# Patient Record
Sex: MALE | Race: WHITE | NOT HISPANIC OR LATINO | Employment: FULL TIME | URBAN - METROPOLITAN AREA
[De-identification: names, ages, dates, MRNs, and addresses within clinical notes are randomized per-mention and may not be internally consistent; named-entity substitution may affect disease eponyms.]

---

## 2019-09-10 ENCOUNTER — HOSPITAL ENCOUNTER (OUTPATIENT)
Dept: GENERAL RADIOLOGY | Facility: HOSPITAL | Age: 59
Discharge: HOME | End: 2019-09-10
Attending: NURSE PRACTITIONER

## 2023-01-14 ENCOUNTER — APPOINTMENT (OUTPATIENT)
Dept: MRI IMAGING | Facility: HOSPITAL | Age: 63
DRG: 439 | End: 2023-01-14
Payer: COMMERCIAL

## 2023-01-14 ENCOUNTER — APPOINTMENT (OUTPATIENT)
Dept: CT IMAGING | Facility: HOSPITAL | Age: 63
DRG: 439 | End: 2023-01-14
Payer: COMMERCIAL

## 2023-01-14 ENCOUNTER — HOSPITAL ENCOUNTER (INPATIENT)
Facility: HOSPITAL | Age: 63
LOS: 3 days | Discharge: HOME | DRG: 439 | End: 2023-01-17
Attending: EMERGENCY MEDICINE | Admitting: INTERNAL MEDICINE
Payer: COMMERCIAL

## 2023-01-14 DIAGNOSIS — R10.9 ABDOMINAL PAIN, UNSPECIFIED ABDOMINAL LOCATION: ICD-10-CM

## 2023-01-14 DIAGNOSIS — R11.2 NAUSEA AND VOMITING, UNSPECIFIED VOMITING TYPE: ICD-10-CM

## 2023-01-14 DIAGNOSIS — K85.90 ACUTE PANCREATITIS, UNSPECIFIED COMPLICATION STATUS, UNSPECIFIED PANCREATITIS TYPE: Primary | ICD-10-CM

## 2023-01-14 DIAGNOSIS — R79.89 ELEVATED LFTS: ICD-10-CM

## 2023-01-14 LAB
ACETONE BLD QL: NEGATIVE
ALBUMIN SERPL-MCNC: 4.7 G/DL (ref 3.5–5.2)
ALBUMIN/GLOB SERPL: 1.8 G/DL
ALP SERPL-CCNC: 151 U/L (ref 39–117)
ALT SERPL W P-5'-P-CCNC: 334 U/L (ref 1–41)
ANION GAP SERPL CALCULATED.3IONS-SCNC: 9.6 MMOL/L (ref 5–15)
APTT PPP: 26.8 SECONDS (ref 24.2–34.2)
AST SERPL-CCNC: 516 U/L (ref 1–40)
BASOPHILS # BLD AUTO: 0.01 10*3/MM3 (ref 0–0.2)
BASOPHILS NFR BLD AUTO: 0.1 % (ref 0–1.5)
BILIRUB SERPL-MCNC: 1.2 MG/DL (ref 0–1.2)
BILIRUB UR QL STRIP: NEGATIVE
BUN SERPL-MCNC: 23 MG/DL (ref 8–23)
BUN/CREAT SERPL: 22.5 (ref 7–25)
CALCIUM SPEC-SCNC: 9.5 MG/DL (ref 8.6–10.5)
CANCER AG19-9 SERPL-ACNC: 81.4 U/ML
CHLORIDE SERPL-SCNC: 99 MMOL/L (ref 98–107)
CLARITY UR: CLEAR
CO2 SERPL-SCNC: 26.4 MMOL/L (ref 22–29)
COLOR UR: ABNORMAL
CREAT SERPL-MCNC: 1.02 MG/DL (ref 0.76–1.27)
D-LACTATE SERPL-SCNC: 1.8 MMOL/L (ref 0.5–2)
DEPRECATED RDW RBC AUTO: 40 FL (ref 37–54)
EGFRCR SERPLBLD CKD-EPI 2021: 82.6 ML/MIN/1.73
EOSINOPHIL # BLD AUTO: 0.02 10*3/MM3 (ref 0–0.4)
EOSINOPHIL NFR BLD AUTO: 0.2 % (ref 0.3–6.2)
ERYTHROCYTE [DISTWIDTH] IN BLOOD BY AUTOMATED COUNT: 13.2 % (ref 12.3–15.4)
GGT SERPL-CCNC: 493 U/L (ref 8–61)
GLOBULIN UR ELPH-MCNC: 2.6 GM/DL
GLUCOSE BLDC GLUCOMTR-MCNC: 190 MG/DL (ref 70–99)
GLUCOSE BLDC GLUCOMTR-MCNC: 87 MG/DL (ref 70–99)
GLUCOSE SERPL-MCNC: 213 MG/DL (ref 65–99)
GLUCOSE UR STRIP-MCNC: ABNORMAL MG/DL
HCT VFR BLD AUTO: 48.5 % (ref 37.5–51)
HGB BLD-MCNC: 17 G/DL (ref 13–17.7)
HGB UR QL STRIP.AUTO: NEGATIVE
HOLD SPECIMEN: NORMAL
HOLD SPECIMEN: NORMAL
IMM GRANULOCYTES # BLD AUTO: 0.04 10*3/MM3 (ref 0–0.05)
IMM GRANULOCYTES NFR BLD AUTO: 0.4 % (ref 0–0.5)
INR PPP: 1.02 (ref 0.86–1.15)
KETONES UR QL STRIP: NEGATIVE
LEUKOCYTE ESTERASE UR QL STRIP.AUTO: NEGATIVE
LIPASE SERPL-CCNC: 196 U/L (ref 13–60)
LYMPHOCYTES # BLD AUTO: 0.77 10*3/MM3 (ref 0.7–3.1)
LYMPHOCYTES NFR BLD AUTO: 7.9 % (ref 19.6–45.3)
MCH RBC QN AUTO: 29.5 PG (ref 26.6–33)
MCHC RBC AUTO-ENTMCNC: 35.1 G/DL (ref 31.5–35.7)
MCV RBC AUTO: 84.1 FL (ref 79–97)
MONOCYTES # BLD AUTO: 0.46 10*3/MM3 (ref 0.1–0.9)
MONOCYTES NFR BLD AUTO: 4.7 % (ref 5–12)
NEUTROPHILS NFR BLD AUTO: 8.49 10*3/MM3 (ref 1.7–7)
NEUTROPHILS NFR BLD AUTO: 86.7 % (ref 42.7–76)
NITRITE UR QL STRIP: NEGATIVE
NRBC BLD AUTO-RTO: 0 /100 WBC (ref 0–0.2)
PH UR STRIP.AUTO: 6 [PH] (ref 5–8)
PLATELET # BLD AUTO: 161 10*3/MM3 (ref 140–450)
PMV BLD AUTO: 9.4 FL (ref 6–12)
POTASSIUM SERPL-SCNC: 4.4 MMOL/L (ref 3.5–5.2)
PROT SERPL-MCNC: 7.3 G/DL (ref 6–8.5)
PROT UR QL STRIP: ABNORMAL
PROTHROMBIN TIME: 13.5 SECONDS (ref 11.8–14.9)
RBC # BLD AUTO: 5.77 10*6/MM3 (ref 4.14–5.8)
SODIUM SERPL-SCNC: 135 MMOL/L (ref 136–145)
SP GR UR STRIP: 1.02 (ref 1–1.03)
UROBILINOGEN UR QL STRIP: ABNORMAL
WBC NRBC COR # BLD: 9.79 10*3/MM3 (ref 3.4–10.8)
WHOLE BLOOD HOLD COAG: NORMAL
WHOLE BLOOD HOLD SPECIMEN: NORMAL

## 2023-01-14 PROCEDURE — 82977 ASSAY OF GGT: CPT | Performed by: INTERNAL MEDICINE

## 2023-01-14 PROCEDURE — 74183 MRI ABD W/O CNTR FLWD CNTR: CPT

## 2023-01-14 PROCEDURE — 86301 IMMUNOASSAY TUMOR CA 19-9: CPT | Performed by: INTERNAL MEDICINE

## 2023-01-14 PROCEDURE — 0 IOPAMIDOL PER 1 ML: Performed by: EMERGENCY MEDICINE

## 2023-01-14 PROCEDURE — 82962 GLUCOSE BLOOD TEST: CPT

## 2023-01-14 PROCEDURE — 99284 EMERGENCY DEPT VISIT MOD MDM: CPT

## 2023-01-14 PROCEDURE — 25010000002 ENOXAPARIN PER 10 MG: Performed by: INTERNAL MEDICINE

## 2023-01-14 PROCEDURE — 85025 COMPLETE CBC W/AUTO DIFF WBC: CPT | Performed by: EMERGENCY MEDICINE

## 2023-01-14 PROCEDURE — 94799 UNLISTED PULMONARY SVC/PX: CPT

## 2023-01-14 PROCEDURE — 0 GADOBENATE DIMEGLUMINE 529 MG/ML SOLUTION: Performed by: EMERGENCY MEDICINE

## 2023-01-14 PROCEDURE — 83605 ASSAY OF LACTIC ACID: CPT | Performed by: EMERGENCY MEDICINE

## 2023-01-14 PROCEDURE — 83690 ASSAY OF LIPASE: CPT | Performed by: EMERGENCY MEDICINE

## 2023-01-14 PROCEDURE — 63710000001 INSULIN REGULAR HUMAN PER 5 UNITS: Performed by: INTERNAL MEDICINE

## 2023-01-14 PROCEDURE — 80053 COMPREHEN METABOLIC PANEL: CPT | Performed by: EMERGENCY MEDICINE

## 2023-01-14 PROCEDURE — 82009 KETONE BODYS QUAL: CPT

## 2023-01-14 PROCEDURE — 85610 PROTHROMBIN TIME: CPT | Performed by: INTERNAL MEDICINE

## 2023-01-14 PROCEDURE — 36415 COLL VENOUS BLD VENIPUNCTURE: CPT

## 2023-01-14 PROCEDURE — 74181 MRI ABDOMEN W/O CONTRAST: CPT

## 2023-01-14 PROCEDURE — 81003 URINALYSIS AUTO W/O SCOPE: CPT | Performed by: EMERGENCY MEDICINE

## 2023-01-14 PROCEDURE — A9577 INJ MULTIHANCE: HCPCS | Performed by: EMERGENCY MEDICINE

## 2023-01-14 PROCEDURE — 99223 1ST HOSP IP/OBS HIGH 75: CPT | Performed by: INTERNAL MEDICINE

## 2023-01-14 PROCEDURE — 87086 URINE CULTURE/COLONY COUNT: CPT | Performed by: INTERNAL MEDICINE

## 2023-01-14 PROCEDURE — 63710000001 INSULIN REGULAR HUMAN PER 5 UNITS

## 2023-01-14 PROCEDURE — 74177 CT ABD & PELVIS W/CONTRAST: CPT

## 2023-01-14 PROCEDURE — 85730 THROMBOPLASTIN TIME PARTIAL: CPT | Performed by: INTERNAL MEDICINE

## 2023-01-14 RX ORDER — NICOTINE POLACRILEX 4 MG
15 LOZENGE BUCCAL
Status: DISCONTINUED | OUTPATIENT
Start: 2023-01-14 | End: 2023-01-17 | Stop reason: HOSPADM

## 2023-01-14 RX ORDER — SODIUM CHLORIDE 0.9 % (FLUSH) 0.9 %
10 SYRINGE (ML) INJECTION AS NEEDED
Status: DISCONTINUED | OUTPATIENT
Start: 2023-01-14 | End: 2023-01-17 | Stop reason: HOSPADM

## 2023-01-14 RX ORDER — SODIUM CHLORIDE, SODIUM LACTATE, POTASSIUM CHLORIDE, CALCIUM CHLORIDE 600; 310; 30; 20 MG/100ML; MG/100ML; MG/100ML; MG/100ML
75 INJECTION, SOLUTION INTRAVENOUS CONTINUOUS
Status: DISCONTINUED | OUTPATIENT
Start: 2023-01-14 | End: 2023-01-17

## 2023-01-14 RX ORDER — CALCIUM CARBONATE 200(500)MG
1 TABLET,CHEWABLE ORAL 2 TIMES DAILY PRN
Status: DISCONTINUED | OUTPATIENT
Start: 2023-01-14 | End: 2023-01-17 | Stop reason: HOSPADM

## 2023-01-14 RX ORDER — ROSUVASTATIN CALCIUM 10 MG/1
10 TABLET, COATED ORAL DAILY
COMMUNITY
Start: 2022-10-19 | End: 2023-01-17

## 2023-01-14 RX ORDER — ENOXAPARIN SODIUM 100 MG/ML
40 INJECTION SUBCUTANEOUS EVERY 24 HOURS
Status: DISCONTINUED | OUTPATIENT
Start: 2023-01-14 | End: 2023-01-17 | Stop reason: HOSPADM

## 2023-01-14 RX ORDER — DULAGLUTIDE 0.75 MG/.5ML
0.75 INJECTION, SOLUTION SUBCUTANEOUS WEEKLY
COMMUNITY
End: 2023-01-17 | Stop reason: HOSPADM

## 2023-01-14 RX ORDER — SODIUM CHLORIDE 0.9 % (FLUSH) 0.9 %
10 SYRINGE (ML) INJECTION EVERY 12 HOURS SCHEDULED
Status: DISCONTINUED | OUTPATIENT
Start: 2023-01-14 | End: 2023-01-17 | Stop reason: HOSPADM

## 2023-01-14 RX ORDER — ONDANSETRON 4 MG/1
4 TABLET, FILM COATED ORAL EVERY 6 HOURS PRN
Status: DISCONTINUED | OUTPATIENT
Start: 2023-01-14 | End: 2023-01-17 | Stop reason: HOSPADM

## 2023-01-14 RX ORDER — ACETAMINOPHEN 325 MG/1
650 TABLET ORAL EVERY 4 HOURS PRN
Status: DISCONTINUED | OUTPATIENT
Start: 2023-01-14 | End: 2023-01-17 | Stop reason: HOSPADM

## 2023-01-14 RX ORDER — SODIUM CHLORIDE 9 MG/ML
40 INJECTION, SOLUTION INTRAVENOUS AS NEEDED
Status: DISCONTINUED | OUTPATIENT
Start: 2023-01-14 | End: 2023-01-17 | Stop reason: HOSPADM

## 2023-01-14 RX ORDER — DEXTROSE MONOHYDRATE 25 G/50ML
25 INJECTION, SOLUTION INTRAVENOUS
Status: DISCONTINUED | OUTPATIENT
Start: 2023-01-14 | End: 2023-01-17 | Stop reason: HOSPADM

## 2023-01-14 RX ORDER — LISINOPRIL 2.5 MG/1
2.5 TABLET ORAL DAILY
Status: ON HOLD | COMMUNITY
Start: 2023-01-11 | End: 2023-03-03

## 2023-01-14 RX ADMIN — INSULIN HUMAN 7 UNITS: 100 INJECTION, SOLUTION PARENTERAL at 09:34

## 2023-01-14 RX ADMIN — ENOXAPARIN SODIUM 40 MG: 100 INJECTION SUBCUTANEOUS at 14:31

## 2023-01-14 RX ADMIN — IOPAMIDOL 100 ML: 755 INJECTION, SOLUTION INTRAVENOUS at 08:26

## 2023-01-14 RX ADMIN — GADOBENATE DIMEGLUMINE 14 ML: 529 INJECTION, SOLUTION INTRAVENOUS at 13:07

## 2023-01-14 RX ADMIN — SODIUM CHLORIDE, POTASSIUM CHLORIDE, SODIUM LACTATE AND CALCIUM CHLORIDE 75 ML/HR: 600; 310; 30; 20 INJECTION, SOLUTION INTRAVENOUS at 13:57

## 2023-01-14 RX ADMIN — SODIUM CHLORIDE 1000 ML: 9 INJECTION, SOLUTION INTRAVENOUS at 09:34

## 2023-01-14 RX ADMIN — INSULIN HUMAN 2 UNITS: 100 INJECTION, SOLUTION PARENTERAL at 17:31

## 2023-01-14 NOTE — PLAN OF CARE
Goal Outcome Evaluation:  Plan of Care Reviewed With: patient, spouse        Progress: no change  Outcome Evaluation: new admit from ED, vss, no n/v, NPO

## 2023-01-14 NOTE — PAYOR COMM NOTE
"Jonas Hudson (62 y.o. Male)     Date of Birth   1960    Social Security Number       Address   [address] [place] [address]    Home Phone   [phone]    MRN   [id number]       Voodoo   None    Marital Status                               Admission Date   1/14/23    Admission Type   Elective    Admitting Provider   Fabian Mccloud MD    Attending Provider   Sean Goldberg DO    Department, Room/Bed   [facility] EMERGENCY ROOM, NBA/NBA       Discharge Date       Discharge Disposition       Discharge Destination                               Attending Provider: Sean Goldberg DO    Allergies: Tyloxapol    Isolation: None   Infection: None   Code Status: CPR    Ht: 160 cm (63\")   Wt: 72.3 kg (159 lb 6.3 oz)    Admission Cmt: None   Principal Problem: Acute pancreatitis, unspecified complication status, unspecified pancreatitis type [K85.90]                 Active Insurance as of 1/14/2023     Primary Coverage     Payor Plan Insurance Group Employer/Plan Group    [employer]UE [employer] BLUE [employer] PPO [id number]     Payor Plan Address Payor Plan Phone Number Payor Plan Fax Number Effective Dates    [address] [phone]  1/1/2021 - None Entered    [facility] [id number]       Subscriber Name Subscriber Birth Date Member ID       JONAS HUDSON 1960 [id number]                 Emergency Contacts      (Rel.) Home Phone Work Phone Mobile Phone    CYNTHIA HUDSON (Spouse) [phone] -- [phone]            History & Physical    No notes of this type exist for this encounter.            Emergency Department Notes      Senait Hudson RN at 01/14/23 0715        Patient states that he has been having abdominal pain for over a week, patent states that last night he ate some chicken wings and his abdominal pain got increasingly worse then It has been all week, patient states he went to the doctors Wednesday  for this abdominal pain " and his PCP told him if the pain increased to go to the ER for evaluation     Electronically signed by Senait Steinberg RN at 01/14/23 0717     Gracie Vázquez APRN at 01/14/23 0717          Time: 7:17 AM EST  Date of encounter:  1/14/2023  Independent Historian/Clinical History and Information was obtained by:   Patient  Chief Complaint: abdominal pain     History is limited by: N/A    History of Present Illness:  Patient is a 62 y.o. year old male who presents to the emergency department for evaluation of left-sided upper and lower abdominal pain.  Patient states Friday he developed pain to the point where he cannot go to work which is not like him.  He is also had nausea and vomiting.  Prior to arriving his pain was a 10 however he states he became ill and vomited and that relieved his pain and is now 0.  He denies any constipation or diarrhea.  He has had no change in his urinary habits such as frequency and urgency and he has had no fever or chills.    HPI    Patient Care Team  Primary Care Provider: Provider, No Known    Past Medical History:     Allergies   Allergen Reactions   • Tyloxapol Swelling     Past Medical History:   Diagnosis Date   • Acid reflux    • Diabetes mellitus (HCC)    • Hyperlipidemia      Past Surgical History:   Procedure Laterality Date   • HERNIA REPAIR     • HERNIA REPAIR     • SHOULDER SURGERY Bilateral      History reviewed. No pertinent family history.    Home Medications:  Prior to Admission medications    Medication Sig Start Date End Date Taking? Authorizing Provider   fluticasone (FLONASE) 50 MCG/ACT nasal spray 1 spray by Each Nare route Daily As Needed for Rhinitis or Allergies for up to 30 days. 3/14/22 4/13/22  Trinh Gilliland MD        Social History:   Social History     Tobacco Use   • Smoking status: Never   • Smokeless tobacco: Never   Vaping Use   • Vaping Use: Never used   Substance Use Topics   • Alcohol use: Never   • Drug use: Never         Review of  "Systems:  Review of Systems   Constitutional: Negative for chills and fever.   HENT: Negative for congestion, ear pain and sore throat.    Eyes: Negative for pain.   Respiratory: Negative for cough, chest tightness and shortness of breath.    Cardiovascular: Negative for chest pain.   Gastrointestinal: Positive for abdominal pain, nausea and vomiting. Negative for abdominal distention and diarrhea.   Genitourinary: Negative for difficulty urinating, flank pain, frequency, hematuria and urgency.   Musculoskeletal: Negative for joint swelling.   Skin: Negative for pallor.   Neurological: Negative for seizures and headaches.   All other systems reviewed and are negative.       Physical Exam:  /81   Pulse 102   Temp 98 °F (36.7 °C) (Oral)   Resp 16   Ht 160 cm (63\")   Wt 72.3 kg (159 lb 6.3 oz)   SpO2 98%   BMI 28.24 kg/m²     Physical Exam  Vitals and nursing note reviewed.   Constitutional:       General: He is not in acute distress.     Appearance: Normal appearance. He is not toxic-appearing.   HENT:      Head: Normocephalic and atraumatic.      Mouth/Throat:      Mouth: Mucous membranes are moist.   Eyes:      General: No scleral icterus.  Cardiovascular:      Rate and Rhythm: Normal rate and regular rhythm.      Pulses: Normal pulses.      Heart sounds: Normal heart sounds.   Pulmonary:      Effort: Pulmonary effort is normal. No respiratory distress.      Breath sounds: Normal breath sounds. No stridor. No wheezing, rhonchi or rales.   Chest:      Chest wall: No tenderness.   Abdominal:      General: Abdomen is flat. There is no distension.      Palpations: Abdomen is soft.      Tenderness: There is abdominal tenderness in the left upper quadrant and left lower quadrant. There is guarding.   Musculoskeletal:         General: Normal range of motion.      Cervical back: Normal range of motion and neck supple.   Skin:     General: Skin is warm and dry.      Coloration: Skin is not cyanotic, jaundiced, " mottled or pale.      Findings: No erythema or rash.   Neurological:      Mental Status: He is alert and oriented to person, place, and time. Mental status is at baseline.                 Procedures:  Procedures      Medical Decision Making:      Comorbidities that affect care:    Diabetes    External Notes reviewed:    Previous Clinic Note      The following orders were placed and all results were independently analyzed by me:  Orders Placed This Encounter   Procedures   • CT Abdomen Pelvis With Contrast   • MRI abdomen wo contrast mrcp   • MRI Abdomen With & Without Contrast   • [facility] Draw   • Comprehensive Metabolic Panel   • Lipase   • Urinalysis With Microscopic If Indicated (No Culture) - Urine, Clean Catch   • Lactic Acid, Plasma   • CBC Auto Differential   • Acetone   • Cancer Antigen 19-9   • NPO Diet NPO Type: Strict NPO   • Undress & Gown   • Code Status and Medical Interventions:   • IP General Consult (Use specialty-specific consult if known)   • IP General Consult (Use specialty-specific consult if known)   • POC Glucose Once   • Insert Peripheral IV   • Inpatient Admission   • CBC & Differential   • Green Top (Gel)   • Lavender Top   • Gold Top - SST   • Light Blue Top       Medications Given in the Emergency Department:  Medications   sodium chloride 0.9 % flush 10 mL (has no administration in time range)   gadobenate dimeglumine (MULTIHANCE) injection 15 mL (has no administration in time range)   iopamidol (ISOVUE-370) 76 % injection 100 mL (100 mL Intravenous Given 1/14/23 0826)   sodium chloride 0.9 % bolus 1,000 mL (0 mL Intravenous Stopped 1/14/23 1206)   insulin regular (humuLIN R,novoLIN R) injection 7 Units (7 Units Intravenous Given 1/14/23 0934)        ED Course:    ED Course as of 01/14/23 1220   Sat Jan 14, 2023   0951 Hospitalist paged for consult for possible admission for acute pancreatitis.   [MS]   1006 Order placed to consult with GI at this time per hospitalist request [MS]    1015 Spoke with Dr. Pelletier who has agreed to consult on pt and requested that pt have an MRI with and without, as well as a MRCP.  This was relayed to .  Dr. Mccloud advises that he will reach out to Dr. Pelletier and be down to see the pt.     I have also discussed this pt with ER attending Dr. Goldberg. [MS]      ED Course User Index  [MS] Gracie Vázquez, APRN       Labs:    Lab Results (last 24 hours)     Procedure Component Value Units Date/Time    CBC & Differential [[id number]]  (Abnormal) Collected: 01/14/23 0700    Specimen: Blood Updated: 01/14/23 0718    Narrative:      The following orders were created for panel order CBC & Differential.  Procedure                               Abnormality         Status                     ---------                               -----------         ------                     CBC Auto Differential[[id number]]        Abnormal            Final result                 Please view results for these tests on the individual orders.    Comprehensive Metabolic Panel [[id number]]  (Abnormal) Collected: 01/14/23 0700    Specimen: Blood Updated: 01/14/23 0733     Glucose 213 mg/dL      BUN 23 mg/dL      Creatinine 1.02 mg/dL      Sodium 135 mmol/L      Potassium 4.4 mmol/L      Chloride 99 mmol/L      CO2 26.4 mmol/L      Calcium 9.5 mg/dL      Total Protein 7.3 g/dL      Albumin 4.7 g/dL      ALT (SGPT) 334 U/L      AST (SGOT) 516 U/L      Alkaline Phosphatase 151 U/L      Total Bilirubin 1.2 mg/dL      Globulin 2.6 gm/dL      A/G Ratio 1.8 g/dL      BUN/Creatinine Ratio 22.5     Anion Gap 9.6 mmol/L      eGFR 82.6 mL/min/1.73      Comment: National Kidney Foundation and American Society of Nephrology (ASN) Task Force recommended calculation based on the Chronic Kidney Disease Epidemiology Collaboration (CKD-EPI) equation refit without adjustment for race.       Narrative:      GFR Normal >60  Chronic Kidney Disease <60  Kidney Failure <15      Lipase [[id number]]   (Abnormal) Collected: 01/14/23 0700    Specimen: Blood Updated: 01/14/23 0733     Lipase 196 U/L     Lactic Acid, Plasma [[id number]]  (Normal) Collected: 01/14/23 0700    Specimen: Blood Updated: 01/14/23 0729     Lactate 1.8 mmol/L     CBC Auto Differential [[id number]]  (Abnormal) Collected: 01/14/23 0700    Specimen: Blood Updated: 01/14/23 0718     WBC 9.79 10*3/mm3      RBC 5.77 10*6/mm3      Hemoglobin 17.0 g/dL      Hematocrit 48.5 %      MCV 84.1 fL      MCH 29.5 pg      MCHC 35.1 g/dL      RDW 13.2 %      RDW-SD 40.0 fl      MPV 9.4 fL      Platelets 161 10*3/mm3      Neutrophil % 86.7 %      Lymphocyte % 7.9 %      Monocyte % 4.7 %      Eosinophil % 0.2 %      Basophil % 0.1 %      Immature Grans % 0.4 %      Neutrophils, Absolute 8.49 10*3/mm3      Lymphocytes, Absolute 0.77 10*3/mm3      Monocytes, Absolute 0.46 10*3/mm3      Eosinophils, Absolute 0.02 10*3/mm3      Basophils, Absolute 0.01 10*3/mm3      Immature Grans, Absolute 0.04 10*3/mm3      nRBC 0.0 /100 WBC     Acetone [[id number]]  (Normal) Collected: 01/14/23 0700    Specimen: Blood Updated: 01/14/23 0937     Acetone Negative    Urinalysis With Microscopic If Indicated (No Culture) - Urine, Clean Catch [[id number]]  (Abnormal) Collected: 01/14/23 0824    Specimen: Urine, Clean Catch Updated: 01/14/23 0842     Color, UA Dark Yellow     Appearance, UA Clear     pH, UA 6.0     Specific Gravity, UA 1.024     Glucose,  mg/dL (2+)     Ketones, UA Negative     Bilirubin, UA Negative     Blood, UA Negative     Protein, UA Trace     Leuk Esterase, UA Negative     Nitrite, UA Negative     Urobilinogen, UA 2.0 E.U./dL    Narrative:      Urine microscopic not indicated.    POC Glucose Once [[id number]]  (Normal) Collected: 01/14/23 1015    Specimen: Blood Updated: 01/14/23 1016     Glucose 87 mg/dL      Comment: Serial Number: [id number]:  [id number]       Cancer Antigen 19-9 [[id number]] Collected: 01/14/23 1128    Specimen: Blood Updated: 01/14/23  1131           Imaging:    CT Abdomen Pelvis With Contrast    Result Date: 1/14/2023  PROCEDURE: CT ABDOMEN PELVIS W CONTRAST  COMPARISON: [facility], CT, ABDOMEN/PELVIS WITH CONTRAST, 9/10/2019, 13:52.  INDICATIONS: left upper and lower abdominal pain with vomiting  TECHNIQUE: After obtaining the patient's consent, CT images were created with non-ionic intravenous contrast material.   PROTOCOL:   Standard imaging protocol performed    RADIATION:   DLP: 625.8 mGy*cm   Automated exposure control was utilized to minimize radiation dose. CONTRAST: 100 cc Isovue 370 I.V. LABS:   eGFR: >60 ml/min/1.73m2  FINDINGS:    Lung bases:  Lung bases are grossly clear.  There is no free air noted below the diaphragm.  Organs:  The liver, gallbladder, spleen and adrenal glands are unremarkable in appearance question a subtle hypervascular lesion within the tail of the pancreas measuring approximately 1.9 by 1.4 cm.  Question of a similar region on the previous study without great change from comparison.  No acute intra-abdominal or intrapelvic abnormality noted low-attenuation lesion compatible with a cyst noted within the inferior pole right kidney.  Cortical scarring noted left kidney  GI tract:  The stomach and small bowel are unremarkable in appearance.  Ileocecal valve is grossly unremarkable in appearance.  The appendix is visualized and appears within normal limits.  No suspicious mesenteric adenopathy or fluid collections are noted.  The colon is incompletely distended and grossly unremarkable in appearance  Pelvis:  Urinary bladder, prostate and pelvic structures demonstrate no acute abnormality.  Postsurgical changes from right inguinal hernia repair noted  Retroperitoneum:  The aorta is normal in caliber.  There is no suspicious retroperitoneal adenopathy  Bones and soft tissues:  No destructive bone lesion noted        1. Incomplete distension of the colon without evidence of acute inflammatory  change. 2. No acute intra-abdominal or intrapelvic abnormality noted 3. Question of a possible hypervascular solid lesion within the pancreas.  Nonemergent MRI abdomen with and without contrast recommended to further evaluate on an outpatient basis.     RUPERTO NOGUEIRA MD       Electronically Signed and Approved By: RUPERTO NOGUEIRA MD on 1/14/2023 at 9:10                 Differential Diagnosis and Discussion:    Abdominal Pain: Based on the patient's signs and symptoms, I considered abdominal aortic aneurysm, small bowel obstruction, pancreatitis, acute cholecystitis, acute appendecitis, peptic ulcer disease, gastritis, colitis, endocrine disorders, irritable bowel syndrome and other differential diagnosis an etiology of the patient's abdominal pain.    All labs were reviewed and analyzed by me.  CT scan radiology interpretation was reviewed by me.    MDM  Number of Diagnoses or Management Options  Abdominal pain, unspecified abdominal location: new and does not require workup  Acute pancreatitis, unspecified complication status, unspecified pancreatitis type: new and requires workup  Elevated LFTs: new and requires workup  Nausea and vomiting, unspecified vomiting type: new and does not require workup  Diagnosis management comments: Patient is a 62-year-old male that presented to the emergency department today with left sided upper and lower abdominal pain.  Patient has had pain since Friday.  He is also had nausea and vomiting but denies any diarrhea or constipation.  He has been afebrile.  CT was completed and did show patient to have a highly vascular lesion noted to his pancreas.  Additionally he was noted to have elevated LFTs, lipase, and alk phos -all which are consistent for acute onset pancreatitis which she has no history of.  He is also a recently, less than a year, new diabetic.  I consulted with ER attending Dr. Goldberg and then consulted with hospitalist Dr. Mccloud who agreed to admit the patient  once on-call GI specialist Dr. Pelletier was consulted which I did.       Amount and/or Complexity of Data Reviewed  Clinical lab tests: reviewed and ordered  Tests in the radiology section of CPT®: reviewed and ordered  Review and summarize past medical records: yes (I have personally reviewed patient's previous medical encounters.  )  Discuss the patient with other providers: yes (I discussed this patient with ER attending Dr. Goldberg.  I then discussed the patient with hospitalist Dr. Mccloud who agreed to admit the patient.  GI specialist Dr. Pelletier was also consulted who will round and consult with patient once admitted to the hospital)    Risk of Complications, Morbidity, and/or Mortality  Presenting problems: moderate  Diagnostic procedures: low  Management options: moderate    Patient Progress  Patient progress: stable           Patient Care Considerations:    MRI: I considered ordering an MRI however Patient is to be admitted and MRI as well as ERCP to be completed once admitted       Consultants/Shared Management Plan:    Hospitalist: I have discussed the case with Dr. Mccloud who agrees to accept the patient for admission.  Consultant: I have discussed the case with Dr. Pelletier who agrees to consult on the patient.I also discussed this pt with ER attending Dr. Goldberg.    Social Determinants of Health:    Patient is independent, reliable, and has access to care.       Disposition and Care Coordination:    Admit:   Through independent evaluation of the patient's history, physical, and imperical data, the patient meets criteria for observation/admission to the hospital.        Final diagnoses:   Acute pancreatitis, unspecified complication status, unspecified pancreatitis type   Elevated LFTs   Abdominal pain, unspecified abdominal location   Nausea and vomiting, unspecified vomiting type        ED Disposition     ED Disposition   Decision to Admit    Condition   --    Comment   Level of Care: Med/Surg [1]    Diagnosis: Acute pancreatitis, unspecified complication status, unspecified pancreatitis type [[id number]]   Admitting Physician: AL STOUT [[id number]]   Attending Physician: AL STOUT [[id number]]   Isolate for COVID?: No [0]   Certification: I Certify That Inpatient Hospital Services Are Medically Necessary For Greater Than 2 Midnights               This medical record created using voice recognition software.           Gracie Vázquez, APRLAWRENCE  01/14/23 1220      Electronically signed by Gracie Vázquez, APRN at 01/14/23 1220       Vital Signs (last day)     Date/Time Temp Temp src Pulse Resp BP Patient Position SpO2    01/14/23 0932 -- -- 102 -- -- -- 98    01/14/23 0922 -- -- 106 -- -- -- 99    01/14/23 0912 -- -- 97 -- -- -- 100    01/14/23 0812 -- -- 93 -- -- -- 99    01/14/23 0802 -- -- 95 -- -- -- 100    01/14/23 0752 -- -- 93 -- -- -- 98    01/14/23 0742 -- -- 89 -- -- -- 98    01/14/23 0732 -- -- 92 -- 140/81 -- 99    01/14/23 0648 98 (36.7) Oral 97 16 144/78 Lying 99          Oxygen Therapy (last day)     Date/Time SpO2 Device (Oxygen Therapy) Flow (L/min) Oxygen Concentration (%) ETCO2 (mmHg)    01/14/23 0932 98 -- -- -- --    01/14/23 0922 99 -- -- -- --    01/14/23 0912 100 -- -- -- --    01/14/23 0812 99 -- -- -- --    01/14/23 0802 100 -- -- -- --    01/14/23 0752 98 -- -- -- --    01/14/23 0742 98 -- -- -- --    01/14/23 0732 99 -- -- -- --    01/14/23 0648 99 room air -- -- --            Facility-Administered Medications as of 1/14/2023   Medication Dose Route Frequency Provider Last Rate Last Admin   • gadobenate dimeglumine (MULTIHANCE) injection 15 mL  15 mL Intravenous Once in imaging Sean Goldberg, DO       • [COMPLETED] insulin regular (humuLIN R,novoLIN R) injection 7 Units  7 Units Intravenous Once Gracie Vázquez APRN   7 Units at 01/14/23 0934   • [COMPLETED] iopamidol (ISOVUE-370) 76 % injection 100 mL  100 mL Intravenous Once in imaging Kenneth Zuniga MD   100 mL at  01/14/23 0826   • [COMPLETED] sodium chloride 0.9 % bolus 1,000 mL  1,000 mL Intravenous Once Gracie Vázquez APRN   Stopped at 01/14/23 1206   • sodium chloride 0.9 % flush 10 mL  10 mL Intravenous PRN Kenneth Zuniga MD           Lab Results (last 24 hours)     Procedure Component Value Units Date/Time    Cancer Antigen 19-9 [[id number]] Collected: 01/14/23 1128    Specimen: Blood Updated: 01/14/23 1131    POC Glucose Once [[id number]]  (Normal) Collected: 01/14/23 1015    Specimen: Blood Updated: 01/14/23 1016     Glucose 87 mg/dL      Comment: Serial Number: [id number]:  [id number]       Acetone [[id number]]  (Normal) Collected: 01/14/23 0700    Specimen: Blood Updated: 01/14/23 0937     Acetone Negative    Urinalysis With Microscopic If Indicated (No Culture) - Urine, Clean Catch [[id number]]  (Abnormal) Collected: 01/14/23 0824    Specimen: Urine, Clean Catch Updated: 01/14/23 0842     Color, UA Dark Yellow     Appearance, UA Clear     pH, UA 6.0     Specific Gravity, UA 1.024     Glucose,  mg/dL (2+)     Ketones, UA Negative     Bilirubin, UA Negative     Blood, UA Negative     Protein, UA Trace     Leuk Esterase, UA Negative     Nitrite, UA Negative     Urobilinogen, UA 2.0 E.U./dL    Narrative:      Urine microscopic not indicated.    Comprehensive Metabolic Panel [[id number]]  (Abnormal) Collected: 01/14/23 0700    Specimen: Blood Updated: 01/14/23 0733     Glucose 213 mg/dL      BUN 23 mg/dL      Creatinine 1.02 mg/dL      Sodium 135 mmol/L      Potassium 4.4 mmol/L      Chloride 99 mmol/L      CO2 26.4 mmol/L      Calcium 9.5 mg/dL      Total Protein 7.3 g/dL      Albumin 4.7 g/dL      ALT (SGPT) 334 U/L      AST (SGOT) 516 U/L      Alkaline Phosphatase 151 U/L      Total Bilirubin 1.2 mg/dL      Globulin 2.6 gm/dL      A/G Ratio 1.8 g/dL      BUN/Creatinine Ratio 22.5     Anion Gap 9.6 mmol/L      eGFR 82.6 mL/min/1.73      Comment: National Kidney Foundation and American Society of  Nephrology (ASN) Task Force recommended calculation based on the Chronic Kidney Disease Epidemiology Collaboration (CKD-EPI) equation refit without adjustment for race.       Narrative:      GFR Normal >60  Chronic Kidney Disease <60  Kidney Failure <15      Lipase [[id number]]  (Abnormal) Collected: 01/14/23 0700    Specimen: Blood Updated: 01/14/23 0733     Lipase 196 U/L     Lactic Acid, Plasma [[id number]]  (Normal) Collected: 01/14/23 0700    Specimen: Blood Updated: 01/14/23 0729     Lactate 1.8 mmol/L     CBC & Differential [[id number]]  (Abnormal) Collected: 01/14/23 0700    Specimen: Blood Updated: 01/14/23 0718    Narrative:      The following orders were created for panel order CBC & Differential.  Procedure                               Abnormality         Status                     ---------                               -----------         ------                     CBC Auto Differential[[id number]]        Abnormal            Final result                 Please view results for these tests on the individual orders.    CBC Auto Differential [[id number]]  (Abnormal) Collected: 01/14/23 0700    Specimen: Blood Updated: 01/14/23 0718     WBC 9.79 10*3/mm3      RBC 5.77 10*6/mm3      Hemoglobin 17.0 g/dL      Hematocrit 48.5 %      MCV 84.1 fL      MCH 29.5 pg      MCHC 35.1 g/dL      RDW 13.2 %      RDW-SD 40.0 fl      MPV 9.4 fL      Platelets 161 10*3/mm3      Neutrophil % 86.7 %      Lymphocyte % 7.9 %      Monocyte % 4.7 %      Eosinophil % 0.2 %      Basophil % 0.1 %      Immature Grans % 0.4 %      Neutrophils, Absolute 8.49 10*3/mm3      Lymphocytes, Absolute 0.77 10*3/mm3      Monocytes, Absolute 0.46 10*3/mm3      Eosinophils, Absolute 0.02 10*3/mm3      Basophils, Absolute 0.01 10*3/mm3      Immature Grans, Absolute 0.04 10*3/mm3      nRBC 0.0 /100 WBC     [place] Draw [[id number]] Collected: 01/14/23 0700    Specimen: Blood Updated: 01/14/23 0716    Narrative:      The following orders were created  for panel order [place] Draw.  Procedure                               Abnormality         Status                     ---------                               -----------         ------                     Green Top (Gel)[[id number]]                                  Final result               Lavender Top[[id number]]                                     Final result               Gold Top - SST[[id number]]                                   Final result               Light Blue Top[[id number]]                                   Final result                 Please view results for these tests on the individual orders.    Light Blue Top [[id number]] Collected: 01/14/23 0700    Specimen: Blood Updated: 01/14/23 0716     Extra Tube Hold for add-ons.     Comment: Auto resulted       Lavender Top [[id number]] Collected: 01/14/23 0700    Specimen: Blood Updated: 01/14/23 0716     Extra Tube hold for add-on     Comment: Auto resulted       Green Top (Gel) [[id number]] Collected: 01/14/23 0700    Specimen: Blood Updated: 01/14/23 0715     Extra Tube Hold for add-ons.     Comment: Auto resulted.       Gold Top - SST [[id number]] Collected: 01/14/23 0700    Specimen: Blood Updated: 01/14/23 0715     Extra Tube Hold for add-ons.     Comment: Auto resulted.           Imaging Results (Last 24 Hours)     Procedure Component Value Units Date/Time    MRI Abdomen With & Without Contrast [[id number]] Resulted: 01/14/23 1158     Updated: 01/14/23 1158    MRI abdomen wo contrast mrcp [[id number]] Resulted: 01/14/23 1157     Updated: 01/14/23 1157    CT Abdomen Pelvis With Contrast [[id number]] Collected: 01/14/23 0911     Updated: 01/14/23 0914    Narrative:      PROCEDURE: CT ABDOMEN PELVIS W CONTRAST     COMPARISON: [facility], CT, ABDOMEN/PELVIS WITH CONTRAST, 9/10/2019, 13:52.     INDICATIONS: left upper and lower abdominal pain with vomiting     TECHNIQUE: After obtaining the patient's consent, CT images were created with  non-ionic intravenous   contrast material.       PROTOCOL:   Standard imaging protocol performed      RADIATION:   DLP: 625.8 mGy*cm    Automated exposure control was utilized to minimize radiation dose.   CONTRAST: 100 cc Isovue 370 I.V.  LABS:   eGFR: >60 ml/min/1.73m2     FINDINGS:         Lung bases:  Lung bases are grossly clear.  There is no free air noted below the diaphragm.     Organs:  The liver, gallbladder, spleen and adrenal glands are unremarkable in appearance question   a subtle hypervascular lesion within the tail of the pancreas measuring approximately 1.9 by 1.4   cm.  Question of a similar region on the previous study without great change from comparison.     No acute intra-abdominal or intrapelvic abnormality noted low-attenuation lesion compatible with a   cyst noted within the inferior pole right kidney.  Cortical scarring noted left kidney     GI tract:  The stomach and small bowel are unremarkable in appearance.  Ileocecal valve is grossly   unremarkable in appearance.     The appendix is visualized and appears within normal limits.  No suspicious mesenteric adenopathy   or fluid collections are noted.  The colon is incompletely distended and grossly unremarkable in   appearance     Pelvis:  Urinary bladder, prostate and pelvic structures demonstrate no acute abnormality.    Postsurgical changes from right inguinal hernia repair noted     Retroperitoneum:  The aorta is normal in caliber.  There is no suspicious retroperitoneal   adenopathy     Bones and soft tissues:  No destructive bone lesion noted       Impression:         1. Incomplete distension of the colon without evidence of acute inflammatory change.  2. No acute intra-abdominal or intrapelvic abnormality noted  3. Question of a possible hypervascular solid lesion within the pancreas.  Nonemergent MRI abdomen   with and without contrast recommended to further evaluate on an outpatient basis.           RUPERTO NOGUEIRA MD          Electronically Signed and Approved By: RUPERTO NOGUEIRA MD on 1/14/2023 at 9:10                         ECG/EMG Results (last 24 hours)     ** No results found for the last 24 hours. **        Orders (active)      Start     Ordered    01/14/23 1200  gadobenate dimeglumine (MULTIHANCE) injection 15 mL  Once in Imaging         01/14/23 1158    01/14/23 1154  MRI Abdomen With & Without Contrast  1 Time Imaging         01/14/23 1050    01/14/23 1119  Cancer Antigen 19-9  Once         01/14/23 1118    01/14/23 1058  Inpatient Admission  Once         01/14/23 1059    01/14/23 1058  Code Status and Medical Interventions:  Continuous         01/14/23 1059    01/14/23 1017  MRI abdomen wo contrast mrcp  1 Time Imaging         01/14/23 1017    01/14/23 1004  IP General Consult (Use specialty-specific consult if known)  Once        Provider:  Davon Pelletier MD    01/14/23 1004    01/14/23 0952  IP General Consult (Use specialty-specific consult if known)  Once        Provider:  Fabian Mccloud MD    01/14/23 0952    01/14/23 0648  NPO Diet NPO Type: Strict NPO  Diet Effective Now         01/14/23 0647    01/14/23 0648  Insert Peripheral IV  Once         01/14/23 0647    01/14/23 0647  sodium chloride 0.9 % flush 10 mL  As Needed         01/14/23 0647    01/11/23 0000  lisinopril (PRINIVIL,ZESTRIL) 2.5 MG tablet  Daily         01/14/23 1052    01/10/23 0000  esomeprazole (nexIUM) 20 MG capsule  Every Morning Before Breakfast         01/14/23 1052    10/19/22 0000  rosuvastatin (CRESTOR) 10 MG tablet  Daily         01/14/23 1052    --  Dulaglutide (Trulicity) 0.75 MG/0.5ML solution pen-injector  Weekly         01/14/23 1054    Signed and Held  Inpatient Gastroenterology Consult  Once        Specialty:  Gastroenterology  Provider:  Davon Pelletier MD    Signed and Held    Signed and Held  Vital Signs  Every 4 Hours       Signed and Held    Signed and Held  Notify Provider (With Default Parameters)  Until  Discontinued         Signed and Held    Signed and Held  Intake & Output  Every Shift       Signed and Held    Signed and Held  Weigh Patient  Once         Signed and Held    Signed and Held  Oral Care  2 Times Daily       Signed and Held    Signed and Held  Oxygen Therapy- Nasal Cannula; Titrate for SPO2: 90% - 95%  Continuous         Signed and Held    Signed and Held  CBC Auto Differential  Morning Draw         Signed and Held    Signed and Held  Magnesium  Morning Draw         Signed and Held    Signed and Held  Urinalysis With Culture If Indicated -  Once         Signed and Held    Signed and Held  Insert Peripheral IV  Once         Signed and Held    Signed and Held  Saline Lock & Maintain IV Access  Continuous         Signed and Held    Signed and Held  sodium chloride 0.9 % flush 10 mL  As Needed         Signed and Held    Signed and Held  sodium chloride 0.9 % flush 10 mL  Every 12 Hours Scheduled         Signed and Held    Signed and Held  sodium chloride 0.9 % infusion 40 mL  As Needed         Signed and Held    Signed and Held  acetaminophen (TYLENOL) tablet 650 mg  Every 4 Hours PRN         Signed and Held    Signed and Held  calcium carbonate (TUMS) chewable tablet 500 mg (200 mg elemental)  2 Times Daily PRN         Signed and Held    Signed and Held  ondansetron (ZOFRAN) tablet 4 mg  Every 6 Hours PRN         Signed and Held    Signed and Held  Tobacco Cessation Education  Prior to Discharge         Signed and Held    Signed and Held  Pharmacy to Dose enoxaparin (LOVENOX)  Continuous PRN         Signed and Held    Signed and Held  Comprehensive Metabolic Panel  Morning Draw         Signed and Held    Signed and Held  Gamma GT  Once         Signed and Held    Signed and Held  lactated ringers infusion  Continuous         Signed and Held    Signed and Held  Protime-INR  Once         Signed and Held    Signed and Held  aPTT  Once         Signed and Held

## 2023-01-14 NOTE — LETTER
January 17, 2023     Patient: Devyn Steinberg   YOB: 1960   Date of Visit: 1/14/2023       To Whom It May Concern:    Was under our care from 1/14/23 to 1/17/2023. It is my medical opinion that Devyn Steinberg may return to work on January 23,2023.            Sincerely,        Daksha Jackson RN

## 2023-01-14 NOTE — H&P
[facility]   HOSPITALIST HISTORY AND PHYSICAL  Date: 2023   Patient Name: Devyn Steinberg  : 1960  MRN: [id number]  Primary Care Physician:  Larry, No Known  Date of admission: 2023    Subjective   Subjective     Chief Complaint:   Abdominal pain    HPI:    Devyn Steinberg is a 62 y.o. male with past medical history for diabetes, hyperlipidemia, GERD    Patient presented emergency department with chief complaint of abdominal pain.  Pain has been present for quite some time.  Before the holidays patient contributed this pain to his new Trulicity therefore he discontinued it.  Patient states his abdominal pain slowly improved.  He followed up with his primary care physician where as he informed to restart Trulicity, stated the pain shortly later started.  Patient described the pain as episodic coming and going.  It is not associated with eating.  Patient denies having nausea.  Patient denies having vomiting.  Patient denies any weight loss.  Patient denies any change in his bowel habits.  Patient states the pain is periumbilical with no radiation.  In the emergency department a CT scan was obtained that shows question of possible hypervascular solid lesion within the pancreas, recommendations for MRI.  In the ED patient with no leukocytosis.  A lipase of 196, AST ALT elevated as 516 and 334 respectively.  Patient with a normal total bilirubin, elevated alk phos at 151.  Given lab abnormalities and findings on CT scan hospitalist service was called for admission.  Requested GI learn about patient before admitting.  Recommendations for MRI with contrast along with an MRCP to be performed.        Personal History     Past Medical History:  Diabetes  Hyperlipidemia  GERD    Past Surgical History:  Bilateral inguinal hernia repairs    Family History:   Patient's mother passed from lung cancer, heavy smoker    Social History:   Non-smoker  Rare alcohol use  No illicit drug use    Home  Medications:  Dulaglutide, esomeprazole, lisinopril, and rosuvastatin    Allergies:  Allergies   Allergen Reactions   • Tyloxapol Swelling       Review of Systems   All systems were reviewed and negative except for: No current abdominal pain    Objective   Objective     Vitals:   Temp:  [98 °F (36.7 °C)-98.2 °F (36.8 °C)] 98.2 °F (36.8 °C)  Heart Rate:  [] 127  Resp:  [16-18] 18  BP: (140-144)/(76-81) 141/76    Physical Exam    Constitutional: Awake, alert, no acute distress   Eyes: Pupils equal, sclerae anicteric, no conjunctival injection   HENT: NCAT, mucous membranes moist   Neck: Supple, no thyromegaly, no lymphadenopathy, trachea midline   Respiratory: Clear to auscultation bilaterally, nonlabored respirations    Cardiovascular: RRR, no murmurs, rubs, or gallops, palpable pedal pulses bilaterally   Gastrointestinal: Positive bowel sounds, soft, nontender, nondistended   Musculoskeletal: No bilateral ankle edema, no clubbing or cyanosis to extremities   Psychiatric: Appropriate affect, cooperative   Neurologic: Oriented x 3, strength symmetric in all extremities, Cranial Nerves grossly intact to confrontation, speech clear   Skin: No rashes     Result Review    Result Review:  I have personally reviewed the results from the time of this admission to 1/14/2023 15:13 EST and agree with these findings:  [x]  Laboratory  []  Microbiology  [x]  Radiology  [x]  EKG/Telemetry   []  Cardiology/Vascular   []  Pathology  [x]  Old records  []  Other:      Assessment & Plan   Assessment / Plan     Assessment/Plan:   Pancreatic mass on CT scan  Pancreatitis  Transaminitis  History of hyperlipidemia  Diabetes  GERD    Plan:  Patient to be admitted to the hospital for further care and management  Consulting gastroenterologist, appreciate assistance  Ordering MRCP, ordering MRI of the abdomen with contrast  We will hold patient n.p.o. until seen by gastroenterologist  Providing additional IV fluids for now  Holding  patient's home medications  Sliding scale insulin with hypoglycemia protocol  Patient's symptoms onset associated with the start of Trulicity along with its cessation after stopping medication certainly lead to thoughts of causation.  Trulicity does have a link with pancreatitis as well as gallbladder disease.  We will continue to hold Trulicity while in the hospital. Will likely recommend its discontinuation on discharge, will also discuss with gastroenterologist.    DVT prophylaxis:  Medical DVT prophylaxis orders are present.    CODE STATUS:    Code Status (Patient has no pulse and is not breathing): CPR (Attempt to Resuscitate)  Medical Interventions (Patient has pulse or is breathing): Full Support      Admission Status:  I believe this patient meets inpatient status.    Electronically signed by Fabian Mccloud MD, 01/14/23, 11:12 AM EST.

## 2023-01-14 NOTE — ED PROVIDER NOTES
Time: 7:17 AM EST  Date of encounter:  1/14/2023  Independent Historian/Clinical History and Information was obtained by:   Patient  Chief Complaint: abdominal pain     History is limited by: N/A    History of Present Illness:  Patient is a 62 y.o. year old male who presents to the emergency department for evaluation of left-sided upper and lower abdominal pain.  Patient states Friday he developed pain to the point where he cannot go to work which is not like him.  He is also had nausea and vomiting.  Prior to arriving his pain was a 10 however he states he became ill and vomited and that relieved his pain and is now 0.  He denies any constipation or diarrhea.  He has had no change in his urinary habits such as frequency and urgency and he has had no fever or chills.    HPI    Patient Care Team  Primary Care Provider: Provider, No Known    Past Medical History:     Allergies   Allergen Reactions   • Tyloxapol Swelling     Past Medical History:   Diagnosis Date   • Acid reflux    • Diabetes mellitus (HCC)    • Hyperlipidemia      Past Surgical History:   Procedure Laterality Date   • HERNIA REPAIR     • HERNIA REPAIR     • SHOULDER SURGERY Bilateral      History reviewed. No pertinent family history.    Home Medications:  Prior to Admission medications    Medication Sig Start Date End Date Taking? Authorizing Provider   fluticasone (FLONASE) 50 MCG/ACT nasal spray 1 spray by Each Nare route Daily As Needed for Rhinitis or Allergies for up to 30 days. 3/14/22 4/13/22  Trinh Gilliland MD        Social History:   Social History     Tobacco Use   • Smoking status: Never   • Smokeless tobacco: Never   Vaping Use   • Vaping Use: Never used   Substance Use Topics   • Alcohol use: Never   • Drug use: Never         Review of Systems:  Review of Systems   Constitutional: Negative for chills and fever.   HENT: Negative for congestion, ear pain and sore throat.    Eyes: Negative for pain.   Respiratory: Negative for cough,  "chest tightness and shortness of breath.    Cardiovascular: Negative for chest pain.   Gastrointestinal: Positive for abdominal pain, nausea and vomiting. Negative for abdominal distention and diarrhea.   Genitourinary: Negative for difficulty urinating, flank pain, frequency, hematuria and urgency.   Musculoskeletal: Negative for joint swelling.   Skin: Negative for pallor.   Neurological: Negative for seizures and headaches.   All other systems reviewed and are negative.       Physical Exam:  /81   Pulse 102   Temp 98 °F (36.7 °C) (Oral)   Resp 16   Ht 160 cm (63\")   Wt 72.3 kg (159 lb 6.3 oz)   SpO2 98%   BMI 28.24 kg/m²     Physical Exam  Vitals and nursing note reviewed.   Constitutional:       General: He is not in acute distress.     Appearance: Normal appearance. He is not toxic-appearing.   HENT:      Head: Normocephalic and atraumatic.      Mouth/Throat:      Mouth: Mucous membranes are moist.   Eyes:      General: No scleral icterus.  Cardiovascular:      Rate and Rhythm: Normal rate and regular rhythm.      Pulses: Normal pulses.      Heart sounds: Normal heart sounds.   Pulmonary:      Effort: Pulmonary effort is normal. No respiratory distress.      Breath sounds: Normal breath sounds. No stridor. No wheezing, rhonchi or rales.   Chest:      Chest wall: No tenderness.   Abdominal:      General: Abdomen is flat. There is no distension.      Palpations: Abdomen is soft.      Tenderness: There is abdominal tenderness in the left upper quadrant and left lower quadrant. There is guarding.   Musculoskeletal:         General: Normal range of motion.      Cervical back: Normal range of motion and neck supple.   Skin:     General: Skin is warm and dry.      Coloration: Skin is not cyanotic, jaundiced, mottled or pale.      Findings: No erythema or rash.   Neurological:      Mental Status: He is alert and oriented to person, place, and time. Mental status is at baseline.            "       Procedures:  Procedures      Medical Decision Making:      Comorbidities that affect care:    Diabetes    External Notes reviewed:    Previous Clinic Note      The following orders were placed and all results were independently analyzed by me:  Orders Placed This Encounter   Procedures   • CT Abdomen Pelvis With Contrast   • MRI abdomen wo contrast mrcp   • MRI Abdomen With & Without Contrast   • [place] Draw   • Comprehensive Metabolic Panel   • Lipase   • Urinalysis With Microscopic If Indicated (No Culture) - Urine, Clean Catch   • Lactic Acid, Plasma   • CBC Auto Differential   • Acetone   • Cancer Antigen 19-9   • NPO Diet NPO Type: Strict NPO   • Undress & Gown   • Code Status and Medical Interventions:   • IP General Consult (Use specialty-specific consult if known)   • IP General Consult (Use specialty-specific consult if known)   • POC Glucose Once   • Insert Peripheral IV   • Inpatient Admission   • CBC & Differential   • Green Top (Gel)   • Lavender Top   • Gold Top - SST   • Light Blue Top       Medications Given in the Emergency Department:  Medications   sodium chloride 0.9 % flush 10 mL (has no administration in time range)   gadobenate dimeglumine (MULTIHANCE) injection 15 mL (has no administration in time range)   iopamidol (ISOVUE-370) 76 % injection 100 mL (100 mL Intravenous Given 1/14/23 0826)   sodium chloride 0.9 % bolus 1,000 mL (0 mL Intravenous Stopped 1/14/23 1206)   insulin regular (humuLIN R,novoLIN R) injection 7 Units (7 Units Intravenous Given 1/14/23 0934)        ED Course:    ED Course as of 01/14/23 1220   Sat Jan 14, 2023   0951 Hospitalist paged for consult for possible admission for acute pancreatitis.   [MS]   1006 Order placed to consult with GI at this time per hospitalist request [MS]   1015 Spoke with Dr. Pelletier who has agreed to consult on pt and requested that pt have an MRI with and without, as well as a MRCP.  This was relayed to .  Dr. Mccloud advises  that he will reach out to Dr. Pelletier and be down to see the pt.     I have also discussed this pt with ER attending Dr. Goldberg. [MS]      ED Course User Index  [MS] Gracie Vázquezison, KINJAL       Labs:    Lab Results (last 24 hours)     Procedure Component Value Units Date/Time    CBC & Differential [[id number]]  (Abnormal) Collected: 01/14/23 0700    Specimen: Blood Updated: 01/14/23 0718    Narrative:      The following orders were created for panel order CBC & Differential.  Procedure                               Abnormality         Status                     ---------                               -----------         ------                     CBC Auto Differential[[id number]]        Abnormal            Final result                 Please view results for these tests on the individual orders.    Comprehensive Metabolic Panel [[id number]]  (Abnormal) Collected: 01/14/23 0700    Specimen: Blood Updated: 01/14/23 0733     Glucose 213 mg/dL      BUN 23 mg/dL      Creatinine 1.02 mg/dL      Sodium 135 mmol/L      Potassium 4.4 mmol/L      Chloride 99 mmol/L      CO2 26.4 mmol/L      Calcium 9.5 mg/dL      Total Protein 7.3 g/dL      Albumin 4.7 g/dL      ALT (SGPT) 334 U/L      AST (SGOT) 516 U/L      Alkaline Phosphatase 151 U/L      Total Bilirubin 1.2 mg/dL      Globulin 2.6 gm/dL      A/G Ratio 1.8 g/dL      BUN/Creatinine Ratio 22.5     Anion Gap 9.6 mmol/L      eGFR 82.6 mL/min/1.73      Comment: National Kidney Foundation and American Society of Nephrology (ASN) Task Force recommended calculation based on the Chronic Kidney Disease Epidemiology Collaboration (CKD-EPI) equation refit without adjustment for race.       Narrative:      GFR Normal >60  Chronic Kidney Disease <60  Kidney Failure <15      Lipase [[id number]]  (Abnormal) Collected: 01/14/23 0700    Specimen: Blood Updated: 01/14/23 0733     Lipase 196 U/L     Lactic Acid, Plasma [[id number]]  (Normal) Collected: 01/14/23 0700    Specimen: Blood  Updated: 01/14/23 0729     Lactate 1.8 mmol/L     CBC Auto Differential [[id number]]  (Abnormal) Collected: 01/14/23 0700    Specimen: Blood Updated: 01/14/23 0718     WBC 9.79 10*3/mm3      RBC 5.77 10*6/mm3      Hemoglobin 17.0 g/dL      Hematocrit 48.5 %      MCV 84.1 fL      MCH 29.5 pg      MCHC 35.1 g/dL      RDW 13.2 %      RDW-SD 40.0 fl      MPV 9.4 fL      Platelets 161 10*3/mm3      Neutrophil % 86.7 %      Lymphocyte % 7.9 %      Monocyte % 4.7 %      Eosinophil % 0.2 %      Basophil % 0.1 %      Immature Grans % 0.4 %      Neutrophils, Absolute 8.49 10*3/mm3      Lymphocytes, Absolute 0.77 10*3/mm3      Monocytes, Absolute 0.46 10*3/mm3      Eosinophils, Absolute 0.02 10*3/mm3      Basophils, Absolute 0.01 10*3/mm3      Immature Grans, Absolute 0.04 10*3/mm3      nRBC 0.0 /100 WBC     Acetone [[id number]]  (Normal) Collected: 01/14/23 0700    Specimen: Blood Updated: 01/14/23 0937     Acetone Negative    Urinalysis With Microscopic If Indicated (No Culture) - Urine, Clean Catch [[id number]]  (Abnormal) Collected: 01/14/23 0824    Specimen: Urine, Clean Catch Updated: 01/14/23 0842     Color, UA Dark Yellow     Appearance, UA Clear     pH, UA 6.0     Specific Gravity, UA 1.024     Glucose,  mg/dL (2+)     Ketones, UA Negative     Bilirubin, UA Negative     Blood, UA Negative     Protein, UA Trace     Leuk Esterase, UA Negative     Nitrite, UA Negative     Urobilinogen, UA 2.0 E.U./dL    Narrative:      Urine microscopic not indicated.    POC Glucose Once [[id number]]  (Normal) Collected: 01/14/23 1015    Specimen: Blood Updated: 01/14/23 1016     Glucose 87 mg/dL      Comment: Serial Number: [id number]:  [id number]       Cancer Antigen 19-9 [[id number]] Collected: 01/14/23 1128    Specimen: Blood Updated: 01/14/23 1131           Imaging:    CT Abdomen Pelvis With Contrast    Result Date: 1/14/2023  PROCEDURE: CT ABDOMEN PELVIS W CONTRAST  COMPARISON: [facility], CT,  ABDOMEN/PELVIS WITH CONTRAST, 9/10/2019, 13:52.  INDICATIONS: left upper and lower abdominal pain with vomiting  TECHNIQUE: After obtaining the patient's consent, CT images were created with non-ionic intravenous contrast material.   PROTOCOL:   Standard imaging protocol performed    RADIATION:   DLP: 625.8 mGy*cm   Automated exposure control was utilized to minimize radiation dose. CONTRAST: 100 cc Isovue 370 I.V. LABS:   eGFR: >60 ml/min/1.73m2  FINDINGS:    Lung bases:  Lung bases are grossly clear.  There is no free air noted below the diaphragm.  Organs:  The liver, gallbladder, spleen and adrenal glands are unremarkable in appearance question a subtle hypervascular lesion within the tail of the pancreas measuring approximately 1.9 by 1.4 cm.  Question of a similar region on the previous study without great change from comparison.  No acute intra-abdominal or intrapelvic abnormality noted low-attenuation lesion compatible with a cyst noted within the inferior pole right kidney.  Cortical scarring noted left kidney  GI tract:  The stomach and small bowel are unremarkable in appearance.  Ileocecal valve is grossly unremarkable in appearance.  The appendix is visualized and appears within normal limits.  No suspicious mesenteric adenopathy or fluid collections are noted.  The colon is incompletely distended and grossly unremarkable in appearance  Pelvis:  Urinary bladder, prostate and pelvic structures demonstrate no acute abnormality.  Postsurgical changes from right inguinal hernia repair noted  Retroperitoneum:  The aorta is normal in caliber.  There is no suspicious retroperitoneal adenopathy  Bones and soft tissues:  No destructive bone lesion noted        1. Incomplete distension of the colon without evidence of acute inflammatory change. 2. No acute intra-abdominal or intrapelvic abnormality noted 3. Question of a possible hypervascular solid lesion within the pancreas.  Nonemergent MRI abdomen with and  without contrast recommended to further evaluate on an outpatient basis.     RUPERTO NOGUEIRA MD       Electronically Signed and Approved By: RUPERTO NOGUEIRA MD on 1/14/2023 at 9:10                 Differential Diagnosis and Discussion:    Abdominal Pain: Based on the patient's signs and symptoms, I considered abdominal aortic aneurysm, small bowel obstruction, pancreatitis, acute cholecystitis, acute appendecitis, peptic ulcer disease, gastritis, colitis, endocrine disorders, irritable bowel syndrome and other differential diagnosis an etiology of the patient's abdominal pain.    All labs were reviewed and analyzed by me.  CT scan radiology interpretation was reviewed by me.    MDM  Number of Diagnoses or Management Options  Abdominal pain, unspecified abdominal location: new and does not require workup  Acute pancreatitis, unspecified complication status, unspecified pancreatitis type: new and requires workup  Elevated LFTs: new and requires workup  Nausea and vomiting, unspecified vomiting type: new and does not require workup  Diagnosis management comments: Patient is a 62-year-old male that presented to the emergency department today with left sided upper and lower abdominal pain.  Patient has had pain since Friday.  He is also had nausea and vomiting but denies any diarrhea or constipation.  He has been afebrile.  CT was completed and did show patient to have a highly vascular lesion noted to his pancreas.  Additionally he was noted to have elevated LFTs, lipase, and alk phos -all which are consistent for acute onset pancreatitis which she has no history of.  He is also a recently, less than a year, new diabetic.  I consulted with ER attending Dr. Goldberg and then consulted with hospitalist Dr. Mccloud who agreed to admit the patient once on-call GI specialist Dr. Pelletier was consulted which I did.       Amount and/or Complexity of Data Reviewed  Clinical lab tests: reviewed and ordered  Tests in the  radiology section of CPT®: reviewed and ordered  Review and summarize past medical records: yes (I have personally reviewed patient's previous medical encounters.  )  Discuss the patient with other providers: yes (I discussed this patient with ER attending Dr. Goldberg.  I then discussed the patient with hospitalist Dr. Stout who agreed to admit the patient.  GI specialist Dr. Pelletier was also consulted who will round and consult with patient once admitted to the hospital)    Risk of Complications, Morbidity, and/or Mortality  Presenting problems: moderate  Diagnostic procedures: low  Management options: moderate    Patient Progress  Patient progress: stable           Patient Care Considerations:    MRI: I considered ordering an MRI however Patient is to be admitted and MRI as well as ERCP to be completed once admitted       Consultants/Shared Management Plan:    Hospitalist: I have discussed the case with Dr. Stout who agrees to accept the patient for admission.  Consultant: I have discussed the case with Dr. Pelletier who agrees to consult on the patient.I also discussed this pt with ER attending Dr. Goldberg.    Social Determinants of Health:    Patient is independent, reliable, and has access to care.       Disposition and Care Coordination:    Admit:   Through independent evaluation of the patient's history, physical, and imperical data, the patient meets criteria for observation/admission to the hospital.        Final diagnoses:   Acute pancreatitis, unspecified complication status, unspecified pancreatitis type   Elevated LFTs   Abdominal pain, unspecified abdominal location   Nausea and vomiting, unspecified vomiting type        ED Disposition     ED Disposition   Decision to Admit    Condition   --    Comment   Level of Care: Med/Surg [1]   Diagnosis: Acute pancreatitis, unspecified complication status, unspecified pancreatitis type [[id number]]   Admitting Physician: AL STOUT [[id number]]   Attending  Physician: AL STOUT [[id number]]   Isolate for COVID?: No [0]   Certification: I Certify That Inpatient Hospital Services Are Medically Necessary For Greater Than 2 Midnights               This medical record created using voice recognition software.           Gracie Vázquez, APRN  01/14/23 4086

## 2023-01-14 NOTE — ED NOTES
Patient states that he has been having abdominal pain for over a week, patent states that last night he ate some chicken wings and his abdominal pain got increasingly worse then It has been all week, patient states he went to the doctors Wednesday  for this abdominal pain and his PCP told him if the pain increased to go to the ER for evaluation

## 2023-01-15 PROBLEM — R74.8 ABNORMAL LIVER ENZYMES: Status: ACTIVE | Noted: 2023-01-15

## 2023-01-15 LAB
ALBUMIN SERPL-MCNC: 3.5 G/DL (ref 3.5–5.2)
ALBUMIN/GLOB SERPL: 1.3 G/DL
ALP SERPL-CCNC: 149 U/L (ref 39–117)
ALT SERPL W P-5'-P-CCNC: 690 U/L (ref 1–41)
ANION GAP SERPL CALCULATED.3IONS-SCNC: 10.5 MMOL/L (ref 5–15)
AST SERPL-CCNC: 346 U/L (ref 1–40)
BASOPHILS # BLD AUTO: 0.01 10*3/MM3 (ref 0–0.2)
BASOPHILS NFR BLD AUTO: 0.1 % (ref 0–1.5)
BILIRUB SERPL-MCNC: 6 MG/DL (ref 0–1.2)
BUN SERPL-MCNC: 12 MG/DL (ref 8–23)
BUN/CREAT SERPL: 15.6 (ref 7–25)
CALCIUM SPEC-SCNC: 9.1 MG/DL (ref 8.6–10.5)
CHLORIDE SERPL-SCNC: 104 MMOL/L (ref 98–107)
CO2 SERPL-SCNC: 24.5 MMOL/L (ref 22–29)
CREAT SERPL-MCNC: 0.77 MG/DL (ref 0.76–1.27)
DEPRECATED RDW RBC AUTO: 42.5 FL (ref 37–54)
EGFRCR SERPLBLD CKD-EPI 2021: 100.6 ML/MIN/1.73
EOSINOPHIL # BLD AUTO: 0.06 10*3/MM3 (ref 0–0.4)
EOSINOPHIL NFR BLD AUTO: 0.8 % (ref 0.3–6.2)
ERYTHROCYTE [DISTWIDTH] IN BLOOD BY AUTOMATED COUNT: 13.9 % (ref 12.3–15.4)
GLOBULIN UR ELPH-MCNC: 2.6 GM/DL
GLUCOSE BLDC GLUCOMTR-MCNC: 131 MG/DL (ref 70–99)
GLUCOSE BLDC GLUCOMTR-MCNC: 134 MG/DL (ref 70–99)
GLUCOSE BLDC GLUCOMTR-MCNC: 146 MG/DL (ref 70–99)
GLUCOSE BLDC GLUCOMTR-MCNC: 86 MG/DL (ref 70–99)
GLUCOSE SERPL-MCNC: 129 MG/DL (ref 65–99)
HCT VFR BLD AUTO: 45 % (ref 37.5–51)
HGB BLD-MCNC: 15.4 G/DL (ref 13–17.7)
IMM GRANULOCYTES # BLD AUTO: 0.02 10*3/MM3 (ref 0–0.05)
IMM GRANULOCYTES NFR BLD AUTO: 0.3 % (ref 0–0.5)
LYMPHOCYTES # BLD AUTO: 0.64 10*3/MM3 (ref 0.7–3.1)
LYMPHOCYTES NFR BLD AUTO: 8.1 % (ref 19.6–45.3)
MAGNESIUM SERPL-MCNC: 1.7 MG/DL (ref 1.6–2.4)
MCH RBC QN AUTO: 29 PG (ref 26.6–33)
MCHC RBC AUTO-ENTMCNC: 34.2 G/DL (ref 31.5–35.7)
MCV RBC AUTO: 84.7 FL (ref 79–97)
MONOCYTES # BLD AUTO: 0.64 10*3/MM3 (ref 0.1–0.9)
MONOCYTES NFR BLD AUTO: 8.1 % (ref 5–12)
NEUTROPHILS NFR BLD AUTO: 6.53 10*3/MM3 (ref 1.7–7)
NEUTROPHILS NFR BLD AUTO: 82.6 % (ref 42.7–76)
NRBC BLD AUTO-RTO: 0 /100 WBC (ref 0–0.2)
PLATELET # BLD AUTO: 143 10*3/MM3 (ref 140–450)
PMV BLD AUTO: 9.7 FL (ref 6–12)
POTASSIUM SERPL-SCNC: 3.9 MMOL/L (ref 3.5–5.2)
PROT SERPL-MCNC: 6.1 G/DL (ref 6–8.5)
RBC # BLD AUTO: 5.31 10*6/MM3 (ref 4.14–5.8)
SODIUM SERPL-SCNC: 139 MMOL/L (ref 136–145)
WBC NRBC COR # BLD: 7.9 10*3/MM3 (ref 3.4–10.8)

## 2023-01-15 PROCEDURE — 99233 SBSQ HOSP IP/OBS HIGH 50: CPT | Performed by: INTERNAL MEDICINE

## 2023-01-15 PROCEDURE — 83735 ASSAY OF MAGNESIUM: CPT | Performed by: INTERNAL MEDICINE

## 2023-01-15 PROCEDURE — 82962 GLUCOSE BLOOD TEST: CPT

## 2023-01-15 PROCEDURE — 99222 1ST HOSP IP/OBS MODERATE 55: CPT | Performed by: INTERNAL MEDICINE

## 2023-01-15 PROCEDURE — 80053 COMPREHEN METABOLIC PANEL: CPT | Performed by: INTERNAL MEDICINE

## 2023-01-15 PROCEDURE — 25010000002 ENOXAPARIN PER 10 MG: Performed by: INTERNAL MEDICINE

## 2023-01-15 PROCEDURE — 94799 UNLISTED PULMONARY SVC/PX: CPT

## 2023-01-15 PROCEDURE — 85025 COMPLETE CBC W/AUTO DIFF WBC: CPT | Performed by: INTERNAL MEDICINE

## 2023-01-15 RX ADMIN — Medication 10 ML: at 00:28

## 2023-01-15 RX ADMIN — Medication 10 ML: at 08:44

## 2023-01-15 RX ADMIN — SODIUM CHLORIDE, POTASSIUM CHLORIDE, SODIUM LACTATE AND CALCIUM CHLORIDE 75 ML/HR: 600; 310; 30; 20 INJECTION, SOLUTION INTRAVENOUS at 16:31

## 2023-01-15 RX ADMIN — Medication 10 ML: at 20:46

## 2023-01-15 RX ADMIN — ENOXAPARIN SODIUM 40 MG: 100 INJECTION SUBCUTANEOUS at 14:09

## 2023-01-15 RX ADMIN — SODIUM CHLORIDE, POTASSIUM CHLORIDE, SODIUM LACTATE AND CALCIUM CHLORIDE 75 ML/HR: 600; 310; 30; 20 INJECTION, SOLUTION INTRAVENOUS at 00:23

## 2023-01-15 RX ADMIN — ACETAMINOPHEN 650 MG: 325 TABLET ORAL at 14:13

## 2023-01-15 NOTE — CONSULTS
Patient states that he was diagnosed with diabetes around July of 2022. Patient states that his HbA1c was greater than 9% at that time. 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 Patient explains that he works for [employer] and was able to consume Pepsi all throughout his workday. Patient states he only consumes Diet Pepsi and sugar free Pepsi now.    Patient states that he was previously on Metformin instant release and it caused him GI distress. Discussed the possibility of trying Metformin ER as it tends to cause less GI distress. Provided education on the importance of taking Metformin with food to help alleviate GI distress.     Provided patient with written material, Diabetes Survival Skills, and covered the pertinent information at this time.     Recommend on discharge:    Trial of Metformin ER 500mg BID   normal

## 2023-01-15 NOTE — PLAN OF CARE
Goal Outcome Evaluation:  Plan of Care Reviewed With: patient        Progress: improving     Pt alert and orient x4 blood sugar range .  No insulin adm.      Pt c/o headache, tylenol prn adm as ordered

## 2023-01-15 NOTE — PROGRESS NOTES
[facility]   Hospitalist Progress Note  Date: 1/15/2023  Patient Name: Devyn Steinberg  : 1960  MRN: [id number]  Date of admission: 2023      Subjective   Subjective     Chief Complaint:   Abdominal pain    Summary:   Devyn Steinberg is a 62 y.o. male with past medical history for diabetes, hyperlipidemia, GERD     Patient presented emergency department with chief complaint of abdominal pain.  Pain has been present for quite some time.  Before the holidays patient contributed this pain to his new Trulicity therefore he discontinued it.  Patient states his abdominal pain slowly improved.  He followed up with his primary care physician where as he informed to restart Trulicity, stated the pain shortly later started.  Patient described the pain as episodic coming and going.  It is not associated with eating.  Patient denies having nausea.  Patient denies having vomiting.  Patient denies any weight loss.  Patient denies any change in his bowel habits.  Patient states the pain is periumbilical with no radiation.  In the emergency department a CT scan was obtained that shows question of possible hypervascular solid lesion within the pancreas, recommendations for MRI.  In the ED patient with no leukocytosis.  A lipase of 196, AST ALT elevated as 516 and 334 respectively.  Patient with a normal total bilirubin, elevated alk phos at 151.  Given lab abnormalities and findings on CT scan hospitalist service was called for admission.      Interval Followup:   Patient with no further pain this morning, will start on clear liquid diet.  Patient's LFTs godfrey today as well as his T bili.  However given no pain will start on a clear liquid diet.  Discussed with gastroenterologist    Review of Systems   All systems were reviewed and negative except for: No further abdominal pain    Objective   Objective     Vitals:   Temp:  [97.7 °F (36.5 °C)-98.6 °F (37 °C)] 98 °F (36.7 °C)  Heart Rate:  [] 93  Resp:  [18]  18  BP: (115-141)/(63-86) 137/77  Physical Exam    Constitutional: Awake, alert, no acute distress   Eyes: Pupils equal, sclerae anicteric, no conjunctival injection   HENT: NCAT, mucous membranes moist   Neck: Supple, no thyromegaly, no lymphadenopathy, trachea midline   Respiratory: Clear to auscultation bilaterally, nonlabored respirations    Cardiovascular: RRR, no murmurs, rubs, or gallops, palpable pedal pulses bilaterally   Gastrointestinal: Positive bowel sounds, soft, nontender, nondistended   Musculoskeletal: No bilateral ankle edema, no clubbing or cyanosis to extremities   Psychiatric: Appropriate affect, cooperative   Neurologic: Oriented x 3, strength symmetric in all extremities, Cranial Nerves grossly intact to confrontation, speech clear   Skin: No rashes     Result Review    Result Review:  I have personally reviewed the results from 1/15/2023 and agree with these findings:  [x]  Laboratory  []  Microbiology  [x]  Radiology  [x]  EKG/Telemetry   [x]  Cardiology/Vascular   []  Pathology  [x]  Old records  []  Other:    Assessment & Plan   Assessment / Plan     Assessment/Plan:  Pancreatic mass on CT scan  Pancreatitis  Transaminitis  History of hyperlipidemia  Diabetes  GERD     Plan:  Patient to be admitted to the hospital for further care and management  Consulting gastroenterologist, appreciate assistance  Following discussion with gastroenterologist we will start patient on clear liquid diet today  Following discussion with gastroenterologist, we both agree patient will stay off Trulicity in the future  Started on sliding scale with hypoglycemia protocol  Patient will need an outpatient US with biopsy given lesion on pancreas  Continue to monitor labs, transaminitis and T bili elevated today    Discussed plan with RN, gastroenterologist    DVT prophylaxis:  Medical DVT prophylaxis orders are present.    CODE STATUS:   Code Status (Patient has no pulse and is not breathing): CPR (Attempt to  Resuscitate)  Medical Interventions (Patient has pulse or is breathing): Full Support

## 2023-01-15 NOTE — CONSULTS
[facility]  Initial Inpatient Consult Note    Referring Provider: Hospitalist    Reason for Consultation: Pancreatitis, elevated LFTs, epigastric pain      Subjective     History of present illness:    62 y.o. male with a history of diabetes newly started on insulin in the past several months who was admitted after having 5 days of progressive epigastric discomfort and food intolerance.  On arrival here patient was noted to have elevation of his lipase to around 300 suggestive of pancreatitis.  He had CT scan of the abdomen pelvis yesterday that showed incomplete distention of the colon but no inflammatory changes, and a possible hypervascular solid lesion within the pancreas measuring 1.9 x 1.4 cm in the tail.  Subsequent MRCP/MRI showed gallstones but a normal common bile duct, mild pancreatitis in the head of the pancreas.  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    Patient describes recurrent epigastric discomfort after each injection of Trulicity over the past 15 weeks.  He stopped the medication and his symptoms improved but then restarted the medication a few weeks ago and his symptoms recurred.  His last injection was 1 week ago today and the next day his symptoms started.  He describes epigastric pain radiation through towards his back, nausea, food aversion.  The symptoms worsen to the point of coming to the emergency department yesterday.  He did have 1 episode of nonbloody emesis.  Today feels much improved although with still some he discomfort.  He is not requiring any pain medication.  He would like to eat.    Past Medical History:  Past Medical History:   Diagnosis Date   • Acid reflux    • Diabetes mellitus (HCC)    • Hyperlipidemia      Past Surgical History:  Past Surgical History:   Procedure Laterality Date   • HERNIA REPAIR     • HERNIA REPAIR     • SHOULDER SURGERY Bilateral       Social History:   Social History     Tobacco  Use   • Smoking status: Never   • Smokeless tobacco: Never   Substance Use Topics   • Alcohol use: Never      Family History:  History reviewed. No pertinent family history.    Home Meds:  Medications Prior to Admission   Medication Sig Dispense Refill Last Dose   • esomeprazole (nexIUM) 20 MG capsule Take 20 mg by mouth Every Morning Before Breakfast.      • lisinopril (PRINIVIL,ZESTRIL) 2.5 MG tablet Take 2.5 mg by mouth Daily.      • rosuvastatin (CRESTOR) 10 MG tablet Take 10 mg by mouth Daily.   1/12/2023   • Dulaglutide (Trulicity) 0.75 MG/0.5ML solution pen-injector Inject 0.75 mg under the skin into the appropriate area as directed 1 (One) Time Per Week.   1/8/2023     Current Meds:   enoxaparin, 40 mg, Subcutaneous, Q24H  insulin regular, 2-7 Units, Subcutaneous, Q6H  sodium chloride, 10 mL, Intravenous, Q12H      Allergies:  Allergies   Allergen Reactions   • Tyloxapol Swelling     Review of Systems  Pertinent items are noted in HPI, all other systems reviewed and negative         Vital Signs  Temp:  [97.7 °F (36.5 °C)-98.6 °F (37 °C)] 97.7 °F (36.5 °C)  Heart Rate:  [] 86  Resp:  [18] 18  BP: (115-141)/(63-86) 138/86  Physical Exam:  General Appearance:    Alert, cooperative, in no acute distress   Head:    Normocephalic, without obvious abnormality, atraumatic   Eyes:          conjunctivae and sclerae normal, no   icterus   Throat:   no thrush, oral mucosa moist   Neck:   Supple, no adenopathy   Lungs:     Clear to auscultation bilaterally    Heart:    Regular rhythm and normal rate    Chest Wall:    No abnormalities observed   Abdomen:     Soft, nondistended, nontender; normal bowel sounds   Extremities:   no edema, no redness   Skin:   No bruising or rash   Psychiatric:  normal mood and insight     Results Review:  [x]  Laboratory   [x]  Radiology  []  Pathology      I reviewed the patient's new clinical results.    Results from last 7 days   Lab Units 01/15/23  0600 01/14/23  0700   WBC 10*3/mm3  7.90 9.79   HEMOGLOBIN g/dL 15.4 17.0   HEMATOCRIT % 45.0 48.5   PLATELETS 10*3/mm3 143 161     Results from last 7 days   Lab Units 01/15/23  0600 01/14/23  0700   SODIUM mmol/L 139 135*   POTASSIUM mmol/L 3.9 4.4   CHLORIDE mmol/L 104 99   CO2 mmol/L 24.5 26.4   BUN mg/dL 12 23   CREATININE mg/dL 0.77 1.02   CALCIUM mg/dL 9.1 9.5   BILIRUBIN mg/dL 6.0* 1.2   ALK PHOS U/L 149* 151*   ALT (SGPT) U/L 690* 334*   AST (SGOT) U/L 346* 516*   GLUCOSE mg/dL 129* 213*     Results from last 7 days   Lab Units 01/14/23  0700   INR  1.02     Lab Results   Lab Value Date/Time    LIPASE 196 (H) 01/14/2023 0700       Radiology:  MRI Abdomen With & Without Contrast   Final Result           1. 2 cm pancreatic tail lesion that has imaging characteristics most compatible with    intrapancreatic accessory spleen.  Recommend follow-up CT in 6 months to assure stability       2. Findings suggesting mild pancreatitis       3. Cholelithiasis with no findings of cholecystitis or biliary obstruction                  DEEP KO MD          Electronically Signed and Approved By: DEEP KO MD on 1/14/2023 at 13:46                           MRI abdomen wo contrast mrcp   Final Result       1. Cholelithiasis.  Trace amount of pericholecystic fluid cannot be excluded.  Although this is    felt to be artifactual.   2. Common bile duct is normal in caliber.   3. Mild increased interstitial changes within the head of the pancreas may be artifactual versus    very mild pancreatitis given reported history.   4. Lesion seen on CT is not definitively identified on noncontrast imaging.  Please refer to    additional imaging with contrast .                RUPERTO NOGUEIRA MD          Electronically Signed and Approved By: RUPERTO NOGUEIRA MD on 1/14/2023 at 13:10                           CT Abdomen Pelvis With Contrast   Final Result       1. Incomplete distension of the colon without evidence of acute inflammatory change.   2. No acute  intra-abdominal or intrapelvic abnormality noted   3. Question of a possible hypervascular solid lesion within the pancreas.  Nonemergent MRI abdomen    with and without contrast recommended to further evaluate on an outpatient basis.              RUPERTO NOGUEIRA MD          Electronically Signed and Approved By: RUPERTO NOGUEIRA MD on 1/14/2023 at 9:10                                Assessment & Plan     Patient Active Problem List   Diagnosis   • Acute pancreatitis, unspecified complication status, unspecified pancreatitis type   • Abnormal liver enzymes    pancreatic lesion    Plan:  Patient with acute pancreatitis but also with elevated liver enzymes and gallstones seen by MRCP.  His common bile duct is normal but his LFTs are worsened today.  He does not have leukocytosis, fever, and clinically appears improved this morning.  I suspect that his pancreatitis is related to Trulicity and this has been discontinued.  We will follow his LFTs again tomorrow morning and check PT/INR.  He can have clears today.  He currently is not needing any pain medication.    Regarding the pancreatic lesion seen by MRI I would recommend outpatient endoscopic ultrasound to ensure correct etiology but this can be arranged as an outpatient.      I discussed the patients findings and my recommendations with patient and family.    Davon Pelletier MD

## 2023-01-16 ENCOUNTER — TELEPHONE (OUTPATIENT)
Dept: GASTROENTEROLOGY | Facility: CLINIC | Age: 63
End: 2023-01-16
Payer: COMMERCIAL

## 2023-01-16 DIAGNOSIS — K86.9 PANCREATIC LESION: Primary | ICD-10-CM

## 2023-01-16 LAB
ALBUMIN SERPL-MCNC: 3.6 G/DL (ref 3.5–5.2)
ALBUMIN/GLOB SERPL: 1.3 G/DL
ALP SERPL-CCNC: 161 U/L (ref 39–117)
ALT SERPL W P-5'-P-CCNC: 433 U/L (ref 1–41)
ANION GAP SERPL CALCULATED.3IONS-SCNC: 8.3 MMOL/L (ref 5–15)
AST SERPL-CCNC: 112 U/L (ref 1–40)
BACTERIA SPEC AEROBE CULT: NO GROWTH
BILIRUB SERPL-MCNC: 2.6 MG/DL (ref 0–1.2)
BUN SERPL-MCNC: 9 MG/DL (ref 8–23)
BUN/CREAT SERPL: 10.8 (ref 7–25)
CALCIUM SPEC-SCNC: 9.4 MG/DL (ref 8.6–10.5)
CHLORIDE SERPL-SCNC: 104 MMOL/L (ref 98–107)
CO2 SERPL-SCNC: 25.7 MMOL/L (ref 22–29)
CREAT SERPL-MCNC: 0.83 MG/DL (ref 0.76–1.27)
DEPRECATED RDW RBC AUTO: 42.3 FL (ref 37–54)
EGFRCR SERPLBLD CKD-EPI 2021: 98.3 ML/MIN/1.73
ERYTHROCYTE [DISTWIDTH] IN BLOOD BY AUTOMATED COUNT: 13.7 % (ref 12.3–15.4)
GLOBULIN UR ELPH-MCNC: 2.8 GM/DL
GLUCOSE BLDC GLUCOMTR-MCNC: 110 MG/DL (ref 70–99)
GLUCOSE BLDC GLUCOMTR-MCNC: 119 MG/DL (ref 70–99)
GLUCOSE BLDC GLUCOMTR-MCNC: 130 MG/DL (ref 70–99)
GLUCOSE BLDC GLUCOMTR-MCNC: 162 MG/DL (ref 70–99)
GLUCOSE BLDC GLUCOMTR-MCNC: 87 MG/DL (ref 70–99)
GLUCOSE SERPL-MCNC: 133 MG/DL (ref 65–99)
HCT VFR BLD AUTO: 44.6 % (ref 37.5–51)
HGB BLD-MCNC: 15.3 G/DL (ref 13–17.7)
INR PPP: 1.11 (ref 0.86–1.15)
MCH RBC QN AUTO: 28.9 PG (ref 26.6–33)
MCHC RBC AUTO-ENTMCNC: 34.3 G/DL (ref 31.5–35.7)
MCV RBC AUTO: 84.2 FL (ref 79–97)
PLATELET # BLD AUTO: 137 10*3/MM3 (ref 140–450)
PMV BLD AUTO: 9.4 FL (ref 6–12)
POTASSIUM SERPL-SCNC: 3.9 MMOL/L (ref 3.5–5.2)
PROT SERPL-MCNC: 6.4 G/DL (ref 6–8.5)
PROTHROMBIN TIME: 14.5 SECONDS (ref 11.8–14.9)
RBC # BLD AUTO: 5.3 10*6/MM3 (ref 4.14–5.8)
SODIUM SERPL-SCNC: 138 MMOL/L (ref 136–145)
WBC NRBC COR # BLD: 4.76 10*3/MM3 (ref 3.4–10.8)

## 2023-01-16 PROCEDURE — 82962 GLUCOSE BLOOD TEST: CPT

## 2023-01-16 PROCEDURE — 85027 COMPLETE CBC AUTOMATED: CPT | Performed by: INTERNAL MEDICINE

## 2023-01-16 PROCEDURE — 25010000002 ENOXAPARIN PER 10 MG: Performed by: INTERNAL MEDICINE

## 2023-01-16 PROCEDURE — 63710000001 INSULIN REGULAR HUMAN PER 5 UNITS: Performed by: INTERNAL MEDICINE

## 2023-01-16 PROCEDURE — 80053 COMPREHEN METABOLIC PANEL: CPT | Performed by: INTERNAL MEDICINE

## 2023-01-16 PROCEDURE — 94799 UNLISTED PULMONARY SVC/PX: CPT

## 2023-01-16 PROCEDURE — 99233 SBSQ HOSP IP/OBS HIGH 50: CPT | Performed by: INTERNAL MEDICINE

## 2023-01-16 PROCEDURE — 85610 PROTHROMBIN TIME: CPT | Performed by: INTERNAL MEDICINE

## 2023-01-16 RX ADMIN — SODIUM CHLORIDE, POTASSIUM CHLORIDE, SODIUM LACTATE AND CALCIUM CHLORIDE 75 ML/HR: 600; 310; 30; 20 INJECTION, SOLUTION INTRAVENOUS at 19:50

## 2023-01-16 RX ADMIN — CALCIUM CARBONATE 1 TABLET: 500 TABLET, CHEWABLE ORAL at 19:55

## 2023-01-16 RX ADMIN — SODIUM CHLORIDE, POTASSIUM CHLORIDE, SODIUM LACTATE AND CALCIUM CHLORIDE 75 ML/HR: 600; 310; 30; 20 INJECTION, SOLUTION INTRAVENOUS at 06:17

## 2023-01-16 RX ADMIN — CALCIUM CARBONATE 1 TABLET: 500 TABLET, CHEWABLE ORAL at 04:07

## 2023-01-16 RX ADMIN — INSULIN HUMAN 2 UNITS: 100 INJECTION, SOLUTION PARENTERAL at 12:14

## 2023-01-16 RX ADMIN — ENOXAPARIN SODIUM 40 MG: 100 INJECTION SUBCUTANEOUS at 14:27

## 2023-01-16 NOTE — PLAN OF CARE
Problem: Adult Inpatient Plan of Care  Goal: Plan of Care Review  Outcome: Ongoing, Progressing  Flowsheets (Taken 1/16/2023 1551)  Progress: no change  Plan of Care Reviewed With: patient  Outcome Evaluation: Patient alert and oriented. Patient on room air. Patient up ad mariah. Patient has had no complaints this shift.  Goal: Patient-Specific Goal (Individualized)  Outcome: Ongoing, Progressing  Goal: Absence of Hospital-Acquired Illness or Injury  Outcome: Ongoing, Progressing  Intervention: Identify and Manage Fall Risk  Recent Flowsheet Documentation  Taken 1/16/2023 1215 by Ashanti Rodriguez RN  Safety Promotion/Fall Prevention: safety round/check completed  Taken 1/16/2023 1130 by Ashanti Rodriguez RN  Safety Promotion/Fall Prevention: safety round/check completed  Taken 1/16/2023 0930 by Ashanti Rodriguez RN  Safety Promotion/Fall Prevention: safety round/check completed  Taken 1/16/2023 0715 by Ashanti Rodriguez RN  Safety Promotion/Fall Prevention:   safety round/check completed   assistive device/personal items within reach   clutter free environment maintained   nonskid shoes/slippers when out of bed   room organization consistent   lighting adjusted  Intervention: Prevent and Manage VTE (Venous Thromboembolism) Risk  Recent Flowsheet Documentation  Taken 1/16/2023 0930 by Ashanti Rodriguez RN  Activity Management: up ad mariah  Intervention: Prevent Infection  Recent Flowsheet Documentation  Taken 1/16/2023 0715 by Ashanti Rodriguez RN  Infection Prevention:   cohorting utilized   environmental surveillance performed   hand hygiene promoted   single patient room provided  Goal: Optimal Comfort and Wellbeing  Outcome: Ongoing, Progressing  Intervention: Provide Person-Centered Care  Recent Flowsheet Documentation  Taken 1/16/2023 0930 by Ashanti Rodriguez RN  Trust Relationship/Rapport:   care explained   choices provided   emotional support provided   empathic listening provided   questions answered   questions  encouraged   reassurance provided   thoughts/feelings acknowledged  Goal: Readiness for Transition of Care  Outcome: Ongoing, Progressing     Problem: Pain Acute  Goal: Acceptable Pain Control and Functional Ability  Outcome: Ongoing, Progressing  Intervention: Prevent or Manage Pain  Recent Flowsheet Documentation  Taken 1/16/2023 0715 by Ashanti Rodriguez, RN  Medication Review/Management:   medications reviewed   high-risk medications identified   Goal Outcome Evaluation:  Plan of Care Reviewed With: patient        Progress: no change  Outcome Evaluation: Patient alert and oriented. Patient on room air. Patient up ad mariah. Patient has had no complaints this shift.

## 2023-01-16 NOTE — PROGRESS NOTES
[facility]   Hospitalist Progress Note  Date: 2023  Patient Name: Devyn Steinberg  : 1960  MRN: [id number]  Date of admission: 2023      Subjective   Subjective     Chief Complaint:   Abdominal pain    Summary:   Devyn Steinberg is a 62 y.o. male with past medical history for diabetes, hyperlipidemia, GERD     Patient presented emergency department with chief complaint of abdominal pain.  Pain has been present for quite some time.  Before the holidays patient contributed this pain to his new Trulicity therefore he discontinued it.  Patient states his abdominal pain slowly improved.  He followed up with his primary care physician where as he informed to restart Trulicity, stated the pain shortly later started.  Patient described the pain as episodic coming and going.  It is not associated with eating.  Patient denies having nausea.  Patient denies having vomiting.  Patient denies any weight loss.  Patient denies any change in his bowel habits.  Patient states the pain is periumbilical with no radiation.  In the emergency department a CT scan was obtained that shows question of possible hypervascular solid lesion within the pancreas, recommendations for MRI.  In the ED patient with no leukocytosis.  A lipase of 196, AST ALT elevated as 516 and 334 respectively.  Patient with a normal total bilirubin, elevated alk phos at 151.  Given lab abnormalities and findings on CT scan hospitalist service was called for admission.  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     Interval Followup:   Patient tolerated clear liquid diet yesterday, therefore starting full liquid diet today.  Patient's lab abnormalities are improving.  Case discussed with gastroenterologist    Review of Systems   All systems were reviewed and negative except for: No further abdominal pain, tolerating clear  liquid diet    Objective   Objective     Vitals:   Temp:  [97.5 °F (36.4 °C)-98 °F (36.7 °C)] 97.5 °F (36.4 °C)  Heart Rate:  [81-96] 85  Resp:  [16-18] 18  BP: (115-145)/(62-97) 145/97  Physical Exam    Constitutional: Awake, alert, no acute distress   Eyes: Pupils equal, sclerae anicteric, no conjunctival injection   HENT: NCAT, mucous membranes moist   Neck: Supple, no thyromegaly, no lymphadenopathy, trachea midline   Respiratory: Clear to auscultation bilaterally, nonlabored respirations    Cardiovascular: RRR, no murmurs, rubs, or gallops, palpable pedal pulses bilaterally   Gastrointestinal: Positive bowel sounds, soft, nontender, nondistended   Musculoskeletal: No bilateral ankle edema, no clubbing or cyanosis to extremities   Psychiatric: Appropriate affect, cooperative   Neurologic: Oriented x 3, strength symmetric in all extremities, Cranial Nerves grossly intact to confrontation, speech clear   Skin: No rashes     Result Review    Result Review:  I have personally reviewed the results from 1/16/2023 and agree with these findings:  [x]  Laboratory  []  Microbiology  [x]  Radiology  [x]  EKG/Telemetry   [x]  Cardiology/Vascular   []  Pathology  [x]  Old records  []  Other:    Assessment & Plan   Assessment / Plan     Assessment/Plan:  Pancreatic mass on CT scan  Pancreatitis  Transaminitis  History of hyperlipidemia  Diabetes  GERD     Plan:  Patient remains admitted to the hospital for further care and management  Consulting gastroenterologist, appreciate assistance  Following discussion with gastroenterologist advancing diet to full liquid today  Following discussion with gastroenterologist, agree patient will stay off Trulicity in the future  Trulicity added to allergy list  Started on sliding scale with hypoglycemia protocol  Patient will need an outpatient US with biopsy given lesion on pancreas  Continue to monitor labs, transaminitis and T bili elevated, now downtrending    Discussed plan with RN,  gastroenterologist    DVT prophylaxis:  Medical DVT prophylaxis orders are present.    CODE STATUS:   Code Status (Patient has no pulse and is not breathing): CPR (Attempt to Resuscitate)  Medical Interventions (Patient has pulse or is breathing): Full Support

## 2023-01-17 ENCOUNTER — READMISSION MANAGEMENT (OUTPATIENT)
Dept: CALL CENTER | Facility: HOSPITAL | Age: 63
End: 2023-01-17
Payer: COMMERCIAL

## 2023-01-17 VITALS
BODY MASS INDEX: 28.24 KG/M2 | DIASTOLIC BLOOD PRESSURE: 85 MMHG | TEMPERATURE: 97.6 F | HEIGHT: 63 IN | WEIGHT: 159.39 LBS | HEART RATE: 78 BPM | RESPIRATION RATE: 16 BRPM | OXYGEN SATURATION: 95 % | SYSTOLIC BLOOD PRESSURE: 136 MMHG

## 2023-01-17 PROBLEM — K85.90 ACUTE PANCREATITIS, UNSPECIFIED COMPLICATION STATUS, UNSPECIFIED PANCREATITIS TYPE: Status: RESOLVED | Noted: 2023-01-14 | Resolved: 2023-01-17

## 2023-01-17 PROBLEM — K86.9 PANCREATIC LESION: Status: ACTIVE | Noted: 2023-01-17

## 2023-01-17 LAB
ALBUMIN SERPL-MCNC: 3.7 G/DL (ref 3.5–5.2)
ALBUMIN/GLOB SERPL: 1.3 G/DL
ALP SERPL-CCNC: 153 U/L (ref 39–117)
ALT SERPL W P-5'-P-CCNC: 285 U/L (ref 1–41)
ANION GAP SERPL CALCULATED.3IONS-SCNC: 6.7 MMOL/L (ref 5–15)
AST SERPL-CCNC: 43 U/L (ref 1–40)
BILIRUB SERPL-MCNC: 1.5 MG/DL (ref 0–1.2)
BUN SERPL-MCNC: 10 MG/DL (ref 8–23)
BUN/CREAT SERPL: 12.3 (ref 7–25)
CALCIUM SPEC-SCNC: 9.6 MG/DL (ref 8.6–10.5)
CHLORIDE SERPL-SCNC: 102 MMOL/L (ref 98–107)
CO2 SERPL-SCNC: 27.3 MMOL/L (ref 22–29)
CREAT SERPL-MCNC: 0.81 MG/DL (ref 0.76–1.27)
DEPRECATED RDW RBC AUTO: 41.9 FL (ref 37–54)
EGFRCR SERPLBLD CKD-EPI 2021: 99.1 ML/MIN/1.73
ERYTHROCYTE [DISTWIDTH] IN BLOOD BY AUTOMATED COUNT: 13.6 % (ref 12.3–15.4)
GLOBULIN UR ELPH-MCNC: 2.8 GM/DL
GLUCOSE BLDC GLUCOMTR-MCNC: 115 MG/DL (ref 70–99)
GLUCOSE BLDC GLUCOMTR-MCNC: 115 MG/DL (ref 70–99)
GLUCOSE BLDC GLUCOMTR-MCNC: 116 MG/DL (ref 70–99)
GLUCOSE BLDC GLUCOMTR-MCNC: 121 MG/DL (ref 70–99)
GLUCOSE SERPL-MCNC: 126 MG/DL (ref 65–99)
HCT VFR BLD AUTO: 45.1 % (ref 37.5–51)
HGB BLD-MCNC: 15.5 G/DL (ref 13–17.7)
MAGNESIUM SERPL-MCNC: 1.7 MG/DL (ref 1.6–2.4)
MCH RBC QN AUTO: 29.1 PG (ref 26.6–33)
MCHC RBC AUTO-ENTMCNC: 34.4 G/DL (ref 31.5–35.7)
MCV RBC AUTO: 84.8 FL (ref 79–97)
PLATELET # BLD AUTO: 145 10*3/MM3 (ref 140–450)
PMV BLD AUTO: 9 FL (ref 6–12)
POTASSIUM SERPL-SCNC: 4 MMOL/L (ref 3.5–5.2)
PROT SERPL-MCNC: 6.5 G/DL (ref 6–8.5)
RBC # BLD AUTO: 5.32 10*6/MM3 (ref 4.14–5.8)
SODIUM SERPL-SCNC: 136 MMOL/L (ref 136–145)
TRIGL SERPL-MCNC: 201 MG/DL (ref 0–150)
WBC NRBC COR # BLD: 4.08 10*3/MM3 (ref 3.4–10.8)

## 2023-01-17 PROCEDURE — 25010000002 ENOXAPARIN PER 10 MG: Performed by: INTERNAL MEDICINE

## 2023-01-17 PROCEDURE — 94799 UNLISTED PULMONARY SVC/PX: CPT

## 2023-01-17 PROCEDURE — 85027 COMPLETE CBC AUTOMATED: CPT | Performed by: INTERNAL MEDICINE

## 2023-01-17 PROCEDURE — 84478 ASSAY OF TRIGLYCERIDES: CPT | Performed by: INTERNAL MEDICINE

## 2023-01-17 PROCEDURE — 80053 COMPREHEN METABOLIC PANEL: CPT | Performed by: INTERNAL MEDICINE

## 2023-01-17 PROCEDURE — 82962 GLUCOSE BLOOD TEST: CPT

## 2023-01-17 PROCEDURE — 83735 ASSAY OF MAGNESIUM: CPT | Performed by: INTERNAL MEDICINE

## 2023-01-17 PROCEDURE — 99239 HOSP IP/OBS DSCHRG MGMT >30: CPT | Performed by: INTERNAL MEDICINE

## 2023-01-17 RX ORDER — GLIMEPIRIDE 1 MG/1
1 TABLET ORAL
Qty: 30 TABLET | Refills: 0 | Status: ON HOLD | OUTPATIENT
Start: 2023-01-17 | End: 2023-03-03

## 2023-01-17 RX ORDER — ROSUVASTATIN CALCIUM 5 MG/1
10 TABLET, COATED ORAL DAILY
Status: DISCONTINUED | OUTPATIENT
Start: 2023-01-17 | End: 2023-01-17 | Stop reason: HOSPADM

## 2023-01-17 RX ORDER — PANTOPRAZOLE SODIUM 40 MG/1
40 TABLET, DELAYED RELEASE ORAL
Status: DISCONTINUED | OUTPATIENT
Start: 2023-01-17 | End: 2023-01-17 | Stop reason: HOSPADM

## 2023-01-17 RX ORDER — LISINOPRIL 2.5 MG/1
2.5 TABLET ORAL DAILY
Status: DISCONTINUED | OUTPATIENT
Start: 2023-01-17 | End: 2023-01-17 | Stop reason: HOSPADM

## 2023-01-17 RX ADMIN — ROSUVASTATIN CALCIUM 10 MG: 5 TABLET, FILM COATED ORAL at 12:03

## 2023-01-17 RX ADMIN — LISINOPRIL 2.5 MG: 2.5 TABLET ORAL at 12:03

## 2023-01-17 RX ADMIN — PANTOPRAZOLE SODIUM 40 MG: 40 TABLET, DELAYED RELEASE ORAL at 12:03

## 2023-01-17 RX ADMIN — ENOXAPARIN SODIUM 40 MG: 100 INJECTION SUBCUTANEOUS at 14:23

## 2023-01-17 RX ADMIN — SODIUM CHLORIDE, POTASSIUM CHLORIDE, SODIUM LACTATE AND CALCIUM CHLORIDE 75 ML/HR: 600; 310; 30; 20 INJECTION, SOLUTION INTRAVENOUS at 08:48

## 2023-01-17 NOTE — PLAN OF CARE
Goal Outcome Evaluation:  Plan of Care Reviewed With: patient        Progress: no change  Outcome Evaluation: VSS. No complaints voiced. Tolerating full liquids. Rested well overnight

## 2023-01-17 NOTE — DISCHARGE SUMMARY
[facility]        HOSPITALIST  DISCHARGE SUMMARY    Patient Name: Devyn Steinberg  : 1960  MRN: [id number]    Date of Admission: 2023  Date of Discharge:  2023  Primary Care Physician: Vivi Bailon APRN    Consults     Date and Time Order Name Status Description    2023  1:29 PM Inpatient Gastroenterology Consult Completed           Active and Resolved Hospital Problems:  Active Hospital Problems    Diagnosis POA   • Pancreatic lesion [K86.9] Yes   • Abnormal liver enzymes [R74.8] Yes      Resolved Hospital Problems    Diagnosis POA   • **Acute pancreatitis, unspecified complication status, unspecified pancreatitis type [K85.90] Yes   Pancreatic mass on CT scan.  With elevated CA 19 9.  Pancreatitis improving  Transaminitis.  Improving  Asymptomatic gallstones  History of hyperlipidemia  Diabetes  GERD       Hospital Course     Hospital Course:  Devyn Steinberg is a 62 y.o. male with past medical history for diabetes, hyperlipidemia, GERD     Patient presented emergency department with chief complaint of abdominal pain.  Pain has been present for quite some time.  Before the holidays patient contributed this pain to his new Trulicity therefore he discontinued it.  Patient states his abdominal pain slowly improved.  He followed up with his primary care physician where as he informed to restart Trulicity, stated the pain shortly later started.  Patient described the pain as episodic coming and going.  It is not associated with eating.  Patient denies having nausea.  Patient denies having vomiting.  Patient denies any weight loss.  Patient denies any change in his bowel habits.  Patient states the pain is periumbilical with no radiation.  In the emergency department a CT scan was obtained that shows question of possible hypervascular solid lesion within the pancreas, recommendations for MRI.  In the ED patient with no leukocytosis.  A lipase of 196, AST ALT elevated as 516 and  334 respectively.  Patient with a normal total bilirubin, elevated alk phos at 151.  Given lab abnormalities and findings on CT scan hospitalist service was called for admission.  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     Interval Followup:   Patient tolerated full liquid diet and started on solids, minimal abdominal discomfort.  No nausea vomiting Patient's lab abnormalities are improving.  Case discussed with gastroenterologist.  He has been set up for EUS as an outpatient.  Trulicity has been DC'd as per GI recommendation.  Started on Amaryl.  Previously metformin caused diarrhea.           DISCHARGE Follow Up Recommendations for labs and diagnostics: PCP and gastroenterology.  Make sure to keep appointment for EUS that is endoscopic  ultrasound in [place].  Have PCP check LFTs in 3 days.      Day of Discharge     Vital Signs:  Temp:  [97.4 °F (36.3 °C)-98 °F (36.7 °C)] 97.7 °F (36.5 °C)  Heart Rate:  [77-86] 77  Resp:  [16-18] 16  BP: (120-147)/(70-88) 130/80    Physical Exam:   Constitutional: Awake, alert, no acute distress              Eyes: Pupils equal, sclerae anicteric, no conjunctival injection              HENT: NCAT, mucous membranes moist              Neck: Supple, no thyromegaly, no lymphadenopathy, trachea midline              Respiratory: Clear to auscultation bilaterally, nonlabored respirations               Cardiovascular: RRR, no murmurs, rubs, or gallops, palpable pedal pulses bilaterally              Gastrointestinal: Positive bowel sounds, soft, nontender, nondistended              Musculoskeletal: No bilateral ankle edema, no clubbing or cyanosis to extremities              Psychiatric: Appropriate affect, cooperative              Neurologic: Oriented x 3, strength symmetric in all extremities, Cranial Nerves grossly intact to confrontation, speech clear               Skin: No rashes        Discharge Details        Discharge Medications      New Medications      Instructions Start Date   glimepiride 1 MG tablet  Commonly known as: Amaryl   1 mg, Oral, Every Morning Before Breakfast         Continue These Medications      Instructions Start Date   esomeprazole 20 MG capsule  Commonly known as: nexIUM   20 mg, Oral, Every Morning Before Breakfast      lisinopril 2.5 MG tablet  Commonly known as: PRINIVIL,ZESTRIL   2.5 mg, Oral, Daily      rosuvastatin 10 MG tablet  Commonly known as: CRESTOR   10 mg, Oral, Daily         Stop These Medications    Trulicity 0.75 MG/0.5ML solution pen-injector  Generic drug: Dulaglutide            Allergies   Allergen Reactions   • Trulicity [Dulaglutide] Other (See Comments)     Pancreatitis   • Tyloxapol Swelling       Discharge Disposition:  Home or Self Care    Diet:  Diet Instructions     Advance Diet As Tolerated      Diet: Consistent Carbohydrate, Specialty Diet; Thin Liquids, No Restrictions; Low Fat      Discharge Diet:  Consistent Carbohydrate  Specialty Diet       Fluid Consistency: Thin Liquids, No Restrictions    Specialty Diets: Low Fat          Discharge Activity:   Activity Instructions     Other Activity Instructions      Activity Instructions: Off work from January 14 until January 22.  Return to work without restriction on January 23.          CODE STATUS:  Code Status and Medical Interventions:   Ordered at: 01/14/23 1100     Code Status (Patient has no pulse and is not breathing):    CPR (Attempt to Resuscitate)     Medical Interventions (Patient has pulse or is breathing):    Full Support         No future appointments.    Additional Instructions for the Follow-ups that You Need to Schedule     Discharge Follow-up with PCP   As directed       Currently Documented PCP:    Vivi Bailon APRN    PCP Phone Number:    [phone]     Follow Up Details: 2 weeks         Discharge Follow-up with Specialty: [place]  [facility]   As directed      Specialty: [place] [facility]    Follow Up Details: For EUS.  Endoscopic ultrasound for biopsy of pancreatic lesion         Discharge Follow-up with Specified Provider: Dr. Pelletier; 1 Week   As directed      To: Dr. Pelletier    Follow Up: 1 Week    Follow Up Details: Pancreatitis               Pertinent  and/or Most Recent Results     PROCEDURES:   * Cannot find OR case *     LAB RESULTS:      Lab 01/17/23 0524 01/16/23  0528 01/15/23  0600 01/14/23  1415 01/14/23  0700   WBC 4.08 4.76 7.90  --  9.79   HEMOGLOBIN 15.5 15.3 15.4  --  17.0   HEMATOCRIT 45.1 44.6 45.0  --  48.5   PLATELETS 145 137* 143  --  161   NEUTROS ABS  --   --  6.53  --  8.49*   IMMATURE GRANS (ABS)  --   --  0.02  --  0.04   LYMPHS ABS  --   --  0.64*  --  0.77   MONOS ABS  --   --  0.64  --  0.46   EOS ABS  --   --  0.06  --  0.02   MCV 84.8 84.2 84.7  --  84.1   LACTATE  --   --   --   --  1.8   PROTIME  --  14.5  --   --  13.5   APTT  --   --   --  26.8  --          Lab 01/17/23  0524 01/16/23  0528 01/15/23  0600 01/14/23  0700   SODIUM 136 138 139 135*   POTASSIUM 4.0 3.9 3.9 4.4   CHLORIDE 102 104 104 99   CO2 27.3 25.7 24.5 26.4   ANION GAP 6.7 8.3 10.5 9.6   BUN 10 9 12 23   CREATININE 0.81 0.83 0.77 1.02   EGFR 99.1 98.3 100.6 82.6   GLUCOSE 126* 133* 129* 213*   CALCIUM 9.6 9.4 9.1 9.5   MAGNESIUM 1.7  --  1.7  --          Lab 01/17/23 0524 01/16/23  0528 01/15/23  0600 01/14/23  0700   TOTAL PROTEIN 6.5 6.4 6.1 7.3   ALBUMIN 3.7 3.6 3.5 4.7   GLOBULIN 2.8 2.8 2.6 2.6   ALT (SGPT) 285* 433* 690* 334*   AST (SGOT) 43* 112* 346* 516*   BILIRUBIN 1.5* 2.6* 6.0* 1.2   ALK PHOS 153* 161* 149* 151*   LIPASE  --   --   --  196*         Lab 01/16/23  0528 01/14/23  0700   PROTIME 14.5 13.5   INR 1.11 1.02         Lab 01/17/23  0524   TRIGLYCERIDES 201*             Brief Urine Lab Results  (Last result in the past 365 days)      Color   Clarity   Blood   Leuk Est   Nitrite   Protein   CREAT   Urine  HCG        01/14/23 0824 Dark Yellow   Clear   Negative   Negative   Negative   Trace               Microbiology Results (last 10 days)     Procedure Component Value - Date/Time    Urine Culture - Urine, Urine, Clean Catch [[id number]]  (Normal) Collected: 01/14/23 0824    Lab Status: Final result Specimen: Urine, Clean Catch Updated: 01/16/23 0809     Urine Culture No growth          MRI Abdomen With & Without Contrast    Result Date: 1/14/2023  Impression:    1. 2 cm pancreatic tail lesion that has imaging characteristics most compatible with intrapancreatic accessory spleen.  Recommend follow-up CT in 6 months to assure stability  2. Findings suggesting mild pancreatitis  3. Cholelithiasis with no findings of cholecystitis or biliary obstruction      DEEP KO MD       Electronically Signed and Approved By: DEEP KO MD on 1/14/2023 at 13:46             CT Abdomen Pelvis With Contrast    Result Date: 1/14/2023  Impression:   1. Incomplete distension of the colon without evidence of acute inflammatory change. 2. No acute intra-abdominal or intrapelvic abnormality noted 3. Question of a possible hypervascular solid lesion within the pancreas.  Nonemergent MRI abdomen with and without contrast recommended to further evaluate on an outpatient basis.     RUPERTO NOGUEIRA MD       Electronically Signed and Approved By: RUPERTO NOGUEIRA MD on 1/14/2023 at 9:10             MRI abdomen wo contrast mrcp    Result Date: 1/14/2023  Impression:   1. Cholelithiasis.  Trace amount of pericholecystic fluid cannot be excluded.  Although this is felt to be artifactual. 2. Common bile duct is normal in caliber. 3. Mild increased interstitial changes within the head of the pancreas may be artifactual versus very mild pancreatitis given reported history. 4. Lesion seen on CT is not definitively identified on noncontrast imaging.  Please refer to additional imaging with contrast .     RUPERTO NOGUEIRA MD       Electronically  Signed and Approved By: RUPERTO NOGUEIRA MD on 1/14/2023 at 13:10                           Imaging Results (All)     Procedure Component Value Units Date/Time    MRI Abdomen With & Without Contrast [[id number]] Collected: 01/14/23 1346     Updated: 01/14/23 1349    Narrative:      PROCEDURE: MRI ABDOMEN W WO CONTRAST     COMPARISON: [facility], CT, CT ABDOMEN PELVIS W CONTRAST, 1/14/2023, 8:24.     INDICATIONS: Abdominal mass, intra-abdominal neoplasm suspected     CONTRAST: 14ml  Multihance I.V.     TECHNIQUE: A comprehensive MRI examination of the abdomen was performed utilizing a variety of   imaging planes and imaging parameters to optimize visualization of suspected pathology.  Images   were obtained both before and after intravenous gadolinium injection.       FINDINGS:   There is a 2 cm lesion in the distal pancreatic tail.  The lesion demonstrates signal   characteristics identical 2 splenic tissue on all pulse sequences, and dynamic enhancement pattern   similar to the spleen.  There is mild interstitial edema in the neck of the pancreas.  Pancreatic   duct is normal in caliber.  Stones are noted in the gallbladder lumen.  Bile ducts normal in   caliber.  No focal liver lesion is demonstrated.  There is some perinephric stranding bilaterally.    There is a cyst of the lower pole of the right kidney       Impression:            1. 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  Recommend follow-up CT in 6 months to assure stability     2. Findings suggesting mild pancreatitis     3. Cholelithiasis with no findings of cholecystitis or biliary obstruction              DEEP KO MD         Electronically Signed and Approved By: DEEP KO MD on 1/14/2023 at 13:46                     MRI abdomen wo contrast mrcp [[id number]] Collected: 01/14/23 1257     Updated: 01/14/23 1313    Narrative:      PROCEDURE: MRI ABDOMEN WO  CONTRAST MRCP     COMPARISON: [facility], CT, CT ABDOMEN PELVIS W CONTRAST, 1/14/2023, 8:24.     INDICATIONS: Pancreatitis, acute, severe             TECHNIQUE: A comprehensive examination was performed utilizing a variety of imaging planes and   imaging parameters to optimize visualization of suspected pathology.  Magnetic resonance   cholangiopancreatography was also performed.     FINDINGS:   Study is limited by motion.     Gallbladder demonstrates some tiny small depended filling defects compatible with small stones.  No   wall thickening .  Questionable trace amount of pericholecystic fluid on the coronal imaging only   is noted..  Liver is normal in signal and morphology without evidence of intrahepatic biliary   ductal dilation.  The common duct appears normal in caliber without evidence of obstructing   calculus     The pancreas demonstrates no dilation of the pancreatic duct.  Questionable very mild increased   signal in interstitial regions of the pancreatic head which could represent mild pancreatitis given   reported patient history.  No significant peripancreatic stranding is noted.     Small cyst noted within the kidneys.  Visualized GI tract is grossly unremarkable in appearance.    Spleen and adrenal glands appear grossly unremarkable in appearance.       Impression:         1. Cholelithiasis.  Trace amount of pericholecystic fluid cannot be excluded.  Although this is   felt to be artifactual.  2. Common bile duct is normal in caliber.  3. Mild increased interstitial changes within the head of the pancreas may be artifactual versus   very mild pancreatitis given reported history.  4. Lesion seen on CT is not definitively identified on noncontrast imaging.  Please refer to   additional imaging with contrast .            RUPERTO NOGUEIRA MD         Electronically Signed and Approved By: RUPERTO NOGUEIRA MD on 1/14/2023 at 13:10                     CT Abdomen Pelvis With Contrast  [[id number]] Collected: 01/14/23 0911     Updated: 01/14/23 0914    Narrative:      PROCEDURE: CT ABDOMEN PELVIS W CONTRAST     COMPARISON: [facility], CT, ABDOMEN/PELVIS WITH CONTRAST, 9/10/2019, 13:52.     INDICATIONS: left upper and lower abdominal pain with vomiting     TECHNIQUE: After obtaining the patient's consent, CT images were created with non-ionic intravenous   contrast material.       PROTOCOL:   Standard imaging protocol performed      RADIATION:   DLP: 625.8 mGy*cm    Automated exposure control was utilized to minimize radiation dose.   CONTRAST: 100 cc Isovue 370 I.V.  LABS:   eGFR: >60 ml/min/1.73m2     FINDINGS:         Lung bases:  Lung bases are grossly clear.  There is no free air noted below the diaphragm.     Organs:  The liver, gallbladder, spleen and adrenal glands are unremarkable in appearance question   a subtle hypervascular lesion within the tail of the pancreas measuring approximately 1.9 by 1.4   cm.  Question of a similar region on the previous study without great change from comparison.     No acute intra-abdominal or intrapelvic abnormality noted low-attenuation lesion compatible with a   cyst noted within the inferior pole right kidney.  Cortical scarring noted left kidney     GI tract:  The stomach and small bowel are unremarkable in appearance.  Ileocecal valve is grossly   unremarkable in appearance.     The appendix is visualized and appears within normal limits.  No suspicious mesenteric adenopathy   or fluid collections are noted.  The colon is incompletely distended and grossly unremarkable in   appearance     Pelvis:  Urinary bladder, prostate and pelvic structures demonstrate no acute abnormality.    Postsurgical changes from right inguinal hernia repair noted     Retroperitoneum:  The aorta is normal in caliber.  There is no suspicious retroperitoneal   adenopathy     Bones and soft tissues:  No destructive bone lesion noted       Impression:          1. Incomplete distension of the colon without evidence of acute inflammatory change.  2. No acute intra-abdominal or intrapelvic abnormality noted  3. Question of a possible hypervascular solid lesion within the pancreas.  Nonemergent MRI abdomen   with and without contrast recommended to further evaluate on an outpatient basis.           RUPERTO NOGUEIRA MD         Electronically Signed and Approved By: RUPERTO NOGUEIRA MD on 1/14/2023 at 9:10                            Labs Pending at Discharge:          Time spent on Discharge including face to face service: More than 30 minutes  Part of this note may be an electronic transcription/translation of spoken language to printed text using the Dragon Dictation System.     TElectronically signed by Jamie Segovia MD, 01/17/23, 3:21 PM EST.

## 2023-01-17 NOTE — PLAN OF CARE
Problem: Adult Inpatient Plan of Care  Goal: Plan of Care Review  Outcome: Met  Flowsheets (Taken 1/17/2023 1545)  Progress: improving  Plan of Care Reviewed With: patient  Outcome Evaluation: Vital signs stable. Discharge home. See discharge summary for further details.  Goal: Patient-Specific Goal (Individualized)  Outcome: Met  Goal: Absence of Hospital-Acquired Illness or Injury  Outcome: Met  Intervention: Identify and Manage Fall Risk  Recent Flowsheet Documentation  Taken 1/17/2023 1424 by Daksha Jackson RN  Safety Promotion/Fall Prevention: safety round/check completed  Taken 1/17/2023 1204 by Daksha Jackson RN  Safety Promotion/Fall Prevention: safety round/check completed  Taken 1/17/2023 0848 by Daksha Jackson RN  Safety Promotion/Fall Prevention: safety round/check completed  Taken 1/17/2023 0755 by Daksha Jackson RN  Safety Promotion/Fall Prevention: safety round/check completed  Taken 1/17/2023 0733 by Daksha Jackson RN  Safety Promotion/Fall Prevention: safety round/check completed  Goal: Optimal Comfort and Wellbeing  Outcome: Met  Goal: Readiness for Transition of Care  Outcome: Met     Problem: Pain Acute  Goal: Acceptable Pain Control and Functional Ability  Outcome: Met   Goal Outcome Evaluation:  Plan of Care Reviewed With: patient        Progress: improving  Outcome Evaluation: Vital signs stable. Discharge home. See discharge summary for further details.

## 2023-01-18 NOTE — OUTREACH NOTE
Prep Survey    Flowsheet Row Responses   [facility] facility patient discharged from? [facility]   Is LACE score < 7 ? Yes   Eligibility Not Eligible   What are the reasons patient is not eligible? Other   Does the patient have one of the following disease processes/diagnoses(primary or secondary)? Other   Prep survey completed? Yes          JACI GOODWIN - Registered Nurse

## 2023-01-19 NOTE — TELEPHONE ENCOUNTER
Spoke to pt; Pt is scheduled with Dr. Armando on 1/20/2023. Educated pt on EUS procedure. Pt has been scheduled Inpt f/u with Saba LAYTON on 2/7/2023. Pt does not report any concerns or issues at this time.

## 2023-02-01 NOTE — PROGRESS NOTES
Chief Complaint   inpt follow up    History of Present Illness       Devyn Steinberg is a 62 y.o. male who presents to [facility] for follow-up after inpatient stay 1/14/2023 through 1/17/2023 with a history of pancreatic lesion, acute pancreatitis, and abnormal liver enzymes.  Patient was noted to have acute pancreatitis and a pancreatic mass on CT scan with elevated CA 19.  Patient was placed on Trulicity and developed episodic abdominal pain.  In the emergency department patient had a lipase of 196 and elevated AST at 516 and ALT of 334.  He had an MRI with [facility]CP that displayed pancreatitis and gallstones.  Patient underwent EUS with Dr. Armando which displayed accessory spleen tissue and recommended repeat CT scan in 2 months.  Patient reports today resolution of his abdominal pain since stopping Trulicity.  Returned to normal labs.  Patient voices no concerns today.  Patient denies fever, nausea, vomiting, weight loss, night sweats, melena, hematochezia, hematemesis.    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  Recommend follow-up CT in 6 months to assure stability Findings suggesting mild pancreatitis. Cholelithiasis with no findings of cholecystitis or biliary obstruction      CT abdomen and pelvis w/contrast on 01.14.2023 Incomplete distension of the colon without evidence of acute inflammatory change. No acute intra-abdominal or intrapelvic abnormality noted  Question of a possible hypervascular solid lesion within the pancreas.  Nonemergent MRI abdomen   with and without contrast recommended to further evaluate on an outpatient basis.         Most recent labs on 01.17.2023 see below  Results       Result Review :       CMP    CMP 1/15/23 1/16/23 1/17/23   Glucose 129 (A) 133 (A) 126 (A)   BUN 12 9 10   Creatinine 0.77  0.83 0.81   eGFR 100.6 98.3 99.1   Sodium 139 138 136   Potassium 3.9 3.9 4.0   Chloride 104 104 102   Calcium 9.1 9.4 9.6   Total Protein 6.1 6.4 6.5   Albumin 3.5 3.6 3.7   Globulin 2.6 2.8 2.8   Total Bilirubin 6.0 (A) 2.6 (A) 1.5 (A)   Alkaline Phosphatase 149 (A) 161 (A) 153 (A)   AST (SGOT) 346 (A) 112 (A) 43 (A)   ALT (SGPT) 690 (A) 433 (A) 285 (A)   Albumin/Globulin Ratio 1.3 1.3 1.3   BUN/Creatinine Ratio 15.6 10.8 12.3   Anion Gap 10.5 8.3 6.7   (A) Abnormal value       Comments are available for some flowsheets but are not being displayed.           CBC    CBC 1/15/23 1/16/23 1/17/23   WBC 7.90 4.76 4.08   RBC 5.31 5.30 5.32   Hemoglobin 15.4 15.3 15.5   Hematocrit 45.0 44.6 45.1   MCV 84.7 84.2 84.8   MCH 29.0 28.9 29.1   MCHC 34.2 34.3 34.4   RDW 13.9 13.7 13.6   Platelets 143 137 (A) 145   (A) Abnormal value                          Past Medical History       Past Medical History:   Diagnosis Date   • Acid reflux    • Diabetes mellitus (HCC)    • Hyperlipidemia        Past Surgical History:   Procedure Laterality Date   • HERNIA REPAIR     • HERNIA REPAIR     • SHOULDER SURGERY     • UPPER GASTROINTESTINAL ENDOSCOPY           Current Outpatient Medications:   •  Continuous Blood Gluc Sensor (FreeStyle Chiquis 3 Sensor) misc, USE 1 EVERY 14 DAYS, Disp: , Rfl:   •  esomeprazole (nexIUM) 20 MG capsule, Take 20 mg by mouth Every Morning Before Breakfast., Disp: , Rfl:   •  glimepiride (Amaryl) 1 MG tablet, Take 1 tablet by mouth Every Morning Before Breakfast for 30 days., Disp: 30 tablet, Rfl: 0  •  Insulin Lispro, 1 Unit Dial, (HUMALOG) 100 UNIT/ML solution pen-injector, Take as needed per sliding scale for hyperglycemia.., Disp: , Rfl:   •  lisinopril (PRINIVIL,ZESTRIL) 2.5 MG tablet, Take 2.5 mg by mouth Daily., Disp: , Rfl:   •  lisinopril (PRINIVIL,ZESTRIL) 5 MG tablet, Take 1 tablet by mouth Daily., Disp: , Rfl:   •  metFORMIN ER (GLUCOPHAGE-XR) 500 MG 24 hr tablet, TAKE 1 TABLET BY MOUTH TWICE DAILY  "WITH MEALS. STOP TRULICITY, Disp: , Rfl:   •  OneTouch Verio test strip, , Disp: , Rfl:   •  rosuvastatin (CRESTOR) 10 MG tablet, Take 10 mg by mouth Daily., Disp: , Rfl:      Allergies   Allergen Reactions   • Trulicity [Dulaglutide] Other (See Comments)     Pancreatitis   • Tyloxapol Swelling       Family History   Problem Relation Age of Onset   • Colon cancer Neg Hx         Social History     Social History Narrative   • Not on file       Objective     Vital Signs:   /67 (BP Location: Right arm, Patient Position: Sitting, Cuff Size: Adult)   Pulse 76   Ht 160 cm (63\")   Wt 73 kg (161 lb)   SpO2 99%   BMI 28.52 kg/m²       Physical Exam  Constitutional:       General: He is not in acute distress.     Appearance: Normal appearance. He is well-developed and normal weight.   Eyes:      Conjunctiva/sclera: Conjunctivae normal.      Pupils: Pupils are equal, round, and reactive to light.      Visual Fields: Right eye visual fields normal and left eye visual fields normal.   Cardiovascular:      Rate and Rhythm: Normal rate and regular rhythm.      Heart sounds: Normal heart sounds.   Pulmonary:      Effort: Pulmonary effort is normal. No retractions.      Breath sounds: Normal breath sounds and air entry.      Comments: Inspection of chest: normal appearance  Abdominal:      General: Bowel sounds are normal.      Palpations: Abdomen is soft.      Tenderness: There is no abdominal tenderness.      Comments: No appreciable hepatosplenomegaly   Musculoskeletal:      Cervical back: Neck supple.      Right lower leg: No edema.      Left lower leg: No edema.   Lymphadenopathy:      Cervical: No cervical adenopathy.   Skin:     Findings: No lesion.      Comments: Turgor normal   Neurological:      Mental Status: He is alert and oriented to person, place, and time.   Psychiatric:         Mood and Affect: Mood and affect normal.           Assessment & Plan          Assessment and Plan    Diagnoses and all orders for " this visit:    1. Accessory spleen (Primary)  -     Lipase; Future  -     Comprehensive Metabolic Panel; Future  -     CBC & Differential; Future  -     Cancel: CT Abdomen Pelvis With Contrast; Future  -     CT Abdomen Pelvis With Contrast; Future    2. Elevated lipase  -     Lipase; Future  -     Comprehensive Metabolic Panel; Future  -     CBC & Differential; Future  -     Cancel: CT Abdomen Pelvis With Contrast; Future  -     CT Abdomen Pelvis With Contrast; Future    3. Acute pancreatitis, unspecified complication status, unspecified pancreatitis type      62-year-old male presenting the office today for follow-up after inpatient stay 1/14/2023 through 1/17/2023 with a history of pancreatic lesion, acute pancreatitis, and abnormal liver enzymes.  We will proceed with labs to include a CBC, CMP and lipase.  We will repeat CT scan in 2 months with follow-up after imaging.  Patient will continue to be off of Trulicity as this could have been the cause of his pancreatitis.  Labs have returned to normal.  Patient will call the office with any questions or concerns.          Follow Up       Follow Up   Return in about 2 months (around 4/7/2023).  Patient was given instructions and counseling regarding his condition or for health maintenance advice. Please see specific information pulled into the AVS if appropriate.

## 2023-02-07 ENCOUNTER — OFFICE VISIT (OUTPATIENT)
Dept: GASTROENTEROLOGY | Facility: CLINIC | Age: 63
End: 2023-02-07
Payer: COMMERCIAL

## 2023-02-07 VITALS
OXYGEN SATURATION: 99 % | DIASTOLIC BLOOD PRESSURE: 67 MMHG | WEIGHT: 161 LBS | HEART RATE: 76 BPM | SYSTOLIC BLOOD PRESSURE: 127 MMHG | BODY MASS INDEX: 28.53 KG/M2 | HEIGHT: 63 IN

## 2023-02-07 DIAGNOSIS — R74.8 ELEVATED LIPASE: ICD-10-CM

## 2023-02-07 DIAGNOSIS — Q89.09 ACCESSORY SPLEEN: Primary | ICD-10-CM

## 2023-02-07 DIAGNOSIS — K85.90 ACUTE PANCREATITIS, UNSPECIFIED COMPLICATION STATUS, UNSPECIFIED PANCREATITIS TYPE: ICD-10-CM

## 2023-02-07 PROCEDURE — 99214 OFFICE O/P EST MOD 30 MIN: CPT | Performed by: NURSE PRACTITIONER

## 2023-02-07 RX ORDER — BLOOD-GLUCOSE SENSOR
EACH MISCELLANEOUS
Status: ON HOLD | COMMUNITY
Start: 2023-01-31 | End: 2023-03-03

## 2023-02-07 RX ORDER — LISINOPRIL 2.5 MG/1
5 TABLET ORAL DAILY
COMMUNITY
Start: 2023-01-31

## 2023-02-07 RX ORDER — INSULIN LISPRO 100 [IU]/ML
INJECTION, SOLUTION INTRAVENOUS; SUBCUTANEOUS
Status: ON HOLD | COMMUNITY
Start: 2023-01-31 | End: 2023-03-03

## 2023-02-07 RX ORDER — METFORMIN HYDROCHLORIDE 500 MG/1
500 TABLET, EXTENDED RELEASE ORAL 2 TIMES DAILY WITH MEALS
COMMUNITY
Start: 2023-01-11

## 2023-02-07 RX ORDER — BLOOD SUGAR DIAGNOSTIC
STRIP MISCELLANEOUS
Status: ON HOLD | COMMUNITY
Start: 2023-01-12 | End: 2023-03-03

## 2023-02-08 ENCOUNTER — TELEPHONE (OUTPATIENT)
Dept: GASTROENTEROLOGY | Facility: CLINIC | Age: 63
End: 2023-02-08
Payer: COMMERCIAL

## 2023-02-08 NOTE — TELEPHONE ENCOUNTER
Per Saba Ibrahim to call pt and set up for a 2 month CT scan as well as 2 month office visit. Pt agreed to this plan is scheduled for CT on 04.07.2023 and office visit on 04.10.2023.

## 2023-03-02 ENCOUNTER — APPOINTMENT (OUTPATIENT)
Dept: ULTRASOUND IMAGING | Facility: HOSPITAL | Age: 63
DRG: 419 | End: 2023-03-02
Payer: COMMERCIAL

## 2023-03-02 ENCOUNTER — APPOINTMENT (OUTPATIENT)
Dept: MRI IMAGING | Facility: HOSPITAL | Age: 63
DRG: 419 | End: 2023-03-02
Payer: COMMERCIAL

## 2023-03-02 ENCOUNTER — APPOINTMENT (OUTPATIENT)
Dept: CT IMAGING | Facility: HOSPITAL | Age: 63
DRG: 419 | End: 2023-03-02
Payer: COMMERCIAL

## 2023-03-02 ENCOUNTER — HOSPITAL ENCOUNTER (INPATIENT)
Facility: HOSPITAL | Age: 63
LOS: 5 days | Discharge: HOME | DRG: 419 | End: 2023-03-07
Attending: STUDENT IN AN ORGANIZED HEALTH CARE EDUCATION/TRAINING PROGRAM | Admitting: INTERNAL MEDICINE
Payer: COMMERCIAL

## 2023-03-02 DIAGNOSIS — K83.09 ASCENDING CHOLANGITIS: ICD-10-CM

## 2023-03-02 DIAGNOSIS — R11.2 NAUSEA AND VOMITING, UNSPECIFIED VOMITING TYPE: ICD-10-CM

## 2023-03-02 DIAGNOSIS — R79.89 ELEVATED LFTS: ICD-10-CM

## 2023-03-02 DIAGNOSIS — K80.21 CALCULUS OF GALLBLADDER WITH BILIARY OBSTRUCTION BUT WITHOUT CHOLECYSTITIS: Primary | ICD-10-CM

## 2023-03-02 DIAGNOSIS — R10.12 LEFT UPPER QUADRANT ABDOMINAL PAIN: ICD-10-CM

## 2023-03-02 LAB
ALBUMIN SERPL-MCNC: 4.7 G/DL (ref 3.5–5.2)
ALBUMIN/GLOB SERPL: 1.5 G/DL
ALP SERPL-CCNC: 135 U/L (ref 39–117)
ALT SERPL W P-5'-P-CCNC: 165 U/L (ref 1–41)
ANION GAP SERPL CALCULATED.3IONS-SCNC: 11.4 MMOL/L (ref 5–15)
AST SERPL-CCNC: 231 U/L (ref 1–40)
BACTERIA UR QL AUTO: ABNORMAL /HPF
BASOPHILS # BLD AUTO: 0 10*3/MM3 (ref 0–0.2)
BASOPHILS NFR BLD AUTO: 0 % (ref 0–1.5)
BILIRUB SERPL-MCNC: 2.3 MG/DL (ref 0–1.2)
BILIRUB UR QL STRIP: ABNORMAL
BUN SERPL-MCNC: 24 MG/DL (ref 8–23)
BUN/CREAT SERPL: 25.5 (ref 7–25)
CALCIUM SPEC-SCNC: 9.7 MG/DL (ref 8.6–10.5)
CHLORIDE SERPL-SCNC: 102 MMOL/L (ref 98–107)
CLARITY UR: CLEAR
CO2 SERPL-SCNC: 25.6 MMOL/L (ref 22–29)
COLOR UR: ABNORMAL
CREAT SERPL-MCNC: 0.94 MG/DL (ref 0.76–1.27)
D-LACTATE SERPL-SCNC: 2.2 MMOL/L (ref 0.5–2)
D-LACTATE SERPL-SCNC: 2.6 MMOL/L (ref 0.5–2)
DEPRECATED RDW RBC AUTO: 42.6 FL (ref 37–54)
EGFRCR SERPLBLD CKD-EPI 2021: 91.1 ML/MIN/1.73
EOSINOPHIL # BLD AUTO: 0.01 10*3/MM3 (ref 0–0.4)
EOSINOPHIL NFR BLD AUTO: 0.1 % (ref 0.3–6.2)
ERYTHROCYTE [DISTWIDTH] IN BLOOD BY AUTOMATED COUNT: 13.8 % (ref 12.3–15.4)
GLOBULIN UR ELPH-MCNC: 3.1 GM/DL
GLUCOSE BLDC GLUCOMTR-MCNC: 131 MG/DL (ref 70–99)
GLUCOSE SERPL-MCNC: 175 MG/DL (ref 65–99)
GLUCOSE UR STRIP-MCNC: ABNORMAL MG/DL
HCT VFR BLD AUTO: 49.9 % (ref 37.5–51)
HGB BLD-MCNC: 17.3 G/DL (ref 13–17.7)
HGB UR QL STRIP.AUTO: NEGATIVE
HOLD SPECIMEN: NORMAL
HOLD SPECIMEN: NORMAL
HYALINE CASTS UR QL AUTO: ABNORMAL /LPF
IMM GRANULOCYTES # BLD AUTO: 0.02 10*3/MM3 (ref 0–0.05)
IMM GRANULOCYTES NFR BLD AUTO: 0.2 % (ref 0–0.5)
KETONES UR QL STRIP: ABNORMAL
LEUKOCYTE ESTERASE UR QL STRIP.AUTO: NEGATIVE
LIPASE SERPL-CCNC: 31 U/L (ref 13–60)
LYMPHOCYTES # BLD AUTO: 0.56 10*3/MM3 (ref 0.7–3.1)
LYMPHOCYTES NFR BLD AUTO: 6.1 % (ref 19.6–45.3)
MCH RBC QN AUTO: 29.5 PG (ref 26.6–33)
MCHC RBC AUTO-ENTMCNC: 34.7 G/DL (ref 31.5–35.7)
MCV RBC AUTO: 85 FL (ref 79–97)
MONOCYTES # BLD AUTO: 0.16 10*3/MM3 (ref 0.1–0.9)
MONOCYTES NFR BLD AUTO: 1.7 % (ref 5–12)
NEUTROPHILS NFR BLD AUTO: 8.48 10*3/MM3 (ref 1.7–7)
NEUTROPHILS NFR BLD AUTO: 91.9 % (ref 42.7–76)
NITRITE UR QL STRIP: NEGATIVE
NRBC BLD AUTO-RTO: 0 /100 WBC (ref 0–0.2)
PH UR STRIP.AUTO: 5.5 [PH] (ref 5–8)
PLATELET # BLD AUTO: 160 10*3/MM3 (ref 140–450)
PMV BLD AUTO: 9.2 FL (ref 6–12)
POTASSIUM SERPL-SCNC: 4.3 MMOL/L (ref 3.5–5.2)
PROT SERPL-MCNC: 7.8 G/DL (ref 6–8.5)
PROT UR QL STRIP: ABNORMAL
RBC # BLD AUTO: 5.87 10*6/MM3 (ref 4.14–5.8)
RBC # UR STRIP: ABNORMAL /HPF
REF LAB TEST METHOD: ABNORMAL
SODIUM SERPL-SCNC: 139 MMOL/L (ref 136–145)
SP GR UR STRIP: >=1.03 (ref 1–1.03)
SQUAMOUS #/AREA URNS HPF: ABNORMAL /HPF
UROBILINOGEN UR QL STRIP: ABNORMAL
WBC # UR STRIP: ABNORMAL /HPF
WBC NRBC COR # BLD: 9.23 10*3/MM3 (ref 3.4–10.8)
WHOLE BLOOD HOLD COAG: NORMAL
WHOLE BLOOD HOLD SPECIMEN: NORMAL

## 2023-03-02 PROCEDURE — 81001 URINALYSIS AUTO W/SCOPE: CPT | Performed by: STUDENT IN AN ORGANIZED HEALTH CARE EDUCATION/TRAINING PROGRAM

## 2023-03-02 PROCEDURE — 25010000002 ONDANSETRON PER 1 MG: Performed by: SURGERY

## 2023-03-02 PROCEDURE — 36415 COLL VENOUS BLD VENIPUNCTURE: CPT | Performed by: STUDENT IN AN ORGANIZED HEALTH CARE EDUCATION/TRAINING PROGRAM

## 2023-03-02 PROCEDURE — 80053 COMPREHEN METABOLIC PANEL: CPT | Performed by: STUDENT IN AN ORGANIZED HEALTH CARE EDUCATION/TRAINING PROGRAM

## 2023-03-02 PROCEDURE — 25010000002 KETOROLAC TROMETHAMINE PER 15 MG: Performed by: BEHAVIOR TECHNICIAN

## 2023-03-02 PROCEDURE — G0378 HOSPITAL OBSERVATION PER HR: HCPCS

## 2023-03-02 PROCEDURE — 25010000002 ONDANSETRON PER 1 MG: Performed by: REGISTERED NURSE

## 2023-03-02 PROCEDURE — 80053 COMPREHEN METABOLIC PANEL: CPT | Performed by: INTERNAL MEDICINE

## 2023-03-02 PROCEDURE — 85025 COMPLETE CBC W/AUTO DIFF WBC: CPT | Performed by: INTERNAL MEDICINE

## 2023-03-02 PROCEDURE — 25010000002 MORPHINE PER 10 MG: Performed by: INTERNAL MEDICINE

## 2023-03-02 PROCEDURE — 74181 MRI ABDOMEN W/O CONTRAST: CPT

## 2023-03-02 PROCEDURE — 99284 EMERGENCY DEPT VISIT MOD MDM: CPT

## 2023-03-02 PROCEDURE — 76705 ECHO EXAM OF ABDOMEN: CPT

## 2023-03-02 PROCEDURE — 25010000002 MORPHINE PER 10 MG: Performed by: SURGERY

## 2023-03-02 PROCEDURE — 25010000002 ONDANSETRON PER 1 MG: Performed by: INTERNAL MEDICINE

## 2023-03-02 PROCEDURE — 82962 GLUCOSE BLOOD TEST: CPT

## 2023-03-02 PROCEDURE — 85025 COMPLETE CBC W/AUTO DIFF WBC: CPT | Performed by: STUDENT IN AN ORGANIZED HEALTH CARE EDUCATION/TRAINING PROGRAM

## 2023-03-02 PROCEDURE — 83605 ASSAY OF LACTIC ACID: CPT | Performed by: STUDENT IN AN ORGANIZED HEALTH CARE EDUCATION/TRAINING PROGRAM

## 2023-03-02 PROCEDURE — 83690 ASSAY OF LIPASE: CPT | Performed by: STUDENT IN AN ORGANIZED HEALTH CARE EDUCATION/TRAINING PROGRAM

## 2023-03-02 PROCEDURE — 99223 1ST HOSP IP/OBS HIGH 75: CPT | Performed by: INTERNAL MEDICINE

## 2023-03-02 RX ORDER — SODIUM CHLORIDE 0.9 % (FLUSH) 0.9 %
10 SYRINGE (ML) INJECTION AS NEEDED
Status: DISCONTINUED | OUTPATIENT
Start: 2023-03-02 | End: 2023-03-07 | Stop reason: HOSPADM

## 2023-03-02 RX ORDER — MORPHINE SULFATE 2 MG/ML
2 INJECTION, SOLUTION INTRAMUSCULAR; INTRAVENOUS EVERY 4 HOURS PRN
Status: DISCONTINUED | OUTPATIENT
Start: 2023-03-02 | End: 2023-03-04

## 2023-03-02 RX ORDER — PANTOPRAZOLE SODIUM 40 MG/10ML
40 INJECTION, POWDER, LYOPHILIZED, FOR SOLUTION INTRAVENOUS
Status: DISCONTINUED | OUTPATIENT
Start: 2023-03-03 | End: 2023-03-05

## 2023-03-02 RX ORDER — ONDANSETRON 2 MG/ML
4 INJECTION INTRAMUSCULAR; INTRAVENOUS ONCE
Status: COMPLETED | OUTPATIENT
Start: 2023-03-02 | End: 2023-03-02

## 2023-03-02 RX ORDER — DEXTROSE MONOHYDRATE 25 G/50ML
25 INJECTION, SOLUTION INTRAVENOUS
Status: DISCONTINUED | OUTPATIENT
Start: 2023-03-02 | End: 2023-03-07 | Stop reason: HOSPADM

## 2023-03-02 RX ORDER — ONDANSETRON 2 MG/ML
4 INJECTION INTRAMUSCULAR; INTRAVENOUS EVERY 6 HOURS PRN
Status: DISCONTINUED | OUTPATIENT
Start: 2023-03-02 | End: 2023-03-04

## 2023-03-02 RX ORDER — KETOROLAC TROMETHAMINE 15 MG/ML
15 INJECTION, SOLUTION INTRAMUSCULAR; INTRAVENOUS ONCE
Status: DISCONTINUED | OUTPATIENT
Start: 2023-03-02 | End: 2023-03-02

## 2023-03-02 RX ORDER — SODIUM CHLORIDE, SODIUM LACTATE, POTASSIUM CHLORIDE, CALCIUM CHLORIDE 600; 310; 30; 20 MG/100ML; MG/100ML; MG/100ML; MG/100ML
100 INJECTION, SOLUTION INTRAVENOUS CONTINUOUS
Status: DISCONTINUED | OUTPATIENT
Start: 2023-03-03 | End: 2023-03-04

## 2023-03-02 RX ORDER — KETOROLAC TROMETHAMINE 30 MG/ML
30 INJECTION, SOLUTION INTRAMUSCULAR; INTRAVENOUS ONCE
Status: COMPLETED | OUTPATIENT
Start: 2023-03-02 | End: 2023-03-02

## 2023-03-02 RX ORDER — SODIUM CHLORIDE 9 MG/ML
40 INJECTION, SOLUTION INTRAVENOUS AS NEEDED
Status: DISCONTINUED | OUTPATIENT
Start: 2023-03-02 | End: 2023-03-07 | Stop reason: HOSPADM

## 2023-03-02 RX ORDER — KETOROLAC TROMETHAMINE 30 MG/ML
30 INJECTION, SOLUTION INTRAMUSCULAR; INTRAVENOUS ONCE
Status: DISCONTINUED | OUTPATIENT
Start: 2023-03-02 | End: 2023-03-02

## 2023-03-02 RX ORDER — NALOXONE HCL 0.4 MG/ML
0.4 VIAL (ML) INJECTION
Status: DISCONTINUED | OUTPATIENT
Start: 2023-03-02 | End: 2023-03-04

## 2023-03-02 RX ORDER — NICOTINE POLACRILEX 4 MG
15 LOZENGE BUCCAL
Status: DISCONTINUED | OUTPATIENT
Start: 2023-03-02 | End: 2023-03-07 | Stop reason: HOSPADM

## 2023-03-02 RX ORDER — SODIUM CHLORIDE 0.9 % (FLUSH) 0.9 %
10 SYRINGE (ML) INJECTION EVERY 12 HOURS SCHEDULED
Status: DISCONTINUED | OUTPATIENT
Start: 2023-03-03 | End: 2023-03-07 | Stop reason: HOSPADM

## 2023-03-02 RX ADMIN — SODIUM CHLORIDE 1000 ML: 9 INJECTION, SOLUTION INTRAVENOUS at 21:37

## 2023-03-02 RX ADMIN — Medication 10 ML: at 23:49

## 2023-03-02 RX ADMIN — KETOROLAC TROMETHAMINE 30 MG: 30 INJECTION, SOLUTION INTRAMUSCULAR; INTRAVENOUS at 18:15

## 2023-03-02 RX ADMIN — SODIUM CHLORIDE 1000 ML: 9 INJECTION, SOLUTION INTRAVENOUS at 18:15

## 2023-03-02 RX ADMIN — ONDANSETRON 4 MG: 2 INJECTION INTRAMUSCULAR; INTRAVENOUS at 23:47

## 2023-03-02 RX ADMIN — MORPHINE SULFATE 2 MG: 2 INJECTION, SOLUTION INTRAMUSCULAR; INTRAVENOUS at 23:48

## 2023-03-02 RX ADMIN — ONDANSETRON 4 MG: 2 INJECTION INTRAMUSCULAR; INTRAVENOUS at 18:15

## 2023-03-02 NOTE — ED PROVIDER NOTES
Time: 4:18 PM EST  Date of encounter:  3/2/2023  Independent Historian/Clinical History and Information was obtained by:   Patient  Chief Complaint   Patient presents with   • Abdominal Pain   • Vomiting       History is limited by: N/A    History of Present Illness:  Patient is a 62 y.o. year old male who presents to the emergency department for evaluation of abdominal pain and vomiting with sudden onset this afternoon.  Patient states he vomited 3 times. He denies diarrhea.  He reports a normal bowel movement just prior to arrival.  He has had a sick contact, his wife having recently been vomiting. He has tried taking PeptoBismol at home, but reports he vomited immediately afterward. 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  He was told that everything was fine.     Patient reports he has a history of pancreatitis, and his symptoms in the past were similar. He denies ETOH use, NSAID use, or HLD. He states his pancreatitis was caused by Trulicity.     Patient Care Team  Primary Care Provider: Vivi Bailon APRN    Past Medical History:     Allergies   Allergen Reactions   • Trulicity [Dulaglutide] Other (See Comments)     Pancreatitis   • Tyloxapol Swelling     Past Medical History:   Diagnosis Date   • Acid reflux    • Diabetes mellitus (HCC)    • Hyperlipidemia      Past Surgical History:   Procedure Laterality Date   • HERNIA REPAIR     • HERNIA REPAIR     • SHOULDER SURGERY     • UPPER GASTROINTESTINAL ENDOSCOPY       Family History   Problem Relation Age of Onset   • Colon cancer Neg Hx        Home Medications:  Prior to Admission medications    Medication Sig Start Date End Date Taking? Authorizing Provider   Continuous Blood Gluc Sensor (FreeStyle Chiquis 3 Sensor) misc USE 1 EVERY 14 DAYS 1/31/23   Provider, MD Kya   esomeprazole (nexIUM) 20 MG capsule Take 20 mg by mouth Every Morning Before Breakfast. 1/10/23    "Kya Juarez MD   glimepiride (Amaryl) 1 MG tablet Take 1 tablet by mouth Every Morning Before Breakfast for 30 days. 1/17/23 2/16/23  Jamie Segovia MD   Insulin Lispro, 1 Unit Dial, (HUMALOG) 100 UNIT/ML solution pen-injector Take as needed per sliding scale for hyperglycemia.. 1/31/23   Kya Juarez MD   lisinopril (PRINIVIL,ZESTRIL) 2.5 MG tablet Take 2.5 mg by mouth Daily. 1/11/23 1/11/24  Kya Juarez MD   lisinopril (PRINIVIL,ZESTRIL) 5 MG tablet Take 1 tablet by mouth Daily. 1/31/23   Kya Juarez MD   metFORMIN ER (GLUCOPHAGE-XR) 500 MG 24 hr tablet TAKE 1 TABLET BY MOUTH TWICE DAILY WITH MEALS. STOP TRULICITY 1/11/23   Kya Juarez MD   OneTouch Verio test strip  1/12/23   Kya Juarez MD   rosuvastatin (CRESTOR) 10 MG tablet Take 10 mg by mouth Daily. 10/19/22 1/17/23  Kya Juarez MD        Social History:   Social History     Tobacco Use   • Smoking status: Never     Passive exposure: Past   • Smokeless tobacco: Never   Vaping Use   • Vaping Use: Never used   Substance Use Topics   • Alcohol use: Never   • Drug use: Never         Review of Systems:  Review of Systems   Constitutional: Positive for chills (+sweating). Negative for fever.   HENT: Negative.    Eyes: Negative.    Respiratory: Negative.    Cardiovascular: Negative.    Gastrointestinal: Positive for abdominal pain, nausea and vomiting.   Endocrine: Negative.    Genitourinary: Negative.    Musculoskeletal: Negative.    Skin: Negative.    Allergic/Immunologic: Negative.    Neurological: Negative.    Hematological: Negative.    Psychiatric/Behavioral: Negative.         Physical Exam:  /75   Pulse 67   Temp 97.6 °F (36.4 °C)   Resp 20   Ht 160 cm (63\")   Wt 71.9 kg (158 lb 8.2 oz)   SpO2 97%   BMI 28.08 kg/m²     Physical Exam  Vitals and nursing note reviewed.   Constitutional:       Appearance: Normal appearance.   HENT:      Head: Normocephalic and atraumatic.      " Nose: Nose normal.      Mouth/Throat:      Mouth: Mucous membranes are moist.   Eyes:      Extraocular Movements: Extraocular movements intact.      Conjunctiva/sclera: Conjunctivae normal.      Pupils: Pupils are equal, round, and reactive to light.   Cardiovascular:      Rate and Rhythm: Normal rate and regular rhythm.   Pulmonary:      Effort: Pulmonary effort is normal.      Breath sounds: Normal breath sounds.   Abdominal:      General: Abdomen is flat. Bowel sounds are normal. There is no distension.      Palpations: Abdomen is soft.      Tenderness: There is abdominal tenderness in the left upper quadrant. There is no right CVA tenderness or left CVA tenderness.   Musculoskeletal:         General: Normal range of motion.      Cervical back: Normal range of motion and neck supple.   Skin:     General: Skin is warm and dry.      Coloration: Skin is not cyanotic.   Neurological:      General: No focal deficit present.      Mental Status: He is alert and oriented to person, place, and time.   Psychiatric:         Attention and Perception: Attention and perception normal.         Mood and Affect: Mood normal.         Behavior: Behavior normal.                  Procedures:  Procedures      Medical Decision Making:      Comorbidities that affect care:    Diabetes    External Notes reviewed:    Previous ED Note, Previous Radiological Studies: previous ct. mri abdomen  and Previous Labs: previous labs      The following orders were placed and all results were independently analyzed by me:  Orders Placed This Encounter   Procedures   • US Abdomen Limited   • MRI abdomen wo contrast mrcp   • [place] Draw   • Comprehensive Metabolic Panel   • Lipase   • Urinalysis With Microscopic If Indicated (No Culture) - Urine, Clean Catch   • Lactic Acid, Plasma   • CBC Auto Differential   • Urinalysis, Microscopic Only - Urine, Clean Catch   • STAT Lactic Acid, Reflex   • STAT Lactic Acid, Reflex   • NPO Diet NPO Type: Strict NPO    • Undress & Gown   • Code Status and Medical Interventions:   • IP General Consult (Use specialty-specific consult if known)   • Inpatient Hospitalist Consult   • Insert Peripheral IV   • Initiate Observation Status   • CBC & Differential   • Green Top (Gel)   • Lavender Top   • Gold Top - SST   • Light Blue Top       Medications Given in the Emergency Department:  Medications   sodium chloride 0.9 % flush 10 mL (has no administration in time range)   sodium chloride 0.9 % bolus 1,000 mL (0 mL Intravenous Stopped 3/2/23 1930)   ondansetron (ZOFRAN) injection 4 mg (4 mg Intravenous Given 3/2/23 1815)   ketorolac (TORADOL) injection 30 mg (30 mg Intravenous Given 3/2/23 1815)   sodium chloride 0.9 % bolus 1,000 mL (1,000 mL Intravenous New Bag 3/2/23 2137)        ED Course:    The patient was initially evaluated in the triage area where orders were placed. The patient was later dispositioned by KINJAL Randhawa.      The patient was advised to stay for completion of workup which includes but is not limited to communication of labs and radiological results, reassessment and plan. The patient was advised that leaving prior to disposition by a provider could result in critical findings that are not communicated to the patient.     ED Course as of 03/02/23 2224   Thu Mar 02, 2023   1954 Eosinophil Rel %(!): 0.1 [AJ]   2030 Narrative & Impression  PROCEDURE:  MRI ABDOMEN W WO CONTRAST     COMPARISON: [facility], CT, CT ABDOMEN PELVIS W CONTRAST, 1/14/2023, 8:24.     INDICATIONS:  Abdominal mass, intra-abdominal neoplasm suspected     CONTRAST:      14ml  Multihance I.V.     TECHNIQUE:    A comprehensive MRI examination of the abdomen was performed utilizing a variety of   imaging planes and imaging parameters to optimize visualization of suspected pathology.  Images   were obtained both before and after intravenous gadolinium injection.       FINDINGS:          There is a 2 cm lesion in the distal  pancreatic tail.  The lesion demonstrates signal   characteristics identical 2 splenic tissue on all pulse sequences, and dynamic enhancement pattern   similar to the spleen.  There is mild interstitial edema in the neck of the pancreas.  Pancreatic   duct is normal in caliber.  Stones are noted in the gallbladder lumen.  Bile ducts normal in   caliber.  No focal liver lesion is demonstrated.  There is some perinephric stranding bilaterally.    There is a cyst of the lower pole of the right kidney     IMPRESSION:                    1. 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  Recommend follow-up CT in 6 months to assure stability     2. Findings suggesting mild pancreatitis     3. Cholelithiasis with no findings of cholecystitis or biliary obstruction              DEEP KO MD         Electronically Signed and Approved By: DEEP KO MD on 1/     [TC]   2152 I spoke with Dr. Pelletier and reviewed the patient's test results with him.  We did discussed and reviewed the previous MRI and MRCP results.  He would like the patient to be admitted tonight with the hospitalist service and wants him kept n.p.o. once an MRCP done tonight and repeat CMP in the morning and states that he will see him tomorrow and consult then.  The patient was advised of this information and is agreeable to the admission and treatment plan. [TC]   2201 Spoke with Dr. Lockhart and he will admit the patient and requested the MRCP be put in while the patient is in the emergency department.  He was made aware that the patient was to be kept n.p.o. and that Dr. Pelletier would see him tomorrow and he also needed a repeat CMP in the morning.  Patient was made aware of the admission. [TC]      ED Course User Index  [AJ] Suresh Joseph PA-C  [TC] Lili Jay APRN       Labs:    Lab Results (last 24 hours)     Procedure Component Value Units Date/Time    CBC &  Differential [[id number]]  (Abnormal) Collected: 03/02/23 1736    Specimen: Blood Updated: 03/02/23 1750    Narrative:      The following orders were created for panel order CBC & Differential.  Procedure                               Abnormality         Status                     ---------                               -----------         ------                     CBC Auto Differential[[id number]]        Abnormal            Final result                 Please view results for these tests on the individual orders.    Comprehensive Metabolic Panel [[id number]]  (Abnormal) Collected: 03/02/23 1736    Specimen: Blood Updated: 03/02/23 1812     Glucose 175 mg/dL      BUN 24 mg/dL      Creatinine 0.94 mg/dL      Sodium 139 mmol/L      Potassium 4.3 mmol/L      Chloride 102 mmol/L      CO2 25.6 mmol/L      Calcium 9.7 mg/dL      Total Protein 7.8 g/dL      Albumin 4.7 g/dL      ALT (SGPT) 165 U/L      AST (SGOT) 231 U/L      Alkaline Phosphatase 135 U/L      Total Bilirubin 2.3 mg/dL      Globulin 3.1 gm/dL      A/G Ratio 1.5 g/dL      BUN/Creatinine Ratio 25.5     Anion Gap 11.4 mmol/L      eGFR 91.1 mL/min/1.73     Narrative:      GFR Normal >60  Chronic Kidney Disease <60  Kidney Failure <15      Lipase [[id number]]  (Normal) Collected: 03/02/23 1736    Specimen: Blood Updated: 03/02/23 1812     Lipase 31 U/L     Urinalysis With Microscopic If Indicated (No Culture) - Urine, Clean Catch [[id number]]  (Abnormal) Collected: 03/02/23 1736    Specimen: Urine, Clean Catch Updated: 03/02/23 1758     Color, UA Dark Yellow     Appearance, UA Clear     pH, UA 5.5     Specific Gravity, UA >=1.030     Glucose,  mg/dL (1+)     Ketones, UA 15 mg/dL (1+)     Bilirubin, UA Small (1+)     Blood, UA Negative     Protein, UA 30 mg/dL (1+)     Leuk Esterase, UA Negative     Nitrite, UA Negative     Urobilinogen, UA 1.0 E.U./dL    Lactic Acid, Plasma [[id number]]  (Abnormal) Collected: 03/02/23 1736    Specimen: Blood Updated: 03/02/23  1821     Lactate 2.2 mmol/L     CBC Auto Differential [[id number]]  (Abnormal) Collected: 03/02/23 1736    Specimen: Blood Updated: 03/02/23 1750     WBC 9.23 10*3/mm3      RBC 5.87 10*6/mm3      Hemoglobin 17.3 g/dL      Hematocrit 49.9 %      MCV 85.0 fL      MCH 29.5 pg      MCHC 34.7 g/dL      RDW 13.8 %      RDW-SD 42.6 fl      MPV 9.2 fL      Platelets 160 10*3/mm3      Neutrophil % 91.9 %      Lymphocyte % 6.1 %      Monocyte % 1.7 %      Eosinophil % 0.1 %      Basophil % 0.0 %      Immature Grans % 0.2 %      Neutrophils, Absolute 8.48 10*3/mm3      Lymphocytes, Absolute 0.56 10*3/mm3      Monocytes, Absolute 0.16 10*3/mm3      Eosinophils, Absolute 0.01 10*3/mm3      Basophils, Absolute 0.00 10*3/mm3      Immature Grans, Absolute 0.02 10*3/mm3      nRBC 0.0 /100 WBC     Urinalysis, Microscopic Only - Urine, Clean Catch [[id number]]  (Abnormal) Collected: 03/02/23 1736    Specimen: Urine, Clean Catch Updated: 03/02/23 1758     RBC, UA 0-2 /HPF      WBC, UA 0-2 /HPF      Bacteria, UA None Seen /HPF      Squamous Epithelial Cells, UA 0-2 /HPF      Hyaline Casts, UA 0-2 /LPF      Methodology Automated Microscopy    STAT Lactic Acid, Reflex [[id number]]  (Abnormal) Collected: 03/02/23 2049    Specimen: Blood Updated: 03/02/23 2129     Lactate 2.6 mmol/L            Imaging:    US Abdomen Limited    Result Date: 3/2/2023  PROCEDURE: US ABDOMEN LIMITED  COMPARISON: [facility], MR, MRI ABDOMEN W WO CONTRAST, 1/14/2023, 12:17.  INDICATIONS: luq pain, elevated LFTS  TECHNIQUE: Ultrasound examination of the right upper quadrant of the abdomen was performed.   FINDINGS:  LIVER: Measures 15.6 cm with diffuse increased echogenicity compatible with steatosis.  No focal hepatic mass is identified.  The hepatic vasculature appears within normal limits. BILIARY: Small stones in the gallbladder, but no secondary findings of acute cholecystitis.  Negative sonographic Loja's sign.  Common bile duct normal in  caliber at 5.4 mm. PANCREAS: Normal.  No visible mass, abnormal atrophy, or ductal dilatation.  RIGHT KIDNEY: Normal measuring 11.7 cm.  No mass or hydronephrosis.  OTHER: Negative.         1. Hepatic steatosis. 2. Cholelithiasis. 3. Pancreas appears within normal limits.     JOCELYNE ROSARIO MD       ELECTRONICALLY SIGNED AND APPROVED BY: JOCELYNE ROSARIO MD ON 3/02/2023 AT 20:27                 Differential Diagnosis and Discussion:      Abdominal Pain: Based on the patient's signs and symptoms, I considered abdominal aortic aneurysm, small bowel obstruction, pancreatitis, acute cholecystitis, acute appendecitis, peptic ulcer disease, gastritis, colitis, endocrine disorders, irritable bowel syndrome and other differential diagnosis an etiology of the patient's abdominal pain.    All labs were reviewed and interpreted by me.  CT scan radiology interpretation was reviewed by me.    MDM  Number of Diagnoses or Management Options  Calculus of gallbladder with biliary obstruction but without cholecystitis: established and worsening  Elevated LFTs: established and worsening  Left upper quadrant abdominal pain: established and worsening  Nausea and vomiting, unspecified vomiting type: established and worsening     Amount and/or Complexity of Data Reviewed  Clinical lab tests: reviewed  Tests in the radiology section of CPT®: reviewed  Decide to obtain previous medical records or to obtain history from someone other than the patient: yes    Risk of Complications, Morbidity, and/or Mortality  Presenting problems: low  Diagnostic procedures: low  Management options: low    Patient Progress  Patient progress: stable       Patient Care Considerations:    CT ABDOMEN AND PELVIS: I considered ordering a CT scan of the abdomen and pelvis however pt had a ct 6 weeks ago, triage provider called  and recommended not to radiate pt      Consultants/Shared Management Plan:    None    Social Determinants of Health:    Patient has  presented with family members who are responsible, reliable and will ensure follow up care.      Disposition and Care Coordination:    Discharged: The patient is suitable and stable for discharge with no need for consideration of observation or admission.    I have explained discharge medications and the need for follow up with the patient/caretakers. This was also printed in the discharge instructions. Patient was discharged with the following medications and follow up:      Medication List      No changes were made to your prescriptions during this visit.      No follow-up provider specified.     Final diagnoses:   Calculus of gallbladder with biliary obstruction but without cholecystitis   Left upper quadrant abdominal pain   Elevated LFTs   Nausea and vomiting, unspecified vomiting type        ED Disposition     ED Disposition   Decision to Admit    Condition   --    Comment   Level of Care: Med/Surg [1]   Diagnosis: Calculus of gallbladder with biliary obstruction but without cholecystitis [[id number]]               This medical record created using voice recognition software.           Aleisha Sheriff  03/02/23 1701       Aleisha Sheriff  03/02/23 1704       Aleisha Sheriff  03/02/23 1705       Lili Jay APRN  03/02/23 [id number]

## 2023-03-03 ENCOUNTER — ANESTHESIA EVENT (OUTPATIENT)
Dept: GASTROENTEROLOGY | Facility: HOSPITAL | Age: 63
DRG: 419 | End: 2023-03-03
Payer: COMMERCIAL

## 2023-03-03 ENCOUNTER — ANESTHESIA (OUTPATIENT)
Dept: GASTROENTEROLOGY | Facility: HOSPITAL | Age: 63
DRG: 419 | End: 2023-03-03
Payer: COMMERCIAL

## 2023-03-03 ENCOUNTER — APPOINTMENT (OUTPATIENT)
Dept: GENERAL RADIOLOGY | Facility: HOSPITAL | Age: 63
DRG: 419 | End: 2023-03-03
Payer: COMMERCIAL

## 2023-03-03 ENCOUNTER — ANESTHESIA EVENT (OUTPATIENT)
Dept: PERIOP | Facility: HOSPITAL | Age: 63
DRG: 419 | End: 2023-03-03
Payer: COMMERCIAL

## 2023-03-03 PROBLEM — K83.09 ASCENDING CHOLANGITIS: Status: ACTIVE | Noted: 2023-03-02

## 2023-03-03 PROBLEM — R79.89 ELEVATED LFTS: Status: ACTIVE | Noted: 2023-03-02

## 2023-03-03 LAB
ALBUMIN SERPL-MCNC: 3.4 G/DL (ref 3.5–5.2)
ALBUMIN SERPL-MCNC: 3.7 G/DL (ref 3.5–5.2)
ALBUMIN/GLOB SERPL: 1.8 G/DL
ALBUMIN/GLOB SERPL: 1.8 G/DL
ALP SERPL-CCNC: 106 U/L (ref 39–117)
ALP SERPL-CCNC: 89 U/L (ref 39–117)
ALT SERPL W P-5'-P-CCNC: 324 U/L (ref 1–41)
ALT SERPL W P-5'-P-CCNC: 336 U/L (ref 1–41)
ANION GAP SERPL CALCULATED.3IONS-SCNC: 11.4 MMOL/L (ref 5–15)
ANION GAP SERPL CALCULATED.3IONS-SCNC: 9.7 MMOL/L (ref 5–15)
AST SERPL-CCNC: 241 U/L (ref 1–40)
AST SERPL-CCNC: 369 U/L (ref 1–40)
BASOPHILS # BLD AUTO: 0.01 10*3/MM3 (ref 0–0.2)
BASOPHILS NFR BLD AUTO: 0.2 % (ref 0–1.5)
BILIRUB SERPL-MCNC: 4.6 MG/DL (ref 0–1.2)
BILIRUB SERPL-MCNC: 6.3 MG/DL (ref 0–1.2)
BUN SERPL-MCNC: 23 MG/DL (ref 8–23)
BUN SERPL-MCNC: 25 MG/DL (ref 8–23)
BUN/CREAT SERPL: 23.2 (ref 7–25)
BUN/CREAT SERPL: 25.5 (ref 7–25)
CALCIUM SPEC-SCNC: 8.4 MG/DL (ref 8.6–10.5)
CALCIUM SPEC-SCNC: 8.4 MG/DL (ref 8.6–10.5)
CHLORIDE SERPL-SCNC: 105 MMOL/L (ref 98–107)
CHLORIDE SERPL-SCNC: 107 MMOL/L (ref 98–107)
CO2 SERPL-SCNC: 21.6 MMOL/L (ref 22–29)
CO2 SERPL-SCNC: 23.3 MMOL/L (ref 22–29)
CREAT SERPL-MCNC: 0.98 MG/DL (ref 0.76–1.27)
CREAT SERPL-MCNC: 0.99 MG/DL (ref 0.76–1.27)
D-LACTATE SERPL-SCNC: 1.7 MMOL/L (ref 0.5–2)
D-LACTATE SERPL-SCNC: 2.5 MMOL/L (ref 0.5–2)
DEPRECATED RDW RBC AUTO: 41.8 FL (ref 37–54)
EGFRCR SERPLBLD CKD-EPI 2021: 85.6 ML/MIN/1.73
EGFRCR SERPLBLD CKD-EPI 2021: 86.6 ML/MIN/1.73
EOSINOPHIL # BLD AUTO: 0 10*3/MM3 (ref 0–0.4)
EOSINOPHIL NFR BLD AUTO: 0 % (ref 0.3–6.2)
ERYTHROCYTE [DISTWIDTH] IN BLOOD BY AUTOMATED COUNT: 13.8 % (ref 12.3–15.4)
GLOBULIN UR ELPH-MCNC: 1.9 GM/DL
GLOBULIN UR ELPH-MCNC: 2.1 GM/DL
GLUCOSE BLDC GLUCOMTR-MCNC: 110 MG/DL (ref 70–99)
GLUCOSE BLDC GLUCOMTR-MCNC: 122 MG/DL (ref 70–99)
GLUCOSE BLDC GLUCOMTR-MCNC: 142 MG/DL (ref 70–99)
GLUCOSE BLDC GLUCOMTR-MCNC: 154 MG/DL (ref 70–99)
GLUCOSE SERPL-MCNC: 139 MG/DL (ref 65–99)
GLUCOSE SERPL-MCNC: 156 MG/DL (ref 65–99)
HCT VFR BLD AUTO: 43.9 % (ref 37.5–51)
HGB BLD-MCNC: 15.3 G/DL (ref 13–17.7)
IMM GRANULOCYTES # BLD AUTO: 0.01 10*3/MM3 (ref 0–0.05)
IMM GRANULOCYTES NFR BLD AUTO: 0.2 % (ref 0–0.5)
LYMPHOCYTES # BLD AUTO: 0.16 10*3/MM3 (ref 0.7–3.1)
LYMPHOCYTES NFR BLD AUTO: 2.5 % (ref 19.6–45.3)
MCH RBC QN AUTO: 29.2 PG (ref 26.6–33)
MCHC RBC AUTO-ENTMCNC: 34.9 G/DL (ref 31.5–35.7)
MCV RBC AUTO: 83.8 FL (ref 79–97)
MONOCYTES # BLD AUTO: 0.28 10*3/MM3 (ref 0.1–0.9)
MONOCYTES NFR BLD AUTO: 4.4 % (ref 5–12)
NEUTROPHILS NFR BLD AUTO: 5.92 10*3/MM3 (ref 1.7–7)
NEUTROPHILS NFR BLD AUTO: 92.7 % (ref 42.7–76)
NRBC BLD AUTO-RTO: 0 /100 WBC (ref 0–0.2)
PLATELET # BLD AUTO: 134 10*3/MM3 (ref 140–450)
PMV BLD AUTO: 8.6 FL (ref 6–12)
POTASSIUM SERPL-SCNC: 3.7 MMOL/L (ref 3.5–5.2)
POTASSIUM SERPL-SCNC: 4.4 MMOL/L (ref 3.5–5.2)
PROT SERPL-MCNC: 5.3 G/DL (ref 6–8.5)
PROT SERPL-MCNC: 5.8 G/DL (ref 6–8.5)
RBC # BLD AUTO: 5.24 10*6/MM3 (ref 4.14–5.8)
SODIUM SERPL-SCNC: 138 MMOL/L (ref 136–145)
SODIUM SERPL-SCNC: 140 MMOL/L (ref 136–145)
WBC NRBC COR # BLD: 6.38 10*3/MM3 (ref 3.4–10.8)

## 2023-03-03 PROCEDURE — 83605 ASSAY OF LACTIC ACID: CPT | Performed by: STUDENT IN AN ORGANIZED HEALTH CARE EDUCATION/TRAINING PROGRAM

## 2023-03-03 PROCEDURE — C2625 STENT, NON-COR, TEM W/DEL SY: HCPCS | Performed by: INTERNAL MEDICINE

## 2023-03-03 PROCEDURE — 25010000002 FENTANYL CITRATE (PF) 50 MCG/ML SOLUTION: Performed by: MARRIAGE & FAMILY THERAPIST

## 2023-03-03 PROCEDURE — 80053 COMPREHEN METABOLIC PANEL: CPT | Performed by: INTERNAL MEDICINE

## 2023-03-03 PROCEDURE — 25010000002 ONDANSETRON PER 1 MG: Performed by: SURGERY

## 2023-03-03 PROCEDURE — C1769 GUIDE WIRE: HCPCS | Performed by: INTERNAL MEDICINE

## 2023-03-03 PROCEDURE — 99232 SBSQ HOSP IP/OBS MODERATE 35: CPT | Performed by: INTERNAL MEDICINE

## 2023-03-03 PROCEDURE — G0378 HOSPITAL OBSERVATION PER HR: HCPCS

## 2023-03-03 PROCEDURE — 25010000002 PROPOFOL 10 MG/ML EMULSION: Performed by: NURSE ANESTHETIST, CERTIFIED REGISTERED

## 2023-03-03 PROCEDURE — 82962 GLUCOSE BLOOD TEST: CPT

## 2023-03-03 PROCEDURE — 99221 1ST HOSP IP/OBS SF/LOW 40: CPT | Performed by: SURGERY

## 2023-03-03 PROCEDURE — 0F798DZ DILATION OF COMMON BILE DUCT WITH INTRALUMINAL DEVICE, VIA NATURAL OR ARTIFICIAL OPENING ENDOSCOPIC: ICD-10-PCS | Performed by: INTERNAL MEDICINE

## 2023-03-03 PROCEDURE — 43264 ERCP REMOVE DUCT CALCULI: CPT | Performed by: INTERNAL MEDICINE

## 2023-03-03 PROCEDURE — 94799 UNLISTED PULMONARY SVC/PX: CPT

## 2023-03-03 PROCEDURE — 25010000002 MORPHINE PER 10 MG: Performed by: INTERNAL MEDICINE

## 2023-03-03 PROCEDURE — 25010000002 MORPHINE PER 10 MG: Performed by: SURGERY

## 2023-03-03 PROCEDURE — 25010000002 ONDANSETRON PER 1 MG: Performed by: INTERNAL MEDICINE

## 2023-03-03 PROCEDURE — 99214 OFFICE O/P EST MOD 30 MIN: CPT | Performed by: INTERNAL MEDICINE

## 2023-03-03 PROCEDURE — 43273 ENDOSCOPIC PANCREATOSCOPY: CPT | Performed by: INTERNAL MEDICINE

## 2023-03-03 PROCEDURE — 43274 ERCP DUCT STENT PLACEMENT: CPT | Performed by: INTERNAL MEDICINE

## 2023-03-03 PROCEDURE — 25510000001 IOPAMIDOL PER 1 ML: Performed by: INTERNAL MEDICINE

## 2023-03-03 PROCEDURE — 74330 X-RAY BILE/PANC ENDOSCOPY: CPT

## 2023-03-03 DEVICE — BILIARY STENT
Type: IMPLANTABLE DEVICE | Site: BILE DUCT | Status: FUNCTIONAL
Brand: ADVANIX™ BILIARY

## 2023-03-03 RX ORDER — PROPOFOL 10 MG/ML
VIAL (ML) INTRAVENOUS AS NEEDED
Status: DISCONTINUED | OUTPATIENT
Start: 2023-03-03 | End: 2023-03-03 | Stop reason: SURG

## 2023-03-03 RX ORDER — KETAMINE HCL IN NACL, ISO-OSM 100MG/10ML
SYRINGE (ML) INJECTION AS NEEDED
Status: DISCONTINUED | OUTPATIENT
Start: 2023-03-03 | End: 2023-03-03 | Stop reason: SURG

## 2023-03-03 RX ORDER — INDOMETHACIN 100 MG
100 SUPPOSITORY, RECTAL RECTAL
Status: COMPLETED | OUTPATIENT
Start: 2023-03-03 | End: 2023-03-03

## 2023-03-03 RX ORDER — GLYCOPYRROLATE 0.2 MG/ML
INJECTION INTRAMUSCULAR; INTRAVENOUS AS NEEDED
Status: DISCONTINUED | OUTPATIENT
Start: 2023-03-03 | End: 2023-03-03 | Stop reason: SURG

## 2023-03-03 RX ORDER — INSULIN LISPRO 100 [IU]/ML
INJECTION, SOLUTION INTRAVENOUS; SUBCUTANEOUS ONCE AS NEEDED
COMMUNITY

## 2023-03-03 RX ORDER — GLIMEPIRIDE 1 MG/1
1 TABLET ORAL DAILY
COMMUNITY
Start: 2023-02-21

## 2023-03-03 RX ORDER — FENTANYL CITRATE 50 UG/ML
INJECTION, SOLUTION INTRAMUSCULAR; INTRAVENOUS AS NEEDED
Status: DISCONTINUED | OUTPATIENT
Start: 2023-03-03 | End: 2023-03-03 | Stop reason: SURG

## 2023-03-03 RX ORDER — SODIUM CHLORIDE, SODIUM LACTATE, POTASSIUM CHLORIDE, CALCIUM CHLORIDE 600; 310; 30; 20 MG/100ML; MG/100ML; MG/100ML; MG/100ML
30 INJECTION, SOLUTION INTRAVENOUS CONTINUOUS PRN
Status: DISCONTINUED | OUTPATIENT
Start: 2023-03-03 | End: 2023-03-07 | Stop reason: HOSPADM

## 2023-03-03 RX ORDER — LIDOCAINE HYDROCHLORIDE 20 MG/ML
INJECTION, SOLUTION EPIDURAL; INFILTRATION; INTRACAUDAL; PERINEURAL AS NEEDED
Status: DISCONTINUED | OUTPATIENT
Start: 2023-03-03 | End: 2023-03-03 | Stop reason: SURG

## 2023-03-03 RX ADMIN — Medication 30 MG: at 14:57

## 2023-03-03 RX ADMIN — PROPOFOL 200 MCG/KG/MIN: 10 INJECTION, EMULSION INTRAVENOUS at 14:46

## 2023-03-03 RX ADMIN — Medication 10 ML: at 08:00

## 2023-03-03 RX ADMIN — FENTANYL CITRATE 50 MCG: 50 INJECTION, SOLUTION INTRAMUSCULAR; INTRAVENOUS at 15:02

## 2023-03-03 RX ADMIN — MORPHINE SULFATE 2 MG: 2 INJECTION, SOLUTION INTRAMUSCULAR; INTRAVENOUS at 17:09

## 2023-03-03 RX ADMIN — FENTANYL CITRATE 50 MCG: 50 INJECTION, SOLUTION INTRAMUSCULAR; INTRAVENOUS at 14:49

## 2023-03-03 RX ADMIN — SODIUM CHLORIDE, POTASSIUM CHLORIDE, SODIUM LACTATE AND CALCIUM CHLORIDE: 600; 310; 30; 20 INJECTION, SOLUTION INTRAVENOUS at 14:56

## 2023-03-03 RX ADMIN — SODIUM CHLORIDE, POTASSIUM CHLORIDE, SODIUM LACTATE AND CALCIUM CHLORIDE 100 ML/HR: 600; 310; 30; 20 INJECTION, SOLUTION INTRAVENOUS at 00:08

## 2023-03-03 RX ADMIN — Medication 100 MG: at 14:21

## 2023-03-03 RX ADMIN — PROPOFOL 50 MG: 10 INJECTION, EMULSION INTRAVENOUS at 14:46

## 2023-03-03 RX ADMIN — Medication 20 MG: at 14:46

## 2023-03-03 RX ADMIN — SODIUM CHLORIDE, POTASSIUM CHLORIDE, SODIUM LACTATE AND CALCIUM CHLORIDE 100 ML/HR: 600; 310; 30; 20 INJECTION, SOLUTION INTRAVENOUS at 22:52

## 2023-03-03 RX ADMIN — LIDOCAINE HYDROCHLORIDE 50 MG: 20 INJECTION, SOLUTION EPIDURAL; INFILTRATION; INTRACAUDAL; PERINEURAL at 14:46

## 2023-03-03 RX ADMIN — SODIUM CHLORIDE, POTASSIUM CHLORIDE, SODIUM LACTATE AND CALCIUM CHLORIDE 100 ML/HR: 600; 310; 30; 20 INJECTION, SOLUTION INTRAVENOUS at 08:00

## 2023-03-03 RX ADMIN — ONDANSETRON 4 MG: 2 INJECTION INTRAMUSCULAR; INTRAVENOUS at 17:09

## 2023-03-03 RX ADMIN — PANTOPRAZOLE SODIUM 40 MG: 40 INJECTION, POWDER, FOR SOLUTION INTRAVENOUS at 06:00

## 2023-03-03 RX ADMIN — GLYCOPYRROLATE 0.1 MG: 0.2 INJECTION INTRAMUSCULAR; INTRAVENOUS at 14:46

## 2023-03-03 NOTE — CONSULTS
[facility]  Initial Inpatient Consult Note    Referring Provider: Hospitalist    Reason for Consultation: Epigastric pain, gallstones    Subjective     History of present illness:    62 y.o. male with a history of diabetes previously on Trulicity now admitted once again with recurrent epigastric pain similar to his episode in January requiring hospitalization.  At that time he was diagnosed with pancreatitis with an elevated lipase.  He had a negative MRCP during that admission.  He was also noted to have a pancreatic tail lesion that ultimately had outpatient EUS per Dr. Armando.  Patient had been doing well until 2 days ago when he developed recurrent epigastric pain and nausea.  He came to emergency department again overnight and was found to have recurrent elevation of his LFTs and bilirubin now up to 4.6 and now repeated up to 6.2.  Repeat MRCP overnight with some motion artifact did not show any obvious choledocholithiasis.  His lipase was normal during this admission.  He denied any fevers and had no leukocytosis.    Past Medical History:  Past Medical History:   Diagnosis Date   • Acid reflux    • Diabetes mellitus (HCC)    • Hyperlipidemia      Past Surgical History:  Past Surgical History:   Procedure Laterality Date   • HERNIA REPAIR     • HERNIA REPAIR     • SHOULDER SURGERY     • UPPER GASTROINTESTINAL ENDOSCOPY        Social History:   Social History     Tobacco Use   • Smoking status: Never     Passive exposure: Past   • Smokeless tobacco: Never   Substance Use Topics   • Alcohol use: Never      Family History:  Family History   Problem Relation Age of Onset   • Colon cancer Neg Hx        Home Meds:  Medications Prior to Admission   Medication Sig Dispense Refill Last Dose   • glimepiride (AMARYL) 1 MG tablet Take 1 tablet by mouth Daily.      • lisinopril (PRINIVIL,ZESTRIL) 2.5 MG tablet Take 2 tablets by mouth Daily.   3/2/2023   • metFORMIN ER (GLUCOPHAGE-XR) 500 MG 24 hr  tablet Take 1 tablet by mouth 2 (Two) Times a Day With Meals.   3/2/2023   • esomeprazole (nexIUM) 20 MG capsule Take 1 capsule by mouth Every Morning Before Breakfast.      • Insulin Lispro, 1 Unit Dial, (HumaLOG KwikPen) 100 UNIT/ML solution pen-injector Inject  under the skin into the appropriate area as directed 1 (One) Time As Needed (BG over 180 mg/dl).      • rosuvastatin (CRESTOR) 10 MG tablet Take 1 tablet by mouth Daily.        Current Meds:   insulin regular, 2-7 Units, Subcutaneous, Q6H  pantoprazole, 40 mg, Intravenous, Q AM  sodium chloride, 10 mL, Intravenous, Q12H      Allergies:  Allergies   Allergen Reactions   • Trulicity [Dulaglutide] Other (See Comments)     Pancreatitis   • Tyloxapol Swelling     Review of Systems  Pertinent items are noted in HPI, all other systems reviewed and negative         Vital Signs  Temp:  [97.6 °F (36.4 °C)-98.5 °F (36.9 °C)] 98.5 °F (36.9 °C)  Heart Rate:  [] 101  Resp:  [16-20] 16  BP: (104-168)/(48-75) 106/65  Physical Exam:  General Appearance:    Alert, cooperative, in no acute distress   Head:    Normocephalic, without obvious abnormality, atraumatic   Eyes:          conjunctivae and sclerae normal, no   icterus   Throat:   no thrush, oral mucosa moist   Neck:   Supple, no adenopathy   Lungs:     Clear to auscultation bilaterally    Heart:    Regular rhythm and normal rate    Chest Wall:    No abnormalities observed   Abdomen:     Soft, nondistended, mildly tender to deep palpation in epigastric region but no rebound tenderness.  Normal bowel sounds   Extremities:   no edema, no redness   Skin:   No bruising or rash   Psychiatric:  normal mood and insight     Results Review:  [x]  Laboratory   [x]  Radiology  []  Pathology      I reviewed the patient's new clinical results.    Results from last 7 days   Lab Units 03/02/23  2348 03/02/23  1736   WBC 10*3/mm3 6.38 9.23   HEMOGLOBIN g/dL 15.3 17.3   HEMATOCRIT % 43.9 49.9   PLATELETS 10*3/mm3 134* 160      Results from last 7 days   Lab Units 03/03/23  0917 03/02/23  2348 03/02/23  1736   SODIUM mmol/L 138 140 139   POTASSIUM mmol/L 4.4 3.7 4.3   CHLORIDE mmol/L 105 107 102   CO2 mmol/L 23.3 21.6* 25.6   BUN mg/dL 25* 23 24*   CREATININE mg/dL 0.98 0.99 0.94   CALCIUM mg/dL 8.4* 8.4* 9.7   BILIRUBIN mg/dL 6.3* 4.6* 2.3*   ALK PHOS U/L 89 106 135*   ALT (SGPT) U/L 336* 324* 165*   AST (SGOT) U/L 241* 369* 231*   GLUCOSE mg/dL 139* 156* 175*         Lab Results   Lab Value Date/Time    LIPASE 31 03/02/2023 1736    LIPASE 61 (H) 01/27/2023 1220    LIPASE 95 (H) 01/19/2023 0844    LIPASE 196 (H) 01/14/2023 0700       Radiology:  MRI abdomen wo contrast mrcp   Final Result       No biliary ductal dilatation or choledocholithiasis is seen.  There are gallstones.  Acute    cholecystitis is possible.  Please correlate with pertinent lab values.  No acute pancreatitis.     The study is motion-limited.  Please see above comments for further detail.             Please note that portions of this note were completed with a voice recognition program.       CARIDAD DONOHUE JR, MD          Electronically Signed and Approved By: CARIDAD DONOHUE JR, MD on 3/03/2023 at 0:57                           US Abdomen Limited   Final Result       1. Hepatic steatosis.   2. Cholelithiasis.   3. Pancreas appears within normal limits.                JOCELYNE ROSARIO MD          ELECTRONICALLY SIGNED AND APPROVED BY: JOCELYNE ROSARIO MD ON 3/02/2023 AT 20:27                                Assessment & Plan     Patient Active Problem List   Diagnosis   • Abnormal liver enzymes   • Pancreatic lesion   • Calculus of gallbladder with biliary obstruction but without cholecystitis        Plan:  Patient with recurrent epigastric pain and elevated liver enzymes in the setting of gallstones.  His lipase is normal but he was admitted with pancreatitis and similar symptoms in mid January.  His MRCP shows no obvious choledocholithiasis however given his  rising total bilirubin I will discuss case further with our ERCP physician for concern of retained common bile duct sludge/small stone.  I will also consult general surgery as he will likely ultimately benefit from cholecystectomy.      I discussed the patients findings and my recommendations with patient.    Davon Pelletier MD

## 2023-03-03 NOTE — H&P (VIEW-ONLY)
[facility]   GENERAL SURGERY CONSULT    Patient Name: Devyn Steinberg  : 1960  MRN: [id number]  Primary Care Physician:  Uvaldo, KINJAL Santos  Date of admission: 3/2/2023    Subjective   Subjective     Chief Complaint: Abdominal pain    HPI:    Devyn Steinberg is a 62 y.o. male admitted with abdominal pain and a known history of pancreatitis with gallstones.  He had elevated bilirubin upon admission.  MRCP did not show any obvious choledocholithiasis.  His bilirubin has risen again today to 6.  Currently he reports he feels rather well.  Abdominal pain is improved.    Review of Systems   Constitutional: Positive for appetite change.   Respiratory: Negative.    Cardiovascular: Negative.    Gastrointestinal: Positive for abdominal pain and nausea.   Genitourinary: Negative.    Neurological: Negative.         Personal History     Past Medical History:   Diagnosis Date   • Acid reflux    • Diabetes mellitus (HCC)    • Hyperlipidemia        Past Surgical History:   Procedure Laterality Date   • HERNIA REPAIR     • HERNIA REPAIR     • SHOULDER SURGERY     • UPPER GASTROINTESTINAL ENDOSCOPY         Family History: family history is not on file. Otherwise pertinent FHx was reviewed and not pertinent to current issue.    Social History:  reports that he has never smoked. He has been exposed to tobacco smoke. He has never used smokeless tobacco. He reports that he does not drink alcohol and does not use drugs.    Home Medications:  Insulin Lispro (1 Unit Dial), esomeprazole, glimepiride, lisinopril, metFORMIN ER, and rosuvastatin      Allergies:  Allergies   Allergen Reactions   • Trulicity [Dulaglutide] Other (See Comments)     Pancreatitis   • Tyloxapol Swelling       Objective   Objective     Vitals:   Temp:  [97.6 °F (36.4 °C)-98.5 °F (36.9 °C)] 98.5 °F (36.9 °C)  Heart Rate:  [] 101  Resp:  [16-20] 16  BP: (104-168)/(48-75) 106/65    Physical Exam  Constitutional:       Appearance: Normal appearance.    Cardiovascular:      Rate and Rhythm: Normal rate.   Pulmonary:      Effort: Pulmonary effort is normal.   Abdominal:      Palpations: Abdomen is soft.          Result Review    Result Review:  I have personally reviewed the results from the time of this admission to 3/3/2023 12:24 EST and agree with these findings:  [x]  Laboratory  []  Microbiology  []  Radiology  []  EKG/Telemetry   []  Cardiology/Vascular   []  Pathology  []  Old records  []  Other:    @[facility]@  @[facility]@  Lab Results   Component Value Date     (H) 03/03/2023      Most notable findings include: Bilirubin 6.3 from 4.6, normal white count, improved lactate    Assessment & Plan   Assessment / Plan     Brief Patient Summary:  Devyn Steinberg is a 62 y.o. male who has elevated bilirubin and gallstones with a history of pancreatitis    Active Hospital Problems:  Active Hospital Problems    Diagnosis    • **Calculus of gallbladder with biliary obstruction but without cholecystitis    • Ascending cholangitis      Plan:  Given his history and stones I would recommend cholecystectomy  Risk benefits alternatives of the procedure were discussed extensively  All questions were answered  His MRCP is normal but has continued to have increasing bilirubin  Defer to GI for judgment on ERCP  If they elect for ERCP, it should likely be done first  If they want to hold off, then I will proceed with cholecystectomy tomorrow    DVT prophylaxis:  Mechanical DVT prophylaxis orders are present.    CODE STATUS:    Code Status (Patient has no pulse and is not breathing): CPR (Attempt to Resuscitate)  Medical Interventions (Patient has pulse or is breathing): Full Support    Admission Status:  I believe this patient meets inpatient status.    Electronically signed by Paul Hodge MD, 03/03/23, 12:24 PM EST.

## 2023-03-03 NOTE — PROGRESS NOTES
[facility]   Hospitalist Progress Note  Date: 3/3/2023  Patient Name: Devyn Steinberg  : 1960  MRN: [id number]  Date of admission: 3/2/2023      Subjective   Subjective     Chief Complaint: Abdominal pain     Summary:   Devyn Steinberg is a 62 y.o. male past medical history of pancreatitis, insulin-dependent diabetes mellitus presents to the emergency department for evaluation of abdominal pain that began earlier in the day.  He describes it as a sharp ache, left-sided left upper quadrant, radiates to epigastrium, constant, no known aggravating alleviating factors.  He has associated nausea and vomiting as well.  He denies any fevers, chills, sweats, chest pain or shortness of breath, palpitations, diarrhea constipation, dysuria, weakness, rash.  In the emergency department patient was found to have hyperbilirubinemia of 2.4 with elevated LFTs.  GI was called and recommended MRCP and admission.  They wish to keep patient n.p.o. and they will evaluate in the a.m.     Of note, patient was admitted in January of this year at which time he was diagnosed with pancreatitis and underwent CT abdomen pelvis which showed a suspected lesion.  He subsequently underwent MR CP which confirmed suspected lesion and underwent EUS which displayed his history of spleen tissue and recommended repeat CT scan in 2 months.  He has followed up with GI in February of this year as well.     Due to the patient's history and recurrent symptoms, GI was called from the emergency department with the new findings and have recommended admission as above.    Interval Followup: Patient seen and evaluated on this morning.  He denies any abdominal pain nausea or vomiting at this time.    Review of Systems   All systems were reviewed and negative except for: As noted in interval follow-up    Objective   Objective     Vitals:   Temp:  [97.6 °F (36.4 °C)-98.4 °F (36.9 °C)] 98.2 °F (36.8 °C)  Heart Rate:  [] 101  Resp:  [16-20] 16  BP:  (104-168)/(48-75) 104/50  Physical Exam    Constitutional: Awake, alert, no acute distress              Eyes: Pupils equal, sclerae anicteric, no conjunctival injection              HENT: NCAT, mucous membranes moist              Neck: Supple, no thyromegaly, no lymphadenopathy, trachea midline              Respiratory: Clear to auscultation bilaterally, nonlabored respirations               Cardiovascular: Mild tachycardic S1-S2, no murmurs, palpable pedal pulses bilaterally              Gastrointestinal: Positive bowel sounds, soft, nontender, nondistended              Musculoskeletal: No bilateral ankle edema, no clubbing or cyanosis to extremities              Psychiatric: Appropriate affect, cooperative              Neurologic: Oriented x 3, moving all extremities equally-no focal weakness appreciated, Cranial Nerves grossly intact , speech clear              Skin: No rashes    Result Review    Result Review:  I have personally reviewed the results from the time of this admission to 3/3/2023 09:43 EST and agree with these findings:  [x]  Laboratory  []  Microbiology  [x]  Radiology  []  EKG/Telemetry   []  Cardiology/Vascular   []  Pathology  []  Old records  []  Other:    Assessment & Plan   Assessment / Plan     Assessment/Plan:   • Elevated LFTs: Plans for ERCP on today.    • Cholelithiasis.  Plans for cholecystectomy as per general surgery.  • Insulin-dependent diabetes mellitus: Continue insulin sliding scale     Discussed plan with RN.    DVT prophylaxis:  Mechanical DVT prophylaxis orders are present.    CODE STATUS:   Code Status (Patient has no pulse and is not breathing): CPR (Attempt to Resuscitate)  Medical Interventions (Patient has pulse or is breathing): Full Support      Electronically signed by Marc Snyder MD, 03/03/23, 9:44 AM EST.

## 2023-03-03 NOTE — ANESTHESIA POSTPROCEDURE EVALUATION
Patient: Devyn Steinberg    Procedure Summary     Date: 03/03/23 Room / Location: [facility] ENDOSCOPY 4 / [facility] ENDOSCOPY    Anesthesia Start: 1439 Anesthesia Stop: 1524    Procedure: ENDOSCOPIC RETROGRADE CHOLANGIOPANCREATOGRAPHY WITH SPHINCTEROTOMY, BALLOON SWEEP AND STENT PLACEMENT Diagnosis:       Ascending cholangitis      (Ascending cholangitis [K83.09])    Surgeons: Rory Blas MD Provider: Reyna Howell MD    Anesthesia Type: general, MAC ASA Status: 3          Anesthesia Type: general, MAC    Vitals  Vitals Value Taken Time   /74 03/03/23 1540   Temp 36.5 °C (97.7 °F) 03/03/23 1540   Pulse 102 03/03/23 1541   Resp 17 03/03/23 1540   SpO2 92 % 03/03/23 1541   Vitals shown include unvalidated device data.        Post Anesthesia Care and Evaluation    Patient location during evaluation: bedside  Patient participation: complete - patient participated  Level of consciousness: awake  Pain management: adequate    Airway patency: patent  Anesthetic complications: No anesthetic complications  PONV Status: none  Cardiovascular status: acceptable and stable  Respiratory status: acceptable  Hydration status: acceptable    Comments:

## 2023-03-03 NOTE — ANESTHESIA PREPROCEDURE EVALUATION
Anesthesia Evaluation                  Airway   Mallampati: II  TM distance: >3 FB  Neck ROM: full  No difficulty expected  Dental      Pulmonary - normal exam    breath sounds clear to auscultation  Cardiovascular - normal exam    Rhythm: regular  Rate: normal    (+) hyperlipidemia,       Neuro/Psych  GI/Hepatic/Renal/Endo    (+)  GERD well controlled,  diabetes mellitus,     Musculoskeletal     Abdominal    Substance History      OB/GYN          Other                        Anesthesia Plan    ASA 3     general       Anesthetic plan, risks, benefits, and alternatives have been provided, discussed and informed consent has been obtained with: patient.        CODE STATUS:    Code Status (Patient has no pulse and is not breathing): CPR (Attempt to Resuscitate)  Medical Interventions (Patient has pulse or is breathing): Full Support

## 2023-03-03 NOTE — H&P
[facility] HISTORY AND PHYSICAL  Date: 3/2/2023   Patient Name: Devyn Steinberg  : 1960  MRN: [id number]  Primary Care Physician:  Vivi Bailon APRN  Date of admission: 3/2/2023    Subjective   Subjective     Chief Complaint: Abdominal pain    HPI:    Devyn Steinberg is a 62 y.o. male past medical history of pancreatitis, insulin-dependent diabetes mellitus presents to the emergency department for evaluation of abdominal pain that began earlier in the day.  He describes it as a sharp ache, left-sided left upper quadrant, radiates to epigastrium, constant, no known aggravating alleviating factors.  He has associated nausea and vomiting as well.  He denies any fevers, chills, sweats, chest pain or shortness of breath, palpitations, diarrhea constipation, dysuria, weakness, rash.  In the emergency department patient was found to have hyperbilirubinemia of 2.4 with elevated LFTs.  GI was called and recommended MRCP and admission.  They wish to keep patient n.p.o. and they will evaluate in the a.m.    Of note, patient was admitted in January of this year at which time he was diagnosed with pancreatitis and underwent CT abdomen pelvis which showed a suspected lesion.  He subsequently underwent MR CP which confirmed suspected lesion and underwent EUS which displayed his history of spleen tissue and recommended repeat CT scan in 2 months.  He has followed up with GI in February of this year as well.    Due to the patient's history and recurrent symptoms, GI was called from the emergency department with the new findings and have recommended admission as above.      Personal History     Past Medical History:  Past Medical History:   Diagnosis Date   • Acid reflux    • Diabetes mellitus (HCC)    • Hyperlipidemia          Past Surgical History:  Past Surgical History:   Procedure Laterality Date   • HERNIA REPAIR     • HERNIA REPAIR     • SHOULDER SURGERY     • UPPER GASTROINTESTINAL ENDOSCOPY            Family History:   Family History   Problem Relation Age of Onset   • Colon cancer Neg Hx          Social History:   Social History     Tobacco Use   • Smoking status: Never     Passive exposure: Past   • Smokeless tobacco: Never   Vaping Use   • Vaping Use: Never used   Substance Use Topics   • Alcohol use: Never   • Drug use: Never         Home Medications:  FreeStyle Chiquis 3 Sensor, Insulin Lispro (1 Unit Dial), esomeprazole, glimepiride, glucose blood, lisinopril, metFORMIN ER, and rosuvastatin    Allergies:  Allergies   Allergen Reactions   • Trulicity [Dulaglutide] Other (See Comments)     Pancreatitis   • Tyloxapol Swelling       Review of Systems   All systems were reviewed and negative except for: Abdominal pain, nausea and vomiting    Objective   Objective     Vitals:   Temp:  [97.6 °F (36.4 °C)] 97.6 °F (36.4 °C)  Heart Rate:  [67] 67  Resp:  [20] 20  BP: (168)/(75) 168/75    Physical Exam    Constitutional: Awake, alert, no acute distress   Eyes: Pupils equal, sclerae anicteric, no conjunctival injection   HENT: NCAT, mucous membranes moist   Neck: Supple, no thyromegaly, no lymphadenopathy, trachea midline   Respiratory: Clear to auscultation bilaterally, nonlabored respirations    Cardiovascular: RRR, no murmurs, rubs, or gallops, palpable pedal pulses bilaterally   Gastrointestinal: Positive bowel sounds, soft, nontender, nondistended   Musculoskeletal: No bilateral ankle edema, no clubbing or cyanosis to extremities   Psychiatric: Appropriate affect, cooperative   Neurologic: Oriented x 3, strength symmetric in all extremities, Cranial Nerves grossly intact to confrontation, speech clear   Skin: No rashes     Result Review    Result Review:  I have personally reviewed the results from the time of this admission to 3/2/2023 22:13 EST and agree with these findings:  [x]  Laboratory  []  Microbiology  [x]  Radiology  []  EKG/Telemetry   []  Cardiology/Vascular   []  Pathology  []  Old records  []   Other:      Assessment & Plan   Assessment / Plan     Assessment/Plan:   • Elevated LFTs: Discussed with GI, recommend MRCP and admission.  Keep patient n.p.o. with IV fluid.  Supportive care and pain control.  GI evaluate tomorrow, appreciate recommendations.  Serial abdominal exams and serial labs.  • Insulin-dependent diabetes mellitus: Insulin sliding scale, troponin suggest need      DVT prophylaxis:  SCDs    CODE STATUS:    Code Status (Patient has no pulse and is not breathing): CPR (Attempt to Resuscitate)  Medical Interventions (Patient has pulse or is breathing): Full Support      Admission Status:  I believe this patient meets observation status.    Electronically signed by Royal Lockhart Jr, MD, 03/02/23, 10:13 PM EST.

## 2023-03-03 NOTE — H&P (VIEW-ONLY)
[facility]  Initial Inpatient Consult Note    Referring Provider: Hospitalist    Reason for Consultation: Epigastric pain, gallstones    Subjective     History of present illness:    62 y.o. male with a history of diabetes previously on Trulicity now admitted once again with recurrent epigastric pain similar to his episode in January requiring hospitalization.  At that time he was diagnosed with pancreatitis with an elevated lipase.  He had a negative MRCP during that admission.  He was also noted to have a pancreatic tail lesion that ultimately had outpatient EUS per Dr. Armando.  Patient had been doing well until 2 days ago when he developed recurrent epigastric pain and nausea.  He came to emergency department again overnight and was found to have recurrent elevation of his LFTs and bilirubin now up to 4.6 and now repeated up to 6.2.  Repeat MRCP overnight with some motion artifact did not show any obvious choledocholithiasis.  His lipase was normal during this admission.  He denied any fevers and had no leukocytosis.    Past Medical History:  Past Medical History:   Diagnosis Date   • Acid reflux    • Diabetes mellitus (HCC)    • Hyperlipidemia      Past Surgical History:  Past Surgical History:   Procedure Laterality Date   • HERNIA REPAIR     • HERNIA REPAIR     • SHOULDER SURGERY     • UPPER GASTROINTESTINAL ENDOSCOPY        Social History:   Social History     Tobacco Use   • Smoking status: Never     Passive exposure: Past   • Smokeless tobacco: Never   Substance Use Topics   • Alcohol use: Never      Family History:  Family History   Problem Relation Age of Onset   • Colon cancer Neg Hx        Home Meds:  Medications Prior to Admission   Medication Sig Dispense Refill Last Dose   • glimepiride (AMARYL) 1 MG tablet Take 1 tablet by mouth Daily.      • lisinopril (PRINIVIL,ZESTRIL) 2.5 MG tablet Take 2 tablets by mouth Daily.   3/2/2023   • metFORMIN ER (GLUCOPHAGE-XR) 500 MG 24 hr  tablet Take 1 tablet by mouth 2 (Two) Times a Day With Meals.   3/2/2023   • esomeprazole (nexIUM) 20 MG capsule Take 1 capsule by mouth Every Morning Before Breakfast.      • Insulin Lispro, 1 Unit Dial, (HumaLOG KwikPen) 100 UNIT/ML solution pen-injector Inject  under the skin into the appropriate area as directed 1 (One) Time As Needed (BG over 180 mg/dl).      • rosuvastatin (CRESTOR) 10 MG tablet Take 1 tablet by mouth Daily.        Current Meds:   insulin regular, 2-7 Units, Subcutaneous, Q6H  pantoprazole, 40 mg, Intravenous, Q AM  sodium chloride, 10 mL, Intravenous, Q12H      Allergies:  Allergies   Allergen Reactions   • Trulicity [Dulaglutide] Other (See Comments)     Pancreatitis   • Tyloxapol Swelling     Review of Systems  Pertinent items are noted in HPI, all other systems reviewed and negative         Vital Signs  Temp:  [97.6 °F (36.4 °C)-98.5 °F (36.9 °C)] 98.5 °F (36.9 °C)  Heart Rate:  [] 101  Resp:  [16-20] 16  BP: (104-168)/(48-75) 106/65  Physical Exam:  General Appearance:    Alert, cooperative, in no acute distress   Head:    Normocephalic, without obvious abnormality, atraumatic   Eyes:          conjunctivae and sclerae normal, no   icterus   Throat:   no thrush, oral mucosa moist   Neck:   Supple, no adenopathy   Lungs:     Clear to auscultation bilaterally    Heart:    Regular rhythm and normal rate    Chest Wall:    No abnormalities observed   Abdomen:     Soft, nondistended, mildly tender to deep palpation in epigastric region but no rebound tenderness.  Normal bowel sounds   Extremities:   no edema, no redness   Skin:   No bruising or rash   Psychiatric:  normal mood and insight     Results Review:  [x]  Laboratory   [x]  Radiology  []  Pathology      I reviewed the patient's new clinical results.    Results from last 7 days   Lab Units 03/02/23  2348 03/02/23  1736   WBC 10*3/mm3 6.38 9.23   HEMOGLOBIN g/dL 15.3 17.3   HEMATOCRIT % 43.9 49.9   PLATELETS 10*3/mm3 134* 160      Results from last 7 days   Lab Units 03/03/23  0917 03/02/23  2348 03/02/23  1736   SODIUM mmol/L 138 140 139   POTASSIUM mmol/L 4.4 3.7 4.3   CHLORIDE mmol/L 105 107 102   CO2 mmol/L 23.3 21.6* 25.6   BUN mg/dL 25* 23 24*   CREATININE mg/dL 0.98 0.99 0.94   CALCIUM mg/dL 8.4* 8.4* 9.7   BILIRUBIN mg/dL 6.3* 4.6* 2.3*   ALK PHOS U/L 89 106 135*   ALT (SGPT) U/L 336* 324* 165*   AST (SGOT) U/L 241* 369* 231*   GLUCOSE mg/dL 139* 156* 175*         Lab Results   Lab Value Date/Time    LIPASE 31 03/02/2023 1736    LIPASE 61 (H) 01/27/2023 1220    LIPASE 95 (H) 01/19/2023 0844    LIPASE 196 (H) 01/14/2023 0700       Radiology:  MRI abdomen wo contrast mrcp   Final Result       No biliary ductal dilatation or choledocholithiasis is seen.  There are gallstones.  Acute    cholecystitis is possible.  Please correlate with pertinent lab values.  No acute pancreatitis.     The study is motion-limited.  Please see above comments for further detail.             Please note that portions of this note were completed with a voice recognition program.       CARIDAD DONOHUE JR, MD          Electronically Signed and Approved By: CARIDAD DONOHUE JR, MD on 3/03/2023 at 0:57                           US Abdomen Limited   Final Result       1. Hepatic steatosis.   2. Cholelithiasis.   3. Pancreas appears within normal limits.                JOCELYNE ROSARIO MD          ELECTRONICALLY SIGNED AND APPROVED BY: JOCELYNE ROSARIO MD ON 3/02/2023 AT 20:27                                Assessment & Plan     Patient Active Problem List   Diagnosis   • Abnormal liver enzymes   • Pancreatic lesion   • Calculus of gallbladder with biliary obstruction but without cholecystitis        Plan:  Patient with recurrent epigastric pain and elevated liver enzymes in the setting of gallstones.  His lipase is normal but he was admitted with pancreatitis and similar symptoms in mid January.  His MRCP shows no obvious choledocholithiasis however given his  rising total bilirubin I will discuss case further with our ERCP physician for concern of retained common bile duct sludge/small stone.  I will also consult general surgery as he will likely ultimately benefit from cholecystectomy.      I discussed the patients findings and my recommendations with patient.    Davon Pelletier MD

## 2023-03-03 NOTE — H&P
"Pre Procedure History & Physical    Chief Complaint:   Ascending cholangitis, jaundice    Subjective     HPI:   Ascending cholangitis, Paul    Past Medical History:   Past Medical History:   Diagnosis Date   • Acid reflux    • Diabetes mellitus (HCC)    • Hyperlipidemia        Past Surgical History:  Past Surgical History:   Procedure Laterality Date   • HERNIA REPAIR     • HERNIA REPAIR     • SHOULDER SURGERY     • UPPER GASTROINTESTINAL ENDOSCOPY         Family History:  Family History   Problem Relation Age of Onset   • Colon cancer Neg Hx        Social History:   reports that he has never smoked. He has been exposed to tobacco smoke. He has never used smokeless tobacco. He reports that he does not drink alcohol and does not use drugs.    Medications:   Medications Prior to Admission   Medication Sig Dispense Refill Last Dose   • glimepiride (AMARYL) 1 MG tablet Take 1 tablet by mouth Daily.      • lisinopril (PRINIVIL,ZESTRIL) 2.5 MG tablet Take 2 tablets by mouth Daily.   3/2/2023   • metFORMIN ER (GLUCOPHAGE-XR) 500 MG 24 hr tablet Take 1 tablet by mouth 2 (Two) Times a Day With Meals.   3/2/2023   • esomeprazole (nexIUM) 20 MG capsule Take 1 capsule by mouth Every Morning Before Breakfast.      • Insulin Lispro, 1 Unit Dial, (HumaLOG KwikPen) 100 UNIT/ML solution pen-injector Inject  under the skin into the appropriate area as directed 1 (One) Time As Needed (BG over 180 mg/dl).      • rosuvastatin (CRESTOR) 10 MG tablet Take 1 tablet by mouth Daily.          Allergies:  Trulicity [dulaglutide] and Tyloxapol        Objective     Blood pressure 106/65, pulse 101, temperature 98.5 °F (36.9 °C), temperature source Oral, resp. rate 16, height 160 cm (63\"), weight 71.9 kg (158 lb 8.2 oz), SpO2 96 %.    Physical Exam   Constitutional: Pt is oriented to person, place, and time and well-developed, well-nourished, and in no distress.   Mouth/Throat: Oropharynx is clear and moist.   Neck: Normal range of motion. "   Cardiovascular: Normal rate, regular rhythm and normal heart sounds.    Pulmonary/Chest: Effort normal and breath sounds normal.   Abdominal: Soft. Nontender  Skin: Skin is warm and dry.   Psychiatric: Mood, memory, affect and judgment normal.     Assessment & Plan     Diagnosis:  Ascending cholangitis, jaundice    Anticipated Surgical Procedure:  ERCP    The risks(including risk of 5 to 7% pancreatitis, bleeding, infection, need for surgery, permanent damage, long hospital stay, death, failure to diagnose, failure to complete the procedure, emergency surgery), benefits, and alternatives of this procedure have been discussed with the patient or the responsible party- the patient understands and agrees to proceed.

## 2023-03-03 NOTE — ANESTHESIA PREPROCEDURE EVALUATION
Anesthesia Evaluation     Patient summary reviewed and Nursing notes reviewed   no history of anesthetic complications:  NPO Solid Status: > 8 hours  NPO Liquid Status: > 2 hours           Airway   Mallampati: II  TM distance: >3 FB  Neck ROM: full  No difficulty expected and Narrow palate  Dental      Pulmonary - negative pulmonary ROS and normal exam    breath sounds clear to auscultation  Cardiovascular - normal exam  Exercise tolerance: good (4-7 METS)    Rhythm: regular  Rate: normal    (+) hypertension, hyperlipidemia,       Neuro/Psych- negative ROS  GI/Hepatic/Renal/Endo    (+)  GERD,  diabetes mellitus type 2,     Musculoskeletal (-) negative ROS    Abdominal    Substance History - negative use     OB/GYN negative ob/gyn ROS         Other - negative ROS       ROS/Med Hx Other: PAT Nursing Notes unavailable.                   Anesthesia Plan    ASA 3     general and MAC     (Patient understands anesthesia not responsible for dental damage.)  intravenous induction     Anesthetic plan, risks, benefits, and alternatives have been provided, discussed and informed consent has been obtained with: patient.    Use of blood products discussed with patient .   Plan discussed with CRNA.        CODE STATUS:    Code Status (Patient has no pulse and is not breathing): CPR (Attempt to Resuscitate)  Medical Interventions (Patient has pulse or is breathing): Full Support

## 2023-03-03 NOTE — PLAN OF CARE
Goal Outcome Evaluation:   Patient went down for an ERCP. Cholecystectomy scheduled for tomorrow. Pain/nausea medication given x1. No further complaints. No issues noted at this time.

## 2023-03-03 NOTE — CONSULTS
[facility]   GENERAL SURGERY CONSULT    Patient Name: Devyn Steinberg  : 1960  MRN: [id number]  Primary Care Physician:  Uvaldo, KINJAL Santos  Date of admission: 3/2/2023    Subjective   Subjective     Chief Complaint: Abdominal pain    HPI:    Devyn Steinberg is a 62 y.o. male admitted with abdominal pain and a known history of pancreatitis with gallstones.  He had elevated bilirubin upon admission.  MRCP did not show any obvious choledocholithiasis.  His bilirubin has risen again today to 6.  Currently he reports he feels rather well.  Abdominal pain is improved.    Review of Systems   Constitutional: Positive for appetite change.   Respiratory: Negative.    Cardiovascular: Negative.    Gastrointestinal: Positive for abdominal pain and nausea.   Genitourinary: Negative.    Neurological: Negative.         Personal History     Past Medical History:   Diagnosis Date   • Acid reflux    • Diabetes mellitus (HCC)    • Hyperlipidemia        Past Surgical History:   Procedure Laterality Date   • HERNIA REPAIR     • HERNIA REPAIR     • SHOULDER SURGERY     • UPPER GASTROINTESTINAL ENDOSCOPY         Family History: family history is not on file. Otherwise pertinent FHx was reviewed and not pertinent to current issue.    Social History:  reports that he has never smoked. He has been exposed to tobacco smoke. He has never used smokeless tobacco. He reports that he does not drink alcohol and does not use drugs.    Home Medications:  Insulin Lispro (1 Unit Dial), esomeprazole, glimepiride, lisinopril, metFORMIN ER, and rosuvastatin      Allergies:  Allergies   Allergen Reactions   • Trulicity [Dulaglutide] Other (See Comments)     Pancreatitis   • Tyloxapol Swelling       Objective   Objective     Vitals:   Temp:  [97.6 °F (36.4 °C)-98.5 °F (36.9 °C)] 98.5 °F (36.9 °C)  Heart Rate:  [] 101  Resp:  [16-20] 16  BP: (104-168)/(48-75) 106/65    Physical Exam  Constitutional:       Appearance: Normal appearance.    Cardiovascular:      Rate and Rhythm: Normal rate.   Pulmonary:      Effort: Pulmonary effort is normal.   Abdominal:      Palpations: Abdomen is soft.          Result Review    Result Review:  I have personally reviewed the results from the time of this admission to 3/3/2023 12:24 EST and agree with these findings:  [x]  Laboratory  []  Microbiology  []  Radiology  []  EKG/Telemetry   []  Cardiology/Vascular   []  Pathology  []  Old records  []  Other:    @[facility]@  @[facility]@  Lab Results   Component Value Date     (H) 03/03/2023      Most notable findings include: Bilirubin 6.3 from 4.6, normal white count, improved lactate    Assessment & Plan   Assessment / Plan     Brief Patient Summary:  Devyn Steinberg is a 62 y.o. male who has elevated bilirubin and gallstones with a history of pancreatitis    Active Hospital Problems:  Active Hospital Problems    Diagnosis    • **Calculus of gallbladder with biliary obstruction but without cholecystitis    • Ascending cholangitis      Plan:  Given his history and stones I would recommend cholecystectomy  Risk benefits alternatives of the procedure were discussed extensively  All questions were answered  His MRCP is normal but has continued to have increasing bilirubin  Defer to GI for judgment on ERCP  If they elect for ERCP, it should likely be done first  If they want to hold off, then I will proceed with cholecystectomy tomorrow    DVT prophylaxis:  Mechanical DVT prophylaxis orders are present.    CODE STATUS:    Code Status (Patient has no pulse and is not breathing): CPR (Attempt to Resuscitate)  Medical Interventions (Patient has pulse or is breathing): Full Support    Admission Status:  I believe this patient meets inpatient status.    Electronically signed by Paul Hodge MD, 03/03/23, 12:24 PM EST.

## 2023-03-04 ENCOUNTER — ANESTHESIA (OUTPATIENT)
Dept: PERIOP | Facility: HOSPITAL | Age: 63
DRG: 419 | End: 2023-03-04
Payer: COMMERCIAL

## 2023-03-04 LAB
GLUCOSE BLDC GLUCOMTR-MCNC: 137 MG/DL (ref 70–99)
GLUCOSE BLDC GLUCOMTR-MCNC: 146 MG/DL (ref 70–99)
GLUCOSE BLDC GLUCOMTR-MCNC: 167 MG/DL (ref 70–99)
GLUCOSE BLDC GLUCOMTR-MCNC: 199 MG/DL (ref 70–99)

## 2023-03-04 PROCEDURE — 25010000002 CEFAZOLIN PER 500 MG: Performed by: NURSE ANESTHETIST, CERTIFIED REGISTERED

## 2023-03-04 PROCEDURE — 25010000002 HYDROMORPHONE 1 MG/ML SOLUTION: Performed by: SURGERY

## 2023-03-04 PROCEDURE — BF53200 OTHER IMAGING OF GALLBLADDER AND BILE DUCTS USING FLUORESCING AGENT, INDOCYANINE GREEN DYE, INTRAOPERATIVE: ICD-10-PCS | Performed by: SURGERY

## 2023-03-04 PROCEDURE — 82962 GLUCOSE BLOOD TEST: CPT

## 2023-03-04 PROCEDURE — 99232 SBSQ HOSP IP/OBS MODERATE 35: CPT | Performed by: INTERNAL MEDICINE

## 2023-03-04 PROCEDURE — 25010000002 PIPERACILLIN SOD-TAZOBACTAM PER 1 G: Performed by: INTERNAL MEDICINE

## 2023-03-04 PROCEDURE — 25010000002 FENTANYL CITRATE (PF) 50 MCG/ML SOLUTION: Performed by: NURSE ANESTHETIST, CERTIFIED REGISTERED

## 2023-03-04 PROCEDURE — 25010000002 PROPOFOL 10 MG/ML EMULSION: Performed by: NURSE ANESTHETIST, CERTIFIED REGISTERED

## 2023-03-04 PROCEDURE — 0 LIDOCAINE 1 % SOLUTION 10 ML VIAL: Performed by: SURGERY

## 2023-03-04 PROCEDURE — 47563 LAPARO CHOLECYSTECTOMY/GRAPH: CPT | Performed by: SPECIALIST/TECHNOLOGIST, OTHER

## 2023-03-04 PROCEDURE — 25010000002 MIDAZOLAM PER 1MG: Performed by: ANESTHESIOLOGY

## 2023-03-04 PROCEDURE — 63710000001 INSULIN REGULAR HUMAN PER 5 UNITS: Performed by: SURGERY

## 2023-03-04 PROCEDURE — 25010000002 DEXAMETHASONE PER 1 MG: Performed by: NURSE ANESTHETIST, CERTIFIED REGISTERED

## 2023-03-04 PROCEDURE — 25010000002 KETOROLAC TROMETHAMINE PER 15 MG: Performed by: NURSE ANESTHETIST, CERTIFIED REGISTERED

## 2023-03-04 PROCEDURE — 25010000002 ONDANSETRON PER 1 MG: Performed by: SURGERY

## 2023-03-04 PROCEDURE — 47563 LAPARO CHOLECYSTECTOMY/GRAPH: CPT | Performed by: SURGERY

## 2023-03-04 PROCEDURE — 0FT44ZZ RESECTION OF GALLBLADDER, PERCUTANEOUS ENDOSCOPIC APPROACH: ICD-10-PCS | Performed by: SURGERY

## 2023-03-04 PROCEDURE — 0 HYDROMORPHONE 1 MG/ML SOLUTION: Performed by: NURSE ANESTHETIST, CERTIFIED REGISTERED

## 2023-03-04 PROCEDURE — 88304 TISSUE EXAM BY PATHOLOGIST: CPT | Performed by: SURGERY

## 2023-03-04 PROCEDURE — 25010000002 ONDANSETRON PER 1 MG: Performed by: NURSE ANESTHETIST, CERTIFIED REGISTERED

## 2023-03-04 DEVICE — ENDOPATH ETS45 2.5MM RELOADS (VASCULAR/THIN)
Type: IMPLANTABLE DEVICE | Site: ABDOMEN | Status: FUNCTIONAL
Brand: ENDOPATH

## 2023-03-04 DEVICE — LIGACLIP 10-M/L, 10MM ENDOSCOPIC ROTATING MULTIPLE CLIP APPLIERS
Type: IMPLANTABLE DEVICE | Site: ABDOMEN | Status: FUNCTIONAL
Brand: LIGACLIP

## 2023-03-04 RX ORDER — HYDROCODONE BITARTRATE AND ACETAMINOPHEN 5; 325 MG/1; MG/1
1 TABLET ORAL EVERY 4 HOURS PRN
Status: DISCONTINUED | OUTPATIENT
Start: 2023-03-04 | End: 2023-03-07 | Stop reason: HOSPADM

## 2023-03-04 RX ORDER — KETOROLAC TROMETHAMINE 30 MG/ML
INJECTION, SOLUTION INTRAMUSCULAR; INTRAVENOUS AS NEEDED
Status: DISCONTINUED | OUTPATIENT
Start: 2023-03-04 | End: 2023-03-04 | Stop reason: SURG

## 2023-03-04 RX ORDER — ONDANSETRON 2 MG/ML
4 INJECTION INTRAMUSCULAR; INTRAVENOUS EVERY 6 HOURS PRN
Status: DISCONTINUED | OUTPATIENT
Start: 2023-03-04 | End: 2023-03-07 | Stop reason: HOSPADM

## 2023-03-04 RX ORDER — HYDROCODONE BITARTRATE AND ACETAMINOPHEN 10; 325 MG/1; MG/1
1 TABLET ORAL EVERY 4 HOURS PRN
Status: DISCONTINUED | OUTPATIENT
Start: 2023-03-04 | End: 2023-03-07 | Stop reason: HOSPADM

## 2023-03-04 RX ORDER — MIDAZOLAM HYDROCHLORIDE 2 MG/2ML
2 INJECTION, SOLUTION INTRAMUSCULAR; INTRAVENOUS ONCE
Status: COMPLETED | OUTPATIENT
Start: 2023-03-04 | End: 2023-03-04

## 2023-03-04 RX ORDER — LIDOCAINE HYDROCHLORIDE 20 MG/ML
INJECTION, SOLUTION EPIDURAL; INFILTRATION; INTRACAUDAL; PERINEURAL AS NEEDED
Status: DISCONTINUED | OUTPATIENT
Start: 2023-03-04 | End: 2023-03-04 | Stop reason: SURG

## 2023-03-04 RX ORDER — ONDANSETRON 2 MG/ML
INJECTION INTRAMUSCULAR; INTRAVENOUS AS NEEDED
Status: DISCONTINUED | OUTPATIENT
Start: 2023-03-04 | End: 2023-03-04 | Stop reason: SURG

## 2023-03-04 RX ORDER — SODIUM CHLORIDE, SODIUM LACTATE, POTASSIUM CHLORIDE, CALCIUM CHLORIDE 600; 310; 30; 20 MG/100ML; MG/100ML; MG/100ML; MG/100ML
9 INJECTION, SOLUTION INTRAVENOUS CONTINUOUS PRN
Status: DISCONTINUED | OUTPATIENT
Start: 2023-03-04 | End: 2023-03-07 | Stop reason: HOSPADM

## 2023-03-04 RX ORDER — MEPERIDINE HYDROCHLORIDE 25 MG/ML
12.5 INJECTION INTRAMUSCULAR; INTRAVENOUS; SUBCUTANEOUS
Status: DISCONTINUED | OUTPATIENT
Start: 2023-03-04 | End: 2023-03-04 | Stop reason: HOSPADM

## 2023-03-04 RX ORDER — NALOXONE HCL 0.4 MG/ML
0.1 VIAL (ML) INJECTION
Status: DISCONTINUED | OUTPATIENT
Start: 2023-03-04 | End: 2023-03-07 | Stop reason: HOSPADM

## 2023-03-04 RX ORDER — BUPIVACAINE HCL/0.9 % NACL/PF 0.1 %
2 PLASTIC BAG, INJECTION (ML) EPIDURAL ONCE
Status: DISCONTINUED | OUTPATIENT
Start: 2023-03-04 | End: 2023-03-04 | Stop reason: HOSPADM

## 2023-03-04 RX ORDER — ROCURONIUM BROMIDE 10 MG/ML
INJECTION, SOLUTION INTRAVENOUS AS NEEDED
Status: DISCONTINUED | OUTPATIENT
Start: 2023-03-04 | End: 2023-03-04 | Stop reason: SURG

## 2023-03-04 RX ORDER — MAGNESIUM HYDROXIDE 1200 MG/15ML
LIQUID ORAL AS NEEDED
Status: DISCONTINUED | OUTPATIENT
Start: 2023-03-04 | End: 2023-03-04 | Stop reason: HOSPADM

## 2023-03-04 RX ORDER — ONDANSETRON 2 MG/ML
4 INJECTION INTRAMUSCULAR; INTRAVENOUS ONCE AS NEEDED
Status: DISCONTINUED | OUTPATIENT
Start: 2023-03-04 | End: 2023-03-04 | Stop reason: HOSPADM

## 2023-03-04 RX ORDER — FENTANYL CITRATE 50 UG/ML
INJECTION, SOLUTION INTRAMUSCULAR; INTRAVENOUS AS NEEDED
Status: DISCONTINUED | OUTPATIENT
Start: 2023-03-04 | End: 2023-03-04 | Stop reason: SURG

## 2023-03-04 RX ORDER — ACETAMINOPHEN 500 MG
1000 TABLET ORAL ONCE
Status: COMPLETED | OUTPATIENT
Start: 2023-03-04 | End: 2023-03-04

## 2023-03-04 RX ORDER — OXYCODONE HYDROCHLORIDE 5 MG/1
5 TABLET ORAL
Status: DISCONTINUED | OUTPATIENT
Start: 2023-03-04 | End: 2023-03-04 | Stop reason: HOSPADM

## 2023-03-04 RX ORDER — NALOXONE HCL 0.4 MG/ML
0.4 VIAL (ML) INJECTION
Status: DISCONTINUED | OUTPATIENT
Start: 2023-03-04 | End: 2023-03-07 | Stop reason: HOSPADM

## 2023-03-04 RX ORDER — METOPROLOL TARTRATE 5 MG/5ML
INJECTION INTRAVENOUS AS NEEDED
Status: DISCONTINUED | OUTPATIENT
Start: 2023-03-04 | End: 2023-03-04 | Stop reason: SURG

## 2023-03-04 RX ORDER — PROMETHAZINE HYDROCHLORIDE 25 MG/1
25 SUPPOSITORY RECTAL ONCE AS NEEDED
Status: DISCONTINUED | OUTPATIENT
Start: 2023-03-04 | End: 2023-03-04 | Stop reason: HOSPADM

## 2023-03-04 RX ORDER — ESMOLOL HYDROCHLORIDE 10 MG/ML
INJECTION INTRAVENOUS AS NEEDED
Status: DISCONTINUED | OUTPATIENT
Start: 2023-03-04 | End: 2023-03-04 | Stop reason: SURG

## 2023-03-04 RX ORDER — ENOXAPARIN SODIUM 100 MG/ML
40 INJECTION SUBCUTANEOUS DAILY
Status: DISCONTINUED | OUTPATIENT
Start: 2023-03-05 | End: 2023-03-07 | Stop reason: HOSPADM

## 2023-03-04 RX ORDER — SODIUM CHLORIDE, SODIUM LACTATE, POTASSIUM CHLORIDE, CALCIUM CHLORIDE 600; 310; 30; 20 MG/100ML; MG/100ML; MG/100ML; MG/100ML
100 INJECTION, SOLUTION INTRAVENOUS CONTINUOUS
Status: DISCONTINUED | OUTPATIENT
Start: 2023-03-04 | End: 2023-03-05

## 2023-03-04 RX ORDER — INDOCYANINE GREEN AND WATER 25 MG
5 KIT INJECTION ONCE
Status: COMPLETED | OUTPATIENT
Start: 2023-03-04 | End: 2023-03-04

## 2023-03-04 RX ORDER — DEXAMETHASONE SODIUM PHOSPHATE 4 MG/ML
INJECTION, SOLUTION INTRA-ARTICULAR; INTRALESIONAL; INTRAMUSCULAR; INTRAVENOUS; SOFT TISSUE AS NEEDED
Status: DISCONTINUED | OUTPATIENT
Start: 2023-03-04 | End: 2023-03-04 | Stop reason: SURG

## 2023-03-04 RX ORDER — ONDANSETRON 4 MG/1
4 TABLET, FILM COATED ORAL EVERY 6 HOURS PRN
Status: DISCONTINUED | OUTPATIENT
Start: 2023-03-04 | End: 2023-03-07 | Stop reason: HOSPADM

## 2023-03-04 RX ORDER — CEFAZOLIN SODIUM 1 G/3ML
INJECTION, POWDER, FOR SOLUTION INTRAMUSCULAR; INTRAVENOUS AS NEEDED
Status: DISCONTINUED | OUTPATIENT
Start: 2023-03-04 | End: 2023-03-04 | Stop reason: SURG

## 2023-03-04 RX ORDER — PROPOFOL 10 MG/ML
VIAL (ML) INTRAVENOUS AS NEEDED
Status: DISCONTINUED | OUTPATIENT
Start: 2023-03-04 | End: 2023-03-04 | Stop reason: SURG

## 2023-03-04 RX ORDER — PROMETHAZINE HYDROCHLORIDE 12.5 MG/1
25 TABLET ORAL ONCE AS NEEDED
Status: DISCONTINUED | OUTPATIENT
Start: 2023-03-04 | End: 2023-03-04 | Stop reason: HOSPADM

## 2023-03-04 RX ADMIN — CEFAZOLIN SODIUM 2 G: 1 INJECTION, POWDER, FOR SOLUTION INTRAMUSCULAR; INTRAVENOUS at 07:17

## 2023-03-04 RX ADMIN — LIDOCAINE HYDROCHLORIDE 100 MG: 20 INJECTION, SOLUTION EPIDURAL; INFILTRATION; INTRACAUDAL; PERINEURAL at 07:24

## 2023-03-04 RX ADMIN — HYDROMORPHONE HYDROCHLORIDE 1 MG: 1 INJECTION, SOLUTION INTRAMUSCULAR; INTRAVENOUS; SUBCUTANEOUS at 07:50

## 2023-03-04 RX ADMIN — SODIUM CHLORIDE, POTASSIUM CHLORIDE, SODIUM LACTATE AND CALCIUM CHLORIDE 9 ML/HR: 600; 310; 30; 20 INJECTION, SOLUTION INTRAVENOUS at 10:29

## 2023-03-04 RX ADMIN — DEXAMETHASONE SODIUM PHOSPHATE 4 MG: 4 INJECTION, SOLUTION INTRA-ARTICULAR; INTRALESIONAL; INTRAMUSCULAR; INTRAVENOUS; SOFT TISSUE at 07:19

## 2023-03-04 RX ADMIN — OXYCODONE HYDROCHLORIDE 5 MG: 5 TABLET ORAL at 09:18

## 2023-03-04 RX ADMIN — Medication 10 ML: at 10:26

## 2023-03-04 RX ADMIN — METOPROLOL TARTRATE 5 MG: 5 INJECTION INTRAVENOUS at 08:06

## 2023-03-04 RX ADMIN — HYDROCODONE BITARTRATE AND ACETAMINOPHEN 1 TABLET: 5; 325 TABLET ORAL at 10:26

## 2023-03-04 RX ADMIN — MIDAZOLAM HYDROCHLORIDE 2 MG: 1 INJECTION, SOLUTION INTRAMUSCULAR; INTRAVENOUS at 07:18

## 2023-03-04 RX ADMIN — ACETAMINOPHEN 1000 MG: 500 TABLET, FILM COATED ORAL at 07:11

## 2023-03-04 RX ADMIN — FENTANYL CITRATE 50 MCG: 50 INJECTION, SOLUTION INTRAMUSCULAR; INTRAVENOUS at 07:35

## 2023-03-04 RX ADMIN — INSULIN HUMAN 2 UNITS: 100 INJECTION, SOLUTION PARENTERAL at 12:42

## 2023-03-04 RX ADMIN — SUGAMMADEX 200 MG: 100 INJECTION, SOLUTION INTRAVENOUS at 08:15

## 2023-03-04 RX ADMIN — ESMOLOL HYDROCHLORIDE 20 MG: 10 INJECTION INTRAVENOUS at 07:55

## 2023-03-04 RX ADMIN — ROCURONIUM BROMIDE 50 MG: 10 INJECTION, SOLUTION INTRAVENOUS at 07:24

## 2023-03-04 RX ADMIN — SODIUM CHLORIDE, POTASSIUM CHLORIDE, SODIUM LACTATE AND CALCIUM CHLORIDE: 600; 310; 30; 20 INJECTION, SOLUTION INTRAVENOUS at 08:25

## 2023-03-04 RX ADMIN — TAZOBACTAM SODIUM AND PIPERACILLIN SODIUM 3.38 G: 375; 3 INJECTION, SOLUTION INTRAVENOUS at 16:18

## 2023-03-04 RX ADMIN — HYDROCODONE BITARTRATE AND ACETAMINOPHEN 1 TABLET: 5; 325 TABLET ORAL at 15:22

## 2023-03-04 RX ADMIN — INSULIN HUMAN 2 UNITS: 100 INJECTION, SOLUTION PARENTERAL at 17:38

## 2023-03-04 RX ADMIN — ONDANSETRON 4 MG: 2 INJECTION INTRAMUSCULAR; INTRAVENOUS at 10:25

## 2023-03-04 RX ADMIN — INDOCYANINE GREEN AND WATER 5 MG: KIT at 07:13

## 2023-03-04 RX ADMIN — PANTOPRAZOLE SODIUM 40 MG: 40 INJECTION, POWDER, FOR SOLUTION INTRAVENOUS at 05:48

## 2023-03-04 RX ADMIN — ONDANSETRON 4 MG: 2 INJECTION INTRAMUSCULAR; INTRAVENOUS at 08:11

## 2023-03-04 RX ADMIN — SODIUM CHLORIDE, POTASSIUM CHLORIDE, SODIUM LACTATE AND CALCIUM CHLORIDE: 600; 310; 30; 20 INJECTION, SOLUTION INTRAVENOUS at 07:18

## 2023-03-04 RX ADMIN — PROPOFOL 150 MG: 10 INJECTION, EMULSION INTRAVENOUS at 07:24

## 2023-03-04 RX ADMIN — HYDROMORPHONE HYDROCHLORIDE 0.5 MG: 1 INJECTION, SOLUTION INTRAMUSCULAR; INTRAVENOUS; SUBCUTANEOUS at 22:01

## 2023-03-04 RX ADMIN — KETOROLAC TROMETHAMINE 30 MG: 30 INJECTION, SOLUTION INTRAMUSCULAR; INTRAVENOUS at 07:30

## 2023-03-04 RX ADMIN — FENTANYL CITRATE 50 MCG: 50 INJECTION, SOLUTION INTRAMUSCULAR; INTRAVENOUS at 07:24

## 2023-03-04 NOTE — PROGRESS NOTES
[facility]   Hospitalist Progress Note  Date: 3/4/2023  Patient Name: Devyn Steinberg  : 1960  MRN: [id number]  Date of admission: 3/2/2023      Subjective   Subjective     Chief Complaint: Abdominal pain     Summary:   Devyn Steinberg is a 62 y.o. male past medical history of pancreatitis, insulin-dependent diabetes mellitus presents to the emergency department for evaluation of abdominal pain that began earlier in the day.  He describes it as a sharp ache, left-sided left upper quadrant, radiates to epigastrium, constant, no known aggravating alleviating factors.  He has associated nausea and vomiting as well.  He denies any fevers, chills, sweats, chest pain or shortness of breath, palpitations, diarrhea constipation, dysuria, weakness, rash.  In the emergency department patient was found to have hyperbilirubinemia of 2.4 with elevated LFTs.  GI was called and recommended MRCP and admission.  They wish to keep patient n.p.o. and they will evaluate in the a.m.     Of note, patient was admitted in January of this year at which time he was diagnosed with pancreatitis and underwent CT abdomen pelvis which showed a suspected lesion.  He subsequently underwent MR CP which confirmed suspected lesion and underwent EUS which displayed his history of spleen tissue and recommended repeat CT scan in 2 months.  He has followed up with GI in February of this year as well.     Due to the patient's history and recurrent symptoms, GI was called from the emergency department with the new findings and have recommended admission as above.    Interval Followup: Patient seen and evaluated postoperatively.  He does complain of some abdominal pain but denies any nausea or vomiting at this time.    Review of Systems   All systems were reviewed and negative except for: As noted in interval follow-up    Objective   Objective     Vitals:   Temp:  [96.8 °F (36 °C)-98.5 °F (36.9 °C)] 96.8 °F (36 °C)  Heart Rate:  [] 75  Resp:   [10-17] 14  BP: ()/(60-82) 124/81  Flow (L/min):  [2.5-6] 3  Physical Exam    Constitutional: Sleeping awakens easily to voice, no acute distress              Eyes: Pupils equal, sclerae anicteric, no conjunctival injection              HENT: NCAT, mucous membranes moist              Neck: Supple, no thyromegaly, no lymphadenopathy, trachea midline              Respiratory: Clear to auscultation bilaterally, nonlabored respirations               Cardiovascular: Regular S1-S2, no murmurs, palpable pedal pulses bilaterally              Gastrointestinal: Positive bowel sounds, soft, mildly distended, appropriately tender to palpation              Musculoskeletal: No bilateral ankle edema, no clubbing or cyanosis to extremities              Psychiatric: Appropriate affect, cooperative              Neurologic: Oriented x 3, moving all extremities equally-no focal weakness appreciated, Cranial Nerves grossly intact , speech clear              Skin: No rashes    Result Review    Result Review:  I have personally reviewed the results from the time of this admission to 3/4/2023 10:04 EST and agree with these findings:  [x]  Laboratory  []  Microbiology  [x]  Radiology  []  EKG/Telemetry   []  Cardiology/Vascular   []  Pathology  []  Old records  []  Other:    Assessment & Plan   Assessment / Plan     Assessment/Plan:   • Elevated LFTs: Status post ERCP.  Findings of sludge and pus noted.  Status post biliary sphincterotomy and stent placement.  We will add Zosyn.  • Cholelithiasis.  Postop day 0 laparoscopic cholecystectomy  • Insulin-dependent diabetes mellitus: Continue insulin sliding scale     Discussed plan with RN.    DVT prophylaxis:  Medical and mechanical DVT prophylaxis orders are present.    CODE STATUS:   Code Status (Patient has no pulse and is not breathing): CPR (Attempt to Resuscitate)  Medical Interventions (Patient has pulse or is breathing): Full Support      Electronically signed by Marc  MD Claudio, 03/04/23, 3:48 PM EST.

## 2023-03-04 NOTE — ANESTHESIA POSTPROCEDURE EVALUATION
Patient: Devyn Steinberg    Procedure Summary     Date: 03/04/23 Room / Location: [facility] OR 05 / [facility] MAIN OR    Anesthesia Start: 0718 Anesthesia Stop: 0826    Procedure: CHOLECYSTECTOMY LAPAROSCOPIC (Abdomen) Diagnosis:       Elevated LFTs      (Elevated LFTs [R79.89])    Surgeons: Paul Hodge MD Provider: Darian Grewal CRNA    Anesthesia Type: general ASA Status: 3          Anesthesia Type: general    Vitals  Vitals Value Taken Time   /74 03/04/23 0849   Temp     Pulse 80 03/04/23 0852   Resp     SpO2 94 % 03/04/23 0852   Vitals shown include unvalidated device data.        Post Anesthesia Care and Evaluation    Patient location during evaluation: bedside  Patient participation: complete - patient participated  Level of consciousness: awake  Pain management: adequate    Airway patency: patent  Anesthetic complications: No anesthetic complications  PONV Status: none  Cardiovascular status: acceptable and stable  Respiratory status: acceptable  Hydration status: acceptable    Comments: An Anesthesiologist personally participated in the most demanding procedures (including induction and emergence if applicable) in the anesthesia plan, monitored the course of anesthesia administration at frequent intervals and remained physically present and available for immediate diagnosis and treatment of emergencies.

## 2023-03-04 NOTE — PLAN OF CARE
Goal Outcome Evaluation:           Progress: no change     Patient alert and able to make needs known. Patient had lap coloy today. Doing very well. No concerns or questions voiced at this time.

## 2023-03-04 NOTE — OP NOTE
Preoperative diagnosis: Choledocholithiasis    Postoperative diagnosis: Same    Procedure: Laparoscopic cholecystectomy    Surgeon: Naveen    Anesthesia: General    Assistant: Esther Simmons    EBL: 11    Specimens: Gallbladder with contents    Complications: None    Indications: 62-year-old male who presented with elevated bilirubins and abdominal pain.  Was found to have sludge and possible stones with pus on his ERCP.  Stent was placed.  He was taken today for laparoscopic cholecystectomy.    PROCEDURE    Patient was seen in the preoperative holding area.  Risks, benefits, and alternatives of the operation were again discussed in detail.  All of the patient's and family's questions were answered.  They voiced understanding, and agreed to proceed.    Patient was brought to the operating room.  Monitoring devices and Florencio stockings were placed.  Anesthesia was administered.  Patient was prepped and draped in standard surgical fashion.  After prepping and draping a timeout was performed to verify both the correct patient and correct procedure.    We began by injecting local anesthesia in the left upper quadrant.  A small nick was made in the skin, and the Veress needle was inserted into the abdomen.  Intra-abdominal placement was verified with a normal saline drop test.  After verification, the abdomen was insufflated to 15 mmHg pressure.  Once the abdomen was insufflated, we injected local anesthesia in the supraumbilical region and made an incision to accommodate a 5 mm trocars.  Then, using the Visiport technique, the abdomen was entered.  Once the abdomen was entered we reinserted the laparoscope and looked underneath our Visiport and Veress needle entry sites, and we saw no obvious underlying injury.  We then placed our subsequent trocars.  After injection of local anesthesia and under direct visualization, a 12 mm trocar was placed in the subxiphoid region, and two 5 mm trocars were placed in the right upper  quadrant.  The patient was placed in reverse Trendelenburg position.    We began by grasping the gallbladder and retracting it above the liver.  We took down any adhesions to the gallbladder with a combination of blunt dissection and electrocautery.  The gallbladder was then grasped and retracted out laterally.  We began by making a window between the liver bed and the gallbladder itself.  We skeletonized any structures within this window.  In this window we were able to see 2 structures going directly into the gallbladder, 1 which appear to be the cystic duct and 1 which appear to be the cystic artery.  Through this window we were also able to see the right lobe of the liver, and we saw no crossing structures.  We rotated the gallbladder medially and were able to see through this window medially and laterally with no crossing structures behind.  Thus we felt we obtained a critical view of safety.    Throughout the course of the operation we used the IC-Green onlay.  With the IC-Green we were able to verify our anatomy.  We are able to visualize the gallbladder, the cystic duct, and the right lobe of the liver with the IC-Green.  Additionally, we saw structure medially and inferiorly to our dissection which appeared to be the common bile duct.  The cystic artery did not light up with the IC-Green, as would be expected.  Thus we felt we verified our anatomy after obtaining critical view of safety and identified to the common bile duct.    We then placed laparoscopic stapler with a vascular load across the cystic duct.  We transected the duct with the stapler.  We placed 2 clips proximally and 1 clip distally on the cystic artery.  We ligated these with laparoscopic scissors.  We then elevated the gallbladder out of the liver bed and carefully dissected it free with electrocautery.  The gallbladder was then amputated, placed in Endo Catch bag, and removed from the abdomen.  The specimen was passed off for  pathology.    We inspected our operative field and made sure everything appeared to be hemostatic.  We inspected the common bile duct with the IC-Green, and it appeared to be a single column of bile with no obvious signs of obstruction or injury.  We irrigated the right upper quadrant and suctioned free any bile or blood.  We irrigated until the effluent was clear, and suctioned that free.    We then removed the 12 mm trocar, and that site was closed with a Patrice-Lisset device and 0 Vicryl suture.  The remaining trocars were then removed under direct visualization, and the abdomen was desufflated.  Skin incisions were closed with subcuticular 4-0 Vicryl stitch and dressed with skin glue.    The patient was then aroused from anesthesia, and taken off the OR table, and taken to PACU in stable condition.  Sponge, needle, and instrument counts were correct x2.  Esther Simmons was present for the entire procedure and helped in all portions of the case.    Assistant: Esther Simmons CSA was responsible for performing the following activities: Suction, Irrigation, Suturing, Closing, Placing Dressing and Held/Positioned Camera and their skilled assistance was necessary for the success of this case.      The operative note was dictated with the help of the Dragon dictation system.

## 2023-03-04 NOTE — INTERVAL H&P NOTE
H&P reviewed. The patient was examined and there are no changes to the H&P.      Risk, benefit, alternatives again discussed  All questions answered  Pt agrees to proceed

## 2023-03-05 LAB
ALBUMIN SERPL-MCNC: 3.3 G/DL (ref 3.5–5.2)
ALBUMIN/GLOB SERPL: 1.2 G/DL
ALP SERPL-CCNC: 105 U/L (ref 39–117)
ALT SERPL W P-5'-P-CCNC: 158 U/L (ref 1–41)
ANION GAP SERPL CALCULATED.3IONS-SCNC: 8.9 MMOL/L (ref 5–15)
AST SERPL-CCNC: 51 U/L (ref 1–40)
BASOPHILS # BLD AUTO: 0.01 10*3/MM3 (ref 0–0.2)
BASOPHILS NFR BLD AUTO: 0.1 % (ref 0–1.5)
BILIRUB SERPL-MCNC: 1.9 MG/DL (ref 0–1.2)
BUN SERPL-MCNC: 15 MG/DL (ref 8–23)
BUN/CREAT SERPL: 16.1 (ref 7–25)
CALCIUM SPEC-SCNC: 8.9 MG/DL (ref 8.6–10.5)
CHLORIDE SERPL-SCNC: 104 MMOL/L (ref 98–107)
CO2 SERPL-SCNC: 25.1 MMOL/L (ref 22–29)
CREAT SERPL-MCNC: 0.93 MG/DL (ref 0.76–1.27)
DEPRECATED RDW RBC AUTO: 45.1 FL (ref 37–54)
EGFRCR SERPLBLD CKD-EPI 2021: 92.3 ML/MIN/1.73
EOSINOPHIL # BLD AUTO: 0 10*3/MM3 (ref 0–0.4)
EOSINOPHIL NFR BLD AUTO: 0 % (ref 0.3–6.2)
ERYTHROCYTE [DISTWIDTH] IN BLOOD BY AUTOMATED COUNT: 14.4 % (ref 12.3–15.4)
GLOBULIN UR ELPH-MCNC: 2.8 GM/DL
GLUCOSE BLDC GLUCOMTR-MCNC: 120 MG/DL (ref 70–99)
GLUCOSE BLDC GLUCOMTR-MCNC: 135 MG/DL (ref 70–99)
GLUCOSE BLDC GLUCOMTR-MCNC: 135 MG/DL (ref 70–99)
GLUCOSE BLDC GLUCOMTR-MCNC: 141 MG/DL (ref 70–99)
GLUCOSE BLDC GLUCOMTR-MCNC: 153 MG/DL (ref 70–99)
GLUCOSE SERPL-MCNC: 144 MG/DL (ref 65–99)
HCT VFR BLD AUTO: 41.5 % (ref 37.5–51)
HGB BLD-MCNC: 14.2 G/DL (ref 13–17.7)
IMM GRANULOCYTES # BLD AUTO: 0.11 10*3/MM3 (ref 0–0.05)
IMM GRANULOCYTES NFR BLD AUTO: 1.1 % (ref 0–0.5)
LYMPHOCYTES # BLD AUTO: 0.67 10*3/MM3 (ref 0.7–3.1)
LYMPHOCYTES NFR BLD AUTO: 6.5 % (ref 19.6–45.3)
MCH RBC QN AUTO: 29.3 PG (ref 26.6–33)
MCHC RBC AUTO-ENTMCNC: 34.2 G/DL (ref 31.5–35.7)
MCV RBC AUTO: 85.7 FL (ref 79–97)
MONOCYTES # BLD AUTO: 0.46 10*3/MM3 (ref 0.1–0.9)
MONOCYTES NFR BLD AUTO: 4.5 % (ref 5–12)
NEUTROPHILS NFR BLD AUTO: 8.98 10*3/MM3 (ref 1.7–7)
NEUTROPHILS NFR BLD AUTO: 87.8 % (ref 42.7–76)
NRBC BLD AUTO-RTO: 0 /100 WBC (ref 0–0.2)
PLATELET # BLD AUTO: 123 10*3/MM3 (ref 140–450)
PMV BLD AUTO: 9.7 FL (ref 6–12)
POTASSIUM SERPL-SCNC: 4.3 MMOL/L (ref 3.5–5.2)
PROT SERPL-MCNC: 6.1 G/DL (ref 6–8.5)
RBC # BLD AUTO: 4.84 10*6/MM3 (ref 4.14–5.8)
SODIUM SERPL-SCNC: 138 MMOL/L (ref 136–145)
WBC NRBC COR # BLD: 10.23 10*3/MM3 (ref 3.4–10.8)

## 2023-03-05 PROCEDURE — 99232 SBSQ HOSP IP/OBS MODERATE 35: CPT | Performed by: INTERNAL MEDICINE

## 2023-03-05 PROCEDURE — 82962 GLUCOSE BLOOD TEST: CPT

## 2023-03-05 PROCEDURE — 80053 COMPREHEN METABOLIC PANEL: CPT | Performed by: SURGERY

## 2023-03-05 PROCEDURE — 63710000001 INSULIN REGULAR HUMAN PER 5 UNITS: Performed by: SURGERY

## 2023-03-05 PROCEDURE — 25010000002 ONDANSETRON PER 1 MG: Performed by: SURGERY

## 2023-03-05 PROCEDURE — 25010000002 ENOXAPARIN PER 10 MG: Performed by: SURGERY

## 2023-03-05 PROCEDURE — 25010000002 PIPERACILLIN SOD-TAZOBACTAM PER 1 G: Performed by: INTERNAL MEDICINE

## 2023-03-05 PROCEDURE — 85025 COMPLETE CBC W/AUTO DIFF WBC: CPT | Performed by: SURGERY

## 2023-03-05 PROCEDURE — 25010000002 HYDROMORPHONE 1 MG/ML SOLUTION: Performed by: SURGERY

## 2023-03-05 PROCEDURE — 99024 POSTOP FOLLOW-UP VISIT: CPT | Performed by: SURGERY

## 2023-03-05 RX ORDER — PANTOPRAZOLE SODIUM 40 MG/1
40 TABLET, DELAYED RELEASE ORAL
Status: DISCONTINUED | OUTPATIENT
Start: 2023-03-06 | End: 2023-03-07 | Stop reason: HOSPADM

## 2023-03-05 RX ADMIN — HYDROMORPHONE HYDROCHLORIDE 0.5 MG: 1 INJECTION, SOLUTION INTRAMUSCULAR; INTRAVENOUS; SUBCUTANEOUS at 07:16

## 2023-03-05 RX ADMIN — TAZOBACTAM SODIUM AND PIPERACILLIN SODIUM 3.38 G: 375; 3 INJECTION, SOLUTION INTRAVENOUS at 23:12

## 2023-03-05 RX ADMIN — TAZOBACTAM SODIUM AND PIPERACILLIN SODIUM 3.38 G: 375; 3 INJECTION, SOLUTION INTRAVENOUS at 15:20

## 2023-03-05 RX ADMIN — SODIUM CHLORIDE, POTASSIUM CHLORIDE, SODIUM LACTATE AND CALCIUM CHLORIDE 100 ML/HR: 600; 310; 30; 20 INJECTION, SOLUTION INTRAVENOUS at 01:24

## 2023-03-05 RX ADMIN — HYDROMORPHONE HYDROCHLORIDE 0.5 MG: 1 INJECTION, SOLUTION INTRAMUSCULAR; INTRAVENOUS; SUBCUTANEOUS at 16:43

## 2023-03-05 RX ADMIN — INSULIN HUMAN 2 UNITS: 100 INJECTION, SOLUTION PARENTERAL at 00:07

## 2023-03-05 RX ADMIN — TAZOBACTAM SODIUM AND PIPERACILLIN SODIUM 3.38 G: 375; 3 INJECTION, SOLUTION INTRAVENOUS at 00:08

## 2023-03-05 RX ADMIN — Medication 10 ML: at 20:41

## 2023-03-05 RX ADMIN — PANTOPRAZOLE SODIUM 40 MG: 40 INJECTION, POWDER, FOR SOLUTION INTRAVENOUS at 05:49

## 2023-03-05 RX ADMIN — TAZOBACTAM SODIUM AND PIPERACILLIN SODIUM 3.38 G: 375; 3 INJECTION, SOLUTION INTRAVENOUS at 08:45

## 2023-03-05 RX ADMIN — HYDROCODONE BITARTRATE AND ACETAMINOPHEN 1 TABLET: 10; 325 TABLET ORAL at 20:46

## 2023-03-05 RX ADMIN — HYDROCODONE BITARTRATE AND ACETAMINOPHEN 1 TABLET: 10; 325 TABLET ORAL at 15:25

## 2023-03-05 RX ADMIN — ONDANSETRON 4 MG: 2 INJECTION INTRAMUSCULAR; INTRAVENOUS at 16:53

## 2023-03-05 RX ADMIN — ENOXAPARIN SODIUM 40 MG: 100 INJECTION SUBCUTANEOUS at 08:45

## 2023-03-05 RX ADMIN — Medication 10 ML: at 08:46

## 2023-03-05 RX ADMIN — ONDANSETRON 4 MG: 2 INJECTION INTRAMUSCULAR; INTRAVENOUS at 23:11

## 2023-03-05 NOTE — PROGRESS NOTES
[facility]     Progress Note    Patient Name: Devyn Steinberg  : 1960  MRN: [id number]  Primary Care Physician:  Vivi Bailon APRN  Date of admission: 3/2/2023    Subjective   Subjective     Chief Complaint: Abdominal pain    HPI:  Patient Reports having some abdominal pain after lap denita but nothing unexpected postop.  Eating regular diet this morning.  No nausea or vomiting      Objective   Objective     Vitals:   Temp:  [97.2 °F (36.2 °C)-98.5 °F (36.9 °C)] 97.5 °F (36.4 °C)  Heart Rate:  [70-81] 70  Resp:  [16-22] 18  BP: (131-150)/(73-93) 141/85  Flow (L/min):  [2] 2    Physical Exam  Constitutional:       Appearance: Normal appearance.   Cardiovascular:      Rate and Rhythm: Normal rate.   Pulmonary:      Effort: Pulmonary effort is normal.   Abdominal:      Palpations: Abdomen is soft.         Result Review    Result Review:  I have personally reviewed the results from the time of this admission to 3/5/2023 09:45 EST and agree with these findings:  [x]  Laboratory  []  Microbiology  []  Radiology  []  EKG/Telemetry   []  Cardiology/Vascular   []  Pathology  []  Old records  []  Other:  Most notable findings include: Bilirubin, LFTs improved    Assessment & Plan   Assessment / Plan     Brief Patient Summary:  Devyn Steinberg is a 62 y.o. male who status post lap denita and ERCP by Dr. Blas    Active Hospital Problems:  Active Hospital Problems    Diagnosis    • **Calculus of gallbladder with biliary obstruction but without cholecystitis    • Ascending cholangitis    • Elevated LFTs      Plan:  Okay for discharge from surgical standpoint  Can follow-up with me in the office in 2 weeks  Will need stent removal per Dr. Blas's direction       DVT prophylaxis:  Medical and mechanical DVT prophylaxis orders are present.    CODE STATUS:   Code Status (Patient has no pulse and is not breathing): CPR (Attempt to Resuscitate)  Medical Interventions (Patient has pulse or is breathing): Full  Support    Disposition:  I expect patient to be discharged today or tomorrow per primary.    Electronically signed by Paul Hodge MD, 03/05/23, 9:45 AM EST.

## 2023-03-05 NOTE — PLAN OF CARE
Goal Outcome Evaluation:           Progress: improving   Patient alert and able to make needs known.

## 2023-03-05 NOTE — PROGRESS NOTES
[facility]   Hospitalist Progress Note  Date: 3/5/2023  Patient Name: Devyn Steinberg  : 1960  MRN: [id number]  Date of admission: 3/2/2023      Subjective   Subjective     Chief Complaint: Abdominal pain     Summary:   Devyn Steinberg is a 62 y.o. male past medical history of pancreatitis, insulin-dependent diabetes mellitus presents to the emergency department for evaluation of abdominal pain that began earlier in the day.  He describes it as a sharp ache, left-sided left upper quadrant, radiates to epigastrium, constant, no known aggravating alleviating factors.  He has associated nausea and vomiting as well.  He denies any fevers, chills, sweats, chest pain or shortness of breath, palpitations, diarrhea constipation, dysuria, weakness, rash.  In the emergency department patient was found to have hyperbilirubinemia of 2.4 with elevated LFTs.  GI was called and recommended MRCP and admission.  They wish to keep patient n.p.o. and they will evaluate in the a.m.     Of note, patient was admitted in January of this year at which time he was diagnosed with pancreatitis and underwent CT abdomen pelvis which showed a suspected lesion.  He subsequently underwent MR CP which confirmed suspected lesion and underwent EUS which displayed his history of spleen tissue and recommended repeat CT scan in 2 months.  He has followed up with GI in February of this year as well.     Due to the patient's history and recurrent symptoms, GI was called from the emergency department with the new findings and have recommended admission as above.  He was admitted for further care, underwent ERCP requiring sphincterotomy and biliary stenting.  Pus noted in the biliary duct, started on IV Zosyn.  General surgery was consulted and patient underwent cholecystectomy on 3/4.  Tolerated procedure well.  LFTs improved drastically.    Interval Followup: No acute events overnight.  Patient still complaining of significant abdominal pain  requiring IV narcotics.  Does generally feel better than he did yesterday.  Tolerating regular diet.    Review of Systems   Denies chest pain, fevers or palpitations    Objective   Objective     Vitals:   Temp:  [97.5 °F (36.4 °C)-98.5 °F (36.9 °C)] 98.1 °F (36.7 °C)  Heart Rate:  [70-79] 73  Resp:  [16-22] 20  BP: (131-144)/(73-88) 133/80  Physical Exam    Constitutional: Awake, alert,, no acute distress              HENT: NCAT, mucous membranes moist              Neck: Supple, no thyromegaly, no lymphadenopathy, trachea midline              Respiratory: Clear to auscultation bilaterally, nonlabored respirations               Cardiovascular: RRR, no MRG              Gastrointestinal: Positive bowel sounds, soft, well-healing incisional scars, appropriately TTP              Musculoskeletal: No bilateral ankle edema, no clubbing or cyanosis to extremities              Neurologic: Oriented x 3, moving all extremities equally, Cranial Nerves grossly intact , speech clear    Result Review    Result Review:  I have personally reviewed the results from the time of this admission to 3/5/2023 13:31 EST and agree with these findings:  [x]  Laboratory  CBC    CBC 1/17/23 3/2/23 3/2/23 3/5/23     1736 2348    WBC 4.08 9.23 6.38 10.23   RBC 5.32 5.87 (A) 5.24 4.84   Hemoglobin 15.5 17.3 15.3 14.2   Hematocrit 45.1 49.9 43.9 41.5   MCV 84.8 85.0 83.8 85.7   MCH 29.1 29.5 29.2 29.3   MCHC 34.4 34.7 34.9 34.2   RDW 13.6 13.8 13.8 14.4   Platelets 145 160 134 (A) 123 (A)   (A) Abnormal value            CMP    CMP 3/2/23 3/2/23 3/3/23 3/5/23    1736 2348     Glucose 175 (A) 156 (A) 139 (A) 144 (A)   BUN 24 (A) 23 25 (A) 15   Creatinine 0.94 0.99 0.98 0.93   eGFR 91.1 85.6 86.6 92.3   Sodium 139 140 138 138   Potassium 4.3 3.7 4.4 4.3   Chloride 102 107 105 104   Calcium 9.7 8.4 (A) 8.4 (A) 8.9   Total Protein 7.8 5.8 (A) 5.3 (A) 6.1   Albumin 4.7 3.7 3.4 (A) 3.3 (A)   Globulin 3.1 2.1 1.9 2.8   Total Bilirubin 2.3 (A) 4.6 (A) 6.3  (A) 1.9 (A)   Alkaline Phosphatase 135 (A) 106 89 105   AST (SGOT) 231 (A) 369 (A) 241 (A) 51 (A)   ALT (SGPT) 165 (A) 324 (A) 336 (A) 158 (A)   Albumin/Globulin Ratio 1.5 1.8 1.8 1.2   BUN/Creatinine Ratio 25.5 (A) 23.2 25.5 (A) 16.1   Anion Gap 11.4 11.4 9.7 8.9   (A) Abnormal value              []  Microbiology  [x]  Radiology  []  EKG/Telemetry   []  Cardiology/Vascular   []  Pathology  []  Old records  []  Other:    Assessment & Plan   Assessment / Plan     Assessment/Plan:   Calculus of gallbladder with biliary obstruction but without cholecystitis status post biliary stenting  Intra-abdominal infection  Ascending cholangitis  Elevated LFTs   Hyperbilirubinemia with jaundice  Type 2 diabetes mellitus  GERD  Hyperlipidemia     Continue to monitor in the hospital for management of the above  General surgery following, appreciate assistance  Patient is status post biliary stenting and cholecystectomy  Concern for pus in the biliary tract during sphincterotomy and stenting, continue with IV Zosyn  Will transition to Levaquin to complete 7-10 days of antibiotic therapy at discharge  Continue oral and IV narcotics as needed  DC IV fluids  Continue regular diet  Encourage ambulation  Continue PPI for GERD  Continue sliding scale insulin  Trend renal function and electrolytes with a.m. BMP  Trend Hgb and WBC with a.m. CBC    Discussed plan with RN, general surgery    DVT prophylaxis:  Medical and mechanical DVT prophylaxis orders are present.    CODE STATUS:   Code Status (Patient has no pulse and is not breathing): CPR (Attempt to Resuscitate)  Medical Interventions (Patient has pulse or is breathing): Full Support

## 2023-03-05 NOTE — PLAN OF CARE
Goal Outcome Evaluation:         Pt rested well. VSS. Incisions intact, no drainage. Pt had complains of pain this shift. PRN pain medication worked well. No new complains.

## 2023-03-06 ENCOUNTER — PREP FOR SURGERY (OUTPATIENT)
Dept: OTHER | Facility: HOSPITAL | Age: 63
End: 2023-03-06
Payer: COMMERCIAL

## 2023-03-06 ENCOUNTER — TELEPHONE (OUTPATIENT)
Dept: GASTROENTEROLOGY | Facility: CLINIC | Age: 63
End: 2023-03-06
Payer: COMMERCIAL

## 2023-03-06 ENCOUNTER — APPOINTMENT (OUTPATIENT)
Dept: CT IMAGING | Facility: HOSPITAL | Age: 63
DRG: 419 | End: 2023-03-06
Payer: COMMERCIAL

## 2023-03-06 DIAGNOSIS — Z46.89 ENCOUNTER FOR REMOVAL OF BILIARY STENT: Primary | ICD-10-CM

## 2023-03-06 LAB
ALBUMIN SERPL-MCNC: 3.2 G/DL (ref 3.5–5.2)
ALBUMIN/GLOB SERPL: 1.3 G/DL
ALP SERPL-CCNC: 204 U/L (ref 39–117)
ALT SERPL W P-5'-P-CCNC: 173 U/L (ref 1–41)
ANION GAP SERPL CALCULATED.3IONS-SCNC: 8.7 MMOL/L (ref 5–15)
AST SERPL-CCNC: 100 U/L (ref 1–40)
BASOPHILS # BLD AUTO: 0.01 10*3/MM3 (ref 0–0.2)
BASOPHILS NFR BLD AUTO: 0.2 % (ref 0–1.5)
BILIRUB SERPL-MCNC: 3.1 MG/DL (ref 0–1.2)
BUN SERPL-MCNC: 21 MG/DL (ref 8–23)
BUN/CREAT SERPL: 23.3 (ref 7–25)
CALCIUM SPEC-SCNC: 8.7 MG/DL (ref 8.6–10.5)
CHLORIDE SERPL-SCNC: 103 MMOL/L (ref 98–107)
CO2 SERPL-SCNC: 26.3 MMOL/L (ref 22–29)
CREAT SERPL-MCNC: 0.9 MG/DL (ref 0.76–1.27)
DEPRECATED RDW RBC AUTO: 44.4 FL (ref 37–54)
EGFRCR SERPLBLD CKD-EPI 2021: 96 ML/MIN/1.73
EOSINOPHIL # BLD AUTO: 0.04 10*3/MM3 (ref 0–0.4)
EOSINOPHIL NFR BLD AUTO: 0.6 % (ref 0.3–6.2)
ERYTHROCYTE [DISTWIDTH] IN BLOOD BY AUTOMATED COUNT: 14.3 % (ref 12.3–15.4)
GLOBULIN UR ELPH-MCNC: 2.5 GM/DL
GLUCOSE BLDC GLUCOMTR-MCNC: 115 MG/DL (ref 70–99)
GLUCOSE BLDC GLUCOMTR-MCNC: 121 MG/DL (ref 70–99)
GLUCOSE BLDC GLUCOMTR-MCNC: 133 MG/DL (ref 70–99)
GLUCOSE BLDC GLUCOMTR-MCNC: 148 MG/DL (ref 70–99)
GLUCOSE BLDC GLUCOMTR-MCNC: 165 MG/DL (ref 70–99)
GLUCOSE BLDC GLUCOMTR-MCNC: 173 MG/DL (ref 70–99)
GLUCOSE BLDC GLUCOMTR-MCNC: 195 MG/DL (ref 70–99)
GLUCOSE SERPL-MCNC: 143 MG/DL (ref 65–99)
HCT VFR BLD AUTO: 39.9 % (ref 37.5–51)
HGB BLD-MCNC: 14 G/DL (ref 13–17.7)
IMM GRANULOCYTES # BLD AUTO: 0.07 10*3/MM3 (ref 0–0.05)
IMM GRANULOCYTES NFR BLD AUTO: 1.1 % (ref 0–0.5)
LYMPHOCYTES # BLD AUTO: 1.09 10*3/MM3 (ref 0.7–3.1)
LYMPHOCYTES NFR BLD AUTO: 16.6 % (ref 19.6–45.3)
MAGNESIUM SERPL-MCNC: 1.7 MG/DL (ref 1.6–2.4)
MCH RBC QN AUTO: 29.9 PG (ref 26.6–33)
MCHC RBC AUTO-ENTMCNC: 35.1 G/DL (ref 31.5–35.7)
MCV RBC AUTO: 85.3 FL (ref 79–97)
MONOCYTES # BLD AUTO: 0.35 10*3/MM3 (ref 0.1–0.9)
MONOCYTES NFR BLD AUTO: 5.3 % (ref 5–12)
NEUTROPHILS NFR BLD AUTO: 5.01 10*3/MM3 (ref 1.7–7)
NEUTROPHILS NFR BLD AUTO: 76.2 % (ref 42.7–76)
NRBC BLD AUTO-RTO: 0 /100 WBC (ref 0–0.2)
PHOSPHATE SERPL-MCNC: 2.5 MG/DL (ref 2.5–4.5)
PLATELET # BLD AUTO: 149 10*3/MM3 (ref 140–450)
PMV BLD AUTO: 9.7 FL (ref 6–12)
POTASSIUM SERPL-SCNC: 4 MMOL/L (ref 3.5–5.2)
PROT SERPL-MCNC: 5.7 G/DL (ref 6–8.5)
RBC # BLD AUTO: 4.68 10*6/MM3 (ref 4.14–5.8)
SODIUM SERPL-SCNC: 138 MMOL/L (ref 136–145)
WBC NRBC COR # BLD: 6.57 10*3/MM3 (ref 3.4–10.8)

## 2023-03-06 PROCEDURE — 25010000002 ENOXAPARIN PER 10 MG: Performed by: SURGERY

## 2023-03-06 PROCEDURE — 25010000002 ONDANSETRON PER 1 MG: Performed by: SURGERY

## 2023-03-06 PROCEDURE — 70450 CT HEAD/BRAIN W/O DYE: CPT

## 2023-03-06 PROCEDURE — 99024 POSTOP FOLLOW-UP VISIT: CPT | Performed by: SURGERY

## 2023-03-06 PROCEDURE — 63710000001 INSULIN REGULAR HUMAN PER 5 UNITS: Performed by: SURGERY

## 2023-03-06 PROCEDURE — 84100 ASSAY OF PHOSPHORUS: CPT | Performed by: INTERNAL MEDICINE

## 2023-03-06 PROCEDURE — 83735 ASSAY OF MAGNESIUM: CPT | Performed by: INTERNAL MEDICINE

## 2023-03-06 PROCEDURE — 85025 COMPLETE CBC W/AUTO DIFF WBC: CPT | Performed by: INTERNAL MEDICINE

## 2023-03-06 PROCEDURE — 99233 SBSQ HOSP IP/OBS HIGH 50: CPT | Performed by: INTERNAL MEDICINE

## 2023-03-06 PROCEDURE — 80053 COMPREHEN METABOLIC PANEL: CPT | Performed by: INTERNAL MEDICINE

## 2023-03-06 PROCEDURE — 25010000002 PIPERACILLIN SOD-TAZOBACTAM PER 1 G: Performed by: INTERNAL MEDICINE

## 2023-03-06 PROCEDURE — 82962 GLUCOSE BLOOD TEST: CPT

## 2023-03-06 RX ORDER — CALCIUM CARBONATE 200(500)MG
1 TABLET,CHEWABLE ORAL 3 TIMES DAILY PRN
Status: DISCONTINUED | OUTPATIENT
Start: 2023-03-06 | End: 2023-03-07 | Stop reason: HOSPADM

## 2023-03-06 RX ORDER — SODIUM CHLORIDE, SODIUM LACTATE, POTASSIUM CHLORIDE, CALCIUM CHLORIDE 600; 310; 30; 20 MG/100ML; MG/100ML; MG/100ML; MG/100ML
30 INJECTION, SOLUTION INTRAVENOUS CONTINUOUS PRN
Status: CANCELLED | OUTPATIENT
Start: 2023-03-06

## 2023-03-06 RX ADMIN — HYDROCODONE BITARTRATE AND ACETAMINOPHEN 1 TABLET: 10; 325 TABLET ORAL at 15:04

## 2023-03-06 RX ADMIN — PANTOPRAZOLE SODIUM 40 MG: 40 TABLET, DELAYED RELEASE ORAL at 05:58

## 2023-03-06 RX ADMIN — TAZOBACTAM SODIUM AND PIPERACILLIN SODIUM 3.38 G: 375; 3 INJECTION, SOLUTION INTRAVENOUS at 08:27

## 2023-03-06 RX ADMIN — INSULIN HUMAN 2 UNITS: 100 INJECTION, SOLUTION PARENTERAL at 23:14

## 2023-03-06 RX ADMIN — SODIUM CHLORIDE, POTASSIUM CHLORIDE, SODIUM LACTATE AND CALCIUM CHLORIDE 1000 ML: 600; 310; 30; 20 INJECTION, SOLUTION INTRAVENOUS at 18:14

## 2023-03-06 RX ADMIN — Medication 10 ML: at 20:16

## 2023-03-06 RX ADMIN — ONDANSETRON 4 MG: 2 INJECTION INTRAMUSCULAR; INTRAVENOUS at 19:13

## 2023-03-06 RX ADMIN — ENOXAPARIN SODIUM 40 MG: 100 INJECTION SUBCUTANEOUS at 08:27

## 2023-03-06 RX ADMIN — Medication 10 ML: at 08:28

## 2023-03-06 RX ADMIN — TAZOBACTAM SODIUM AND PIPERACILLIN SODIUM 3.38 G: 375; 3 INJECTION, SOLUTION INTRAVENOUS at 23:06

## 2023-03-06 RX ADMIN — TAZOBACTAM SODIUM AND PIPERACILLIN SODIUM 3.38 G: 375; 3 INJECTION, SOLUTION INTRAVENOUS at 15:04

## 2023-03-06 NOTE — PROGRESS NOTES
[facility]   Hospitalist Progress Note  Date: 3/6/2023  Patient Name: Devyn Steinberg  : 1960  MRN: [id number]  Date of admission: 3/2/2023      Subjective   Subjective     Chief Complaint: Abdominal pain     Summary:   Devyn Steinberg is a 62 y.o. male past medical history of pancreatitis, insulin-dependent diabetes mellitus presents to the emergency department for evaluation of abdominal pain that began earlier in the day.  He describes it as a sharp ache, left-sided left upper quadrant, radiates to epigastrium, constant, no known aggravating alleviating factors.  In the ED, found to have hyperbilirubinemia of 2.4 with elevated LFTs.  GI was called and recommended MRCP and admission.  He was admitted for further care, underwent ERCP requiring sphincterotomy and biliary stenting.  Pus noted in the biliary duct, started on IV Zosyn on 3/14.  Plan to complete 7-10 days of Zosyn or ultimately Levaquin for intra-abdominal infection. General surgery was consulted and patient underwent cholecystectomy on 3/4.  Tolerated procedure well.  LFTs improved drastically.  However, had bump in LFTs on 3/6.  Discussed with GI, will monitor overnight and if CMP worsened on 3/7, consider repeat imaging.    Interval Followup: No acute events overnight.  Still had some intermittent nausea but no vomiting yesterday.  States he ate quite a bit yesterday and that may have made his nausea worse.  Pain is currently tolerable.  Still required multiple doses of IV Dilaudid yesterday.    Review of Systems   Denies chest pain, fevers or palpitations    Objective   Objective     Vitals:   Temp:  [97.6 °F (36.4 °C)-98 °F (36.7 °C)] 97.6 °F (36.4 °C)  Heart Rate:  [69-76] 73  Resp:  [16] 16  BP: (118-138)/(61-85) 138/85  Physical Exam    Constitutional: Awake, alert, NAD              HENT: NCAT, mucous membranes moist              Neck: Supple, no thyromegaly, no LAD, trachea midline              Respiratory: Clear to auscultation  bilaterally, nonlabored respirations               Cardiovascular: RRR, no MRG              Gastrointestinal: Positive bowel sounds, soft, well-healing incisional scars, appropriately TTP              Musculoskeletal: No bilateral ankle edema, no clubbing or cyanosis to extremities              Neurologic: Oriented x 3, moving all extremities equally, Cranial Nerves grossly intact , speech clear    Result Review    Result Review:  I have personally reviewed the results from the time of this admission to 3/6/2023 15:12 EST and agree with these findings:  [x]  Laboratory  CBC    CBC 3/2/23 3/2/23 3/5/23 3/6/23    1736 2348     WBC 9.23 6.38 10.23 6.57   RBC 5.87 (A) 5.24 4.84 4.68   Hemoglobin 17.3 15.3 14.2 14.0   Hematocrit 49.9 43.9 41.5 39.9   MCV 85.0 83.8 85.7 85.3   MCH 29.5 29.2 29.3 29.9   MCHC 34.7 34.9 34.2 35.1   RDW 13.8 13.8 14.4 14.3   Platelets 160 134 (A) 123 (A) 149   (A) Abnormal value            CMP    CMP 3/3/23 3/5/23 3/6/23   Glucose 139 (A) 144 (A) 143 (A)   BUN 25 (A) 15 21   Creatinine 0.98 0.93 0.90   eGFR 86.6 92.3 96.0   Sodium 138 138 138   Potassium 4.4 4.3 4.0   Chloride 105 104 103   Calcium 8.4 (A) 8.9 8.7   Total Protein 5.3 (A) 6.1 5.7 (A)   Albumin 3.4 (A) 3.3 (A) 3.2 (A)   Globulin 1.9 2.8 2.5   Total Bilirubin 6.3 (A) 1.9 (A) 3.1 (A)   Alkaline Phosphatase 89 105 204 (A)   AST (SGOT) 241 (A) 51 (A) 100 (A)   ALT (SGPT) 336 (A) 158 (A) 173 (A)   Albumin/Globulin Ratio 1.8 1.2 1.3   BUN/Creatinine Ratio 25.5 (A) 16.1 23.3   Anion Gap 9.7 8.9 8.7   (A) Abnormal value              []  Microbiology  [x]  Radiology  []  EKG/Telemetry   []  Cardiology/Vascular   []  Pathology  []  Old records  []  Other:    Assessment & Plan   Assessment / Plan     Assessment/Plan:   Calculus of gallbladder with biliary obstruction but without cholecystitis status post biliary stenting  Intra-abdominal infection  Ascending cholangitis  Elevated LFTs   Hyperbilirubinemia with jaundice  Type 2 diabetes  mellitus  GERD  Hyperlipidemia     Continue to monitor in the hospital for management of the above  General surgery and gastroenterology following, appreciate assistance  Patient is status post biliary stenting and cholecystectomy  Concern for pus in the biliary tract during sphincterotomy and stenting, continue with IV Zosyn  Will transition to Levaquin to complete 7-10 days of antibiotic therapy at discharge  Continue oral and IV narcotics as needed.  Still intermittently requiring IV Dilaudid  Due to bump in alk phos, bilirubin, AST and ALT, will continue to monitor in the hospital.  Possible concern for retained stone although with biliary stenting this would be unusual  If his labs continue to worsen, would likely repeat MRCP  Continue regular diet  Encourage ambulation  Continue PPI for GERD  Continue sliding scale insulin  Trend renal function and electrolytes with a.m. BMP  Trend Hgb and WBC with a.m. CBC    Discussed plan with RN, general surgery, gastroenterology.  Discussed with consultants and at this time agree no indication for escalation or de-escalation of care    DVT prophylaxis:  Medical and mechanical DVT prophylaxis orders are present.    CODE STATUS:   Code Status (Patient has no pulse and is not breathing): CPR (Attempt to Resuscitate)  Medical Interventions (Patient has pulse or is breathing): Full Support

## 2023-03-06 NOTE — PROGRESS NOTES
[facility]     Progress Note    Patient Name: Devyn Steinberg  : 1960  MRN: [id number]  Primary Care Physician:  Uvaldo, KINJAL Santos  Date of admission: 3/2/2023    Subjective   Subjective     Chief Complaint: Abdominal pain    HPI:  Patient Reports some abdominal pain and nausea overnight.  Feels better this morning.      Objective   Objective     Vitals:   Temp:  [97.8 °F (36.6 °C)-98.1 °F (36.7 °C)] 97.8 °F (36.6 °C)  Heart Rate:  [69-76] 71  Resp:  [16-20] 16  BP: (118-143)/(61-84) 128/83    Physical Exam  Constitutional:       Appearance: Normal appearance.   Cardiovascular:      Rate and Rhythm: Normal rate.   Pulmonary:      Effort: Pulmonary effort is normal.   Abdominal:      Palpations: Abdomen is soft.         Result Review    Result Review:  I have personally reviewed the results from the time of this admission to 3/6/2023 10:08 EST and agree with these findings:  [x]  Laboratory  []  Microbiology  []  Radiology  []  EKG/Telemetry   []  Cardiology/Vascular   []  Pathology  []  Old records  []  Other:  Most notable findings include: Bilirubin increased today    Assessment & Plan   Assessment / Plan     Brief Patient Summary:  Devyn Steinberg is a 62 y.o. male who status post lap denita and ERCP with stent placement    Active Hospital Problems:  Active Hospital Problems    Diagnosis    • **Calculus of gallbladder with biliary obstruction but without cholecystitis    • Ascending cholangitis    • Elevated LFTs      Plan:  Unsure why his bili is up today  We will check repeat labs this afternoon  If bili continues to remain elevated or increases may need some repeat imaging       DVT prophylaxis:  Medical and mechanical DVT prophylaxis orders are present.    CODE STATUS:   Code Status (Patient has no pulse and is not breathing): CPR (Attempt to Resuscitate)  Medical Interventions (Patient has pulse or is breathing): Full Support    Disposition:  I expect patient to be discharged 1 to 2  days.    Electronically signed by Paul Hodge MD, 03/06/23, 10:08 AM EST.

## 2023-03-06 NOTE — SIGNIFICANT NOTE
03/06/23 1740   [facility] High Risk Falls Assessment (If Fall score is >/=13, add the Fall Risk CPG to the care plan)    Fallen in past 6 months 5--> Yes   Mental Status 0--> no mental status change   Elimination 0--> No elimination issues   Mobility 2--> Requires assistance- transfer, walker, etc.   Medications 1--> Narcotics   Nurses' Clinical Judgement 6   Total Fall Risk Score 15     Pt fell while ambulating in the bathroom. Fall was unwitnessed, pt called out when he fell. Bed alarm in place and audible. Assessment performed, no injury noted. Pt did report he hit his head on the door. Vital signs stable, HR in 50s but quickly returned to the 80s after sitting in the bed. See flowsheet. MD notified, see orders. Pt's family notified. Will continue to monitor.  Meena Maldonado RN

## 2023-03-06 NOTE — PLAN OF CARE
Goal Outcome Evaluation:  Plan of Care Reviewed With: patient        Progress: improving  Outcome Evaluation: Recieved report on patient around 0110. Pt recieved Norco x1 and Zofran x1. Pain and nausea adequately controlled. Up ad mariah to bathroom. BGL monitored. Possible discharge today.

## 2023-03-07 ENCOUNTER — READMISSION MANAGEMENT (OUTPATIENT)
Dept: CALL CENTER | Facility: HOSPITAL | Age: 63
End: 2023-03-07
Payer: COMMERCIAL

## 2023-03-07 VITALS
BODY MASS INDEX: 28.09 KG/M2 | TEMPERATURE: 97.9 F | HEART RATE: 90 BPM | DIASTOLIC BLOOD PRESSURE: 76 MMHG | HEIGHT: 63 IN | WEIGHT: 158.51 LBS | OXYGEN SATURATION: 100 % | SYSTOLIC BLOOD PRESSURE: 129 MMHG | RESPIRATION RATE: 18 BRPM

## 2023-03-07 PROBLEM — Z90.49 S/P LAPAROSCOPIC CHOLECYSTECTOMY: Status: ACTIVE | Noted: 2023-03-07

## 2023-03-07 LAB
ALBUMIN SERPL-MCNC: 3.5 G/DL (ref 3.5–5.2)
ALBUMIN/GLOB SERPL: 1.3 G/DL
ALP SERPL-CCNC: 202 U/L (ref 39–117)
ALT SERPL W P-5'-P-CCNC: 132 U/L (ref 1–41)
ANION GAP SERPL CALCULATED.3IONS-SCNC: 12.2 MMOL/L (ref 5–15)
AST SERPL-CCNC: 34 U/L (ref 1–40)
BASOPHILS # BLD AUTO: 0.03 10*3/MM3 (ref 0–0.2)
BASOPHILS NFR BLD AUTO: 0.3 % (ref 0–1.5)
BILIRUB SERPL-MCNC: 1.5 MG/DL (ref 0–1.2)
BUN SERPL-MCNC: 25 MG/DL (ref 8–23)
BUN/CREAT SERPL: 30.5 (ref 7–25)
CALCIUM SPEC-SCNC: 9.2 MG/DL (ref 8.6–10.5)
CHLORIDE SERPL-SCNC: 100 MMOL/L (ref 98–107)
CO2 SERPL-SCNC: 24.8 MMOL/L (ref 22–29)
CREAT SERPL-MCNC: 0.82 MG/DL (ref 0.76–1.27)
CYTO UR: NORMAL
DEPRECATED RDW RBC AUTO: 42.8 FL (ref 37–54)
EGFRCR SERPLBLD CKD-EPI 2021: 98.7 ML/MIN/1.73
EOSINOPHIL # BLD AUTO: 0.1 10*3/MM3 (ref 0–0.4)
EOSINOPHIL NFR BLD AUTO: 1.1 % (ref 0.3–6.2)
ERYTHROCYTE [DISTWIDTH] IN BLOOD BY AUTOMATED COUNT: 13.9 % (ref 12.3–15.4)
GLOBULIN UR ELPH-MCNC: 2.8 GM/DL
GLUCOSE BLDC GLUCOMTR-MCNC: 131 MG/DL (ref 70–99)
GLUCOSE BLDC GLUCOMTR-MCNC: 131 MG/DL (ref 70–99)
GLUCOSE SERPL-MCNC: 146 MG/DL (ref 65–99)
HCT VFR BLD AUTO: 37.7 % (ref 37.5–51)
HGB BLD-MCNC: 13 G/DL (ref 13–17.7)
IMM GRANULOCYTES # BLD AUTO: 0.15 10*3/MM3 (ref 0–0.05)
IMM GRANULOCYTES NFR BLD AUTO: 1.7 % (ref 0–0.5)
LAB AP CASE REPORT: NORMAL
LAB AP CLINICAL INFORMATION: NORMAL
LYMPHOCYTES # BLD AUTO: 2.38 10*3/MM3 (ref 0.7–3.1)
LYMPHOCYTES NFR BLD AUTO: 26.5 % (ref 19.6–45.3)
MAGNESIUM SERPL-MCNC: 1.8 MG/DL (ref 1.6–2.4)
MCH RBC QN AUTO: 29.1 PG (ref 26.6–33)
MCHC RBC AUTO-ENTMCNC: 34.5 G/DL (ref 31.5–35.7)
MCV RBC AUTO: 84.3 FL (ref 79–97)
MONOCYTES # BLD AUTO: 0.55 10*3/MM3 (ref 0.1–0.9)
MONOCYTES NFR BLD AUTO: 6.1 % (ref 5–12)
NEUTROPHILS NFR BLD AUTO: 5.78 10*3/MM3 (ref 1.7–7)
NEUTROPHILS NFR BLD AUTO: 64.3 % (ref 42.7–76)
NRBC BLD AUTO-RTO: 0.2 /100 WBC (ref 0–0.2)
PATH REPORT.FINAL DX SPEC: NORMAL
PATH REPORT.GROSS SPEC: NORMAL
PHOSPHATE SERPL-MCNC: 3.4 MG/DL (ref 2.5–4.5)
PLATELET # BLD AUTO: 218 10*3/MM3 (ref 140–450)
PMV BLD AUTO: 9.7 FL (ref 6–12)
POTASSIUM SERPL-SCNC: 3.8 MMOL/L (ref 3.5–5.2)
PROT SERPL-MCNC: 6.3 G/DL (ref 6–8.5)
RBC # BLD AUTO: 4.47 10*6/MM3 (ref 4.14–5.8)
SODIUM SERPL-SCNC: 137 MMOL/L (ref 136–145)
WBC NRBC COR # BLD: 8.99 10*3/MM3 (ref 3.4–10.8)

## 2023-03-07 PROCEDURE — 25010000002 PIPERACILLIN SOD-TAZOBACTAM PER 1 G: Performed by: INTERNAL MEDICINE

## 2023-03-07 PROCEDURE — 99239 HOSP IP/OBS DSCHRG MGMT >30: CPT | Performed by: INTERNAL MEDICINE

## 2023-03-07 PROCEDURE — 85025 COMPLETE CBC W/AUTO DIFF WBC: CPT | Performed by: INTERNAL MEDICINE

## 2023-03-07 PROCEDURE — 99024 POSTOP FOLLOW-UP VISIT: CPT | Performed by: SURGERY

## 2023-03-07 PROCEDURE — 80053 COMPREHEN METABOLIC PANEL: CPT | Performed by: INTERNAL MEDICINE

## 2023-03-07 PROCEDURE — 84100 ASSAY OF PHOSPHORUS: CPT | Performed by: INTERNAL MEDICINE

## 2023-03-07 PROCEDURE — 25010000002 ENOXAPARIN PER 10 MG: Performed by: SURGERY

## 2023-03-07 PROCEDURE — 83735 ASSAY OF MAGNESIUM: CPT | Performed by: INTERNAL MEDICINE

## 2023-03-07 PROCEDURE — 82962 GLUCOSE BLOOD TEST: CPT

## 2023-03-07 RX ORDER — LEVOFLOXACIN 750 MG/1
750 TABLET ORAL DAILY
Qty: 7 TABLET | Refills: 0 | Status: SHIPPED | OUTPATIENT
Start: 2023-03-07 | End: 2023-03-14

## 2023-03-07 RX ORDER — HYDROCODONE BITARTRATE AND ACETAMINOPHEN 5; 325 MG/1; MG/1
1 TABLET ORAL EVERY 4 HOURS PRN
Qty: 18 TABLET | Refills: 0 | Status: SHIPPED | OUTPATIENT
Start: 2023-03-07 | End: 2023-03-10

## 2023-03-07 RX ADMIN — PANTOPRAZOLE SODIUM 40 MG: 40 TABLET, DELAYED RELEASE ORAL at 05:58

## 2023-03-07 RX ADMIN — Medication 10 ML: at 08:38

## 2023-03-07 RX ADMIN — TAZOBACTAM SODIUM AND PIPERACILLIN SODIUM 3.38 G: 375; 3 INJECTION, SOLUTION INTRAVENOUS at 07:41

## 2023-03-07 RX ADMIN — HYDROCODONE BITARTRATE AND ACETAMINOPHEN 1 TABLET: 5; 325 TABLET ORAL at 08:38

## 2023-03-07 RX ADMIN — ENOXAPARIN SODIUM 40 MG: 100 INJECTION SUBCUTANEOUS at 08:38

## 2023-03-07 NOTE — PLAN OF CARE
Goal Outcome Evaluation:  Plan of Care Reviewed With: patient        Progress: improving  Outcome Evaluation: Alert and oriented x4. VSS. No complaint of pain. Zofran given x1 with adequate relief. Went down for a head CT tonight. BGL monitored. Supplemental insulin given per sliding scale. Up to bathroom with standby assist. Bed alert on. BM x1. NPO at midnight for possible ERCP today depending on morning lab results. Informed consent obtained.

## 2023-03-07 NOTE — DISCHARGE INSTR - ACTIVITY
No heavy lifting greater than 15 pounds for a total of 2 weeks. If not lifting, patient may return to work when comfortable.

## 2023-03-07 NOTE — DISCHARGE SUMMARY
[facility]         HOSPITALIST  DISCHARGE SUMMARY    Patient Name: Devyn Steinberg  : 1960  MRN: [id number]    Date of Admission: 3/2/2023  Date of Discharge:  23  Primary Care Physician: Vivi Bailon APRN    Consultants:  -General Surgery: Dr. Paul Hodge  -Gastroenterology: Dr. Davon Pelletier, Dr. Valadez [facility] Problems:  Calculus of gallbladder with biliary obstruction but without cholecystitis status post biliary stenting  Intra-abdominal infection  Ascending cholangitis  Elevated LFTs   Hyperbilirubinemia with jaundice  Type 2 diabetes mellitus  GERD  Hyperlipidemia     Hospital Course     Hospital Course:  Devyn Steinberg is a 62 y.o. male with pancreatitis, insulin-dependent diabetes mellitus presents to the emergency department for evaluation of abdominal pain that began prior to presentation in the ED.  Eval in ED significant for labs showing hyperbilirubinemia of 2.4 with elevated LFTs.  Gastroenterology contacted and recommended MRCP and admission.  Patient underwent ERCP requiring sphincterotomy and biliary stenting, pus was noted in the biliary duct and patient was started on IV Zosyn on 2023.  General surgery subsequently consulted and patient underwent cholecystectomy on 2023 and tolerated procedure well.  LFTs improved drastically.  There was bump in LFTs on 2023 and patient was monitored and these improved overnight and after discussion with GI and general surgery no additional imaging was warranted.  Patient treated with IV Zosyn during mission and will complete antibiotic treatment with Levaquin after discharge.  Patient hemodynamically stable and no additional inpatient evaluation or work-up necessary at this time, patient will discharge home with outpatient follow-up.    DISCHARGE Follow Up Recommendations for labs and diagnostics:   -Follow-up with PCP in 3 to 5 days  -Follow-up with Gastroenterology in 2 weeks  -Follow-up  with General Surgery in 2 weeks    Day of Discharge     Vital Signs:  Temp:  [97.6 °F (36.4 °C)-98.6 °F (37 °C)] 97.9 °F (36.6 °C)  Heart Rate:  [] 90  Resp:  [16-18] 18  BP: (105-139)/(67-87) 129/76  Physical Exam:   Gen: No acute distress, Conversant, Pleasant, lying in bed  Resp: CTAB, No w/r/r, good aeration, no respiratory distress appreciated  Card: RRR, No m/r/g  Abd: Soft, Nontender, Nondistended, + bowel sounds  Neuro: CN II-XII grossly intact, No focal deficits appreciated  Psych: AAO x 3, Normal mood, Normal affect    Discharge Details        Discharge Medications      New Medications      Instructions Start Date   HYDROcodone-acetaminophen 5-325 MG per tablet  Commonly known as: NORCO   1 tablet, Oral, Every 4 Hours PRN      levoFLOXacin 750 MG tablet  Commonly known as: LEVAQUIN   750 mg, Oral, Daily         Continue These Medications      Instructions Start Date   esomeprazole 20 MG capsule  Commonly known as: nexIUM   20 mg, Oral, Every Morning Before Breakfast      glimepiride 1 MG tablet  Commonly known as: AMARYL   1 mg, Oral, Daily      HumaLOG KwikPen 100 UNIT/ML solution pen-injector  Generic drug: Insulin Lispro (1 Unit Dial)   Subcutaneous, Once As Needed      lisinopril 2.5 MG tablet  Commonly known as: PRINIVIL,ZESTRIL   5 mg, Oral, Daily      metFORMIN  MG 24 hr tablet  Commonly known as: GLUCOPHAGE-XR   Take 1 tablet by mouth 2 (Two) Times a Day With Meals.      rosuvastatin 10 MG tablet  Commonly known as: CRESTOR   10 mg, Oral, Daily             Allergies   Allergen Reactions   • Trulicity [Dulaglutide] Other (See Comments)     Pancreatitis   • Tyloxapol Swelling       Discharge Disposition:  Home or Self Care    Diet:  Hospital:  Diet Order   Procedures   • Diet: Regular/House Diet; Texture: Regular Texture (IDDSI 7); Fluid Consistency: Thin (IDDSI 0)       Discharge Activity:       CODE STATUS:  Code Status and Medical Interventions:   Ordered at: 03/02/23 0350     Code  Status (Patient has no pulse and is not breathing):    CPR (Attempt to Resuscitate)     Medical Interventions (Patient has pulse or is breathing):    Full Support       Future Appointments   Date Time Provider Department Center   4/7/2023  3:00 PM NIRAV CATHIE CT 1  NIRAV ETWCT [facility]   4/10/2023  2:30 PM Saba Ibrahim APRN [facility] GE ETW [facility]   8/7/2023  1:15 PM Saba Ibrahim APRN [facility] GE ETW [facility]       Additional Instructions for the Follow-ups that You Need to Schedule     Discharge Follow-up with PCP   As directed       Currently Documented PCP:    Vivi Bailon APRN    PCP Phone Number:    [phone]     Follow Up Details: Follow-up in 3 to 5 days         Discharge Follow-up with Specified Provider: Dr. Paul Hodge; 2 Weeks   As directed      To: Dr. Paul Hodge    Follow Up: 2 Weeks               Pertinent  and/or Most Recent Results     PROCEDURES:   -ERCP (03/03/2023)  -Laparoscopic cholecystectomy (03/04/2023)    RADIOLOGY:  CT Head Without Contrast [[id number]] Gigi as Reviewed   Order Status: Completed Collected: 03/06/23 2036    Updated: 03/06/23 2039   Narrative:     PROCEDURE: CT HEAD WO CONTRAST       COMPARISON: None       INDICATIONS: HIT POSTERIOR HEAD POST FALL       PROTOCOL:   Standard imaging protocol performed       RADIATION:   DLP: 48.51 mGy*cm     MA and/or KV was adjusted to minimize radiation dose.       TECHNIQUE: CT images were obtained without non-ionic intravenous contrast material.       FINDINGS:   The ventricles are normal in size, position, and configuration.  Sulci are not abnormally   prominent.       No abnormal gray or white matter density is appreciated.  There is no CT evidence of acute   intracranial hemorrhage, mass, or mass effect.       1 cm exostosis or densely calcified granuloma in the right frontal region is of doubtful clinical   significance.       The orbits have an unremarkable appearance.       The paranasal sinuses, middle ears, and mastoid air cells are well aerated.        No fractures are seen on bone window images.       Impression:       CT scan of the head without IV contrast without IV contrast demonstrating no acute intracranial   abnormality.                ZULLY LLOYD MD         Electronically Signed and Approved By: ZULLY LLOYD MD on 3/06/2023 at 20:36                       FL ERCP pancreatic and biliary ducts [[id number]] Gigi as Reviewed   Order Status: Completed Collected: 03/03/23 1546    Updated: 03/03/23 1549   Narrative:     PROCEDURE: FL ERCP PANCREATIC AND BILIARY DUCTS       COMPARISON: [facility], MR, MRI ABDOMEN WO CONTRAST MRCP, 3/02/2023, 22:49.       INDICATIONS: COMMON BILE DUCT STONE, FLUOROTIME 3 MINUTES 24 SECONDS, 49.52 mGy       FINDINGS:   Imaging performed during ERCP and intervention.  There is placement of a biliary stent.       Impression:       1. Imaging during ERCP and intervention.  See operative note.                SONG TORO MD         Electronically Signed and Approved By: SONG TORO MD on 3/03/2023 at 15:46                       MRI abdomen wo contrast mrcp [[id number]] Gigi as Reviewed   Order Status: Completed Collected: 03/03/23 0057    Updated: 03/03/23 0100   Narrative:     PROCEDURE: MRI ABDOMEN WO CONTRAST MRCP       COMPARISONS: 3/2/2023 (U/S); 1/14/2023 (CT, MRI, MRI).       INDICATIONS:   History of cholelithiasis; elevated liver function tests; left upper quadrant abdominal pain.       TECHNIQUE:   A comprehensive examination was performed utilizing a variety of imaging planes and imaging   parameters to optimize visualization of suspected pathology.  Magnetic resonance   cholangiopancreatography (MRCP) was also performed.  Five hundred eight (508) magnetic resonance   (MR) images were obtained.       FINDINGS:   Motion artifact obscures detail.  Gallstones are seen.  There is suspected mild pericholecystic   fluid.  There is possible gallbladder wall thickening.  Acute cholecystitis cannot be  "excluded   please correlate with pertinent lab values.  No choledocholithiasis.  No dilatation of the biliary   tree (ducts) is seen.  No definite acute pancreatitis is suggested.  Again, please correlate with   pertinent lab values.  No pancreatic ductal dilatation is seen.  No change in the 2.3 cm lesion   involving the pancreatic tail, which may represent intrapancreatic accessory splenic tissue (or a   \"splenunculus\").  It has been seen on prior recent CT and MRI studies.  If further imaging   evaluation is desired to confirm this impression, consider nonemergent Nuclear Medicine (NM)   Technetium99m-labeled RBC scan or sulfur colloid scan for further assessment.  There are probably   benign renal cysts, measuring about 3 cm or less in size.  They are not fully characterized by this   study.  There is diffuse hepatic steatosis with borderline hepatomegaly.  Probably no splenomegaly.    The other incidental findings are as described in prior recent imaging reports.       Impression:       No biliary ductal dilatation or choledocholithiasis is seen.  There are gallstones.  Acute   cholecystitis is possible.  Please correlate with pertinent lab values.  No acute pancreatitis.     The study is motion-limited.  Please see above comments for further detail.             Please note that portions of this note were completed with a voice recognition program.       CARIDAD DONOHUE JR, MD         Electronically Signed and Approved By: CARIDAD DONOHUE JR, MD on 3/03/2023 at 0:57                       US Abdomen Limited [[id number]] Gigi as Reviewed   Order Status: Completed Collected: 03/02/23 2027    Updated: 03/02/23 2030   Narrative:     PROCEDURE: US ABDOMEN LIMITED       COMPARISON: [facility], MR, MRI ABDOMEN W WO CONTRAST, 1/14/2023, 12:17.       INDICATIONS: luq pain, elevated LFTS       TECHNIQUE: Ultrasound examination of the right upper quadrant of the abdomen was performed.         FINDINGS:   LIVER: " Measures 15.6 cm with diffuse increased echogenicity compatible with steatosis.  No focal   hepatic mass is identified.  The hepatic vasculature appears within normal limits.   BILIARY: Small stones in the gallbladder, but no secondary findings of acute cholecystitis.     Negative sonographic Loja's sign.  Common bile duct normal in caliber at 5.4 mm.   PANCREAS: Normal.  No visible mass, abnormal atrophy, or ductal dilatation.     RIGHT KIDNEY: Normal measuring 11.7 cm.  No mass or hydronephrosis.     OTHER: Negative.         Impression:       1. Hepatic steatosis.   2. Cholelithiasis.   3. Pancreas appears within normal limits.                JOCELYNE ROSARIO MD         ELECTRONICALLY SIGNED AND APPROVED BY: JOCELYNE ROSARIO MD ON 3/02/2023 AT 20:27          LAB RESULTS:      Lab 03/07/23 0428 03/06/23 0422 03/05/23  0500 03/03/23  0243 03/02/23  2348 03/02/23 2049 03/02/23  1736   WBC 8.99 6.57 10.23  --  6.38  --  9.23   HEMOGLOBIN 13.0 14.0 14.2  --  15.3  --  17.3   HEMATOCRIT 37.7 39.9 41.5  --  43.9  --  49.9   PLATELETS 218 149 123*  --  134*  --  160   NEUTROS ABS 5.78 5.01 8.98*  --  5.92  --  8.48*   IMMATURE GRANS (ABS) 0.15* 0.07* 0.11*  --  0.01  --  0.02   LYMPHS ABS 2.38 1.09 0.67*  --  0.16*  --  0.56*   MONOS ABS 0.55 0.35 0.46  --  0.28  --  0.16   EOS ABS 0.10 0.04 0.00  --  0.00  --  0.01   MCV 84.3 85.3 85.7  --  83.8  --  85.0   LACTATE  --   --   --  1.7 2.5* 2.6* 2.2*         Lab 03/07/23 0428 03/06/23 0422 03/05/23  0500 03/03/23  0917 03/02/23  2348   SODIUM 137 138 138 138 140   POTASSIUM 3.8 4.0 4.3 4.4 3.7   CHLORIDE 100 103 104 105 107   CO2 24.8 26.3 25.1 23.3 21.6*   ANION GAP 12.2 8.7 8.9 9.7 11.4   BUN 25* 21 15 25* 23   CREATININE 0.82 0.90 0.93 0.98 0.99   EGFR 98.7 96.0 92.3 86.6 85.6   GLUCOSE 146* 143* 144* 139* 156*   CALCIUM 9.2 8.7 8.9 8.4* 8.4*   MAGNESIUM 1.8 1.7  --   --   --    PHOSPHORUS 3.4 2.5  --   --   --          Lab 03/07/23  0428 03/06/23  0422  03/05/23  0500 03/03/23  0917 03/02/23  2348 03/02/23  1736   TOTAL PROTEIN 6.3 5.7* 6.1 5.3* 5.8* 7.8   ALBUMIN 3.5 3.2* 3.3* 3.4* 3.7 4.7   GLOBULIN 2.8 2.5 2.8 1.9 2.1 3.1   ALT (SGPT) 132* 173* 158* 336* 324* 165*   AST (SGOT) 34 100* 51* 241* 369* 231*   BILIRUBIN 1.5* 3.1* 1.9* 6.3* 4.6* 2.3*   ALK PHOS 202* 204* 105 89 106 135*   LIPASE  --   --   --   --   --  31           Brief Urine Lab Results  (Last result in the past 365 days)      Color   Clarity   Blood   Leuk Est   Nitrite   Protein   CREAT   Urine HCG        03/02/23 1736 Dark Yellow   Clear   Negative   Negative   Negative   30 mg/dL (1+)               Microbiology Results (last 10 days)     ** No results found for the last 240 hours. **          Time spent on Discharge including face to face service: Greater than 30 minutes    Electronically signed by Talib Hernández MD, 03/07/23, 11:46 AM EST.

## 2023-03-07 NOTE — PROGRESS NOTES
[facility]     Progress Note    Patient Name: Devyn Steinberg  : 1960  MRN: [id number]  Primary Care Physician:  Head, KINJAL Santos  Date of admission: 3/2/2023    Subjective   Subjective     Chief Complaint: Abdominal pain    HPI:  Patient Reports feeling pretty well today      Objective   Objective     Vitals:   Temp:  [97.6 °F (36.4 °C)-98.6 °F (37 °C)] 97.9 °F (36.6 °C)  Heart Rate:  [] 90  Resp:  [16-18] 18  BP: (105-139)/(67-87) 129/76    Physical Exam  Cardiovascular:      Rate and Rhythm: Normal rate.   Pulmonary:      Effort: Pulmonary effort is normal.   Abdominal:      Palpations: Abdomen is soft.         Result Review    Result Review:  I have personally reviewed the results from the time of this admission to 3/7/2023 09:25 EST and agree with these findings:  [x]  Laboratory  []  Microbiology  []  Radiology  []  EKG/Telemetry   []  Cardiology/Vascular   []  Pathology  []  Old records  []  Other:  Most notable findings include: Bilirubin back down to 1.5    Assessment & Plan   Assessment / Plan     Brief Patient Summary:  Devyn Steinberg is a 62 y.o. male who status post lap deinta and ERCP    Active Hospital Problems:  Active Hospital Problems    Diagnosis    • **Calculus of gallbladder with biliary obstruction but without cholecystitis    • Ascending cholangitis    • Elevated LFTs      Plan:  Unsure why his bili bumped yesterday  Bilirubin back down to normal today  No new issues from surgical standpoint       DVT prophylaxis:  Medical and mechanical DVT prophylaxis orders are present.    CODE STATUS:   Code Status (Patient has no pulse and is not breathing): CPR (Attempt to Resuscitate)  Medical Interventions (Patient has pulse or is breathing): Full Support    Disposition:  I expect patient to be discharged per primary.    Electronically signed by Paul Hodge MD, 23, 9:25 AM EST.

## 2023-03-07 NOTE — PLAN OF CARE
Goal Outcome Evaluation:      Pt resting in bed. Pt looks and feels better after eating after his fall. Orthostatic Bps were normal. Instructed pt to call out to get up. Will continue to monitor.  Meena Maldonado RN

## 2023-03-08 PROBLEM — Z46.89 ENCOUNTER FOR REMOVAL OF BILIARY STENT: Status: ACTIVE | Noted: 2023-03-08

## 2023-03-08 NOTE — OUTREACH NOTE
Prep Survey    Flowsheet Row Responses   [facility] facility patient discharged from? [facility]   Is LACE score < 7 ? No   Eligibility Readm Mgmt   Discharge diagnosis Calculus of gallbladder with biliary obstruction but without cholecystitis   Does the patient have one of the following disease processes/diagnoses(primary or secondary)? General Surgery   Does the patient have Home health ordered? No   Is there a DME ordered? No   Prep survey completed? Yes          Julia GALAVIZ - Registered Nurse

## 2023-03-08 NOTE — TELEPHONE ENCOUNTER
ERCP scheduled on 3/30/2023; NPO harrison ANN. Educated and mailed pt regarding ERCP date and instructions. Educated that a nurse from [facility] would contact prior to scheduled date to inform of arrival time and review medications/history.  Verified understanding.

## 2023-03-09 ENCOUNTER — TELEPHONE (OUTPATIENT)
Dept: GASTROENTEROLOGY | Facility: CLINIC | Age: 63
End: 2023-03-09
Payer: COMMERCIAL

## 2023-03-09 NOTE — TELEPHONE ENCOUNTER
ERCP 3/3/2023: Sphincterotomy performed stent placed in the common bile duct, recommend repeat ERCP in 3 months to remove the stent

## 2023-03-14 ENCOUNTER — READMISSION MANAGEMENT (OUTPATIENT)
Dept: CALL CENTER | Facility: HOSPITAL | Age: 63
End: 2023-03-14
Payer: COMMERCIAL

## 2023-03-14 NOTE — OUTREACH NOTE
General Surgery Week 1 Survey    Flowsheet Row Responses   [facility] patient discharged from? [facility]   Does the patient have one of the following disease processes/diagnoses(primary or secondary)? General Surgery   Week 1 attempt successful? Yes   Call start time 1412   Call end time 1416   Discharge diagnosis Calculus of gallbladder with biliary obstruction but without cholecystitis   Medication alerts for this patient Using Tylenol   Meds reviewed with patient/caregiver? Yes   Is the patient having any side effects they believe may be caused by any medication additions or changes? No   Does the patient have all medications related to this admission filled (includes all antibiotics, pain medications, etc.) Yes   Is the patient taking all medications as directed (includes completed medication regime)? Yes   Does the patient have a follow up appointment scheduled with their surgeon? Yes   Has the patient kept scheduled appointments due by today? Yes   Psychosocial issues? No   Comments Pt no longer having nausea. Tolerating po intake, no issues voiding. 4 Lap sites healing well, tender to touch, surgical adhesive present, minimal bruising. glucose today was 110.    Did the patient receive a copy of their discharge instructions? Yes   Nursing interventions Reviewed instructions with patient   What is the patient's perception of their health status since discharge? Returned to baseline/stable   Nursing interventions Nurse provided patient education   Is the patient /caregiver able to teach back basic post-op care? Practice 'cough and deep breath', Keep incision areas clean,dry and protected, Lifting as instructed by MD in discharge instructions   Is the patient/caregiver able to teach back signs and symptoms of incisional infection? Increased redness, swelling or pain at the incisonal site, Increased drainage or bleeding   Is the patient/caregiver able to teach back steps to recovery at home? Rest and rebuild  strength, gradually increase activity, Set small, achievable goals for return to baseline health   If the patient is a current smoker, are they able to teach back resources for cessation? Not a smoker   Is the patient/caregiver able to teach back the hierarchy of who to call/visit for symptoms/problems? PCP, Specialist, Home health nurse, Urgent Care, ED, 911 Yes   Week 1 call completed? Yes   Revoked No further contact(revokes)-requires comment   Is the patient interested in additional calls from an ambulatory ?  NOTE:  applies to high risk patients requiring additional follow-up. No   Graduated/Revoked comments nahomi GALAVIZ - Registered Nurse

## 2023-03-14 NOTE — TELEPHONE ENCOUNTER
Verbally clarified with Dr. Blas regarding timeline. ERCP to be scheduled in 3 weeks. Will keep current scheduled date.

## 2023-03-23 ENCOUNTER — OFFICE VISIT (OUTPATIENT)
Dept: SURGERY | Facility: CLINIC | Age: 63
End: 2023-03-23
Payer: COMMERCIAL

## 2023-03-23 VITALS — RESPIRATION RATE: 14 BRPM | HEIGHT: 63 IN | WEIGHT: 159 LBS | BODY MASS INDEX: 28.17 KG/M2

## 2023-03-23 DIAGNOSIS — K80.21 CALCULUS OF GALLBLADDER WITH BILIARY OBSTRUCTION BUT WITHOUT CHOLECYSTITIS: Primary | ICD-10-CM

## 2023-03-23 PROCEDURE — 99024 POSTOP FOLLOW-UP VISIT: CPT | Performed by: SURGERY

## 2023-03-23 RX ORDER — BLOOD SUGAR DIAGNOSTIC
STRIP MISCELLANEOUS
COMMUNITY
Start: 2023-03-08

## 2023-03-23 RX ORDER — LISINOPRIL 5 MG/1
TABLET ORAL
COMMUNITY
Start: 2023-03-12

## 2023-03-23 RX ORDER — ERGOCALCIFEROL 1.25 MG/1
CAPSULE ORAL
COMMUNITY
Start: 2023-03-09

## 2023-03-23 NOTE — PROGRESS NOTES
Chief Complaint: Post-op    Subjective         History of Present Illness  Devyn Steinberg is a 62 y.o. male presents to [facility] GENERAL SURGERY. The patient is to be seen for follow-up after laparoscopic cholecystectomy.    Status post laparoscopic cholecystectomy  The patient reports he has been doing well since his discharge from the hospital. He states that his stomach is still slightly sore. He denies any difficulty with eating or drinking. He also denies any issues with urination or bowel movements. The patient notes that his incisions are healing well and the glue has fallen off. The patient had his node of Calot removed with cholecystectomy. He was noted for an infection of the gallbladder and 5 gallstones prior to his procedure. The patient has brought in paperwork to request for short term disability.    Objective     Past Medical History:   Diagnosis Date   • Acid reflux    • Diabetes mellitus (HCC)    • Hyperlipidemia    • S/P laparoscopic cholecystectomy 3/7/2023       Past Surgical History:   Procedure Laterality Date   • CHOLECYSTECTOMY N/A 3/4/2023    Procedure: CHOLECYSTECTOMY LAPAROSCOPIC;  Surgeon: Paul Hodge MD;  Location: [facility] MAIN OR;  Service: General;  Laterality: N/A;   • ERCP N/A 3/3/2023    Procedure: ENDOSCOPIC RETROGRADE CHOLANGIOPANCREATOGRAPHY WITH SPHINCTEROTOMY, BALLOON SWEEP AND STENT PLACEMENT;  Surgeon: Rory Blas MD;  Location: [facility] ENDOSCOPY;  Service: Gastroenterology;  Laterality: N/A;  BILE DUCT SLUDGE, Ascending cholangitis   • HERNIA REPAIR     • HERNIA REPAIR     • SHOULDER SURGERY     • UPPER GASTROINTESTINAL ENDOSCOPY           Current Outpatient Medications:   •  esomeprazole (nexIUM) 20 MG capsule, Take 1 capsule by mouth Every Morning Before Breakfast., Disp: , Rfl:   •  glimepiride (AMARYL) 1 MG tablet, Take 1 tablet by mouth Daily., Disp: , Rfl:   •  Insulin Lispro, 1 Unit Dial, (HUMALOG) 100 UNIT/ML solution pen-injector,  Inject  under the skin into the appropriate area as directed 1 (One) Time As Needed (BG over 180 mg/dl)., Disp: , Rfl:   •  lisinopril (PRINIVIL,ZESTRIL) 5 MG tablet, , Disp: , Rfl:   •  metFORMIN ER (GLUCOPHAGE-XR) 500 MG 24 hr tablet, Take 1 tablet by mouth 2 (Two) Times a Day With Meals., Disp: , Rfl:   •  OneTouch Verio test strip, USE AS DIRECTED TO TEST BLOOD SUGAR THREE TIMES DAILY, Disp: , Rfl:   •  vitamin D (ERGOCALCIFEROL) 1.25 MG (01250 UT) capsule capsule, , Disp: , Rfl:   •  lisinopril (PRINIVIL,ZESTRIL) 2.5 MG tablet, Take 2 tablets by mouth Daily. (Patient not taking: Reported on 3/23/2023), Disp: , Rfl:   •  rosuvastatin (CRESTOR) 10 MG tablet, Take 1 tablet by mouth Daily., Disp: , Rfl:     Allergies   Allergen Reactions   • Trulicity [Dulaglutide] Other (See Comments)     Pancreatitis   • Tyloxapol Swelling        Family History   Problem Relation Age of Onset   • Colon cancer Neg Hx    • Cancer Neg Hx        Social History     Socioeconomic History   • Marital status:    Tobacco Use   • Smoking status: Never     Passive exposure: Past   • Smokeless tobacco: Never   Vaping Use   • Vaping Use: Never used   Substance and Sexual Activity   • Alcohol use: Never   • Drug use: Never   • Sexual activity: Defer        Physical Exam  Constitutional:       General: He is not in acute distress.     Appearance: Normal appearance. He is well-developed and normal weight.   Pulmonary:      Effort: Pulmonary effort is normal.      Breath sounds: Normal air entry.   Abdominal:      General: There is no distension.      Palpations: Abdomen is soft.      Tenderness: There is no abdominal tenderness.        Post Surgical Incision  Surgical wound: Incision is healing well. No signs of infection.    Result Review :               Assessment and Plan    There are no diagnoses linked to this encounter.     1. Status post laparoscopic cholecystectomy for choledocholithiasis and ascending cholangitis  The patient is  doing well. He is pleased with his overall progress. At this point in time, he can follow up with me as needed and call with any questions or concerns. He will follow up with Dr. Rory Blas for stent removal. Discussed with the patient. All questions were answered. He voiced understanding and agreed to proceed with the above plan.      Follow Up   No follow-ups on file.  Patient was given instructions and counseling regarding his condition or for health maintenance advice. Please see specific information pulled into the AVS if appropriate.       Transcribed from ambient dictation for Paul Hodge MD by Karo Martinez.  03/23/23   13:00 EDT    Patient or patient representative verbalized consent to the visit recording.  I have personally performed the services described in this document as transcribed by the above individual, and it is both accurate and complete.

## 2023-03-30 ENCOUNTER — ANESTHESIA EVENT (OUTPATIENT)
Dept: GASTROENTEROLOGY | Facility: HOSPITAL | Age: 63
End: 2023-03-30
Payer: COMMERCIAL

## 2023-03-30 ENCOUNTER — APPOINTMENT (OUTPATIENT)
Dept: GENERAL RADIOLOGY | Facility: HOSPITAL | Age: 63
End: 2023-03-30
Payer: COMMERCIAL

## 2023-03-30 ENCOUNTER — HOSPITAL ENCOUNTER (OUTPATIENT)
Facility: HOSPITAL | Age: 63
Setting detail: HOSPITAL OUTPATIENT SURGERY
Discharge: HOME | End: 2023-03-30
Attending: INTERNAL MEDICINE | Admitting: INTERNAL MEDICINE
Payer: COMMERCIAL

## 2023-03-30 ENCOUNTER — ANESTHESIA (OUTPATIENT)
Dept: GASTROENTEROLOGY | Facility: HOSPITAL | Age: 63
End: 2023-03-30
Payer: COMMERCIAL

## 2023-03-30 VITALS
TEMPERATURE: 97.1 F | BODY MASS INDEX: 28.27 KG/M2 | OXYGEN SATURATION: 97 % | DIASTOLIC BLOOD PRESSURE: 90 MMHG | SYSTOLIC BLOOD PRESSURE: 121 MMHG | RESPIRATION RATE: 16 BRPM | HEART RATE: 71 BPM | WEIGHT: 159.61 LBS

## 2023-03-30 DIAGNOSIS — Z46.89 ENCOUNTER FOR REMOVAL OF BILIARY STENT: ICD-10-CM

## 2023-03-30 LAB — GLUCOSE BLDC GLUCOMTR-MCNC: 152 MG/DL (ref 70–99)

## 2023-03-30 PROCEDURE — 25510000001 IOPAMIDOL 61 % SOLUTION: Performed by: INTERNAL MEDICINE

## 2023-03-30 PROCEDURE — 25010000002 ONDANSETRON PER 1 MG: Performed by: NURSE ANESTHETIST, CERTIFIED REGISTERED

## 2023-03-30 PROCEDURE — 25010000002 PROPOFOL 10 MG/ML EMULSION: Performed by: NURSE ANESTHETIST, CERTIFIED REGISTERED

## 2023-03-30 PROCEDURE — C1769 GUIDE WIRE: HCPCS | Performed by: INTERNAL MEDICINE

## 2023-03-30 PROCEDURE — 82962 GLUCOSE BLOOD TEST: CPT

## 2023-03-30 PROCEDURE — 25010000002 DEXAMETHASONE PER 1 MG: Performed by: NURSE ANESTHETIST, CERTIFIED REGISTERED

## 2023-03-30 PROCEDURE — 43273 ENDOSCOPIC PANCREATOSCOPY: CPT | Performed by: INTERNAL MEDICINE

## 2023-03-30 PROCEDURE — 43275 ERCP REMOVE FORGN BODY DUCT: CPT | Performed by: INTERNAL MEDICINE

## 2023-03-30 PROCEDURE — 43264 ERCP REMOVE DUCT CALCULI: CPT | Performed by: INTERNAL MEDICINE

## 2023-03-30 PROCEDURE — 74330 X-RAY BILE/PANC ENDOSCOPY: CPT

## 2023-03-30 RX ORDER — SODIUM CHLORIDE, SODIUM LACTATE, POTASSIUM CHLORIDE, CALCIUM CHLORIDE 600; 310; 30; 20 MG/100ML; MG/100ML; MG/100ML; MG/100ML
30 INJECTION, SOLUTION INTRAVENOUS CONTINUOUS PRN
Status: DISCONTINUED | OUTPATIENT
Start: 2023-03-30 | End: 2023-03-30 | Stop reason: HOSPADM

## 2023-03-30 RX ORDER — LIDOCAINE HYDROCHLORIDE 20 MG/ML
INJECTION, SOLUTION EPIDURAL; INFILTRATION; INTRACAUDAL; PERINEURAL AS NEEDED
Status: DISCONTINUED | OUTPATIENT
Start: 2023-03-30 | End: 2023-03-30 | Stop reason: SURG

## 2023-03-30 RX ORDER — PROPOFOL 10 MG/ML
VIAL (ML) INTRAVENOUS AS NEEDED
Status: DISCONTINUED | OUTPATIENT
Start: 2023-03-30 | End: 2023-03-30 | Stop reason: SURG

## 2023-03-30 RX ORDER — KETAMINE HCL IN NACL, ISO-OSM 100MG/10ML
SYRINGE (ML) INJECTION AS NEEDED
Status: DISCONTINUED | OUTPATIENT
Start: 2023-03-30 | End: 2023-03-30 | Stop reason: SURG

## 2023-03-30 RX ORDER — DEXAMETHASONE SODIUM PHOSPHATE 4 MG/ML
INJECTION, SOLUTION INTRA-ARTICULAR; INTRALESIONAL; INTRAMUSCULAR; INTRAVENOUS; SOFT TISSUE AS NEEDED
Status: DISCONTINUED | OUTPATIENT
Start: 2023-03-30 | End: 2023-03-30 | Stop reason: SURG

## 2023-03-30 RX ORDER — SODIUM CHLORIDE, SODIUM LACTATE, POTASSIUM CHLORIDE, CALCIUM CHLORIDE 600; 310; 30; 20 MG/100ML; MG/100ML; MG/100ML; MG/100ML
30 INJECTION, SOLUTION INTRAVENOUS CONTINUOUS
Status: DISCONTINUED | OUTPATIENT
Start: 2023-03-30 | End: 2023-03-30 | Stop reason: HOSPADM

## 2023-03-30 RX ORDER — ONDANSETRON 2 MG/ML
INJECTION INTRAMUSCULAR; INTRAVENOUS AS NEEDED
Status: DISCONTINUED | OUTPATIENT
Start: 2023-03-30 | End: 2023-03-30 | Stop reason: SURG

## 2023-03-30 RX ORDER — DEXMEDETOMIDINE HYDROCHLORIDE 100 UG/ML
INJECTION, SOLUTION INTRAVENOUS AS NEEDED
Status: DISCONTINUED | OUTPATIENT
Start: 2023-03-30 | End: 2023-03-30 | Stop reason: SURG

## 2023-03-30 RX ADMIN — LIDOCAINE HYDROCHLORIDE 100 MG: 20 INJECTION, SOLUTION EPIDURAL; INFILTRATION; INTRACAUDAL; PERINEURAL at 07:58

## 2023-03-30 RX ADMIN — SODIUM CHLORIDE, POTASSIUM CHLORIDE, SODIUM LACTATE AND CALCIUM CHLORIDE: 600; 310; 30; 20 INJECTION, SOLUTION INTRAVENOUS at 07:48

## 2023-03-30 RX ADMIN — DEXAMETHASONE SODIUM PHOSPHATE 4 MG: 4 INJECTION, SOLUTION INTRA-ARTICULAR; INTRALESIONAL; INTRAMUSCULAR; INTRAVENOUS; SOFT TISSUE at 08:05

## 2023-03-30 RX ADMIN — PROPOFOL 80 MG: 10 INJECTION, EMULSION INTRAVENOUS at 07:58

## 2023-03-30 RX ADMIN — PROPOFOL 250 MCG/KG/MIN: 10 INJECTION, EMULSION INTRAVENOUS at 07:58

## 2023-03-30 RX ADMIN — DEXMEDETOMIDINE HYDROCHLORIDE 12 MCG: 100 INJECTION, SOLUTION, CONCENTRATE INTRAVENOUS at 07:58

## 2023-03-30 RX ADMIN — SODIUM CHLORIDE, POTASSIUM CHLORIDE, SODIUM LACTATE AND CALCIUM CHLORIDE 30 ML/HR: 600; 310; 30; 20 INJECTION, SOLUTION INTRAVENOUS at 07:30

## 2023-03-30 RX ADMIN — DEXMEDETOMIDINE HYDROCHLORIDE 8 MCG: 100 INJECTION, SOLUTION, CONCENTRATE INTRAVENOUS at 08:03

## 2023-03-30 RX ADMIN — ONDANSETRON 4 MG: 2 INJECTION INTRAMUSCULAR; INTRAVENOUS at 08:05

## 2023-03-30 RX ADMIN — Medication 20 MG: at 08:09

## 2023-03-30 NOTE — ANESTHESIA POSTPROCEDURE EVALUATION
Patient: Devyn Steinberg    Procedure Summary     Date: 03/30/23 Room / Location: [facility] ENDOSCOPY 4 / [facility] ENDOSCOPY    Anesthesia Start: 0745 Anesthesia Stop: 0845    Procedure: ENDOSCOPIC RETROGRADE CHOLANGIOPANCREATOGRAPHY WITH STENT REMOVAL AND BALLOON SWEEP Diagnosis:       Encounter for removal of biliary stent      (Encounter for removal of biliary stent [Z46.89])    Surgeons: Rory Blas MD Provider: Kike Correa MD    Anesthesia Type: general ASA Status: 3          Anesthesia Type: general    Vitals  Vitals Value Taken Time   /90 03/30/23 0916   Temp 36.2 °C (97.1 °F) 03/30/23 0915   Pulse 70 03/30/23 0937   Resp 16 03/30/23 0915   SpO2 97 % 03/30/23 0937   Vitals shown include unvalidated device data.        Post Anesthesia Care and Evaluation    Patient location during evaluation: bedside  Patient participation: complete - patient participated  Level of consciousness: awake  Pain management: adequate    Airway patency: patent  PONV Status: none  Cardiovascular status: acceptable and stable  Respiratory status: acceptable  Hydration status: acceptable    Comments: An Anesthesiologist personally participated in the most demanding procedures (including induction and emergence if applicable) in the anesthesia plan, monitored the course of anesthesia administration at frequent intervals and remained physically present and available for immediate diagnosis and treatment of emergencies.

## 2023-03-30 NOTE — ANESTHESIA PREPROCEDURE EVALUATION
Anesthesia Evaluation     Patient summary reviewed and Nursing notes reviewed   no history of anesthetic complications:  NPO Solid Status: > 8 hours  NPO Liquid Status: > 2 hours           Airway   Mallampati: II  TM distance: >3 FB  Neck ROM: full  No difficulty expected  Dental    (+) poor dentition    Pulmonary - negative pulmonary ROS and normal exam    breath sounds clear to auscultation  Cardiovascular - negative cardio ROS and normal exam  Exercise tolerance: good (4-7 METS)    Rhythm: regular  Rate: normal        Neuro/Psych- negative ROS  GI/Hepatic/Renal/Endo    (+)  GERD well controlled,  diabetes mellitus,     Musculoskeletal (-) negative ROS    Abdominal    Substance History - negative use     OB/GYN negative ob/gyn ROS         Other - negative ROS       ROS/Med Hx Other: PAT Nursing Notes unavailable.                   Anesthesia Plan    ASA 3     general       Anesthetic plan, risks, benefits, and alternatives have been provided, discussed and informed consent has been obtained with: patient and child.        CODE STATUS:

## 2023-03-30 NOTE — H&P
Pre Procedure History & Physical    Chief Complaint:   Bile duct stone    Subjective     HPI:   History of common bile duct stone    Past Medical History:   Past Medical History:   Diagnosis Date   • Acid reflux    • Diabetes mellitus (HCC)    • Hyperlipidemia    • S/P laparoscopic cholecystectomy 3/7/2023       Past Surgical History:  Past Surgical History:   Procedure Laterality Date   • CHOLECYSTECTOMY N/A 3/4/2023    Procedure: CHOLECYSTECTOMY LAPAROSCOPIC;  Surgeon: Paul Hodge MD;  Location: [facility] MAIN OR;  Service: General;  Laterality: N/A;   • ERCP N/A 3/3/2023    Procedure: ENDOSCOPIC RETROGRADE CHOLANGIOPANCREATOGRAPHY WITH SPHINCTEROTOMY, BALLOON SWEEP AND STENT PLACEMENT;  Surgeon: Rory Blas MD;  Location: [facility] ENDOSCOPY;  Service: Gastroenterology;  Laterality: N/A;  BILE DUCT SLUDGE, Ascending cholangitis   • HERNIA REPAIR     • HERNIA REPAIR     • SHOULDER SURGERY     • UPPER GASTROINTESTINAL ENDOSCOPY         Family History:  Family History   Problem Relation Age of Onset   • Colon cancer Neg Hx    • Cancer Neg Hx        Social History:   reports that he has never smoked. He has been exposed to tobacco smoke. He has never used smokeless tobacco. He reports that he does not drink alcohol and does not use drugs.    Medications:   Medications Prior to Admission   Medication Sig Dispense Refill Last Dose   • esomeprazole (nexIUM) 20 MG capsule Take 1 capsule by mouth Every Morning Before Breakfast.   3/29/2023   • glimepiride (AMARYL) 1 MG tablet Take 1 tablet by mouth Daily.   3/29/2023   • lisinopril (PRINIVIL,ZESTRIL) 5 MG tablet    3/29/2023   • metFORMIN ER (GLUCOPHAGE-XR) 500 MG 24 hr tablet Take 1 tablet by mouth 2 (Two) Times a Day With Meals.   3/29/2023   • vitamin D (ERGOCALCIFEROL) 1.25 MG (05148 UT) capsule capsule    3/29/2023   • Insulin Lispro, 1 Unit Dial, (HUMALOG) 100 UNIT/ML solution pen-injector Inject  under the skin into the appropriate area as directed 1  (One) Time As Needed (BG over 180 mg/dl).   Unknown   • lisinopril (PRINIVIL,ZESTRIL) 2.5 MG tablet Take 2 tablets by mouth Daily. (Patient not taking: Reported on 3/23/2023)      • OneTouch Verio test strip USE AS DIRECTED TO TEST BLOOD SUGAR THREE TIMES DAILY      • rosuvastatin (CRESTOR) 10 MG tablet Take 1 tablet by mouth Daily.          Allergies:  Trulicity [dulaglutide] and Tyloxapol        Objective     Blood pressure 143/83, pulse 67, temperature 98.5 °F (36.9 °C), temperature source Temporal, resp. rate 18, weight 72.4 kg (159 lb 9.8 oz), SpO2 99 %.    Physical Exam   Constitutional: Pt is oriented to person, place, and time and well-developed, well-nourished, and in no distress.   Mouth/Throat: Oropharynx is clear and moist.   Neck: Normal range of motion.   Cardiovascular: Normal rate, regular rhythm and normal heart sounds.    Pulmonary/Chest: Effort normal and breath sounds normal.   Abdominal: Soft. Nontender  Skin: Skin is warm and dry.   Psychiatric: Mood, memory, affect and judgment normal.     Assessment & Plan     Diagnosis:  Bile duct stone and stent    Anticipated Surgical Procedure:  ERCP    The risks, benefits, and alternatives of this procedure have been discussed with the patient or the responsible party- the patient understands and agrees to proceed.

## 2023-04-03 ENCOUNTER — TELEPHONE (OUTPATIENT)
Dept: GASTROENTEROLOGY | Facility: CLINIC | Age: 63
End: 2023-04-03
Payer: COMMERCIAL

## 2023-04-03 NOTE — TELEPHONE ENCOUNTER
I attempted to contact pt regarding r/s appt for 4/10/23 since Saba will be out of office. I was unable to contact pt and could not leave a VM due to no VM set up.

## 2023-04-05 ENCOUNTER — TELEPHONE (OUTPATIENT)
Dept: GASTROENTEROLOGY | Facility: CLINIC | Age: 63
End: 2023-04-05
Payer: COMMERCIAL

## 2023-05-02 ENCOUNTER — TELEPHONE (OUTPATIENT)
Dept: GASTROENTEROLOGY | Facility: CLINIC | Age: 63
End: 2023-05-02
Payer: COMMERCIAL

## 2023-05-02 NOTE — TELEPHONE ENCOUNTER
Received a message from [facility] that patient's CT requires a peer to peer. After further review with KINJAL Burnette, pt not due for CT scan until June. I have spoken with pt and notified him of this. Pt is aware of new appt date and time of CT. Saba also wanted pt to follow up with Dr. Pelletier. I have rescheduled follow up appt and made pt aware. I am mailing pt appt information for CT and follow up appt per pt request. Pt did not have any other questions.

## 2023-09-12 ENCOUNTER — OFFICE VISIT (OUTPATIENT)
Dept: ORTHOPEDIC SURGERY | Facility: CLINIC | Age: 63
End: 2023-09-12
Payer: OTHER MISCELLANEOUS

## 2023-09-12 ENCOUNTER — PREP FOR SURGERY (OUTPATIENT)
Dept: OTHER | Facility: HOSPITAL | Age: 63
End: 2023-09-12
Payer: COMMERCIAL

## 2023-09-12 VITALS
HEART RATE: 77 BPM | BODY MASS INDEX: 27.11 KG/M2 | OXYGEN SATURATION: 98 % | WEIGHT: 153 LBS | HEIGHT: 63 IN | DIASTOLIC BLOOD PRESSURE: 82 MMHG | SYSTOLIC BLOOD PRESSURE: 142 MMHG

## 2023-09-12 DIAGNOSIS — M75.112 NONTRAUMATIC INCOMPLETE TEAR OF LEFT ROTATOR CUFF: Primary | ICD-10-CM

## 2023-09-12 DIAGNOSIS — M25.512 LEFT SHOULDER PAIN, UNSPECIFIED CHRONICITY: ICD-10-CM

## 2023-09-12 RX ORDER — CEFAZOLIN SODIUM 2 G/100ML
2 INJECTION, SOLUTION INTRAVENOUS ONCE
OUTPATIENT
Start: 2023-09-12 | End: 2023-09-12

## 2023-09-12 RX ORDER — DICLOFENAC SODIUM 75 MG/1
TABLET, DELAYED RELEASE ORAL
COMMUNITY
Start: 2023-09-03

## 2023-09-12 RX ORDER — CEFAZOLIN SODIUM IN 0.9 % NACL 3 G/100 ML
3 INTRAVENOUS SOLUTION, PIGGYBACK (ML) INTRAVENOUS ONCE
OUTPATIENT
Start: 2023-09-12 | End: 2023-09-12

## 2023-09-12 NOTE — PROGRESS NOTES
"Chief Complaint  Pain and Initial Evaluation of the Left Shoulder     Subjective      Devyn Steinberg presents to [facility] for initial evaluation of the left shoulder. 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  He then reached the knot in the rope and tried to pull it down and he heard a pop.   He notes if he does to much the shoulder lets him know.  He has had X rays and MRI and here for treatment intervention. Prior to the injury at work he had no problems with his left shoulder.      Allergies   Allergen Reactions    Trulicity [Dulaglutide] Other (See Comments)     Pancreatitis    Tyloxapol Swelling        Social History     Socioeconomic History    Marital status:    Tobacco Use    Smoking status: Never     Passive exposure: Past    Smokeless tobacco: Never   Vaping Use    Vaping Use: Never used   Substance and Sexual Activity    Alcohol use: Never    Drug use: Never    Sexual activity: Defer        I reviewed the patient's chief complaint, history of present illness, review of systems, past medical history, surgical history, family history, social history, medications, and allergy list.     Review of Systems     Constitutional: Denies fevers, chills, weight loss  Cardiovascular: Denies chest pain, shortness of breath  Skin: Denies rashes, acute skin changes  Neurologic: Denies headache, loss of consciousness        Vital Signs:   /82   Pulse 77   Ht 160 cm (63\")   Wt 69.4 kg (153 lb)   SpO2 98%   BMI 27.10 kg/m²          Physical Exam  General: Alert. No acute distress    Ortho Exam        LEFT SHOULDER Forward flexion 60. Abduction 40. External rotation 10. Internal rotation side of hip. Negative Cross body adduction. Supraspinatus strength 2/5. Infraspinatus Strength 2/5. Infrared subscap 2/5. Negative Young. Negative Neer. Negative Apprehension. Negative Lift off. (Negative Obriens. Sensation intact to light touch, median, radial, ulnar nerve. Positive AIN, PIN, " ulnar nerve motor. Positive pulses. Negative Impingement signs. Good strength in triceps, biceps, deltoid, wrist extensors and wrist flexors.       Procedures      Imaging Results (Most Recent)       None             Result Review :     MRI 8/25/23.   PROCEDURE: MRI SHOULDER WO CONTRAST [id number]  REASON FOR EXAM: M25.512 pulling injury to left shoulder with persistent pain. Felt a pop. Injury 1 week ago.  COMPARISON: None  TECHNIQUE: Multiplanar multi-echo imaging of the left shoulder utilizing a dedicated protocol.  FINDINGS: Diffuse attenuation and abnormal signal within the supraspinatus and infraspinatus tendons with a suspected fullthickness or near full-thickness supraspinatus tendon tear estimated over 2 cm transverse and 2 cm AP. full-thickness retracted tear  of the cranial fibers of the subscapularis estimated 1.4 cm cephalocaudal by 2.9 cm transverse with the tendon retracted back to the  level of the glenoid. The biceps tendon is dislocated out of the bicipital groove. Abnormal globular appearance of the pre  insertional infraspinatus tendon likely representing a partial tear estimated up to 1.3 cm. New the muscle tendon junction there is an  extensive partial tear of the infraspinatus measuring up to 2.6 cm AP. Teres minor tendon appears intact. Mild to moderate fatty  atrophy of the infraspinatus.  Visualized labrum unremarkable. The biceps tendon is dislocated medially out of the bicipital groove and demonstrates diffuse  thickening and signal abnormality compatible with tendinosis. Mild AC joint arthropathy. Deltoid and extra-articular soft tissues  appear normal.  IMPRESSION: Extensive rotator cuff disease with a full-thickness tear of the cranial fibers of the subscapularis tendon as detailed  above. No normal appearing supraspinatus tendon identified and likely represents a full-thickness or near full-thickness tear  superimposed on tendinosis and possible acute injury. Additional extensive partial  tear of infraspinatus as detailed.  Medial dislocation of the biceps tendon out of the bicipital groove and into the anterior glenohumeral space. There is diffuse  tendinosis    XR Shoulder 2+ View Left    Result Date: 8/18/2023  Narrative: PROCEDURE: XR SHOULDER 2+ VW LEFT  COMPARISON: None  INDICATIONS: injury  FINDINGS:  There are AC joint hypertrophic changes.  There is a calcific density identified adjacent to the greater tuberosity that appears sclerotic with some sclerosis of the greater tuberosity in the at adjacent humerus.  This appears to be separate from the acromion and may represent an old avulsion fracture.  The space between the acromion and humeral head is narrowed.  There is mild irregularity of the articular surface of the glenoid and of the humeral head most likely degenerative in nature.  No acute fractures are identified.      Impression:   1. Calcific density near the greater tuberosity which probably represents an old avulsion injury of the rotator cuff and supraspinatus tendon.  There are chronic degenerative changes of the rotator cuff. 2. AC joint degenerative changes. 3. Mild glenohumeral degenerative changes.      Paul Lopez MD       Electronically Signed and Approved By: Paul Lopez MD on 8/18/2023 at 10:54                     Assessment and Plan     Diagnoses and all orders for this visit:    1. tear of left rotator cuff (Primary)    2. Left shoulder pain, unspecified chronicity        Discussed the treatment plan with the patient. I reviewed the X-rays that were obtained 8/18/23 with the patient.     Discussed the treatment options with the patient, operative vs non-operative.     The patient expressed understanding and wished to proceed with a left shoulder arthroscopy with rotator cuff repair, subacromial decompression, distal clavicle excision       Discussed surgery., Risks/benefits discussed with patient including, but not limited to: infection, bleeding, neurovascular  damage, malunion, nonunion, aesthetic deformity, need for further surgery, and death., Surgery pamphlet given., and Call or return if worsening symptoms.    Follow Up     2 weeks postoperatively       Patient was given instructions and counseling regarding his condition or for health maintenance advice. Please see specific information pulled into the AVS if appropriate.     Scribed for Erickson Hernandez MD by Arelis Viramontes MA.  09/12/23   08:10 EDT    I have personally performed the services described in this document as scribed by the above individual and it is both accurate and complete. Erikcson Hernandez MD 09/13/23

## 2023-09-25 RX ORDER — ROSUVASTATIN CALCIUM 10 MG/1
10 TABLET, COATED ORAL NIGHTLY
COMMUNITY

## 2023-09-25 NOTE — PRE-PROCEDURE INSTRUCTIONS
IMPORTANT INSTRUCTIONS - PRE-ADMISSION TESTING  DO NOT EAT OR CHEW anything after midnight the night before your procedure.    You may have CLEAR liquids up to 2 hours prior to ARRIVAL time.   Take the following medications the morning of your procedure with JUST A SIP OF WATER: NEXIUM    DO NOT BRING your medications to the hospital with you, UNLESS something has changed since your PRE-Admission Testing appointment.  Hold all vitamins, supplements, and NSAIDS (Non- steroidal anti-inflammatory meds) for one week prior to surgery (you MAY take Tylenol or Acetaminophen).  If you are diabetic, check your blood sugar the morning of your procedure. If it is less than 70 or if you are feeling symptomatic, call the following number for further instructions: [phone]  Use your inhalers/nebulizers as usual, the morning of your procedure. BRING YOUR INHALERS with you.   Bring your CPAP or BIPAP to hospital, ONLY IF YOU WILL BE SPENDING THE NIGHT.   Make sure you have a ride home and have someone who will stay with you the day of your procedure after you go home.  If you have any questions, please call your Pre-Admission Testing NurseLEMUEL at [phone]_.   Per anesthesia request, do not smoke for 24 hours before your procedure or as instructed by your surgeon.  Clear Liquid Diet        Find out when you need to start a clear liquid diet.   Think of “clear liquids” as anything you could read a newspaper through. This includes things like water, broth, sports drinks, or tea WITHOUT any kind of milk or cream.           Once you are told to start a clear liquid diet, only drink these things until 2 hours before arrival to the hospital or when the hospital says to stop. Total volume limitation: 8 oz.       Clear liquids you CAN drink:   Water   Clear broth: beef, chicken, vegetable, or bone broth with nothing in it   Gatorade   Lemonade or Aba-aid   Soda   Tea, coffee (NO cream or honey)   Jell-O (without fruit)    Popsicles (without fruit or cream)   Italian ices   Juice without pulp: apple, white, grape   You may use salt, pepper, and sugar    Do NOT drink:   Milk or cream   Soy milk, almond milk, coconut milk, or other non-dairy drinks and   creamers   Milkshakes or smoothies   Tomato juice   Orange juice   Grapefruit juice   Cream soups or any other than broth         Clear Liquid Diet:  Do NOT eat any solid food.  Do NOT eat or suck on mints or candy.  Do NOT chew gum.  Do NOT drink thick liquids like milk or juice with pulp in it.  Do NOT add milk, cream, or anything like soy milk or almond milk to coffee or tea.

## 2023-09-27 ENCOUNTER — ANESTHESIA EVENT (OUTPATIENT)
Dept: PERIOP | Facility: HOSPITAL | Age: 63
End: 2023-09-27
Payer: OTHER MISCELLANEOUS

## 2023-09-27 ENCOUNTER — ANESTHESIA (OUTPATIENT)
Dept: PERIOP | Facility: HOSPITAL | Age: 63
End: 2023-09-27
Payer: OTHER MISCELLANEOUS

## 2023-09-27 ENCOUNTER — ANESTHESIA EVENT CONVERTED (OUTPATIENT)
Dept: ANESTHESIOLOGY | Facility: HOSPITAL | Age: 63
End: 2023-09-27
Payer: OTHER MISCELLANEOUS

## 2023-09-27 ENCOUNTER — HOSPITAL ENCOUNTER (OUTPATIENT)
Facility: HOSPITAL | Age: 63
Setting detail: HOSPITAL OUTPATIENT SURGERY
Discharge: HOME | End: 2023-09-27
Attending: ORTHOPAEDIC SURGERY | Admitting: ORTHOPAEDIC SURGERY
Payer: OTHER MISCELLANEOUS

## 2023-09-27 VITALS
DIASTOLIC BLOOD PRESSURE: 74 MMHG | SYSTOLIC BLOOD PRESSURE: 125 MMHG | WEIGHT: 155.42 LBS | RESPIRATION RATE: 14 BRPM | TEMPERATURE: 97 F | OXYGEN SATURATION: 98 % | HEIGHT: 63 IN | BODY MASS INDEX: 27.54 KG/M2 | HEART RATE: 67 BPM

## 2023-09-27 DIAGNOSIS — M75.112 NONTRAUMATIC INCOMPLETE TEAR OF LEFT ROTATOR CUFF: ICD-10-CM

## 2023-09-27 LAB — GLUCOSE BLDC GLUCOMTR-MCNC: 154 MG/DL (ref 70–99)

## 2023-09-27 PROCEDURE — 25010000002 DEXAMETHASONE PER 1 MG: Performed by: ANESTHESIOLOGY

## 2023-09-27 PROCEDURE — 25010000002 PROPOFOL 10 MG/ML EMULSION: Performed by: NURSE ANESTHETIST, CERTIFIED REGISTERED

## 2023-09-27 PROCEDURE — L3670 SO ACRO/CLAV CAN WEB PRE OTS: HCPCS | Performed by: ORTHOPAEDIC SURGERY

## 2023-09-27 PROCEDURE — 25010000002 MIDAZOLAM PER 1MG: Performed by: ANESTHESIOLOGY

## 2023-09-27 PROCEDURE — 63710000001 PROMETHAZINE PER 12.5 MG: Performed by: NURSE ANESTHETIST, CERTIFIED REGISTERED

## 2023-09-27 PROCEDURE — 25010000002 CEFAZOLIN IN DEXTROSE 2000 MG/ 100 ML SOLUTION: Performed by: ORTHOPAEDIC SURGERY

## 2023-09-27 PROCEDURE — 25010000002 ONDANSETRON PER 1 MG: Performed by: NURSE ANESTHETIST, CERTIFIED REGISTERED

## 2023-09-27 PROCEDURE — 25010000002 KETOROLAC TROMETHAMINE PER 15 MG: Performed by: NURSE ANESTHETIST, CERTIFIED REGISTERED

## 2023-09-27 PROCEDURE — 25010000002 EPINEPHRINE 1 MG/ML SOLUTION: Performed by: ANESTHESIOLOGY

## 2023-09-27 PROCEDURE — 25010000002 SUGAMMADEX 200 MG/2ML SOLUTION: Performed by: NURSE ANESTHETIST, CERTIFIED REGISTERED

## 2023-09-27 PROCEDURE — 25010000002 FENTANYL CITRATE (PF) 50 MCG/ML SOLUTION: Performed by: NURSE ANESTHETIST, CERTIFIED REGISTERED

## 2023-09-27 PROCEDURE — 23130 ACROMP/ACROMIONECTOMY PRTL: CPT | Performed by: ORTHOPAEDIC SURGERY

## 2023-09-27 PROCEDURE — 82948 REAGENT STRIP/BLOOD GLUCOSE: CPT

## 2023-09-27 PROCEDURE — 25010000002 ROPIVACAINE PER 1 MG: Performed by: ANESTHESIOLOGY

## 2023-09-27 DEVICE — SUT FW 2/0 38IN 1BLU 1BLK/WHT  AR7201: Type: IMPLANTABLE DEVICE | Site: SHOULDER | Status: FUNCTIONAL

## 2023-09-27 RX ORDER — PROMETHAZINE HYDROCHLORIDE 25 MG/1
25 SUPPOSITORY RECTAL ONCE AS NEEDED
Status: COMPLETED | OUTPATIENT
Start: 2023-09-27 | End: 2023-09-27

## 2023-09-27 RX ORDER — LIDOCAINE HYDROCHLORIDE 20 MG/ML
INJECTION, SOLUTION EPIDURAL; INFILTRATION; INTRACAUDAL; PERINEURAL AS NEEDED
Status: DISCONTINUED | OUTPATIENT
Start: 2023-09-27 | End: 2023-09-27 | Stop reason: SURG

## 2023-09-27 RX ORDER — MIDAZOLAM HYDROCHLORIDE 2 MG/2ML
2 INJECTION, SOLUTION INTRAMUSCULAR; INTRAVENOUS ONCE
Status: COMPLETED | OUTPATIENT
Start: 2023-09-27 | End: 2023-09-27

## 2023-09-27 RX ORDER — KETOROLAC TROMETHAMINE 30 MG/ML
INJECTION, SOLUTION INTRAMUSCULAR; INTRAVENOUS AS NEEDED
Status: DISCONTINUED | OUTPATIENT
Start: 2023-09-27 | End: 2023-09-27 | Stop reason: SURG

## 2023-09-27 RX ORDER — HYDROCODONE BITARTRATE AND ACETAMINOPHEN 7.5; 325 MG/1; MG/1
1 TABLET ORAL ONCE AS NEEDED
Status: DISCONTINUED | OUTPATIENT
Start: 2023-09-27 | End: 2023-09-27 | Stop reason: HOSPADM

## 2023-09-27 RX ORDER — DEXAMETHASONE SODIUM PHOSPHATE 4 MG/ML
INJECTION, SOLUTION INTRA-ARTICULAR; INTRALESIONAL; INTRAMUSCULAR; INTRAVENOUS; SOFT TISSUE AS NEEDED
Status: DISCONTINUED | OUTPATIENT
Start: 2023-09-27 | End: 2023-09-27 | Stop reason: SURG

## 2023-09-27 RX ORDER — PROMETHAZINE HYDROCHLORIDE 12.5 MG/1
25 TABLET ORAL ONCE AS NEEDED
Status: COMPLETED | OUTPATIENT
Start: 2023-09-27 | End: 2023-09-27

## 2023-09-27 RX ORDER — ROPIVACAINE HYDROCHLORIDE 5 MG/ML
INJECTION, SOLUTION EPIDURAL; INFILTRATION; PERINEURAL
Status: COMPLETED | OUTPATIENT
Start: 2023-09-27 | End: 2023-09-27

## 2023-09-27 RX ORDER — ONDANSETRON 2 MG/ML
4 INJECTION INTRAMUSCULAR; INTRAVENOUS ONCE AS NEEDED
Status: DISCONTINUED | OUTPATIENT
Start: 2023-09-27 | End: 2023-09-27 | Stop reason: HOSPADM

## 2023-09-27 RX ORDER — MAGNESIUM HYDROXIDE 1200 MG/15ML
LIQUID ORAL AS NEEDED
Status: DISCONTINUED | OUTPATIENT
Start: 2023-09-27 | End: 2023-09-27 | Stop reason: HOSPADM

## 2023-09-27 RX ORDER — ACETAMINOPHEN 500 MG
1000 TABLET ORAL ONCE
Status: COMPLETED | OUTPATIENT
Start: 2023-09-27 | End: 2023-09-27

## 2023-09-27 RX ORDER — FENTANYL CITRATE 50 UG/ML
INJECTION, SOLUTION INTRAMUSCULAR; INTRAVENOUS AS NEEDED
Status: DISCONTINUED | OUTPATIENT
Start: 2023-09-27 | End: 2023-09-27 | Stop reason: SURG

## 2023-09-27 RX ORDER — ONDANSETRON 2 MG/ML
INJECTION INTRAMUSCULAR; INTRAVENOUS AS NEEDED
Status: DISCONTINUED | OUTPATIENT
Start: 2023-09-27 | End: 2023-09-27 | Stop reason: SURG

## 2023-09-27 RX ORDER — HYDROCODONE BITARTRATE AND ACETAMINOPHEN 7.5; 325 MG/1; MG/1
1-2 TABLET ORAL EVERY 4 HOURS PRN
Qty: 40 TABLET | Refills: 0 | Status: SHIPPED | OUTPATIENT
Start: 2023-09-27 | End: 2023-10-03 | Stop reason: ALTCHOICE

## 2023-09-27 RX ORDER — MEPERIDINE HYDROCHLORIDE 25 MG/ML
12.5 INJECTION INTRAMUSCULAR; INTRAVENOUS; SUBCUTANEOUS
Status: DISCONTINUED | OUTPATIENT
Start: 2023-09-27 | End: 2023-09-27 | Stop reason: HOSPADM

## 2023-09-27 RX ORDER — CEFAZOLIN SODIUM IN 0.9 % NACL 3 G/100 ML
3 INTRAVENOUS SOLUTION, PIGGYBACK (ML) INTRAVENOUS ONCE
Status: DISCONTINUED | OUTPATIENT
Start: 2023-09-27 | End: 2023-09-27

## 2023-09-27 RX ORDER — PROPOFOL 10 MG/ML
VIAL (ML) INTRAVENOUS AS NEEDED
Status: DISCONTINUED | OUTPATIENT
Start: 2023-09-27 | End: 2023-09-27 | Stop reason: SURG

## 2023-09-27 RX ORDER — SODIUM CHLORIDE, SODIUM LACTATE, POTASSIUM CHLORIDE, CALCIUM CHLORIDE 600; 310; 30; 20 MG/100ML; MG/100ML; MG/100ML; MG/100ML
9 INJECTION, SOLUTION INTRAVENOUS CONTINUOUS PRN
Status: DISCONTINUED | OUTPATIENT
Start: 2023-09-27 | End: 2023-09-27 | Stop reason: HOSPADM

## 2023-09-27 RX ORDER — OXYCODONE HYDROCHLORIDE 5 MG/1
5 TABLET ORAL
Status: DISCONTINUED | OUTPATIENT
Start: 2023-09-27 | End: 2023-09-27 | Stop reason: HOSPADM

## 2023-09-27 RX ORDER — DEXAMETHASONE SODIUM PHOSPHATE 4 MG/ML
INJECTION, SOLUTION INTRA-ARTICULAR; INTRALESIONAL; INTRAMUSCULAR; INTRAVENOUS; SOFT TISSUE
Status: COMPLETED | OUTPATIENT
Start: 2023-09-27 | End: 2023-09-27

## 2023-09-27 RX ORDER — CEFAZOLIN SODIUM 2 G/100ML
2 INJECTION, SOLUTION INTRAVENOUS ONCE
Status: COMPLETED | OUTPATIENT
Start: 2023-09-27 | End: 2023-09-27

## 2023-09-27 RX ORDER — ROCURONIUM BROMIDE 10 MG/ML
INJECTION, SOLUTION INTRAVENOUS AS NEEDED
Status: DISCONTINUED | OUTPATIENT
Start: 2023-09-27 | End: 2023-09-27 | Stop reason: SURG

## 2023-09-27 RX ADMIN — PROPOFOL 180 MG: 10 INJECTION, EMULSION INTRAVENOUS at 10:32

## 2023-09-27 RX ADMIN — ACETAMINOPHEN 1000 MG: 500 TABLET ORAL at 08:38

## 2023-09-27 RX ADMIN — ROCURONIUM BROMIDE 50 MG: 50 INJECTION INTRAVENOUS at 10:32

## 2023-09-27 RX ADMIN — FENTANYL CITRATE 50 MCG: 50 INJECTION, SOLUTION INTRAMUSCULAR; INTRAVENOUS at 10:55

## 2023-09-27 RX ADMIN — SODIUM CHLORIDE, POTASSIUM CHLORIDE, SODIUM LACTATE AND CALCIUM CHLORIDE: 600; 310; 30; 20 INJECTION, SOLUTION INTRAVENOUS at 11:18

## 2023-09-27 RX ADMIN — LIDOCAINE HYDROCHLORIDE 100 MG: 20 INJECTION, SOLUTION EPIDURAL; INFILTRATION; INTRACAUDAL; PERINEURAL at 10:32

## 2023-09-27 RX ADMIN — ROPIVACAINE HYDROCHLORIDE 30 ML: 5 INJECTION EPIDURAL; INFILTRATION; PERINEURAL at 09:40

## 2023-09-27 RX ADMIN — CEFAZOLIN SODIUM 2 G: 2 INJECTION, SOLUTION INTRAVENOUS at 10:27

## 2023-09-27 RX ADMIN — EPINEPHRINE 0.15 MG: 1 INJECTION INTRAMUSCULAR; INTRAVENOUS; SUBCUTANEOUS at 09:40

## 2023-09-27 RX ADMIN — SODIUM CHLORIDE, POTASSIUM CHLORIDE, SODIUM LACTATE AND CALCIUM CHLORIDE 9 ML/HR: 600; 310; 30; 20 INJECTION, SOLUTION INTRAVENOUS at 08:32

## 2023-09-27 RX ADMIN — PROMETHAZINE HYDROCHLORIDE 25 MG: 12.5 TABLET ORAL at 13:55

## 2023-09-27 RX ADMIN — ONDANSETRON 4 MG: 2 INJECTION INTRAMUSCULAR; INTRAVENOUS at 10:41

## 2023-09-27 RX ADMIN — DEXAMETHASONE SODIUM PHOSPHATE 4 MG: 4 INJECTION, SOLUTION INTRA-ARTICULAR; INTRALESIONAL; INTRAMUSCULAR; INTRAVENOUS; SOFT TISSUE at 10:41

## 2023-09-27 RX ADMIN — SUGAMMADEX 200 MG: 100 INJECTION, SOLUTION INTRAVENOUS at 11:56

## 2023-09-27 RX ADMIN — KETOROLAC TROMETHAMINE 30 MG: 30 INJECTION, SOLUTION INTRAMUSCULAR; INTRAVENOUS at 11:42

## 2023-09-27 RX ADMIN — FENTANYL CITRATE 50 MCG: 50 INJECTION, SOLUTION INTRAMUSCULAR; INTRAVENOUS at 10:32

## 2023-09-27 RX ADMIN — MIDAZOLAM HYDROCHLORIDE 2 MG: 1 INJECTION, SOLUTION INTRAMUSCULAR; INTRAVENOUS at 09:39

## 2023-09-27 RX ADMIN — DEXAMETHASONE SODIUM PHOSPHATE 4 MG: 4 INJECTION, SOLUTION INTRAMUSCULAR; INTRAVENOUS at 09:40

## 2023-09-27 NOTE — ANESTHESIA PREPROCEDURE EVALUATION
Anesthesia Evaluation     Patient summary reviewed and Nursing notes reviewed   no history of anesthetic complications:   NPO Solid Status: > 8 hours  NPO Liquid Status: > 2 hours           Airway   Mallampati: II  TM distance: >3 FB  Neck ROM: full  No difficulty expected  Dental      Pulmonary - negative pulmonary ROS and normal exam    breath sounds clear to auscultation  Cardiovascular - normal exam  Exercise tolerance: good (4-7 METS)    Rhythm: regular  Rate: normal    (+) hyperlipidemia      Neuro/Psych- negative ROS  GI/Hepatic/Renal/Endo    (+) diabetes mellitus    Musculoskeletal     Abdominal    Substance History      OB/GYN          Other        ROS/Med Hx Other: PAT Nursing Notes unavailable.                 Anesthesia Plan    ASA 2     general with block     (Patient understands anesthesia not responsible for dental damage. Regional anesthesia options discussed with patient. Pt accepts regional block.)  intravenous induction     Anesthetic plan, risks, benefits, and alternatives have been provided, discussed and informed consent has been obtained with: patient.    Plan discussed with CRNA.    CODE STATUS:

## 2023-09-27 NOTE — DISCHARGE INSTRUCTIONS
DISCHARGE INSTRUCTIONS  Post-Operative  Arthroscopic Shoulder Surgery      For your surgery you had:  General anesthesia (you may have a sore throat for the first 24 hours)      Local anesthesia    You may experience dizziness, drowsiness, or light-headedness for several hours following surgery/procedure.  Do not stay alone today or tonight.  Limit your activity for 24 hours.  Resume your diet slowly.  Follow whatever special dietary instructions you may have been given by your doctor.  You should not drive or operate machinery or drink alcohol for 24 hours or while you are taking pain medication.  You should not sign legally binding documents.  Post-Operative Day #1 is counted as the 1st day after surgery.  [x] If you had a regional block, you will not have control of your arm for up to 12 hours.  Protect the arm with a sling or follow your physician's specific instructions.  Post-Operative Day #3 saturday  Remove your dressing.  Under the dressing you will either have sutures or staples.  Change dressing once or twice daily.  Place a dry dressing or band-aids over the small incisions.  Prior to dressing change, wash your hands.  Post-Operative Day #3 saturday  You may shower.  During the shower, you may let the water hit the incision and roll down but do not scrub or place any soap on the incision.  Do not soak it.  Pat it dry and do not put any ointments on the incision unless directed by surgeon. Then replace the dry dressing.    General Instructions  [x] Use ice pack to shoulder for 72 hours.  This can help with reducing swelling and pain relief.    Never place ice directly on skin.  Avoid getting dressing wet. Change out gel ice packs every 4 hours    Keep arm elevated with sling to decrease swelling and minimize throbbing pain.  The sling will provide support for your shoulder.  It is important to remove the sling 3-5 times daily to work on range-of-motion of the elbow, wrist and hand.  You will receive a  prescription for physical therapy, unless otherwise instructed by physician.  After most shoulder surgeries, it is permissible to start exercises by slowing trying to crawl your fingers up a wall until your arm is parallel to the floor.  While doing this support the affected arm at the elbow or closer to the shoulder.  You may also lean over and let the arm and hand do small or large circles or write the alphabet with your hanging arm to keep it loose.  It is normal to have some pain with these gentle exercises.  The exercises help reduce swelling and pain overall and help to avoid a stiff shoulder post-operatively.  It is okay to come out of the sling for showering or for certain activities of daily living but avoid any lifting or carrying with the affected arm until instructed by the physician especially if you have had a repair of the rotator cuff or some type of reconstructive procedure.  It is okay to come out of the sling and support the arm with a pillow if you remain sedentary.  In general, sling use is preferred following a repair or reconstruction for 4 weeks.  If you have had a SAD (sub acromial decompression), continue sling use more liberally because there was not a repair or reconstruction that could be harmed.          DISCHARGE INSTRUCTIONS  Post-Operative   Arthroscopic Shoulder Surgery      Exercise fingers for 10 minutes every hour while awake.  The physician will typically inform the family and the patient whether or not a repair or reconstruction could be harmed.  If you have had a rotator cuff repair or reconstructive procedure, do not let the arm drop down suddenly from an elevated position down to your side despite improvements made in pain and over the 3 months following repair and reconstruction.  It generally takes 8-12 weeks before the repair or reconstruction does not rely on sutures and anchors that are placed for the repair.  You are generally given a prescription for a narcotic  medication.  Take as prescribed.  You may use over-the-counter anti-inflammatory medications such as Motrin or Aleve for additional pain or fever reduction if not allergic.  If you are taking the pain medication prescribed, do not take Tylenol too unless you consult with the pharmacist. The pain medications generally have Tylenol in them and too much Tylenol can be harmful.  Sometimes it is recommended to take an anti-inflammatory in between doses of prescription pain medication if additional pain relief is needed.  Consult with your physician or your pharmacist before taking over-the-counter pain medications/anti-inflammatories.  It is not uncommon to have a fever post-operatively especially in the first 24-48 hours.  Anti-inflammatories can be used for fever reduction also.  It is normal to have some redness or irritation around skin sutures or staples, however, if you have any expanding areas of redness with a persistent fever and increasing pain notify the physician as soon as possible.  The incidence of blood clots is low following arthroscopic procedures, however, if you notice any increasing pain or swelling in your calves or legs, contact the physician as soon as possible.   It is difficult to sleep in the customary fashion following a shoulder surgery.  It is usually necessary to sleep with the shoulder above the level of the heart such as in a recliner, couch or with the head of the bed elevated.  This should slowly improve over the weeks following shoulder surgery.  If unable to urinate 6 to 8 hours after surgery or urinating frequently in small amounts, notify the physician or go to the nearest Emergency Room.  SPECIAL INSTRUCTIONS:        NOTIFY YOUR PHYSICIAN IF YOU EXPERIENCE:  Numbness of fingers.  Inability to move fingers.  Extreme coldness, paleness or blue dis-coloration of fingers.  Any pain other than from the incision, such as swelling of the arm that blocks circulation of fingers.  Follow  verbal instructions from your doctor.  Medications per physician instructions as indicated on Discharge Medication Information Sheet.  You should see DrLuanne ______________________for follow-up care  on     Phone number: __________________________________  Missing your appointment/follow-up could lead to serious complications  If you have an emergency and cannot reach your doctor, go to an Emergency Room nearest your home.

## 2023-09-27 NOTE — ANESTHESIA PROCEDURE NOTES
Peripheral Block      Patient reassessed immediately prior to procedure    Patient location during procedure: pre-op  Start time: 9/27/2023 9:40 AM  Stop time: 9/27/2023 9:47 AM  Reason for block: at surgeon's request and post-op pain management  Performed by  Anesthesiologist: Av Fregoso MD  Preanesthetic Checklist  Completed: patient identified, IV checked, site marked, risks and benefits discussed, surgical consent, monitors and equipment checked, pre-op evaluation and timeout performed  Prep:  Pt Position: supine (HOB elevated)  Sterile barriers:cap, washed/disinfected hands, sterile barriers, gloves, mask, partial drape and alcohol skin prep  Prep: ChloraPrep  Patient monitoring: blood pressure monitoring, continuous pulse oximetry and EKG  Procedure    Sedation: yes    Guidance:ultrasound guided    ULTRASOUND INTERPRETATION.  Using ultrasound guidance a 22 G gauge needle was placed in close proximity to the brachial plexus nerve, at which point, under ultrasound guidance anesthetic was injected in the area of the nerve and spread of the anesthesia was seen on ultrasound in close proximity thereto.  There were no abnormalities seen on ultrasound; a digital image was taken; and the patient tolerated the procedure with no complications. Images:still images obtained, printed/placed on chart    Laterality:left  Block Type:interscalene  Injection Technique:single-shot  Needle Type:echogenic  Needle Gauge:22 G (2in)  Resistance on Injection: none    Medications Used: ropivacaine (NAROPIN) 0.5 % injection - Injection   30 mL - 9/27/2023 9:40:00 AM  dexamethasone (DECADRON) injection 4 mg/mL - Injection   4 mg - 9/27/2023 9:40:00 AM  EPINEPHrine (ADRENALIN) injection - Injection   0.15 mg - 9/27/2023 9:40:00 AM      Post Assessment  Injection Assessment: negative aspiration for heme, no paresthesia on injection and incremental injection  Patient Tolerance:comfortable throughout  block  Complications:no  Additional Notes  The block or continuous infusion is requested by the referring physician for management of postoperative pain, or pain related to a procedure. Ultrasound guidance (deemed medically necessary). Painless injection, pt was awake and conversant during the procedure without complications. Needle and surrounding structures visualized throughout procedure. No adverse reactions or complications seen during this period. Post-procedure image showed no signs of complication, and anatomy was consistent with an uncomplicated nerve blockade.

## 2023-09-27 NOTE — ANESTHESIA POSTPROCEDURE EVALUATION
Patient: Devyn Steinberg    Procedure Summary       Date: 09/27/23 Room / Location: [facility] OR  /  [facility]    Anesthesia Start: 1027 Anesthesia Stop: 1209    Procedure: LEFT SHOULDER ARTHROSCOPY WITH OPEN DEBRIDEMENT OF MASSIVE ROTATOR CUFF TEAR (Left: Shoulder) Diagnosis:       Nontraumatic incomplete tear of left rotator cuff      (Nontraumatic incomplete tear of left rotator cuff [M75.112])    Surgeons: Erickson Hernandez MD Provider: Av Fregoso MD    Anesthesia Type: general with block ASA Status: 2            Anesthesia Type: general with block    Vitals  Vitals Value Taken Time   /68 09/27/23 1225   Temp 36.1 °C (97 °F) 09/27/23 1209   Pulse 64 09/27/23 1229   Resp 14 09/27/23 1209   SpO2 95 % 09/27/23 1229   Vitals shown include unvalidated device data.        Post Anesthesia Care and Evaluation    Patient location during evaluation: bedside  Patient participation: complete - patient participated  Level of consciousness: awake  Pain score: 0  Pain management: adequate    Airway patency: patent  PONV Status: none  Cardiovascular status: acceptable and stable  Respiratory status: acceptable  Hydration status: acceptable    Comments: An Anesthesiologist personally participated in the most demanding procedures (including induction and emergence if applicable) in the anesthesia plan, monitored the course of anesthesia administration at frequent intervals and remained physically present and available for immediate diagnosis and treatment of emergencies.

## 2023-09-27 NOTE — OP NOTE
SHOULDER ARTHROSCOPY WITH ROTATOR CUFF REPAIR  Procedure Report    Patient Name:  Devyn Steinberg  YOB: 1960    Date of Surgery:  9/27/2023     Indications:  shoulder pain, failed conservative care    Pre-op Diagnosis:   Nontraumatic incomplete tear of left rotator cuff [M75.112]       Post-Op Diagnosis Codes:     * Nontraumatic incomplete tear of left rotator cuff [M75.112]    Procedure/CPT® Codes:      Procedure(s):  LEFT SHOULDER ARTHROSCOPY WITH OPEN DEBRIDEMENT OF MASSIVE ROTATOR CUFF TEAR    Staff:  Surgeon(s):  Erickson Hernandez MD    Assistant: Mikayla Enriquez    Anesthesia: General    Estimated Blood Loss: 50 mL    Implants:    Implant Name Type Inv. Item Serial No.  Lot No. LRB No. Used Action   SUT FW 2/0 38IN 1BLU 1BLK/WHT  [id number] - [id number] Implant SUT FW 2/0 38IN 1BLU 1BLK/WHT  [id number]  [id number] Left 1 Implanted       Specimen:          None        Findings: no full rtc tear, positive spurring/bursitis     Complications: none    Description of Procedure: The patient was brought to the operating room and had a preoperative antibiotic and preoperative block. The patient was placed in a modified beach chair position and was prepped and draped in sterile fashion. A posterior portal was made. The scope was inserted to the glenohumeral joint. There was no labral tear, no biceps tendon tear, and there was a massive full-thickness rotator cuff tear.  A mini open incision was made.  This is dissected down.  The deltoid was split retractors were placed bursal tissue was removed a rongeur was used to remove bone off the undersurface of the acromion.  Sutures were placed in the retracted rotator cuff that was massively torn.  And this could not be repaired.  Multiple attempts were made but this massive tear could not be repaired.  This was then thoroughly irrigated.  The deltoid was closed using #1 Vicryl.  Incision was closed using 2-0 Vicryl and 3-0 Monocryl.  Steri-Strips were  placed.  Portals closed using 3-0 nylon. Sterile dressing was applied followed by application of an ice pack and UltraSling. The patient was then extubated and taken to the recovery room in stable condition. No complications.    Assistant: Mikayla Enriquez  was responsible for performing the following activities: Retraction, Suction, Irrigation, Closing, and Placing Dressing and their skilled assistance was necessary for the success of this case.    Erickson Hernandez MD     Date: 9/27/2023  Time: 11:56 EDT

## 2023-10-02 ENCOUNTER — TELEPHONE (OUTPATIENT)
Dept: ORTHOPEDIC SURGERY | Facility: CLINIC | Age: 63
End: 2023-10-02
Payer: COMMERCIAL

## 2023-10-02 NOTE — TELEPHONE ENCOUNTER
PATIENT WAS CALLED AND I TRIED TO REASSURE PATIENT.  HE STATES HE WOULD FEEL BETTER IF HE COULD SEE DR PIÑA TOMORROW.  APPOINTMENT GIVEN

## 2023-10-02 NOTE — TELEPHONE ENCOUNTER
Hub staff attempted to follow warm transfer process and was unsuccessful     Caller: Devyn Steinberg    Relationship to patient: Self    Best call back number: [phone]    Patient is needing: PATIENT STATES THAT HE HAS DEVELOPED EXTENSIVE BRUISING ON HIS LEFT BICEP AND CHEST AREA SINCE HE HAD SURGERY ON 09/27. HE IS ASKING IF THIS IS NORMAL. PLEASE CALL HIM TO DISCUSS.

## 2023-10-03 ENCOUNTER — TREATMENT (OUTPATIENT)
Dept: PHYSICAL THERAPY | Facility: CLINIC | Age: 63
End: 2023-10-03
Payer: OTHER MISCELLANEOUS

## 2023-10-03 ENCOUNTER — OFFICE VISIT (OUTPATIENT)
Dept: ORTHOPEDIC SURGERY | Facility: CLINIC | Age: 63
End: 2023-10-03
Payer: OTHER MISCELLANEOUS

## 2023-10-03 VITALS
HEART RATE: 91 BPM | BODY MASS INDEX: 27.46 KG/M2 | DIASTOLIC BLOOD PRESSURE: 83 MMHG | SYSTOLIC BLOOD PRESSURE: 127 MMHG | WEIGHT: 155 LBS | HEIGHT: 63 IN | OXYGEN SATURATION: 97 %

## 2023-10-03 DIAGNOSIS — M25.512 LEFT SHOULDER PAIN, UNSPECIFIED CHRONICITY: Primary | ICD-10-CM

## 2023-10-03 DIAGNOSIS — M62.81 MUSCLE WEAKNESS OF LEFT UPPER EXTREMITY: ICD-10-CM

## 2023-10-03 DIAGNOSIS — Z98.890 S/P LEFT ROTATOR CUFF REPAIR: ICD-10-CM

## 2023-10-03 DIAGNOSIS — M25.512 ACUTE PAIN OF LEFT SHOULDER: ICD-10-CM

## 2023-10-03 DIAGNOSIS — M25.612 DECREASED ROM OF LEFT SHOULDER: ICD-10-CM

## 2023-10-03 DIAGNOSIS — M75.112 NONTRAUMATIC INCOMPLETE TEAR OF LEFT ROTATOR CUFF: ICD-10-CM

## 2023-10-03 DIAGNOSIS — Z98.890 STATUS POST SUBACROMIAL DECOMPRESSION: Primary | ICD-10-CM

## 2023-10-03 RX ORDER — TRAMADOL HYDROCHLORIDE 50 MG/1
50 TABLET ORAL EVERY 6 HOURS PRN
Qty: 28 TABLET | Refills: 0 | Status: SHIPPED | OUTPATIENT
Start: 2023-10-03

## 2023-10-03 NOTE — PROGRESS NOTES
"Chief Complaint  Follow-up and Pain of the Left Shoulder     Subjective      Devyn Steinberg presents to [facility] for follow up of the left shoulder.  He had a left shoulder arthroscopy with open debridement of massive rotator cuff tear 9/27/23.  He is here today with a sling on and states his arm is bruised up. He notes bruising on the upper arm and his shoulder. He was concerned. He starts therapy today.     Allergies   Allergen Reactions    Trulicity [Dulaglutide] Other (See Comments)     Pancreatitis    Tyloxapol Swelling        Social History     Socioeconomic History    Marital status:    Tobacco Use    Smoking status: Never     Passive exposure: Past    Smokeless tobacco: Never   Vaping Use    Vaping Use: Never used   Substance and Sexual Activity    Alcohol use: Never    Drug use: Never    Sexual activity: Defer        I reviewed the patient's chief complaint, history of present illness, review of systems, past medical history, surgical history, family history, social history, medications, and allergy list.     Review of Systems     Constitutional: Denies fevers, chills, weight loss  Cardiovascular: Denies chest pain, shortness of breath  Skin: Denies rashes, acute skin changes  Neurologic: Denies headache, loss of consciousness        Vital Signs:   /83   Pulse 91   Ht 160 cm (63\")   Wt 70.3 kg (155 lb)   SpO2 97%   BMI 27.46 kg/m²          Physical Exam  General: Alert. No acute distress    Ortho Exam        LEFT SHOULDER  He is in postoperative sling. Sensation intact to light touch, median, radial, ulnar nerve. Positive AIN, PIN, ulnar nerve motor. Full , thumb opposition, MCP flexors, DIP flexors and PIP flexors. Mild bruising and swelling.       Procedures      Imaging Results (Most Recent)       None             Result Review :           Assessment and Plan     Diagnoses and all orders for this visit:    1. Left shoulder pain, unspecified chronicity " (Primary)    2. S/P left rotator cuff repair        Discussed the treatment plan with the patient.     Start physical therapy.     He cannot take the hydro condone and requested a different pain medication. Prescribed Tramadol.       Call or return if worsening symptoms.    Follow Up     Continue appointment 10/12/23.       Patient was given instructions and counseling regarding his condition or for health maintenance advice. Please see specific information pulled into the AVS if appropriate.     Scribed for Erickson Hernandez MD by Arelis Viramontes MA.  10/03/23   13:01 EDT    I have personally performed the services described in this document as scribed by the above individual and it is both accurate and complete. Erickson Hernandez MD 10/04/23

## 2023-10-03 NOTE — PROGRESS NOTES
Physical Therapy Initial Evaluation and Plan of Care      [place] [facility]: [address] [place] [address]      Patient: Devyn Steinberg   : 1960  Diagnosis/ICD-10 Code:  Status post subacromial decompression [Z98.890]  Referring practitioner: Erickson Hernandez MD  Date of Initial Visit: 10/3/2023  Today's Date: 10/3/2023  Patient seen for 1 sessions           Subjective Questionnaire: QuickDASH: 44      Subjective   Pt reports to therapy s/p L RTC debridement and subacromial decompression on 2023. Pt reports their current pain is a 3/10. Pt reports if they move their shoulder in the wrong way, they can get up to a 10/10.     Pt is R handed    Pt occupation:      Current Pain: 3  Best Pain: 3  Worst Pain: 10  Quality of pain: hurts bad     Past Medical Hx: DM, medication for blood pressure. Hernia surgery.       Objective          Active Range of Motion     Right Shoulder   Flexion: 143 degrees   Abduction: 153 degrees   External rotation 0°: 32 degrees   External rotation BTH: T1   Internal rotation BTB: L1     Additional Active Range of Motion Details  L shoulder not tested at this time.     Passive Range of Motion   Left Shoulder   Flexion: 90 degrees with pain  Extension: with pain  Abduction: 90 degrees   External rotation 45°: 20 degrees with pain  Internal rotation 0°: 40 degrees     Strength/Myotome Testing     Right Shoulder     Planes of Motion   Flexion: 4   Abduction: 4+   External rotation at 0°: 4   Internal rotation at 0°: 4     Isolated Muscles   Biceps: 4+   Triceps: 4+     See Exercise, Manual, and Modality Logs for complete treatment.       Assessment & Plan       Assessment  Impairments: abnormal muscle firing, abnormal or restricted ROM, activity intolerance, impaired physical strength, lacks appropriate home exercise program and pain with function   Functional limitations: carrying objects, lifting, sleeping, pulling, pushing, uncomfortable because of pain,  moving in bed, reaching behind back, reaching overhead and unable to perform repetitive tasks   Assessment details: Pt reports to physical therapy s/p L RTC debridement w/ subacromial decompression. Pt presents w/ decreased ROM, increased pain, and weakness. Pt has increased perceived deficits described by Alen. Pt was educated on their HEP as well as use of ice for inflammation. Pt will benefit from skilled physical therapy, to address their current impairments and limitations, in order to improve upon their functional mobility in order to return to performing ADLs and work tasks safely and without restrictions.       Prognosis: good    Goals  Plan Goals: SHOULDER  PROBLEMS:     1. The patient has limited ROM of the L shoulder.    LTG 1: 12 weeks:  The patient will demonstrate 150 degrees of L shoulder flexion, 150 degrees of shoulder abduction, 70 degrees of shoulder external rotation, and 70 degrees of shoulder internal rotation to allow the patient to reach into upper kitchen cabinets and manipulate clothing behind the back with greater ease.    STATUS:  New   STG 1a: 6 weeks:  The patient will demonstrate 125 degrees of L shoulder flexion, 125 degrees of shoulder abduction, 60 degrees of shoulder external rotation, and 60 degrees of shoulder internal rotation.    STATUS:  New   TREATMENT: Manual therapy, therapeutic exercise, home exercise instruction, and modalities as needed to include: electrical stimulation, ultrasound, moist heat, and ice.    2. The patient has limited strength of the L shoulder.   LTG 2: 12 weeks:  The patient will demonstrate 4+/5 strength for L shoulder flexion, abduction, external rotation, and internal rotation in order to demonstrate improved shoulder stability.    STATUS:  New   STG 2a: 8 weeks:  The patient will demonstrate 3+/5 strength for L shoulder flexion, abduction, external rotation, and internal rotation.    STATUS:  New   STG2b:  2 weeks:  The patient will be  independent with home exercises.     STATUS:  New   TREATMENT: Manual therapy, therapeutic exercise, home exercise instruction, and modalities as needed to include: electrical stimulation, ultrasound, moist heat, and ice.     3. The patient complains of pain to the L shoulder.   LTG 3: 12 weeks:  The patient will report a pain rating of 1/10 or better in order to improve sleep quality and tolerance to performance of activities of daily living.    STATUS:  New   STG 3a: 8 weeks:  The patient will report a pain rating of 3/10 or better.     STATUS:  New   TREATMENT: Manual therapy, therapeutic exercise, home exercise instruction, and modalities as needed to include: electrical stimulation, ultrasound, moist heat, and ice.    4. Carrying, Moving, and Handling Objects Functional Limitation     LTG 4: 12 weeks:  The patient will demonstrate 9 % limitation by achieving a score of 15 on the QuickDASH.    STATUS:  New   STG 4a: 6 weeks:  The patient will demonstrate 26 % limitation by achieving a score of 25 on the QuickDASH.      STATUS:  New   TREATMENT:  Manual therapy, therapeutic exercise, home exercise instruction, and modalities as needed to include: moist heat, electrical stimulation, and ultrasound.         PLAN:  Therapy options: will receive skilled therapy services  Planned modality interventions: Cryotherapy  Planned therapy interventions:ADL retraining, soft tissue mobilization, strengthening, stretching, therapeutic activities, manual therapy, joint mobilization, home exercise program/patient education, functional ROM exercises, flexibility, body mechanics training, postural training, and neuromuscular re-education  Frequency: 3x per week  Duration in weeks: 12  Treatment plan discussed with: patient      Visit Diagnoses:    ICD-10-CM ICD-9-CM   1. Status post subacromial decompression  Z98.890 V45.89   2. Decreased ROM of left shoulder  M25.612 719.51   3. Muscle weakness of left upper extremity  M62.81  728.87   4. Acute pain of left shoulder  M25.512 719.41       History # of Personal Factors and/or Comorbidities: LOW (0)  Examination of Body System(s): # of elements: LOW (1-2)  Clinical Presentation: STABLE   Clinical Decision Making: LOW       Timed:         Manual Therapy:    15     mins  30488;     Therapeutic Exercise:    8     mins  56949;     Neuromuscular Adrian:    0    mins  96756;    Therapeutic Activity:     0     mins  39568;     Gait Trainin     mins  21305;     Ultrasound:     0     mins  49705;    Ionto                               0    mins   01215  Self Care                       0     mins   97688  Canalith Repos    0     mins 49445      Un-Timed:  Electrical Stimulation:    0     mins  96405 (MC );  Dry Needling     0     mins self-pay  Traction     0     mins 14163  Low Eval     15     Mins  66419  Mod Eval     0     Mins  16925  High Eval                       0     Mins  78215  Re-Eval                           0    mins  67309    Timed Treatment:    23  mins   Total Treatment:     38   mins    PT SIGNATURE: Daniele Benedict PT, DPT    Electronically signed 10/3/2023    [place] License: PT - [id number]    Initial Certification  Certification Period: 10/3/2023 thru 2023  I certify that the therapy services are furnished while this patient is under my care.  The services outlined above are required by this patient, and will be reviewed every 90 days.     PHYSICIAN: Erickson Hernandez MD  NPI: [id number]      DATE:     Please sign and return via fax to [phone]. Thank you, [facility].

## 2023-10-06 ENCOUNTER — TREATMENT (OUTPATIENT)
Dept: PHYSICAL THERAPY | Facility: CLINIC | Age: 63
End: 2023-10-06
Payer: OTHER MISCELLANEOUS

## 2023-10-06 DIAGNOSIS — M62.81 MUSCLE WEAKNESS OF LEFT UPPER EXTREMITY: ICD-10-CM

## 2023-10-06 DIAGNOSIS — M25.512 ACUTE PAIN OF LEFT SHOULDER: ICD-10-CM

## 2023-10-06 DIAGNOSIS — M25.612 DECREASED ROM OF LEFT SHOULDER: ICD-10-CM

## 2023-10-06 DIAGNOSIS — Z98.890 STATUS POST SUBACROMIAL DECOMPRESSION: Primary | ICD-10-CM

## 2023-10-06 NOTE — PROGRESS NOTES
Physical Therapy Daily Treatment Note      Patient: Devyn Steinberg   : 1960  Referring practitioner: Erickson Hernandez MD  Date of Initial Visit: Type: THERAPY  Noted: 10/3/2023  Today's Date: 10/6/2023  Patient seen for 2 sessions           Subjective Questionnaire:       Subjective Evaluation    History of Present Illness    Subjective comment: Pt reports 4/10 L shoulder pain.  Pt showed therapist his bruising and edema.     Objective   See Exercise, Manual, and Modality Logs for complete treatment.       Assessment & Plan       Assessment  Assessment details: Pt presents wearing sling on L shoulder.  Pt tolerated new strengthening and range of motion well.  Pt tolerated passive stretching jflexion and ABD to approximately 90-95 degrees and stated enough.  Pt was educated in wearing sling properly.  Pt states he wants to wear sling until MD visit on Tuesday.  Continue with POC.      Visit Diagnoses:    ICD-10-CM ICD-9-CM   1. Status post subacromial decompression  Z98.890 V45.89   2. Decreased ROM of left shoulder  M25.612 719.51   3. Muscle weakness of left upper extremity  M62.81 728.87   4. Acute pain of left shoulder  M25.512 719.41       Progress per Plan of Care and Progress strengthening /stabilization /functional activity           Timed:  Manual Therapy:    17     mins  99518;  Therapeutic Exercise:    12     mins  74032;     Neuromuscular Adrian:        mins  57924;    Therapeutic Activity:          mins  96641;     Gait Training:           mins  69338;     Ultrasound:          mins  47613;    Electrical Stimulation:         mins  40535 ( );  Aquatic Therapy          mins  28373    Untimed:  Electrical Stimulation:         mins  58620 ( );  Mechanical Traction:         mins  30285;     Timed Treatment:   29   mins   Total Treatment:     29   mins    Electronically signed    Odessa Ratliff PTA  Physical Therapist Assistant    [facility] license: [id number]

## 2023-10-10 ENCOUNTER — TREATMENT (OUTPATIENT)
Dept: PHYSICAL THERAPY | Facility: CLINIC | Age: 63
End: 2023-10-10
Payer: OTHER MISCELLANEOUS

## 2023-10-10 DIAGNOSIS — Z98.890 STATUS POST SUBACROMIAL DECOMPRESSION: Primary | ICD-10-CM

## 2023-10-10 DIAGNOSIS — M25.612 DECREASED ROM OF LEFT SHOULDER: ICD-10-CM

## 2023-10-10 DIAGNOSIS — M62.81 MUSCLE WEAKNESS OF LEFT UPPER EXTREMITY: ICD-10-CM

## 2023-10-10 DIAGNOSIS — M25.512 ACUTE PAIN OF LEFT SHOULDER: ICD-10-CM

## 2023-10-10 DIAGNOSIS — M75.112 NONTRAUMATIC INCOMPLETE TEAR OF LEFT ROTATOR CUFF: ICD-10-CM

## 2023-10-10 PROCEDURE — 97140 MANUAL THERAPY 1/> REGIONS: CPT

## 2023-10-10 PROCEDURE — 97110 THERAPEUTIC EXERCISES: CPT

## 2023-10-10 NOTE — PROGRESS NOTES
Physical Therapy Daily Treatment Note  [place] [facility]: [address] [facility] [address]      Patient: Devyn Steinberg   : 1960  Diagnosis/ICD-10 Code:  Status post subacromial decompression [Z98.890]  Referring practitioner: Erickson Hernandez MD  Date of Initial Visit: Type: THERAPY  Noted: 10/3/2023  Today's Date: 10/10/2023  Patient seen for 3 sessions           Subjective   The patient reported they have been controlling their pain in their L UE w/ aleve. Pt reports their current pain is a 3-4/10. Pt continues to need assistance for dressing completely.     Objective   See Exercise, Manual, and Modality Logs for complete treatment.     Assessment/Plan  Pt tolerates therapy session well today. Pt was educated on shoulder mechanics, anatomy, and their restrictions. Pt does have improvement in their L shoulder PROM today w/ manual therapy. Patient will continue to benefit from skilled physical therapy to further address their deficits in strength and ROM in order to improve upon their functional mobility and to meet their personal goals.          Timed:  Manual Therapy:    13     mins  03250;  Therapeutic Exercise:    23     mins  49017;     Neuromuscular Adrian:   0    mins  94780;    Therapeutic Activity:     0     mins  04678;     Gait Trainin     mins  88377;     Aquatics                         0      mins  73157    Un-timed:  Mechanical Traction      0     mins  17537  Electrical Stimulation:    0     mins  20022 ( );      Timed Treatment:   36   mins   Total Treatment:     40   mins    Daniele Benedict PT, DPT    Electronically signed 10/10/2023    [place] License: PT - [id number]

## 2023-10-12 ENCOUNTER — OFFICE VISIT (OUTPATIENT)
Dept: ORTHOPEDIC SURGERY | Facility: CLINIC | Age: 63
End: 2023-10-12
Payer: OTHER MISCELLANEOUS

## 2023-10-12 VITALS
HEART RATE: 76 BPM | SYSTOLIC BLOOD PRESSURE: 139 MMHG | HEIGHT: 63 IN | OXYGEN SATURATION: 97 % | DIASTOLIC BLOOD PRESSURE: 84 MMHG | WEIGHT: 155 LBS | BODY MASS INDEX: 27.46 KG/M2

## 2023-10-12 DIAGNOSIS — Z98.890 S/P LEFT ROTATOR CUFF REPAIR: Primary | ICD-10-CM

## 2023-10-12 DIAGNOSIS — M25.512 LEFT SHOULDER PAIN, UNSPECIFIED CHRONICITY: ICD-10-CM

## 2023-10-12 DIAGNOSIS — Z98.890 S/P LEFT ROTATOR CUFF REPAIR: ICD-10-CM

## 2023-10-12 RX ORDER — GLIMEPIRIDE 1 MG/1
1 TABLET ORAL DAILY
COMMUNITY
Start: 2023-10-05

## 2023-10-12 NOTE — PROGRESS NOTES
"Chief Complaint  Follow-up and Pain of the Left Shoulder and Suture / Staple Removal    Subjective      Devyn Steinberg presents to [facility] for follow up of his left shoulder. Patient is 2 weeks weeks postop left shoulder arthroscopy with open debridement of massive rotator cuff tear performed by Dr Hernandez on 9/27/2023. Today, 10/12/2023 he arrives without his sling. He states his shoulder is uncomfortable but better than it was. He is doing PT at [facility]. He had difficulty obtaining his pain medications previously and hasn't had any. He is requesting a refill of the pain medication at this time; he has been taking Aleve. He denies complications from incision site, he denies fever or chills.     Allergies   Allergen Reactions    Trulicity [Dulaglutide] Other (See Comments)     Pancreatitis    Tyloxapol Swelling       Objective     Vital Signs:   Vitals:    10/12/23 0812   BP: 139/84   Pulse: 76   SpO2: 97%   Weight: 70.3 kg (155 lb)   Height: 160 cm (63\")     Body mass index is 27.46 kg/mý.    I reviewed the patient's chief complaint, history of present illness, review of systems, past medical history, surgical history, family history, social history, medications, and allergy list.     REVIEW OF SYSTEMS    Constitutional: Denies fevers, chills, weight loss  Cardiovascular: Denies chest pain, shortness of breath  Skin: Denies rashes, acute skin changes  Neurologic: Denies headache, loss of consciousness  MSK: Left shoulder pain.     Ortho Exam  Shoulder Scope General: Alert, no acute distress  Left upper extremity: Arthroscopy portals are well healing without active drainage or redness noted.  Incision well-healing without concerning signs of infection. Improving bruising to the arm. Full elbow flexion and extension. Active wrist and finger range of motion. Thumb opposition intact. Palmar abduction of the thumb intact. Sensation intact to the axillary, median, radial, and ulnar nerve " distributions. Palpable radial pulse.            Assessment and Plan   Diagnoses and all orders for this visit:    1. S/P left rotator cuff repair (Primary)    2. Left shoulder pain, unspecified chronicity         Devyn Steinberg presents to clinic today for 2 week post op left shoulder fluoroscopy with open debridement of massive rotator cuff tear, performed by Dr. Hernandez on 9/27/2023.  Incisions are well-healing.  No active drainage or redness noted.  No concerning signs of infection.  Patient is doing well and denies fever or chills.  Incision site care was reviewed today. Patient instructed not to soak or submerge incision. Do not apply any lotions, creams, or ointments to the incision at this time.  We discussed concerning signs and symptoms regarding the incision site.    Continue to progress out of the sling. Discussed with patient the importance of gentle ROM, including pendulums. No lifting, pushing or pulling with the left arm unless directed by PT.     Follow up in 4 weeks.     Call the office if you have any questions or concerns.        Tobacco Use: Low Risk  (10/12/2023)    Patient History     Smoking Tobacco Use: Never     Smokeless Tobacco Use: Never     Passive Exposure: Past     Patient reports that they are a nonsmoker; cessation education not applicable.     Follow Up   No follow-ups on file.  There are no Patient Instructions on file for this visit.  Patient was given instructions and counseling regarding his condition or for health maintenance advice. Please see specific information pulled into the AVS if appropriate.

## 2023-10-13 ENCOUNTER — TREATMENT (OUTPATIENT)
Dept: PHYSICAL THERAPY | Facility: CLINIC | Age: 63
End: 2023-10-13
Payer: OTHER MISCELLANEOUS

## 2023-10-13 ENCOUNTER — TELEPHONE (OUTPATIENT)
Dept: ORTHOPEDIC SURGERY | Facility: CLINIC | Age: 63
End: 2023-10-13
Payer: COMMERCIAL

## 2023-10-13 DIAGNOSIS — Z98.890 STATUS POST SUBACROMIAL DECOMPRESSION: Primary | ICD-10-CM

## 2023-10-13 DIAGNOSIS — M25.612 DECREASED ROM OF LEFT SHOULDER: ICD-10-CM

## 2023-10-13 DIAGNOSIS — M25.512 ACUTE PAIN OF LEFT SHOULDER: ICD-10-CM

## 2023-10-13 DIAGNOSIS — M62.81 MUSCLE WEAKNESS OF LEFT UPPER EXTREMITY: ICD-10-CM

## 2023-10-13 RX ORDER — TRAMADOL HYDROCHLORIDE 50 MG/1
50 TABLET ORAL EVERY 6 HOURS PRN
Qty: 28 TABLET | Refills: 0 | Status: SHIPPED | OUTPATIENT
Start: 2023-10-13

## 2023-10-13 NOTE — PROGRESS NOTES
Physical Therapy Daily Treatment Note      Patient: Devyn Steinberg   : 1960  Referring practitioner: Erickson Hernandez MD  Date of Initial Visit: Type: THERAPY  Noted: 10/3/2023  Today's Date: 10/13/2023  Patient seen for 4 sessions           Subjective Questionnaire:       Subjective Evaluation    History of Present Illness    Subjective comment: Pt reports his L shoulder pain is 4 or 5/10 and has not taken his pain medicine this morning.       Objective   See Exercise, Manual, and Modality Logs for complete treatment.       Assessment & Plan       Assessment  Assessment details: Pt reports he saw MD and was told he will need a shoulder replacement.  Pt was put off of work for another month.  Pt reports his pain feels like a toothache.  Pt tolerated active assist and strengthening well.  Continue with POC.      Visit Diagnoses:    ICD-10-CM ICD-9-CM   1. Status post subacromial decompression  Z98.890 V45.89   2. Decreased ROM of left shoulder  M25.612 719.51   3. Muscle weakness of left upper extremity  M62.81 728.87   4. Acute pain of left shoulder  M25.512 719.41       Progress per Plan of Care and Progress strengthening /stabilization /functional activity           Timed:  Manual Therapy:    13     mins  77220;  Therapeutic Exercise:    10     mins  61550;     Neuromuscular Adrian:        mins  74697;    Therapeutic Activity:     8     mins  27994;     Gait Training:           mins  28717;     Ultrasound:          mins  09237;    Electrical Stimulation:         mins  97770 ( );  Aquatic Therapy          mins  14926    Untimed:  Electrical Stimulation:         mins  75326 ( );  Mechanical Traction:         mins  51969;     Timed Treatment:   31   mins   Total Treatment:     31   mins    Electronically signed    Odessa Ratliff PTA  Physical Therapist Assistant    [employer] license: [id number]

## 2023-10-13 NOTE — TELEPHONE ENCOUNTER
[employer]  JOHN CALLED AND IS REQUESTING LAST OFFICE VISIT AND WORK NOTE STATUS FAXED TO HIM WHEN AVAILABLE.     FAX # [phone].

## 2023-10-16 ENCOUNTER — TREATMENT (OUTPATIENT)
Dept: PHYSICAL THERAPY | Facility: CLINIC | Age: 63
End: 2023-10-16
Payer: OTHER MISCELLANEOUS

## 2023-10-16 DIAGNOSIS — M75.112 NONTRAUMATIC INCOMPLETE TEAR OF LEFT ROTATOR CUFF: ICD-10-CM

## 2023-10-16 DIAGNOSIS — M25.612 DECREASED ROM OF LEFT SHOULDER: ICD-10-CM

## 2023-10-16 DIAGNOSIS — Z98.890 STATUS POST SUBACROMIAL DECOMPRESSION: Primary | ICD-10-CM

## 2023-10-16 DIAGNOSIS — M62.81 MUSCLE WEAKNESS OF LEFT UPPER EXTREMITY: ICD-10-CM

## 2023-10-16 DIAGNOSIS — M25.512 ACUTE PAIN OF LEFT SHOULDER: ICD-10-CM

## 2023-10-16 NOTE — PROGRESS NOTES
Physical Therapy Daily Treatment Note      Patient: Devyn Steinberg   : 1960  Referring practitioner: Erickson Hernandez MD  Date of Initial Visit: Type: THERAPY  Noted: 10/3/2023  Today's Date: 10/16/2023  Patient seen for 5 sessions           Subjective Questionnaire:       Subjective Evaluation    History of Present Illness    Subjective comment: Pt reports 3/10 L shoulder pain.       Objective   See Exercise, Manual, and Modality Logs for complete treatment.       Assessment & Plan       Assessment  Assessment details: Pt tolerated active assist range of motion and strengthening well.  Pt tolerated shoulder range of motion with report of increased pain to 5-6/10. and elbow range of motion well.  Pt is progressing and will benefit from continued PT.          Visit Diagnoses:    ICD-10-CM ICD-9-CM   1. Status post subacromial decompression  Z98.890 V45.89   2. Decreased ROM of left shoulder  M25.612 719.51   3. Muscle weakness of left upper extremity  M62.81 728.87   4. Acute pain of left shoulder  M25.512 719.41   5. Nontraumatic incomplete tear of left rotator cuff  M75.112 726.13       Progress per Plan of Care and Progress strengthening /stabilization /functional activity           Timed:  Manual Therapy:    23     mins  81922;  Therapeutic Exercise:    8     mins  11706;     Neuromuscular Adrian:        mins  30570;    Therapeutic Activity:          mins  04945;     Gait Training:           mins  86386;     Ultrasound:          mins  75066;    Electrical Stimulation:         mins  69945 ( );  Aquatic Therapy          mins  62306    Untimed:  Electrical Stimulation:         mins  78396 ( );  Mechanical Traction:         mins  74059;     Timed Treatment:   31   mins   Total Treatment:     31   mins    Electronically signed    Odessa Ratliff PTA  Physical Therapist Assistant    [employer] license: [id number]

## 2023-10-19 ENCOUNTER — TREATMENT (OUTPATIENT)
Dept: PHYSICAL THERAPY | Facility: CLINIC | Age: 63
End: 2023-10-19
Payer: OTHER MISCELLANEOUS

## 2023-10-19 DIAGNOSIS — M62.81 MUSCLE WEAKNESS OF LEFT UPPER EXTREMITY: ICD-10-CM

## 2023-10-19 DIAGNOSIS — Z98.890 STATUS POST SUBACROMIAL DECOMPRESSION: Primary | ICD-10-CM

## 2023-10-19 DIAGNOSIS — M25.512 ACUTE PAIN OF LEFT SHOULDER: ICD-10-CM

## 2023-10-19 DIAGNOSIS — M75.112 NONTRAUMATIC INCOMPLETE TEAR OF LEFT ROTATOR CUFF: ICD-10-CM

## 2023-10-19 DIAGNOSIS — M25.612 DECREASED ROM OF LEFT SHOULDER: ICD-10-CM

## 2023-10-19 NOTE — PROGRESS NOTES
Physical Therapy Daily Treatment Note  [facility] [address] [facility] [place] [address]      Patient: Devyn Steinberg   : 1960  Diagnosis/ICD-10 Code:  Status post subacromial decompression [Z98.890]  Referring practitioner: Erickson Hernandez MD  Date of Initial Visit: Type: THERAPY  Noted: 10/3/2023  Today's Date: 10/19/2023  Patient seen for 6 sessions           Subjective   The patient reported they are currently experiencing 3/10 pain in their L UE. Pt reports they have tried to move their shoulder more, but they do have increased pain w/ moving straight out to the side as well as overhead.     Objective   See Exercise, Manual, and Modality Logs for complete treatment.     Assessment/Plan  Pt has increased pain w/ supine AAROM. Patient will continue to benefit from skilled physical therapy to further address their deficits in strength and ROM in order to improve upon their functional mobility and to meet their personal goals.          Timed:  Manual Therapy:    8     mins  67043;  Therapeutic Exercise:    23     mins  53349;     Neuromuscular Adrian:   0    mins  61522;    Therapeutic Activity:     0     mins  05126;     Gait Trainin     mins  68375;     Aquatics                         0      mins  68998    Un-timed:  Mechanical Traction      0     mins  80741  Electrical Stimulation:    0     mins  12973 ( );      Timed Treatment:   31   mins   Total Treatment:     31   mins    Daniele Benedict PT, DPT    Electronically signed 10/19/2023    [place] License: PT - [id number]

## 2023-10-24 ENCOUNTER — TREATMENT (OUTPATIENT)
Dept: PHYSICAL THERAPY | Facility: CLINIC | Age: 63
End: 2023-10-24
Payer: OTHER MISCELLANEOUS

## 2023-10-24 DIAGNOSIS — M62.81 MUSCLE WEAKNESS OF LEFT UPPER EXTREMITY: ICD-10-CM

## 2023-10-24 DIAGNOSIS — Z98.890 STATUS POST SUBACROMIAL DECOMPRESSION: Primary | ICD-10-CM

## 2023-10-24 DIAGNOSIS — M25.512 ACUTE PAIN OF LEFT SHOULDER: ICD-10-CM

## 2023-10-24 DIAGNOSIS — M25.612 DECREASED ROM OF LEFT SHOULDER: ICD-10-CM

## 2023-10-24 DIAGNOSIS — M75.112 NONTRAUMATIC INCOMPLETE TEAR OF LEFT ROTATOR CUFF: ICD-10-CM

## 2023-10-24 PROCEDURE — 97140 MANUAL THERAPY 1/> REGIONS: CPT

## 2023-10-24 PROCEDURE — 97110 THERAPEUTIC EXERCISES: CPT

## 2023-10-24 PROCEDURE — 97530 THERAPEUTIC ACTIVITIES: CPT

## 2023-10-24 NOTE — PROGRESS NOTES
Physical Therapy Daily Treatment Note      Patient: Devyn Steinberg   : 1960  Referring practitioner: Erickson Hernandez MD  Date of Initial Visit: Type: THERAPY  Noted: 10/3/2023  Today's Date: 10/24/2023  Patient seen for 7 sessions           Subjective Questionnaire:       Subjective Evaluation    History of Present Illness    Subjective comment: Pt reports 3/10 L shoulder pain today.  Pt reports his shoulder hurt a lot yesterday that he could hardly stand it and had no idea why stating maybe he slept on it.       Objective   See Exercise, Manual, and Modality Logs for complete treatment.       Assessment & Plan       Assessment  Assessment details: Pt presents with report of 3/10.  Pt reports he can't do much with all the pain he has.  Pt tolerated strengthening with pain.  Pt tolerated manual stretching with pain and is progressing.  Continue with POC.        Visit Diagnoses:    ICD-10-CM ICD-9-CM   1. Status post subacromial decompression  Z98.890 V45.89   2. Decreased ROM of left shoulder  M25.612 719.51   3. Muscle weakness of left upper extremity  M62.81 728.87   4. Acute pain of left shoulder  M25.512 719.41   5. Nontraumatic incomplete tear of left rotator cuff  M75.112 726.13       Progress per Plan of Care and Progress strengthening /stabilization /functional activity           Timed:  Manual Therapy:    8     mins  41835;  Therapeutic Exercise:    23     mins  72700;     Neuromuscular Adrian:        mins  51233;    Therapeutic Activity:     22    mins  88381;     Gait Training:           mins  01090;     Ultrasound:          mins  91767;    Electrical Stimulation:         mins  11871 ( );  Aquatic Therapy          mins  98363    Untimed:  Electrical Stimulation:         mins  49894 ( );  Mechanical Traction:         mins  43139;     Timed Treatment:   53   mins   Total Treatment:     53   mins    Electronically signed    Odessa Ratliff PTA  Physical Therapist Assistant    [employer] license:  [id number]

## 2023-10-26 ENCOUNTER — TREATMENT (OUTPATIENT)
Dept: PHYSICAL THERAPY | Facility: CLINIC | Age: 63
End: 2023-10-26
Payer: OTHER MISCELLANEOUS

## 2023-10-26 DIAGNOSIS — M25.512 ACUTE PAIN OF LEFT SHOULDER: ICD-10-CM

## 2023-10-26 DIAGNOSIS — M25.612 DECREASED ROM OF LEFT SHOULDER: ICD-10-CM

## 2023-10-26 DIAGNOSIS — Z98.890 STATUS POST SUBACROMIAL DECOMPRESSION: Primary | ICD-10-CM

## 2023-10-26 DIAGNOSIS — M62.81 MUSCLE WEAKNESS OF LEFT UPPER EXTREMITY: ICD-10-CM

## 2023-10-26 NOTE — PROGRESS NOTES
Physical Therapy Daily Treatment Note      Patient: Devyn Steinberg   : 1960  Referring practitioner: Erickson Hernandez MD  Date of Initial Visit: Type: THERAPY  Noted: 10/3/2023  Today's Date: 10/26/2023  Patient seen for 8 sessions           Subjective Questionnaire:       Subjective Evaluation    History of Present Illness    Subjective comment: Pt reports his L anterior shoulder feels like a toothache all the time.  Pt reports activity increases his pain.  Pt reports he only takes Aleve for pain and if L shoulder pain gets real bad as at 4:00 am this morning he will take a pain pill.       Objective   See Exercise, Manual, and Modality Logs for complete treatment.       Assessment & Plan       Assessment  Assessment details: Pt tolerates strengthening well reporting most pain with external rotation. Continue with POC.       Visit Diagnoses:    ICD-10-CM ICD-9-CM   1. Status post subacromial decompression  Z98.890 V45.89   2. Decreased ROM of left shoulder  M25.612 719.51   3. Muscle weakness of left upper extremity  M62.81 728.87   4. Acute pain of left shoulder  M25.512 719.41       Progress per Plan of Care and Progress strengthening /stabilization /functional activity           Timed:  Manual Therapy:    8     mins  76185;  Therapeutic Exercise:    12     mins  22645;     Neuromuscular Adrian:        mins  73786;    Therapeutic Activity:     10     mins  17651;     Gait Training:           mins  55175;     Ultrasound:          mins  88515;    Electrical Stimulation:         mins  73082 ( );  Aquatic Therapy          mins  17776    Untimed:  Electrical Stimulation:         mins  33845 ( );  Mechanical Traction:         mins  58933;     Timed Treatment:   30   mins   Total Treatment:     30   mins    Electronically signed    Odessa Ratliff PTA  Physical Therapist Assistant    [employer] license: [id number]

## 2023-10-31 ENCOUNTER — TREATMENT (OUTPATIENT)
Dept: PHYSICAL THERAPY | Facility: CLINIC | Age: 63
End: 2023-10-31
Payer: OTHER MISCELLANEOUS

## 2023-10-31 DIAGNOSIS — M25.512 ACUTE PAIN OF LEFT SHOULDER: ICD-10-CM

## 2023-10-31 DIAGNOSIS — M62.81 MUSCLE WEAKNESS OF LEFT UPPER EXTREMITY: ICD-10-CM

## 2023-10-31 DIAGNOSIS — M25.612 DECREASED ROM OF LEFT SHOULDER: ICD-10-CM

## 2023-10-31 DIAGNOSIS — M75.112 NONTRAUMATIC INCOMPLETE TEAR OF LEFT ROTATOR CUFF: ICD-10-CM

## 2023-10-31 DIAGNOSIS — Z98.890 STATUS POST SUBACROMIAL DECOMPRESSION: Primary | ICD-10-CM

## 2023-10-31 NOTE — PROGRESS NOTES
Physical Therapy Daily Treatment Note      Patient: Devyn Steinberg   : 1960  Referring practitioner: Erickson Hernandez MD  Date of Initial Visit: Type: THERAPY  Noted: 10/3/2023  Today's Date: 10/31/2023  Patient seen for 9 sessions             Subjective:  Pt reports he took pain medicine before bed and his L shoulder doesn't feel bad today 2/10.  Pt reports if he doesn't take his pain medicine at night he doesn't sleep well secondary to L shoulder pain.         Objective   See Exercise, Manual, and Modality Logs for complete treatment.       Assessment & Plan       Assessment  Assessment details: Pt tolerated strengthening and active assist range of motion well.  Pt's range of motion is progressing well and so is strength.  Pt continues to c/o L shoulder pain.  Continue with POC.      Visit Diagnoses:    ICD-10-CM ICD-9-CM   1. Status post subacromial decompression  Z98.890 V45.89   2. Decreased ROM of left shoulder  M25.612 719.51   3. Muscle weakness of left upper extremity  M62.81 728.87   4. Acute pain of left shoulder  M25.512 719.41   5. Nontraumatic incomplete tear of left rotator cuff  M75.112 726.13       Progress per Plan of Care and Progress strengthening /stabilization /functional activity           Timed:  Manual Therapy:    9     mins  17234;  Therapeutic Exercise:    12     mins  05176;     Neuromuscular Adrian:        mins  17737;    Therapeutic Activity:     10     mins  73303;     Gait Training:           mins  48377;     Ultrasound:          mins  94845;    Electrical Stimulation:         mins  48186 ( );  Aquatic Therapy          mins  20207    Untimed:  Electrical Stimulation:         mins  68991 ( );  Mechanical Traction:         mins  03436;     Timed Treatment:   31   mins   Total Treatment:     31   mins    Electronically signed    Odessa Ratliff PTA  Physical Therapist Assistant    [employer] license: [id number]

## 2023-11-02 ENCOUNTER — TREATMENT (OUTPATIENT)
Dept: PHYSICAL THERAPY | Facility: CLINIC | Age: 63
End: 2023-11-02
Payer: OTHER MISCELLANEOUS

## 2023-11-02 DIAGNOSIS — M25.612 DECREASED ROM OF LEFT SHOULDER: ICD-10-CM

## 2023-11-02 DIAGNOSIS — Z98.890 STATUS POST SUBACROMIAL DECOMPRESSION: Primary | ICD-10-CM

## 2023-11-02 DIAGNOSIS — M25.512 ACUTE PAIN OF LEFT SHOULDER: ICD-10-CM

## 2023-11-02 DIAGNOSIS — M62.81 MUSCLE WEAKNESS OF LEFT UPPER EXTREMITY: ICD-10-CM

## 2023-11-02 DIAGNOSIS — M75.112 NONTRAUMATIC INCOMPLETE TEAR OF LEFT ROTATOR CUFF: ICD-10-CM

## 2023-11-02 NOTE — PROGRESS NOTES
Progress Note   [facility]: [address] [place] [address]      Patient: Devyn Steinberg   : 1960  Diagnosis/ICD-10 Code:  Status post subacromial decompression [Z98.890]  Referring practitioner: Erickson Hernandez MD  Date of Initial Visit: Type: THERAPY  Noted: 10/3/2023  Today's Date: 2023  Patient seen for 10 sessions      Subjective:   Subjective Questionnaire: QuickDASH: 40  Clinical Progress: unchanged  Home Program Compliance: Yes  Treatment has included: ADL retraining, soft tissue mobilization, strengthening, stretching, therapeutic activities, manual therapy, joint mobilization, home exercise program/patient education, functional ROM exercises, flexibility, body mechanics training, postural training, and neuromuscular re-education    Subjective   Pt reports their L shoulder hurts this morning. Pt reports they must have laid on it last night. Pt reports clicking w/n their shoulder. Pt reports their pain is typically a 3-4/10 and can get a little lower w/ OTC medication. Pt reports it feels like its moving around a lot w/n the shoulder. 'Feels like a 1 inch pipe in a 2 inch hold'.       Objective     Active Range of Motion      Left Shoulder   Flexion: 50 degrees   Abduction: 38 degrees   External rotation 0°: 54 degrees   External rotation BTH: unable to perform    Internal rotation BTB: sacrum       Additional Active Range of Motion Details  L shoulder not tested at this time.      Passive Range of Motion   Left Shoulder   Flexion: 115 degrees with pain  Extension: with pain  Abduction: 115 degrees   External rotation 45°: 50 degrees with pain  Internal rotation 0°: 55 degrees      Strength/Myotome Testing      Right Shoulder      Planes of Motion   Flexion: 4   Abduction: 4+   External rotation at 0°: 4+   Internal rotation at 0°: 4+      Isolated Muscles   Biceps: 4+   Triceps: 4+       Left Shoulder      Planes of Motion   Flexion: 2   Abduction: 2   External rotation at 0°: 3+    Internal rotation at 0°: 3+      Isolated Muscles   Biceps: 4   Triceps: 4         See Exercise, Manual, and Modality Logs for complete treatment.     Assessment/Plan  Impairments: abnormal muscle firing, abnormal or restricted ROM, activity intolerance, impaired physical strength, lacks appropriate home exercise program and pain with function   Functional limitations: carrying objects, lifting, sleeping, pulling, pushing, uncomfortable because of pain, moving in bed, reaching behind back, reaching overhead and unable to perform repetitive tasks     Pt reports to physical therapy s/p L RTC debridement w/ subacromial decompression. Pt does demonstrate improvement in their ROM and slight improvement in strength at this time. Discussed w/ pt about continuing to perform ROM exs, as this will continue to remain important pre and post operatively. Pt does follow up w/ ortho and plans on discussing a surgical option for their L shoulder. Pt also continues to have increased deficits described by Alen. Patient will continue to benefit from skilled physical therapy to further address their deficits in strength and ROM in order to improve potential outcomes of surgical intervention and ADLs.       Goals  Plan Goals: SHOULDER  PROBLEMS:      1. The patient has limited ROM of the L shoulder.                   LTG 1: 12 weeks:  The patient will demonstrate 150 degrees of L shoulder flexion, 150 degrees of shoulder abduction, 70 degrees of shoulder external rotation, and 70 degrees of shoulder internal rotation to allow the patient to reach into upper kitchen cabinets and manipulate clothing behind the back with greater ease.                          STATUS:  progressing              STG 1a: 6 weeks:  The patient will demonstrate 125 degrees of L shoulder flexion, 125 degrees of shoulder abduction, 60 degrees of shoulder external rotation, and 60 degrees of shoulder internal rotation.                          STATUS:   progressing              TREATMENT: Manual therapy, therapeutic exercise, home exercise instruction, and modalities as needed to include: electrical stimulation, ultrasound, moist heat, and ice.     2. The patient has limited strength of the L shoulder.              LTG 2: 12 weeks:  The patient will demonstrate 4+/5 strength for L shoulder flexion, abduction, external rotation, and internal rotation in order to demonstrate improved shoulder stability.                          STATUS:  progressing              STG 2a: 8 weeks:  The patient will demonstrate 3+/5 strength for L shoulder flexion, abduction, external rotation, and internal rotation.                          STATUS: progressing              STG2b:  2 weeks:  The patient will be independent with home exercises.                           STATUS:  met              TREATMENT: Manual therapy, therapeutic exercise, home exercise instruction, and modalities as needed to include: electrical stimulation, ultrasound, moist heat, and ice.                3. The patient complains of pain to the L shoulder.              LTG 3: 12 weeks:  The patient will report a pain rating of 1/10 or better in order to improve sleep quality and tolerance to performance of activities of daily living.                          STATUS:  progressing              STG 3a: 8 weeks:  The patient will report a pain rating of 3/10 or better.                           STATUS: progressing              TREATMENT: Manual therapy, therapeutic exercise, home exercise instruction, and modalities as needed to include: electrical stimulation, ultrasound, moist heat, and ice.     4. Carrying, Moving, and Handling Objects Functional Limitation                               LTG 4: 12 weeks:  The patient will demonstrate 9 % limitation by achieving a score of 15 on the QuickDASH.                          STATUS:  progressing              STG 4a: 6 weeks:  The patient will demonstrate 26 % limitation by  achieving a score of 25 on the QuickDASH.                            STATUS:  progressing              TREATMENT:  Manual therapy, therapeutic exercise, home exercise instruction, and modalities as needed to include: moist heat, electrical stimulation, and ultrasound.       Progress toward previous goals: Partially Met      Recommendations: Continue as planned; continue following up w/ ortho  Timeframe: 2 a week, for 2 months   Prognosis to achieve goals: poor    PT Signature: Daniele Benedict PT, DPT    Electronically signed 2023    [place] License: [id number] - [id number]         Timed:  Manual Therapy:    8     mins  12946;  Therapeutic Exercise:    23     mins  42088;     Neuromuscular Adrian:    0    mins  63159;    Therapeutic Activity:     0     mins  77523;     Gait Trainin     mins  55326;     Aquatics                         0      mins  38910    Un-timed:  Mechanical Traction      0     mins  53109  Dry Needling     0     mins self-pay  Electrical Stimulation:    0     mins  31882 ( );    Timed Treatment:   31   mins   Total Treatment:     31   mins

## 2023-11-07 ENCOUNTER — TREATMENT (OUTPATIENT)
Dept: PHYSICAL THERAPY | Facility: CLINIC | Age: 63
End: 2023-11-07
Payer: OTHER MISCELLANEOUS

## 2023-11-07 DIAGNOSIS — M25.612 DECREASED ROM OF LEFT SHOULDER: ICD-10-CM

## 2023-11-07 DIAGNOSIS — M62.81 MUSCLE WEAKNESS OF LEFT UPPER EXTREMITY: ICD-10-CM

## 2023-11-07 DIAGNOSIS — M75.112 NONTRAUMATIC INCOMPLETE TEAR OF LEFT ROTATOR CUFF: ICD-10-CM

## 2023-11-07 DIAGNOSIS — Z98.890 STATUS POST SUBACROMIAL DECOMPRESSION: Primary | ICD-10-CM

## 2023-11-07 PROCEDURE — 97110 THERAPEUTIC EXERCISES: CPT | Performed by: PHYSICAL THERAPIST

## 2023-11-07 PROCEDURE — 97140 MANUAL THERAPY 1/> REGIONS: CPT | Performed by: PHYSICAL THERAPIST

## 2023-11-07 PROCEDURE — 97530 THERAPEUTIC ACTIVITIES: CPT | Performed by: PHYSICAL THERAPIST

## 2023-11-07 NOTE — PROGRESS NOTES
Physical Therapy Daily Treatment Note      Patient: Devyn Steinberg   : 1960  Referring practitioner: Erickson Hernandez MD  Date of Initial Visit: Type: THERAPY  Noted: 10/3/2023  Today's Date: 2023  Patient seen for 11 sessions           Subjective Questionnaire:       Subjective Evaluation    History of Present Illness    Subjective comment: Pt reports his L shoulder is the same 3/10.       Objective   See Exercise, Manual, and Modality Logs for complete treatment.       Assessment & Plan       Assessment  Assessment details: Pt has difficulty with active L shoulder flexion and ABD.  Pt tolerated strengthening well.  Pt reports pian with manual work.  Pt had an audible pop with passive stretching of L shoulder.  Pt reports he is to see ortho in one week.  Continue wit POC.          Visit Diagnoses:    ICD-10-CM ICD-9-CM   1. Status post subacromial decompression  Z98.890 V45.89   2. Decreased ROM of left shoulder  M25.612 719.51   3. Muscle weakness of left upper extremity  M62.81 728.87   4. Nontraumatic incomplete tear of left rotator cuff  M75.112 726.13       Progress per Plan of Care and Progress strengthening /stabilization /functional activity           Timed:  Manual Therapy:    12     mins  55734;  Therapeutic Exercise:    23     mins  45784;     Neuromuscular Adrian:        mins  01034;    Therapeutic Activity:     8     mins  06408;     Gait Training:           mins  60606;     Ultrasound:          mins  10977;    Electrical Stimulation:         mins  80217 ( );  Aquatic Therapy          mins  64211    Untimed:  Electrical Stimulation:         mins  28451 ( );  Mechanical Traction:         mins  36119;     Timed Treatment:   43   mins   Total Treatment:     43   mins    Electronically signed    Odessa Ratliff PTA  Physical Therapist Assistant    [employer] license: [id number]

## 2023-11-09 ENCOUNTER — TREATMENT (OUTPATIENT)
Dept: PHYSICAL THERAPY | Facility: CLINIC | Age: 63
End: 2023-11-09
Payer: OTHER MISCELLANEOUS

## 2023-11-09 DIAGNOSIS — M62.81 MUSCLE WEAKNESS OF LEFT UPPER EXTREMITY: ICD-10-CM

## 2023-11-09 DIAGNOSIS — M25.512 ACUTE PAIN OF LEFT SHOULDER: ICD-10-CM

## 2023-11-09 DIAGNOSIS — M75.112 NONTRAUMATIC INCOMPLETE TEAR OF LEFT ROTATOR CUFF: ICD-10-CM

## 2023-11-09 DIAGNOSIS — Z98.890 STATUS POST SUBACROMIAL DECOMPRESSION: Primary | ICD-10-CM

## 2023-11-09 DIAGNOSIS — M25.612 DECREASED ROM OF LEFT SHOULDER: ICD-10-CM

## 2023-11-09 NOTE — PROGRESS NOTES
Physical Therapy Daily Treatment Note  [facility] [address] [facility] [place] [address]      Patient: Devyn Steinberg   : 1960  Diagnosis/ICD-10 Code:  Status post subacromial decompression [Z98.890]  Referring practitioner: Erickson Hernandez MD  Date of Initial Visit: Type: THERAPY  Noted: 10/3/2023  Today's Date: 2023  Patient seen for 12 sessions           Subjective   The patient reported some increased soreness in their L UE today. Pt reports they may have slept on it wrong. Pt reports their current pain is a 3/10.     Objective   See Exercise, Manual, and Modality Logs for complete treatment.     Assessment/Plan  Pt tolerates counter walk away stretch well today. Patient will continue to benefit from skilled physical therapy to further address their deficits in strength and ROM in order to improve upon their functional mobility and to meet their personal goals.          Timed:  Manual Therapy:    0     mins  92196;  Therapeutic Exercise:    23     mins  58969;     Neuromuscular Adrian:   0    mins  56715;    Therapeutic Activity:     8     mins  40656;     Gait Trainin     mins  21328;     Aquatics                         0      mins  05168    Un-timed:  Mechanical Traction      0     mins  33593  Electrical Stimulation:    0     mins  01545 ( );      Timed Treatment:   31   mins   Total Treatment:     31   mins    Daniele Benedict PT, DPT    Electronically signed 2023    [place] License: PT - [id number]

## 2023-11-14 ENCOUNTER — OFFICE VISIT (OUTPATIENT)
Dept: ORTHOPEDIC SURGERY | Facility: CLINIC | Age: 63
End: 2023-11-14
Payer: OTHER MISCELLANEOUS

## 2023-11-14 ENCOUNTER — PREP FOR SURGERY (OUTPATIENT)
Dept: OTHER | Facility: HOSPITAL | Age: 63
End: 2023-11-14
Payer: COMMERCIAL

## 2023-11-14 ENCOUNTER — TREATMENT (OUTPATIENT)
Dept: PHYSICAL THERAPY | Facility: CLINIC | Age: 63
End: 2023-11-14
Payer: OTHER MISCELLANEOUS

## 2023-11-14 VITALS
DIASTOLIC BLOOD PRESSURE: 90 MMHG | BODY MASS INDEX: 27.46 KG/M2 | HEIGHT: 63 IN | SYSTOLIC BLOOD PRESSURE: 160 MMHG | OXYGEN SATURATION: 98 % | HEART RATE: 79 BPM | WEIGHT: 155 LBS

## 2023-11-14 DIAGNOSIS — M62.81 MUSCLE WEAKNESS OF LEFT UPPER EXTREMITY: ICD-10-CM

## 2023-11-14 DIAGNOSIS — S46.012D TRAUMATIC COMPLETE TEAR OF LEFT ROTATOR CUFF, SUBSEQUENT ENCOUNTER: Primary | ICD-10-CM

## 2023-11-14 DIAGNOSIS — M25.512 LEFT SHOULDER PAIN, UNSPECIFIED CHRONICITY: ICD-10-CM

## 2023-11-14 DIAGNOSIS — M75.112 NONTRAUMATIC INCOMPLETE TEAR OF LEFT ROTATOR CUFF: ICD-10-CM

## 2023-11-14 DIAGNOSIS — M25.512 ACUTE PAIN OF LEFT SHOULDER: ICD-10-CM

## 2023-11-14 DIAGNOSIS — Z98.890 S/P LEFT ROTATOR CUFF REPAIR: Primary | ICD-10-CM

## 2023-11-14 DIAGNOSIS — Z98.890 STATUS POST SUBACROMIAL DECOMPRESSION: Primary | ICD-10-CM

## 2023-11-14 DIAGNOSIS — M25.612 DECREASED ROM OF LEFT SHOULDER: ICD-10-CM

## 2023-11-14 PROBLEM — S46.012A TRAUMATIC COMPLETE TEAR OF LEFT ROTATOR CUFF: Status: ACTIVE | Noted: 2023-11-14

## 2023-11-14 RX ORDER — CEFAZOLIN SODIUM 2 G/100ML
2 INJECTION, SOLUTION INTRAVENOUS ONCE
OUTPATIENT
Start: 2023-11-14 | End: 2023-11-14

## 2023-11-14 RX ORDER — CEFAZOLIN SODIUM IN 0.9 % NACL 3 G/100 ML
3 INTRAVENOUS SOLUTION, PIGGYBACK (ML) INTRAVENOUS ONCE
OUTPATIENT
Start: 2023-11-14 | End: 2023-11-14

## 2023-11-14 RX ORDER — TRANEXAMIC ACID 10 MG/ML
1000 INJECTION, SOLUTION INTRAVENOUS ONCE
OUTPATIENT
Start: 2023-11-14 | End: 2023-11-14

## 2023-11-14 NOTE — PROGRESS NOTES
"Chief Complaint  Follow-up of the Left Shoulder    Subjective      Devyn Steinberg presents to [facility] for a follow up of his left shoulder. He is 6 weeks status post left shoulder arthroscopy with open debridement of massive rotator cuff tear, performed by Dr. Hernandez on 9/27/2023. He reports he is still having pain and it is about the same level as it was before the surgery. He is in PT and trying to progress with range of motion but due to the pain he is having difficulty.  He reports that he is interested in proceeding with total shoulder reverse arthroplasty.      Objective   Allergies   Allergen Reactions    Trulicity [Dulaglutide] Other (See Comments)     Pancreatitis    Tyloxapol Swelling       Vital Signs:   /90   Pulse 79   Ht 160 cm (63\")   Wt 70.3 kg (155 lb)   SpO2 98%   BMI 27.46 kg/m²       Physical Exam    Constitutional: Awake, alert. Well nourished appearance.    Integumentary: Warm, dry, intact. No obvious rashes.    HENT: Atraumatic, normocephalic.   Respiratory: Non labored respirations .   Cardiovascular: Intact peripheral pulses.    Psychiatric: Normal mood and affect. A&O X3    Ortho Exam  Left shoulder: Skin is warm, dry, and intact.  Well-healed surgical scars noted.  Patient demonstrates full flexion and extension of the wrist and elbow.  Full pronation and supination.  Active assisted shoulder forward flexion to 110 degrees with pain reported.  Sensation and distal neurovascular intact.    Imaging Results (Most Recent)       Procedure Component Value Units Date/Time    XR Scapula Left [[id number]] Resulted: 11/17/23 1256     Updated: 11/17/23 1257    Narrative:      X-Ray Report:  Study: X-rays ordered, taken in the office, and reviewed today.   Site: Left scapula Xray  Indication: Pain  View: AP/Lateral view(s)  Findings: Advanced arthritis of the left shoulder.  Prior studies available for comparison: yes                       Assessment and Plan "   Problem List Items Addressed This Visit    None  Visit Diagnoses       S/P left rotator cuff repair    -  Primary    Relevant Orders    XR Scapula Left (Completed)    Left shoulder pain, unspecified chronicity        Relevant Orders    XR Scapula Left (Completed)            Follow Up   No follow-ups on file.    Tobacco Use: Low Risk  (11/14/2023)    Patient History     Smoking Tobacco Use: Never     Smokeless Tobacco Use: Never     Passive Exposure: Past     Patient is a non-smoker.  Did not discuss tobacco cessation options.    Patient Instructions   Patient reports persistent pain postoperatively and failure to progress with physical therapy.  He is requesting to proceed with total shoulder reverse arthroplasty.  X-rays taken and reviewed and discussed with Dr. Hernandez.  Dr. Hernandez is in agreement to proceed with requested TSRA.  Orders placed.     Discussed surgery. Risks/benefits discussed with patient including, but not limited to: infection, bleeding, neurovascular damage, malunion, nonunion, aesthetic deformity, need for further surgery, and death. Discussed with patient the implant type being used during surgery and patient understands and desires to proceed. Surgery pamphlet provided to patient. Call or return if worsening symptoms.    Patient was given instructions and counseling regarding his condition or for health maintenance advice. Please see specific information pulled into the AVS if appropriate.

## 2023-11-14 NOTE — PROGRESS NOTES
Physical Therapy Daily Treatment Note      Patient: Devyn Steinberg   : 1960  Referring practitioner: Erickson Hernandez MD  Date of Initial Visit: Type: THERAPY  Noted: 10/3/2023  Today's Date: 2023  Patient seen for 13 sessions           Subjective Questionnaire:       Subjective Evaluation    History of Present Illness    Subjective comment: Pt reports his L shoulder feels achey.  Pt reports that he is moving his L shoulder more but still cannot move it away from his body as in ABD.       Objective   See Exercise, Manual, and Modality Logs for complete treatment.       Assessment & Plan       Assessment  Assessment details: Pt reports he can use his LUE more secondary to decreased pain however could not specify. Pt tolerated strengthening well with report of burning only with hammer curls.  Pt sees MD today to see if he will have surgery.         Visit Diagnoses:    ICD-10-CM ICD-9-CM   1. Status post subacromial decompression  Z98.890 V45.89   2. Decreased ROM of left shoulder  M25.612 719.51   3. Muscle weakness of left upper extremity  M62.81 728.87   4. Nontraumatic incomplete tear of left rotator cuff  M75.112 726.13   5. Acute pain of left shoulder  M25.512 719.41       Progress per Plan of Care and Progress strengthening /stabilization /functional activity           Timed:  Manual Therapy:         mins  38991;  Therapeutic Exercise:    16     mins  27223;     Neuromuscular Adrian:        mins  41415;    Therapeutic Activity:     10     mins  18345;     Gait Training:           mins  79764;     Ultrasound:          mins  99938;    Electrical Stimulation:         mins  28507 ( );  Aquatic Therapy          mins  52001    Untimed:  Electrical Stimulation:         mins  10149 ( );  Mechanical Traction:         mins  85131;     Timed Treatment:   26   mins   Total Treatment:     26   mins    Electronically signed    Odessa Ratliff PTA  Physical Therapist Assistant    [place] license:  [id number]

## 2023-11-15 ENCOUNTER — TELEPHONE (OUTPATIENT)
Dept: ORTHOPEDIC SURGERY | Facility: CLINIC | Age: 63
End: 2023-11-15
Payer: COMMERCIAL

## 2023-11-15 NOTE — TELEPHONE ENCOUNTER
DELETE AFTER REVIEWING: Telephone encounter to be sent to the clinical pool.    Caller: NURSE  WI KATHYAJEROD- JOHN    Relationship: Other    Best call back number: .[phone]    What form or medical record are you requesting: PATIENT IS GOING TO SCHEDULE SURGERY, JOHN HIS NURSE  WANTS TO MAKE SURE WE DONT NEED ANYTHING FROM THEM IN ORDER TO GET THE SURGERY DONE.  JOHN NEEDS OFFICE NOTES, UPDATED WORK STATUS AND NEXT APPOINTMENT        Who is requesting this form or medical record from you: NURSE     How would you like to receive the form or medical records (pick-up, mail, fax): FAX   If fax, what is the fax number: [phone]  Timeframe paperwork needed: ASAP    Additional notes: NA

## 2023-11-17 NOTE — PATIENT INSTRUCTIONS
Patient reports persistent pain postoperatively and failure to progress with physical therapy.  He is requesting to proceed with total shoulder reverse arthroplasty.  X-rays taken and reviewed and discussed with Dr. Hernandez.  Dr. Hernandez is in agreement to proceed with requested TSRA.  Orders placed.     Discussed surgery. Risks/benefits discussed with patient including, but not limited to: infection, bleeding, neurovascular damage, malunion, nonunion, aesthetic deformity, need for further surgery, and death. Discussed with patient the implant type being used during surgery and patient understands and desires to proceed. Surgery pamphlet provided to patient. Call or return if worsening symptoms.

## 2023-11-20 ENCOUNTER — PRIOR AUTHORIZATION (OUTPATIENT)
Dept: ORTHOPEDIC SURGERY | Facility: CLINIC | Age: 63
End: 2023-11-20
Payer: COMMERCIAL

## 2023-11-20 NOTE — TELEPHONE ENCOUNTER
LUIS FROM [facility] CALLED TO INFORM THAT SURGERY FOR PATIENT HAS BEEN DENIED- DENIAL LETTER WILL BE FAXED NEXT BUSINESS DAY FROM 11/20/2023

## 2023-12-05 ENCOUNTER — TELEPHONE (OUTPATIENT)
Dept: ORTHOPEDIC SURGERY | Facility: CLINIC | Age: 63
End: 2023-12-05
Payer: COMMERCIAL

## 2023-12-05 NOTE — TELEPHONE ENCOUNTER
-WORKERS COMP REQUESTING A MRI - WILL AUTHORIZE SCHEDULED SURGERY [facility] 01/15- IF PATIENT HAS MRI - SENT REQUEST FOR MRI TO DR. PIÑA HAD AUTH SENT TO ME FOR MRI TO SCHEDULING

## 2023-12-06 ENCOUNTER — TELEPHONE (OUTPATIENT)
Dept: ORTHOPEDIC SURGERY | Facility: CLINIC | Age: 63
End: 2023-12-06
Payer: COMMERCIAL

## 2023-12-06 DIAGNOSIS — M25.512 LEFT SHOULDER PAIN, UNSPECIFIED CHRONICITY: Primary | ICD-10-CM

## 2023-12-06 NOTE — TELEPHONE ENCOUNTER
----- Message from Erickson Hernandez MD sent at 12/6/2023  1:01 PM EST -----  Regarding: RE: DR HERNANDEZ PATIENT  Ok, but I don't think MRI is needed.  But if only way to get surgery approved, ...  ----- Message -----  From: Mira Ulrich  Sent: 12/5/2023   2:14 PM EST  To: Erickson Hernandez MD  Subject: FW: DR HERNANDEZ PATIENT                               ----- Message -----  From: Kell Roman RegSched Rep  Sent: 12/5/2023  10:51 AM EST  To: [facility] [place] Clinical Pool  Subject: DR HERNANDEZ PATIENT                                 DR HERNANDEZ PATIENT - WORKERS COMP REQUESTING A MRI - WILL AUTHORIZE SCHEDULED SURGERY IF PATIENT HAS MRI -THANKS  LEFT ANTONIA 01/15/2023

## 2023-12-07 ENCOUNTER — TELEPHONE (OUTPATIENT)
Dept: ORTHOPEDIC SURGERY | Facility: CLINIC | Age: 63
End: 2023-12-07
Payer: COMMERCIAL

## 2023-12-07 NOTE — TELEPHONE ENCOUNTER
CALLED PATIENT TO INFORM HIM HE IS SCHEDULED FOR MRI FOR [facility] COMP 12/27/2023 @ 9:00 WITH A 8:30 ARRIVAL [address] -PATIENT ACKNOWLEDGED UNDERSTANDING

## 2023-12-07 NOTE — TELEPHONE ENCOUNTER
Caller: JONAS HUDSON    Relationship to Patient: the patient    Phone Number: [phone] (home)     Reason for Call:  PATIENT CALLED IN STATED HE MISSED A CALL AND HAS A [employer] MESSAGE BUT NOT VERY VERSED ON THE INTERNET AND NO VOICEMAIL WAS LEFT. PLEASE CALL PATIENT BACK. NO NOTES SHOWING IN CHART

## 2023-12-11 DIAGNOSIS — Z47.1 AFTERCARE FOLLOWING LEFT SHOULDER JOINT REPLACEMENT SURGERY: Primary | ICD-10-CM

## 2023-12-11 DIAGNOSIS — Z96.612 AFTERCARE FOLLOWING LEFT SHOULDER JOINT REPLACEMENT SURGERY: Primary | ICD-10-CM

## 2023-12-27 ENCOUNTER — HOSPITAL ENCOUNTER (OUTPATIENT)
Dept: MRI IMAGING | Facility: HOSPITAL | Age: 63
Discharge: HOME | End: 2023-12-27
Admitting: ORTHOPAEDIC SURGERY
Payer: OTHER MISCELLANEOUS

## 2023-12-27 DIAGNOSIS — M25.512 LEFT SHOULDER PAIN, UNSPECIFIED CHRONICITY: ICD-10-CM

## 2023-12-27 PROCEDURE — 73221 MRI JOINT UPR EXTREM W/O DYE: CPT

## 2024-01-08 ENCOUNTER — PRE-ADMISSION TESTING (OUTPATIENT)
Dept: PREADMISSION TESTING | Facility: HOSPITAL | Age: 64
End: 2024-01-08
Payer: OTHER MISCELLANEOUS

## 2024-01-08 VITALS
DIASTOLIC BLOOD PRESSURE: 78 MMHG | BODY MASS INDEX: 29.41 KG/M2 | HEIGHT: 63 IN | WEIGHT: 166.01 LBS | OXYGEN SATURATION: 98 % | SYSTOLIC BLOOD PRESSURE: 132 MMHG | TEMPERATURE: 97.2 F | HEART RATE: 72 BPM | RESPIRATION RATE: 16 BRPM

## 2024-01-08 DIAGNOSIS — S46.012D TRAUMATIC COMPLETE TEAR OF LEFT ROTATOR CUFF, SUBSEQUENT ENCOUNTER: ICD-10-CM

## 2024-01-08 LAB
ALBUMIN SERPL-MCNC: 4.4 G/DL (ref 3.5–5.2)
ALBUMIN/GLOB SERPL: 1.6 G/DL
ALP SERPL-CCNC: 80 U/L (ref 39–117)
ALT SERPL W P-5'-P-CCNC: 15 U/L (ref 1–41)
ANION GAP SERPL CALCULATED.3IONS-SCNC: 12.7 MMOL/L (ref 5–15)
AST SERPL-CCNC: 15 U/L (ref 1–40)
BASOPHILS # BLD AUTO: 0.01 10*3/MM3 (ref 0–0.2)
BASOPHILS NFR BLD AUTO: 0.2 % (ref 0–1.5)
BILIRUB SERPL-MCNC: 0.8 MG/DL (ref 0–1.2)
BUN SERPL-MCNC: 17 MG/DL (ref 8–23)
BUN/CREAT SERPL: 18.9 (ref 7–25)
CALCIUM SPEC-SCNC: 9.6 MG/DL (ref 8.6–10.5)
CHLORIDE SERPL-SCNC: 101 MMOL/L (ref 98–107)
CO2 SERPL-SCNC: 24.3 MMOL/L (ref 22–29)
CREAT SERPL-MCNC: 0.9 MG/DL (ref 0.76–1.27)
DEPRECATED RDW RBC AUTO: 39.8 FL (ref 37–54)
EGFRCR SERPLBLD CKD-EPI 2021: 95.4 ML/MIN/1.73
EOSINOPHIL # BLD AUTO: 0.12 10*3/MM3 (ref 0–0.4)
EOSINOPHIL NFR BLD AUTO: 1.9 % (ref 0.3–6.2)
ERYTHROCYTE [DISTWIDTH] IN BLOOD BY AUTOMATED COUNT: 13.2 % (ref 12.3–15.4)
GLOBULIN UR ELPH-MCNC: 2.8 GM/DL
GLUCOSE SERPL-MCNC: 139 MG/DL (ref 65–99)
HBA1C MFR BLD: 6.8 % (ref 4.8–5.6)
HCT VFR BLD AUTO: 47.9 % (ref 37.5–51)
HGB BLD-MCNC: 16.5 G/DL (ref 13–17.7)
IMM GRANULOCYTES # BLD AUTO: 0.01 10*3/MM3 (ref 0–0.05)
IMM GRANULOCYTES NFR BLD AUTO: 0.2 % (ref 0–0.5)
INR PPP: 1.02 (ref 0.86–1.15)
LYMPHOCYTES # BLD AUTO: 2.06 10*3/MM3 (ref 0.7–3.1)
LYMPHOCYTES NFR BLD AUTO: 32.1 % (ref 19.6–45.3)
MCH RBC QN AUTO: 28.8 PG (ref 26.6–33)
MCHC RBC AUTO-ENTMCNC: 34.4 G/DL (ref 31.5–35.7)
MCV RBC AUTO: 83.6 FL (ref 79–97)
MONOCYTES # BLD AUTO: 0.51 10*3/MM3 (ref 0.1–0.9)
MONOCYTES NFR BLD AUTO: 7.9 % (ref 5–12)
NEUTROPHILS NFR BLD AUTO: 3.71 10*3/MM3 (ref 1.7–7)
NEUTROPHILS NFR BLD AUTO: 57.7 % (ref 42.7–76)
NRBC BLD AUTO-RTO: 0 /100 WBC (ref 0–0.2)
PLATELET # BLD AUTO: 171 10*3/MM3 (ref 140–450)
PMV BLD AUTO: 9.3 FL (ref 6–12)
POTASSIUM SERPL-SCNC: 4.3 MMOL/L (ref 3.5–5.2)
PROT SERPL-MCNC: 7.2 G/DL (ref 6–8.5)
PROTHROMBIN TIME: 13.6 SECONDS (ref 11.8–14.9)
RBC # BLD AUTO: 5.73 10*6/MM3 (ref 4.14–5.8)
SODIUM SERPL-SCNC: 138 MMOL/L (ref 136–145)
WBC NRBC COR # BLD AUTO: 6.42 10*3/MM3 (ref 3.4–10.8)

## 2024-01-08 PROCEDURE — 80053 COMPREHEN METABOLIC PANEL: CPT

## 2024-01-08 PROCEDURE — 36415 COLL VENOUS BLD VENIPUNCTURE: CPT

## 2024-01-08 PROCEDURE — 85025 COMPLETE CBC W/AUTO DIFF WBC: CPT

## 2024-01-08 PROCEDURE — 85610 PROTHROMBIN TIME: CPT

## 2024-01-08 PROCEDURE — 83036 HEMOGLOBIN GLYCOSYLATED A1C: CPT

## 2024-01-08 NOTE — DISCHARGE INSTRUCTIONS
IMPORTANT INSTRUCTIONS - PRE-ADMISSION TESTING  DO NOT EAT OR CHEW anything after midnight the night before your procedure.    You may have CLEAR liquids up to __3____ hours prior to ARRIVAL time.   Take the following medications the morning of your procedure with JUST A SIP OF WATER:             NEXIUM    DO NOT BRING your medications to the hospital with you, UNLESS something has changed since your PRE-Admission Testing appointment.  Hold all vitamins, supplements, and NSAIDS (Non- steroidal anti-inflammatory meds) for one week prior to surgery (you MAY take Tylenol or Acetaminophen). STOP 1-8-24                 STOP - VITAMIN D AT 1-8-24    If you are diabetic, check your blood sugar the morning of your procedure. If it is less than 70 or if you are feeling symptomatic, call the following number for further instructions: [phone].  Use your inhalers/nebulizers as usual, the morning of your procedure. BRING YOUR INHALERS with you.   Bring your CPAP or BIPAP to hospital, ONLY IF YOU WILL BE SPENDING THE NIGHT.   Make sure you have a ride home and have someone who will stay with you the day of your procedure after you go home.  If you have any questions, please call your Pre-Admission Testing Nurse, __VARUN H__ at [phone] _[phone]___________.     SURGERY 1-15-24 ELEVATOR A, THIRD FLOOR  WILL CALL YOU THE FRIDAY PRIOR TO SURGERY WITH ARRIVAL TIME AFTER 1PM   IF HAVE ANY QUESTIONS AFTER 4PM FRIDAY OR OVER THE WEEKEND ABOUT SURGERY, CONCERNS ABOUT SURGERY OR NEED TO CANCEL SURGERY CALL SURGEON'S OFFICE AND PREOP AREA [phone]  USE SURGICAL SURGERY 3 TIMES PRIOR TO SURGERY (SEE BELOW)  DRINK 20 OZ GATORADE 3 HR PRIOR TO SURGERY ( NO RED, G ZERO OR G2)   FOLLOW ANY INSTRUCTIONS FROM SURGEON'S OFFICE   BRING OVERSIZED SHIRT/ BUTTON UP SHIRT TO WEAR AFTER SURGERY WITH THE BRACE AND COLD THERAPY (ICE PACK) WRAP.   BRING CASH OR CARD FOR COPAYMENT OR WORKMAN COMP INFORMATION TO PROCESS MEDICATION  TAKE METFORMIN BY  6PM THE NIGHT PRIOR TO SURGERY AND DO NOT TAKE DAY OF SURGERY  NO INSULIN AFTER DINNER AND NONE DAY OF SURGERY      PREOPERATIVE (BEFORE SURGERY)              BATHING INSTRUCTIONS  Instructions:    You will need to shower 3 separate times utilizing the soap provided; at the times indicated   below:     1-13-24 SAT PM   1-14-24 SUN AM  1-14-24 SUN PM     Wash your hair and face with normal shampoo and soap, rinse it well before using the surgical soap.      In the shower, wet the skin completely with water from your neck to your feet. Apply the cleanser to your   body ONLY FROM THE NECK TO YOUR FEET.     Do NOT USE THE CLEANSER ON YOUR FACE, HEAD, OR GENITAL (PRIVATE) AREAS.   Keep it out of your eyes, ears, and mouth because of the risk of injury to those areas.      Scrub with a clean washcloth for each bath utilizing the soap provided from the top of your body to the   bottom starting at the neck area.      Pay close attention to your armpits, groin area, and the site of surgery.      Wash your body gently for 5 minutes. Stand outside the stream or turn off the water while scrubbing your   body. Do NOT wash with your regular soap after the surgical cleanser is used.      RINSE THE CLEANSER OFF COMPLETELY with plenty of water. Rinse the area again thoroughly.      Dry off with a clean towel. The surgical soap can cause dryness; however do NOT APPLY LOTION,   CREAM, POWDER, and/or DEODORANT AFTER SHOWERING.     Be sure to where clean clothes after showering.      Ensure CLEAN BED LINENS AFTER FIRST wash with the surgical soap. - ON SAT PM     NO PETS ALLOWED IN THE BED with you after utilizing the surgical soap.    Clear Liquid Diet        Find out when you need to start a clear liquid diet.   Think of “clear liquids” as anything you could read a newspaper through. This includes things like water, broth, sports drinks, or tea WITHOUT any kind of milk or cream.           Once you are told to start a clear liquid  diet, only drink these things until 3 hours before arrival to the hospital or when the hospital says to stop. Total volume limitation: 8 oz.       Clear liquids you CAN drink:   Water   Clear broth: beef, chicken, vegetable, or bone broth with nothing in it   Gatorade   Lemonade or Aba-aid   Soda   Tea, coffee (NO cream or honey)   Jell-O (without fruit)   Popsicles (without fruit or cream)   Italian ices   Juice without pulp: apple, white, grape   You may use salt, pepper, and sugar  NO RED  NO NOODLES      Do NOT drink:   Milk or cream   Soy milk, almond milk, coconut milk, or other non-dairy drinks and   creamers   Milkshakes or smoothies   Tomato juice   Orange juice   Grapefruit juice   Cream soups or any other than broth         Clear Liquid Diet:  Do NOT eat any solid food.  Do NOT eat or suck on mints or candy.  Do NOT chew gum.  Do NOT drink thick liquids like milk or juice with pulp in it.  Do NOT add milk, cream, or anything like soy milk or almond milk to coffee or tea.

## 2024-01-08 NOTE — SIGNIFICANT NOTE
PT WOULD LIKE TO THERAPY AT [facility]. PT WIFE TO TRANSPORT TO AND FROM THERAPY. , PT GOAL FOR SURGERY IS GETTING BETTER

## 2024-01-11 ENCOUNTER — ANESTHESIA EVENT (OUTPATIENT)
Dept: PERIOP | Facility: HOSPITAL | Age: 64
End: 2024-01-11
Payer: OTHER MISCELLANEOUS

## 2024-01-13 NOTE — H&P
Chief Complaint  No chief complaint on file.    Subjective      Devyn Steinberg presents to [facility] for a follow up of his left shoulder. He is 6 weeks status post left shoulder arthroscopy with open debridement of massive rotator cuff tear, performed by Dr. Hernandez on 9/27/2023. He reports he is still having pain and it is about the same level as it was before the surgery. He is in PT and trying to progress with range of motion but due to the pain he is having difficulty.  He reports that he is interested in proceeding with total shoulder reverse arthroplasty.      Objective   Allergies   Allergen Reactions    Trulicity [Dulaglutide] Other (See Comments)     Pancreatitis    Tyloxapol Swelling       Vital Signs:   There were no vitals taken for this visit.      Physical Exam    Constitutional: Awake, alert. Well nourished appearance.    Integumentary: Warm, dry, intact. No obvious rashes.    HENT: Atraumatic, normocephalic.   Respiratory: Non labored respirations .   Cardiovascular: Intact peripheral pulses.    Psychiatric: Normal mood and affect. A&O X3    Ortho Exam  Left shoulder: Skin is warm, dry, and intact.  Well-healed surgical scars noted.  Patient demonstrates full flexion and extension of the wrist and elbow.  Full pronation and supination.  Active assisted shoulder forward flexion to 110 degrees with pain reported.  Sensation and distal neurovascular intact.    Imaging Results (Most Recent)       None                      Assessment and Plan   Problem List Items Addressed This Visit    None        Follow Up   No follow-ups on file.    Tobacco Use: Low Risk  (1/8/2024)    Patient History     Smoking Tobacco Use: Never     Smokeless Tobacco Use: Never     Passive Exposure: Past     Patient is a non-smoker.  Did not discuss tobacco cessation options.    Patient Instructions   Patient reports persistent pain postoperatively and failure to progress with physical therapy.  He is requesting to  proceed with total shoulder reverse arthroplasty.  X-rays taken and reviewed and discussed with Dr. Hernandez.  Dr. Hernandez is in agreement to proceed with requested TSRA.  Orders placed.     Discussed surgery. Risks/benefits discussed with patient including, but not limited to: infection, bleeding, neurovascular damage, malunion, nonunion, aesthetic deformity, need for further surgery, and death. Discussed with patient the implant type being used during surgery and patient understands and desires to proceed. Surgery pamphlet provided to patient. Call or return if worsening symptoms.    Electronically signed by Erickson Hernandez MD, 01/12/24, 8:12 PM EST.

## 2024-01-15 ENCOUNTER — ANESTHESIA (OUTPATIENT)
Dept: PERIOP | Facility: HOSPITAL | Age: 64
End: 2024-01-15
Payer: OTHER MISCELLANEOUS

## 2024-01-15 ENCOUNTER — HOSPITAL ENCOUNTER (OUTPATIENT)
Facility: HOSPITAL | Age: 64
Discharge: HOME | End: 2024-01-16
Attending: ORTHOPAEDIC SURGERY | Admitting: ORTHOPAEDIC SURGERY
Payer: OTHER MISCELLANEOUS

## 2024-01-15 ENCOUNTER — ANESTHESIA EVENT CONVERTED (OUTPATIENT)
Dept: ANESTHESIOLOGY | Facility: HOSPITAL | Age: 64
End: 2024-01-15
Payer: OTHER MISCELLANEOUS

## 2024-01-15 ENCOUNTER — APPOINTMENT (OUTPATIENT)
Dept: GENERAL RADIOLOGY | Facility: HOSPITAL | Age: 64
End: 2024-01-15
Payer: OTHER MISCELLANEOUS

## 2024-01-15 DIAGNOSIS — R26.2 DIFFICULTY WALKING: Primary | ICD-10-CM

## 2024-01-15 PROBLEM — Z96.619 S/P REVERSE TOTAL SHOULDER ARTHROPLASTY: Status: ACTIVE | Noted: 2024-01-15

## 2024-01-15 LAB
GLUCOSE BLDC GLUCOMTR-MCNC: 151 MG/DL (ref 70–99)
GLUCOSE BLDC GLUCOMTR-MCNC: 157 MG/DL (ref 70–99)
GLUCOSE BLDC GLUCOMTR-MCNC: 185 MG/DL (ref 70–99)
GLUCOSE BLDC GLUCOMTR-MCNC: 214 MG/DL (ref 70–99)

## 2024-01-15 PROCEDURE — 25010000002 CEFAZOLIN PER 500 MG

## 2024-01-15 PROCEDURE — 23472 RECONSTRUCT SHOULDER JOINT: CPT | Performed by: ORTHOPAEDIC SURGERY

## 2024-01-15 PROCEDURE — 82948 REAGENT STRIP/BLOOD GLUCOSE: CPT

## 2024-01-15 PROCEDURE — 63710000001 INSULIN LISPRO (HUMAN) PER 5 UNITS: Performed by: PHYSICIAN ASSISTANT

## 2024-01-15 PROCEDURE — 25010000002 PROCHLORPERAZINE 10 MG/2ML SOLUTION: Performed by: PHYSICIAN ASSISTANT

## 2024-01-15 PROCEDURE — 94799 UNLISTED PULMONARY SVC/PX: CPT

## 2024-01-15 PROCEDURE — 25810000003 LACTATED RINGERS PER 1000 ML: Performed by: ANESTHESIOLOGY

## 2024-01-15 PROCEDURE — 25010000002 DEXAMETHASONE PER 1 MG

## 2024-01-15 PROCEDURE — 25010000002 CEFAZOLIN IN DEXTROSE 2-4 GM/100ML-% SOLUTION: Performed by: ORTHOPAEDIC SURGERY

## 2024-01-15 PROCEDURE — 0 HYDROMORPHONE 1 MG/ML SOLUTION

## 2024-01-15 PROCEDURE — C1776 JOINT DEVICE (IMPLANTABLE): HCPCS | Performed by: ORTHOPAEDIC SURGERY

## 2024-01-15 PROCEDURE — 25010000002 SUGAMMADEX 200 MG/2ML SOLUTION

## 2024-01-15 PROCEDURE — 25010000002 PROPOFOL 10 MG/ML EMULSION

## 2024-01-15 PROCEDURE — 97161 PT EVAL LOW COMPLEX 20 MIN: CPT

## 2024-01-15 PROCEDURE — 25010000002 MIDAZOLAM PER 1MG: Performed by: ANESTHESIOLOGY

## 2024-01-15 PROCEDURE — 25010000002 BUPIVACAINE (PF) 0.5 % SOLUTION: Performed by: ANESTHESIOLOGY

## 2024-01-15 PROCEDURE — 25010000002 ONDANSETRON PER 1 MG: Performed by: ORTHOPAEDIC SURGERY

## 2024-01-15 PROCEDURE — 25010000002 ONDANSETRON PER 1 MG

## 2024-01-15 PROCEDURE — L3670 SO ACRO/CLAV CAN WEB PRE OTS: HCPCS | Performed by: ORTHOPAEDIC SURGERY

## 2024-01-15 PROCEDURE — 73020 X-RAY EXAM OF SHOULDER: CPT

## 2024-01-15 DEVICE — SCRW COMPRNSV CNTRL 6.5X35MM REUS: Type: IMPLANTABLE DEVICE | Site: SHOULDER | Status: FUNCTIONAL

## 2024-01-15 DEVICE — TRY HUM/SHLDR COMPREHENSIVE/REVERSE MINI COCR STD 40MM: Type: IMPLANTABLE DEVICE | Site: SHOULDER | Status: FUNCTIONAL

## 2024-01-15 DEVICE — SCRW FIX LK HEX 4.75X15MM: Type: IMPLANTABLE DEVICE | Site: SHOULDER | Status: FUNCTIONAL

## 2024-01-15 DEVICE — GLENOSPHERE VERSA DIAL FIX STD 36MM: Type: IMPLANTABLE DEVICE | Site: SHOULDER | Status: FUNCTIONAL

## 2024-01-15 DEVICE — SCRW FIX LK HEX 4.75X25MM: Type: IMPLANTABLE DEVICE | Site: SHOULDER | Status: FUNCTIONAL

## 2024-01-15 DEVICE — BEAR HUM PROLNG STD 36MM: Type: IMPLANTABLE DEVICE | Site: SHOULDER | Status: FUNCTIONAL

## 2024-01-15 DEVICE — STEM HUM/SHLDR COMPREHENSIVE MINI 9X83MM: Type: IMPLANTABLE DEVICE | Site: SHOULDER | Status: FUNCTIONAL

## 2024-01-15 DEVICE — BASEPLT GLEN COMPR MINI W TPR ADAPTR 25: Type: IMPLANTABLE DEVICE | Site: SHOULDER | Status: FUNCTIONAL

## 2024-01-15 DEVICE — SCRW FIX LK HEX 4.75X30MM: Type: IMPLANTABLE DEVICE | Site: SHOULDER | Status: FUNCTIONAL

## 2024-01-15 DEVICE — TOTL SHLDER REV: Type: IMPLANTABLE DEVICE | Site: SHOULDER | Status: FUNCTIONAL

## 2024-01-15 RX ORDER — ACETAMINOPHEN 650 MG/1
650 SUPPOSITORY RECTAL EVERY 4 HOURS PRN
Status: DISCONTINUED | OUTPATIENT
Start: 2024-01-15 | End: 2024-01-16 | Stop reason: HOSPADM

## 2024-01-15 RX ORDER — MEPERIDINE HYDROCHLORIDE 25 MG/ML
12.5 INJECTION INTRAMUSCULAR; INTRAVENOUS; SUBCUTANEOUS
Status: DISCONTINUED | OUTPATIENT
Start: 2024-01-15 | End: 2024-01-15 | Stop reason: HOSPADM

## 2024-01-15 RX ORDER — AMOXICILLIN 250 MG
2 CAPSULE ORAL 2 TIMES DAILY
Status: DISCONTINUED | OUTPATIENT
Start: 2024-01-15 | End: 2024-01-16 | Stop reason: HOSPADM

## 2024-01-15 RX ORDER — INSULIN LISPRO 100 [IU]/ML
2-7 INJECTION, SOLUTION INTRAVENOUS; SUBCUTANEOUS
Status: DISCONTINUED | OUTPATIENT
Start: 2024-01-15 | End: 2024-01-16 | Stop reason: HOSPADM

## 2024-01-15 RX ORDER — ROCURONIUM BROMIDE 10 MG/ML
INJECTION, SOLUTION INTRAVENOUS AS NEEDED
Status: DISCONTINUED | OUTPATIENT
Start: 2024-01-15 | End: 2024-01-15 | Stop reason: SURG

## 2024-01-15 RX ORDER — LIDOCAINE HYDROCHLORIDE 20 MG/ML
INJECTION, SOLUTION EPIDURAL; INFILTRATION; INTRACAUDAL; PERINEURAL AS NEEDED
Status: DISCONTINUED | OUTPATIENT
Start: 2024-01-15 | End: 2024-01-15 | Stop reason: SURG

## 2024-01-15 RX ORDER — ACETAMINOPHEN 325 MG/1
325 TABLET ORAL EVERY 4 HOURS PRN
Status: DISCONTINUED | OUTPATIENT
Start: 2024-01-15 | End: 2024-01-16 | Stop reason: HOSPADM

## 2024-01-15 RX ORDER — SODIUM CHLORIDE 0.9 % (FLUSH) 0.9 %
10 SYRINGE (ML) INJECTION EVERY 12 HOURS SCHEDULED
Status: DISCONTINUED | OUTPATIENT
Start: 2024-01-15 | End: 2024-01-16 | Stop reason: HOSPADM

## 2024-01-15 RX ORDER — MIDAZOLAM HYDROCHLORIDE 2 MG/2ML
2 INJECTION, SOLUTION INTRAMUSCULAR; INTRAVENOUS ONCE
Status: COMPLETED | OUTPATIENT
Start: 2024-01-15 | End: 2024-01-15

## 2024-01-15 RX ORDER — BUPIVACAINE HYDROCHLORIDE 5 MG/ML
INJECTION, SOLUTION EPIDURAL; INTRACAUDAL
Status: COMPLETED | OUTPATIENT
Start: 2024-01-15 | End: 2024-01-15

## 2024-01-15 RX ORDER — EPHEDRINE SULFATE 50 MG/ML
INJECTION, SOLUTION INTRAVENOUS AS NEEDED
Status: DISCONTINUED | OUTPATIENT
Start: 2024-01-15 | End: 2024-01-15 | Stop reason: SURG

## 2024-01-15 RX ORDER — ONDANSETRON 2 MG/ML
4 INJECTION INTRAMUSCULAR; INTRAVENOUS EVERY 6 HOURS PRN
Status: DISCONTINUED | OUTPATIENT
Start: 2024-01-15 | End: 2024-01-16 | Stop reason: HOSPADM

## 2024-01-15 RX ORDER — PROMETHAZINE HYDROCHLORIDE 12.5 MG/1
25 TABLET ORAL ONCE AS NEEDED
Status: DISCONTINUED | OUTPATIENT
Start: 2024-01-15 | End: 2024-01-15 | Stop reason: HOSPADM

## 2024-01-15 RX ORDER — ACETAMINOPHEN 500 MG
1000 TABLET ORAL ONCE
Status: COMPLETED | OUTPATIENT
Start: 2024-01-15 | End: 2024-01-15

## 2024-01-15 RX ORDER — TRANEXAMIC ACID 100 MG/ML
INJECTION, SOLUTION INTRAVENOUS AS NEEDED
Status: DISCONTINUED | OUTPATIENT
Start: 2024-01-15 | End: 2024-01-15 | Stop reason: SURG

## 2024-01-15 RX ORDER — NALOXONE HCL 0.4 MG/ML
0.4 VIAL (ML) INJECTION
Status: DISCONTINUED | OUTPATIENT
Start: 2024-01-15 | End: 2024-01-16 | Stop reason: HOSPADM

## 2024-01-15 RX ORDER — DEXAMETHASONE SODIUM PHOSPHATE 4 MG/ML
INJECTION, SOLUTION INTRA-ARTICULAR; INTRALESIONAL; INTRAMUSCULAR; INTRAVENOUS; SOFT TISSUE AS NEEDED
Status: DISCONTINUED | OUTPATIENT
Start: 2024-01-15 | End: 2024-01-15 | Stop reason: SURG

## 2024-01-15 RX ORDER — PHENYLEPHRINE HCL IN 0.9% NACL 1 MG/10 ML
SYRINGE (ML) INTRAVENOUS AS NEEDED
Status: DISCONTINUED | OUTPATIENT
Start: 2024-01-15 | End: 2024-01-15 | Stop reason: SURG

## 2024-01-15 RX ORDER — CEFAZOLIN SODIUM 2 G/100ML
2 INJECTION, SOLUTION INTRAVENOUS ONCE
Status: DISCONTINUED | OUTPATIENT
Start: 2024-01-15 | End: 2024-01-15 | Stop reason: HOSPADM

## 2024-01-15 RX ORDER — CELECOXIB 100 MG/1
200 CAPSULE ORAL ONCE
Status: COMPLETED | OUTPATIENT
Start: 2024-01-15 | End: 2024-01-15

## 2024-01-15 RX ORDER — MORPHINE SULFATE 2 MG/ML
2 INJECTION, SOLUTION INTRAMUSCULAR; INTRAVENOUS
Status: DISCONTINUED | OUTPATIENT
Start: 2024-01-15 | End: 2024-01-16 | Stop reason: HOSPADM

## 2024-01-15 RX ORDER — FAMOTIDINE 20 MG/1
40 TABLET, FILM COATED ORAL DAILY
Status: DISCONTINUED | OUTPATIENT
Start: 2024-01-15 | End: 2024-01-16 | Stop reason: HOSPADM

## 2024-01-15 RX ORDER — PROMETHAZINE HYDROCHLORIDE 25 MG/1
25 SUPPOSITORY RECTAL ONCE AS NEEDED
Status: DISCONTINUED | OUTPATIENT
Start: 2024-01-15 | End: 2024-01-15 | Stop reason: HOSPADM

## 2024-01-15 RX ORDER — SODIUM CHLORIDE 0.9 % (FLUSH) 0.9 %
10 SYRINGE (ML) INJECTION AS NEEDED
Status: DISCONTINUED | OUTPATIENT
Start: 2024-01-15 | End: 2024-01-16 | Stop reason: HOSPADM

## 2024-01-15 RX ORDER — HYDROCODONE BITARTRATE AND ACETAMINOPHEN 7.5; 325 MG/1; MG/1
2 TABLET ORAL EVERY 4 HOURS PRN
Status: DISCONTINUED | OUTPATIENT
Start: 2024-01-15 | End: 2024-01-16 | Stop reason: HOSPADM

## 2024-01-15 RX ORDER — SODIUM CHLORIDE 9 MG/ML
40 INJECTION, SOLUTION INTRAVENOUS AS NEEDED
Status: DISCONTINUED | OUTPATIENT
Start: 2024-01-15 | End: 2024-01-15 | Stop reason: HOSPADM

## 2024-01-15 RX ORDER — CEFAZOLIN SODIUM IN 0.9 % NACL 3 G/100 ML
3 INTRAVENOUS SOLUTION, PIGGYBACK (ML) INTRAVENOUS ONCE
Status: DISCONTINUED | OUTPATIENT
Start: 2024-01-15 | End: 2024-01-15

## 2024-01-15 RX ORDER — SODIUM CHLORIDE 0.9 % (FLUSH) 0.9 %
10 SYRINGE (ML) INJECTION AS NEEDED
Status: DISCONTINUED | OUTPATIENT
Start: 2024-01-15 | End: 2024-01-15 | Stop reason: HOSPADM

## 2024-01-15 RX ORDER — PROPOFOL 10 MG/ML
VIAL (ML) INTRAVENOUS AS NEEDED
Status: DISCONTINUED | OUTPATIENT
Start: 2024-01-15 | End: 2024-01-15 | Stop reason: SURG

## 2024-01-15 RX ORDER — CEFAZOLIN SODIUM 1 G/3ML
INJECTION, POWDER, FOR SOLUTION INTRAMUSCULAR; INTRAVENOUS AS NEEDED
Status: DISCONTINUED | OUTPATIENT
Start: 2024-01-15 | End: 2024-01-15 | Stop reason: SURG

## 2024-01-15 RX ORDER — ONDANSETRON 2 MG/ML
4 INJECTION INTRAMUSCULAR; INTRAVENOUS ONCE AS NEEDED
Status: DISCONTINUED | OUTPATIENT
Start: 2024-01-15 | End: 2024-01-15 | Stop reason: HOSPADM

## 2024-01-15 RX ORDER — FERROUS SULFATE 325(65) MG
325 TABLET ORAL
Status: DISCONTINUED | OUTPATIENT
Start: 2024-01-16 | End: 2024-01-16 | Stop reason: HOSPADM

## 2024-01-15 RX ORDER — ROSUVASTATIN CALCIUM 5 MG/1
10 TABLET, COATED ORAL NIGHTLY
Status: DISCONTINUED | OUTPATIENT
Start: 2024-01-15 | End: 2024-01-16 | Stop reason: HOSPADM

## 2024-01-15 RX ORDER — SODIUM CHLORIDE 9 MG/ML
40 INJECTION, SOLUTION INTRAVENOUS AS NEEDED
Status: DISCONTINUED | OUTPATIENT
Start: 2024-01-15 | End: 2024-01-16 | Stop reason: HOSPADM

## 2024-01-15 RX ORDER — DEXMEDETOMIDINE HYDROCHLORIDE 100 UG/ML
INJECTION, SOLUTION INTRAVENOUS AS NEEDED
Status: DISCONTINUED | OUTPATIENT
Start: 2024-01-15 | End: 2024-01-15 | Stop reason: SURG

## 2024-01-15 RX ORDER — SODIUM CHLORIDE, SODIUM LACTATE, POTASSIUM CHLORIDE, CALCIUM CHLORIDE 600; 310; 30; 20 MG/100ML; MG/100ML; MG/100ML; MG/100ML
100 INJECTION, SOLUTION INTRAVENOUS CONTINUOUS
Status: DISCONTINUED | OUTPATIENT
Start: 2024-01-15 | End: 2024-01-16 | Stop reason: HOSPADM

## 2024-01-15 RX ORDER — KETAMINE HCL IN NACL, ISO-OSM 100MG/10ML
SYRINGE (ML) INJECTION AS NEEDED
Status: DISCONTINUED | OUTPATIENT
Start: 2024-01-15 | End: 2024-01-15 | Stop reason: SURG

## 2024-01-15 RX ORDER — IBUPROFEN 600 MG/1
1 TABLET ORAL
Status: DISCONTINUED | OUTPATIENT
Start: 2024-01-15 | End: 2024-01-16 | Stop reason: HOSPADM

## 2024-01-15 RX ORDER — TRANEXAMIC ACID 10 MG/ML
1000 INJECTION, SOLUTION INTRAVENOUS ONCE
Status: COMPLETED | OUTPATIENT
Start: 2024-01-15 | End: 2024-01-15

## 2024-01-15 RX ORDER — DEXTROSE MONOHYDRATE 25 G/50ML
25 INJECTION, SOLUTION INTRAVENOUS
Status: DISCONTINUED | OUTPATIENT
Start: 2024-01-15 | End: 2024-01-16 | Stop reason: HOSPADM

## 2024-01-15 RX ORDER — NICOTINE POLACRILEX 4 MG
15 LOZENGE BUCCAL
Status: DISCONTINUED | OUTPATIENT
Start: 2024-01-15 | End: 2024-01-16 | Stop reason: HOSPADM

## 2024-01-15 RX ORDER — ONDANSETRON 2 MG/ML
INJECTION INTRAMUSCULAR; INTRAVENOUS AS NEEDED
Status: DISCONTINUED | OUTPATIENT
Start: 2024-01-15 | End: 2024-01-15 | Stop reason: SURG

## 2024-01-15 RX ORDER — SODIUM CHLORIDE, SODIUM LACTATE, POTASSIUM CHLORIDE, CALCIUM CHLORIDE 600; 310; 30; 20 MG/100ML; MG/100ML; MG/100ML; MG/100ML
9 INJECTION, SOLUTION INTRAVENOUS CONTINUOUS PRN
Status: DISCONTINUED | OUTPATIENT
Start: 2024-01-15 | End: 2024-01-16 | Stop reason: HOSPADM

## 2024-01-15 RX ORDER — HYDROCODONE BITARTRATE AND ACETAMINOPHEN 7.5; 325 MG/1; MG/1
1 TABLET ORAL EVERY 4 HOURS PRN
Status: DISCONTINUED | OUTPATIENT
Start: 2024-01-15 | End: 2024-01-16 | Stop reason: HOSPADM

## 2024-01-15 RX ORDER — OXYCODONE HYDROCHLORIDE 5 MG/1
5 TABLET ORAL
Status: DISCONTINUED | OUTPATIENT
Start: 2024-01-15 | End: 2024-01-15 | Stop reason: HOSPADM

## 2024-01-15 RX ORDER — MAGNESIUM HYDROXIDE 1200 MG/15ML
LIQUID ORAL AS NEEDED
Status: DISCONTINUED | OUTPATIENT
Start: 2024-01-15 | End: 2024-01-15 | Stop reason: HOSPADM

## 2024-01-15 RX ORDER — ONDANSETRON 4 MG/1
4 TABLET, ORALLY DISINTEGRATING ORAL EVERY 6 HOURS PRN
Status: DISCONTINUED | OUTPATIENT
Start: 2024-01-15 | End: 2024-01-16 | Stop reason: HOSPADM

## 2024-01-15 RX ORDER — ACETAMINOPHEN 325 MG/1
650 TABLET ORAL EVERY 4 HOURS PRN
Status: DISCONTINUED | OUTPATIENT
Start: 2024-01-15 | End: 2024-01-16 | Stop reason: HOSPADM

## 2024-01-15 RX ORDER — CEFAZOLIN SODIUM 2 G/100ML
2000 INJECTION, SOLUTION INTRAVENOUS EVERY 8 HOURS
Qty: 200 ML | Refills: 0 | Status: COMPLETED | OUTPATIENT
Start: 2024-01-15 | End: 2024-01-16

## 2024-01-15 RX ORDER — TRANEXAMIC ACID 10 MG/ML
1000 INJECTION, SOLUTION INTRAVENOUS ONCE
Status: DISCONTINUED | OUTPATIENT
Start: 2024-01-15 | End: 2024-01-15 | Stop reason: HOSPADM

## 2024-01-15 RX ORDER — PROCHLORPERAZINE EDISYLATE 5 MG/ML
2.5 INJECTION INTRAMUSCULAR; INTRAVENOUS EVERY 4 HOURS PRN
Status: DISCONTINUED | OUTPATIENT
Start: 2024-01-15 | End: 2024-01-16 | Stop reason: HOSPADM

## 2024-01-15 RX ADMIN — TRANEXAMIC ACID 1000 MG: 100 INJECTION INTRAVENOUS at 13:43

## 2024-01-15 RX ADMIN — DEXMEDETOMIDINE HYDROCHLORIDE 10 MCG: 100 INJECTION, SOLUTION, CONCENTRATE INTRAVENOUS at 12:43

## 2024-01-15 RX ADMIN — SODIUM CHLORIDE, POTASSIUM CHLORIDE, SODIUM LACTATE AND CALCIUM CHLORIDE: 600; 310; 30; 20 INJECTION, SOLUTION INTRAVENOUS at 13:55

## 2024-01-15 RX ADMIN — TRANEXAMIC ACID 1000 MG: 10 INJECTION, SOLUTION INTRAVENOUS at 12:07

## 2024-01-15 RX ADMIN — HYDROMORPHONE HYDROCHLORIDE 0.5 MG: 1 INJECTION, SOLUTION INTRAMUSCULAR; INTRAVENOUS; SUBCUTANEOUS at 13:31

## 2024-01-15 RX ADMIN — LIDOCAINE HYDROCHLORIDE 100 MG: 20 INJECTION, SOLUTION EPIDURAL; INFILTRATION; INTRACAUDAL; PERINEURAL at 12:43

## 2024-01-15 RX ADMIN — Medication 10 MG: at 13:14

## 2024-01-15 RX ADMIN — PROPOFOL 20 MG: 10 INJECTION, EMULSION INTRAVENOUS at 13:50

## 2024-01-15 RX ADMIN — Medication 30 MG: at 12:52

## 2024-01-15 RX ADMIN — INSULIN LISPRO 4 UNITS: 100 INJECTION, SOLUTION INTRAVENOUS; SUBCUTANEOUS at 21:20

## 2024-01-15 RX ADMIN — ACETAMINOPHEN 1000 MG: 500 TABLET ORAL at 11:56

## 2024-01-15 RX ADMIN — FAMOTIDINE 40 MG: 20 TABLET ORAL at 17:03

## 2024-01-15 RX ADMIN — Medication 100 MCG: at 13:10

## 2024-01-15 RX ADMIN — CEFAZOLIN SODIUM 2000 MG: 2 INJECTION, SOLUTION INTRAVENOUS at 21:03

## 2024-01-15 RX ADMIN — PROPOFOL 150 MG: 10 INJECTION, EMULSION INTRAVENOUS at 12:42

## 2024-01-15 RX ADMIN — ROCURONIUM BROMIDE 50 MG: 10 INJECTION, SOLUTION INTRAVENOUS at 12:43

## 2024-01-15 RX ADMIN — EPHEDRINE SULFATE 25 MG: 50 INJECTION, SOLUTION INTRAVENOUS at 13:29

## 2024-01-15 RX ADMIN — DEXMEDETOMIDINE HYDROCHLORIDE 10 MCG: 100 INJECTION, SOLUTION, CONCENTRATE INTRAVENOUS at 13:49

## 2024-01-15 RX ADMIN — SUGAMMADEX 200 MG: 100 INJECTION, SOLUTION INTRAVENOUS at 13:54

## 2024-01-15 RX ADMIN — HYDROMORPHONE HYDROCHLORIDE 0.5 MG: 1 INJECTION, SOLUTION INTRAMUSCULAR; INTRAVENOUS; SUBCUTANEOUS at 13:49

## 2024-01-15 RX ADMIN — BUPIVACAINE HYDROCHLORIDE 30 ML: 5 INJECTION, SOLUTION EPIDURAL; INTRACAUDAL at 12:32

## 2024-01-15 RX ADMIN — Medication 200 MCG: at 13:21

## 2024-01-15 RX ADMIN — CELECOXIB 200 MG: 100 CAPSULE ORAL at 11:56

## 2024-01-15 RX ADMIN — SODIUM CHLORIDE, POTASSIUM CHLORIDE, SODIUM LACTATE AND CALCIUM CHLORIDE 9 ML/HR: 600; 310; 30; 20 INJECTION, SOLUTION INTRAVENOUS at 11:55

## 2024-01-15 RX ADMIN — ONDANSETRON 4 MG: 2 INJECTION INTRAMUSCULAR; INTRAVENOUS at 17:03

## 2024-01-15 RX ADMIN — ONDANSETRON 4 MG: 2 INJECTION INTRAMUSCULAR; INTRAVENOUS at 13:50

## 2024-01-15 RX ADMIN — MIDAZOLAM HYDROCHLORIDE 2 MG: 1 INJECTION, SOLUTION INTRAMUSCULAR; INTRAVENOUS at 11:57

## 2024-01-15 RX ADMIN — CEFAZOLIN 2 G: 1 INJECTION, POWDER, FOR SOLUTION INTRAMUSCULAR; INTRAVENOUS at 12:48

## 2024-01-15 RX ADMIN — Medication 200 MCG: at 13:25

## 2024-01-15 RX ADMIN — DEXAMETHASONE SODIUM PHOSPHATE 4 MG: 4 INJECTION, SOLUTION INTRAMUSCULAR; INTRAVENOUS at 12:58

## 2024-01-15 RX ADMIN — Medication 10 MG: at 13:50

## 2024-01-15 RX ADMIN — Medication 100 MCG: at 13:02

## 2024-01-15 RX ADMIN — PROCHLORPERAZINE EDISYLATE 2.5 MG: 5 INJECTION INTRAMUSCULAR; INTRAVENOUS at 21:02

## 2024-01-15 RX ADMIN — ROSUVASTATIN CALCIUM 10 MG: 5 TABLET, FILM COATED ORAL at 21:02

## 2024-01-15 RX ADMIN — EPHEDRINE SULFATE 5 MG: 50 INJECTION INTRAVENOUS at 13:29

## 2024-01-15 RX ADMIN — Medication 100 MCG: at 13:00

## 2024-01-15 NOTE — ANESTHESIA POSTPROCEDURE EVALUATION
Patient: Devyn Steinberg    Procedure Summary       Date: 01/15/24 Room / Location: [facility] OR 03 / [facility] MAIN OR    Anesthesia Start: 1238 Anesthesia Stop: 1408    Procedure: LEFT TOTAL SHOULDER REVERSE ARTHROPLASTY (Left: Shoulder) Diagnosis:       Traumatic complete tear of left rotator cuff, subsequent encounter      (Traumatic complete tear of left rotator cuff, subsequent encounter [S46.012D])    Surgeons: Erickson Hernandez MD Provider: Lance Young MD    Anesthesia Type: general, ERAS Protocol ASA Status: 3            Anesthesia Type: general, ERAS Protocol    Vitals  Vitals Value Taken Time   /79 01/15/24 1409   Temp 36.8 °C (98.3 °F) 01/15/24 1405   Pulse 104 01/15/24 1412   Resp 14 01/15/24 1405   SpO2 94 % 01/15/24 1412   Vitals shown include unfiled device data.        Post Anesthesia Care and Evaluation    Patient location during evaluation: bedside  Patient participation: complete - patient participated  Level of consciousness: awake and alert  Pain management: adequate    Airway patency: patent  Anesthetic complications: No anesthetic complications  PONV Status: none  Cardiovascular status: acceptable  Respiratory status: acceptable  Hydration status: acceptable    Comments: An Anesthesiologist personally participated in the most demanding procedures (including induction and emergence if applicable) in the anesthesia plan, monitored the course of anesthesia administration at frequent intervals and remained physically present and available for immediate diagnosis and treatment of emergencies.

## 2024-01-15 NOTE — ANESTHESIA PROCEDURE NOTES
Peripheral Block      Patient reassessed immediately prior to procedure    Patient location during procedure: pre-op  Reason for block: at surgeon's request and post-op pain management  Preanesthetic Checklist  Completed: patient identified, IV checked, site marked, risks and benefits discussed, surgical consent, monitors and equipment checked, pre-op evaluation and timeout performed  Prep:  Pt Position: supine (HOB elevated)  Sterile barriers:cap, washed/disinfected hands, sterile barriers, gloves, mask, partial drape and alcohol skin prep  Prep: ChloraPrep  Patient monitoring: blood pressure monitoring, continuous pulse oximetry and EKG  Procedure    Sedation: yes  Performed under: local infiltration  Guidance:ultrasound guided and nerve stimulator    ULTRASOUND INTERPRETATION.  Using ultrasound guidance a 22 G gauge needle was placed in close proximity to the brachial plexus nerve, at which point, under ultrasound guidance anesthetic was injected in the area of the nerve and spread of the anesthesia was seen on ultrasound in close proximity thereto.  There were no abnormalities seen on ultrasound; a digital image was taken; and the patient tolerated the procedure with no complications. Images:still images obtained, printed/placed on chart    Laterality:left  Block Type:interscalene  Injection Technique:single-shot  Needle Type:echogenic  Needle Gauge:22 G (2in)  Resistance on Injection: none    Medications Used: bupivacaine (PF) (MARCAINE) 0.5 % injection - Injection   30 mL - 1/15/2024 12:32:00 PM      Post Assessment  Injection Assessment: negative aspiration for heme, no paresthesia on injection and incremental injection  Patient Tolerance:comfortable throughout block  Complications:no  Additional Notes  The block or continuous infusion is requested by the referring physician for management of postoperative pain, or pain related to a procedure. Ultrasound guidance (deemed medically necessary). Painless  injection, pt was awake and conversant during the procedure without complications. Needle and surrounding structures visualized throughout procedure. No adverse reactions or complications seen during this period. Post-procedure image showed no signs of complication, and anatomy was consistent with an uncomplicated nerve blockade.

## 2024-01-15 NOTE — CONSULTS
[facility]   HOSPITALIST HISTORY AND PHYSICAL  Date: 1/15/2024   Patient Name: Devyn Steinberg  : 1960  MRN: [id number]  Primary Care Physician:  Vivi Bailon APRN  Date of admission: 1/15/2024    Subjective   Subjective   Chief Complaint: Medical management    HPI:    Devyn Steinberg is a 63 y.o. male who is being seen by hospitalist for general medical management of chronic medical issues.  He has a history of diabetes, HTN, dyslipidemia, GERD, degenerative joint/osteoarthritis of the left shoulder.  Left total shoulder reverse arthroplasty 1/15/24 by Dr. Hernandez.  Home medications reviewed and include Nexium, Humalog, lisinopril, metformin, Crestor, vitamin D.  Recent lab work reviewed and includes hemoglobin A1c 6.8, creatinine 0.9, potassium 4.3, sodium 138, AST 15, ALT 15, total bilirubin 0.8, WBC 6.4, hemoglobin 16.5, platelets 171.    Current vital signs include /90, respirations 20, pulse 98, T97.8, O2 sats 94-96% on 3 L O2.    Currently, resting in recliner.  Alert and conversational.  Denies any chest pains, shortness of air, palpitations, dizziness, or nausea.  Left arm pain reasonably controlled.    Pertinent History   Past Medical History:    Active Ambulatory Problems     Diagnosis Date Noted   • Abnormal liver enzymes 01/15/2023   • Pancreatic lesion 2023   • Calculus of gallbladder with biliary obstruction but without cholecystitis 2023   • Ascending cholangitis 2023   • Elevated LFTs 2023   • S/P laparoscopic cholecystectomy 2023   • Encounter for removal of biliary stent 2023   • Nontraumatic incomplete tear of left rotator cuff 2023   • Traumatic complete tear of left rotator cuff 2023     Resolved Ambulatory Problems     Diagnosis Date Noted   • Acute pancreatitis, unspecified complication status, unspecified pancreatitis type 2023     Past Medical History:   Diagnosis Date   • Acid reflux    • Diabetes mellitus    •  Hyperlipidemia    • Nontraumatic incomplete tear of rotator cuff, left        Past Surgical History:    Past Surgical History:   Procedure Laterality Date   • CHOLECYSTECTOMY N/A 03/04/2023    Procedure: CHOLECYSTECTOMY LAPAROSCOPIC;  Surgeon: Paul Hodge MD;  Location: [facility] MAIN OR;  Service: General;  Laterality: N/A;   • ERCP N/A 03/03/2023    Procedure: ENDOSCOPIC RETROGRADE CHOLANGIOPANCREATOGRAPHY WITH SPHINCTEROTOMY, BALLOON SWEEP AND STENT PLACEMENT;  Surgeon: Rory Blas MD;  Location: [facility] ENDOSCOPY;  Service: Gastroenterology;  Laterality: N/A;  BILE DUCT SLUDGE, Ascending cholangitis   • ERCP N/A 03/30/2023    Procedure: ENDOSCOPIC RETROGRADE CHOLANGIOPANCREATOGRAPHY WITH STENT REMOVAL AND BALLOON SWEEP;  Surgeon: Rory Blas MD;  Location: [facility] ENDOSCOPY;  Service: Gastroenterology;  Laterality: N/A;  SAME AS PREOP   • HERNIA REPAIR     • HERNIA REPAIR     • SHOULDER ARTHROSCOPY W/ ROTATOR CUFF REPAIR Left 09/27/2023    Procedure: LEFT SHOULDER ARTHROSCOPY WITH OPEN DEBRIDEMENT OF MASSIVE ROTATOR CUFF TEAR;  Surgeon: Ericksno Hernandez MD;  [place] [facility] [place] [facility]  Service: Orthopedics;  Laterality: Left; PT STATED DIDN'T HAVE RCR   • SHOULDER SURGERY     • UPPER GASTROINTESTINAL ENDOSCOPY         Social History:     Lives locally / [place]  Non-smoker    Social History     Socioeconomic History   • Marital status:    Tobacco Use   • Smoking status: Never     Passive exposure: Past   • Smokeless tobacco: Never   Vaping Use   • Vaping Use: Never used   Substance and Sexual Activity   • Alcohol use: Never   • Drug use: Never   • Sexual activity: Defer       Family History:     Family History   Problem Relation Age of Onset   • Colon cancer Neg Hx    • Cancer Neg Hx        Home Medications (reported)  Current Outpatient Medications   Medication Instructions   • esomeprazole (NEXIUM) 20 mg, Oral, Every Morning Before Breakfast   • Insulin  Lispro, 1 Unit Dial, (HUMALOG) 100 UNIT/ML solution pen-injector Subcutaneous, 3 Times Daily PRN, SLIDING SCALE WHEN NEEDED WITH MEALS, PT STATED HE DON'T TAKE IT OFTEN.  PT STATED HE HAS NEVER HAD TO TAKE <BR>INSTRUCTED NOT TO TAKE NONE AFTER DINNER THE NIGHT BEFORE AND NONE DAY OF SURGERY   • lisinopril (PRINIVIL,ZESTRIL) 5 mg, Oral, Daily   • metFORMIN ER (GLUCOPHAGE-XR) 500 MG 24 hr tablet Take 1 tablet by mouth 2 (Two) Times a Day With Meals.   • OneTouch Verio test strip USE AS DIRECTED TO TEST BLOOD SUGAR THREE TIMES DAILY   • rosuvastatin (CRESTOR) 10 mg, Oral, Nightly   • vitamin D (ERGOCALCIFEROL) 1.25 MG (56779 UT) capsule capsule No dose, route, or frequency recorded.       Allergies:  Allergies   Allergen Reactions   • Trulicity [Dulaglutide] Other (See Comments)     Pancreatitis   • Tyloxapol Swelling       REVIEW OF SYSTEMS:   Left shoulder pain    Objective   Objective   Vitals:   Temp:  [97.3 °F (36.3 °C)-98.3 °F (36.8 °C)] 97.8 °F (36.6 °C)  Heart Rate:  [] 98  Resp:  [14-20] 20  BP: (124-167)/(78-97) 136/90  Flow (L/min):  [3-5] 3  FiO2 (%):  [34 %-56 %] 34 %  PHYSICAL EXAM   CON: WN. WD. NAD.   NECK:  No thyromegaly. No stridor. Trachea midline.  RESP:  CTA. No wheezes. No crackles.  No work of breathing or tachypnea.   CV:  Rhythm regular. Rate WNL. No murmur noted.  No edema.  GI:  Soft and nontender. Nondistended.  EXT: Left arm in sling immobilizer.  Distally, L UE intact neurovascularly.  PSYCH:  Alert. Oriented. Normal affect and mood.  NEURO:  No dysarthria or aphasia. No unilateral weakness or paresthesia.  SKIN: No chronic venous stasis changes or varicosities.  No cellulitis    Result Review    Result Review:  I have personally reviewed the results from the time of this admission to 1/15/2024 15:47 EST and agree with these findings:  []  Laboratory  []  Microbiology  []  Radiology  []  EKG/Telemetry   []  Cardiology/Vascular   []  Pathology  []  Old records  []   Other:    Assessment & Plan   Assessment / Plan   Assessment:    Medical management  DM (Bere[place] Flro)  HTN  Dyslipidemia  Hx pancreatitis  Left rotator cuff tear/severe osteoarthritis  Left total shoulder reverse arthroplasty 1/15/24 by Dr. Hernandez     Plan:    IV fluid hydration  Trend blood pressure.  Check BMP in the morning.  Encourage incentive spirometer  Antiemetics  Low-dose sliding scale insulin protocol implemented  Continue statin.  Continue Pepcid.  Daily PT with left shoulder restrictions  Pain med Rx per orthopedist  DVT prophylaxis per orthopedist    DVT prophylaxis:  Mechanical DVT prophylaxis orders are present.  CODE STATUS:         Electronically signed by JULIA Pitt, 01/15/24, 3:47 PM EST.

## 2024-01-15 NOTE — PLAN OF CARE
Goal Outcome Evaluation:  Plan of Care Reviewed With: (P) patient, family        Progress: (P) no change  Outcome Evaluation: (P) Pt demonstrated impaired gait, balance, and strength and presents below functional baseline. Skilled PT is warranted due to functional deficits.      Anticipated Discharge Disposition (PT): (P) home with outpatient therapy services

## 2024-01-15 NOTE — OP NOTE
TOTAL SHOULDER REVERSE ARTHROPLASTY  Procedure Report    Patient Name:  Devyn Steinberg  YOB: 1960    Date of Surgery:  1/15/2024     Indications:  Severe shoulder arthritis and/or Rotator Cuff Arthropathy, failed conservative treatment    Pre-op Diagnosis:   Traumatic complete tear of left rotator cuff, subsequent encounter [S46.012D]rotator cuff arthropathy       Post-Op Diagnosis Codes:     * Traumatic complete tear of left rotator cuff, subsequent encounter [S46.012D]rotator cuff arthropathy    Procedure/CPT® Codes:      Procedure(s):  LEFT TOTAL SHOULDER REVERSE ARTHROPLASTY    Staff:  Surgeon(s):  Erickson Hernandez MD    Assistant: Jani Montes De Oca Tambrea    Anesthesia: General with Block    Estimated Blood Loss:  75    Implants:    Implant Name Type Inv. Item Serial No.  Lot No. LRB No. Used Action   SCRW FIX LK HEX 4.90N87RU - [id number] Implant SCRW FIX LK HEX 4.73L68OE  [employer] [id number] Left 1 Implanted   SCRW FIX LK HEX 4.56C62PI - [id number] Implant SCRW FIX LK HEX 4.20S99CF  [employer] [id number] Left 1 Implanted   BASEPLT NERY COMPR MINI W TPR ADAPTR 25 - [id number] Implant BASEPLT NERY COMPR MINI W TPR ADAPTR 25  [employer] [id number] Left 1 Implanted   GLENOSPHERE VERSA DIAL FIX STD 36MM - [id number] Implant GLENOSPHERE VERSA DIAL FIX STD 36MM  [employer] [id number] Left 1 Implanted   SCRW FIX LK HEX 4.58B71AG - [id number] Implant SCRW FIX LK HEX 4.81T26AK  [employer] [id number] Left 1 Implanted   SCRW COMPRNSV CNTRL 6.5X35MM REUS - [id number] Implant SCRW COMPRNSV CNTRL 6.5X35MM REUS  [employer] [id number] Left 1 Implanted   SCRW FIX LK HEX 4.78F92LS - [id number] Implant SCRW FIX LK HEX 4.03D06GF  [employer] [id number] Left 1 Implanted   BEAR HUM PROLNG STD 36MM - [id number] Implant BEAR HUM PROLNG STD 36MM  [employer] [id number] Left 1 Implanted   STEM HUM/SHLDR COMPREHENSIVE MINI 9X83MM - [id number] Implant STEM HUM/SHLDR COMPREHENSIVE MINI 9X83MM   [employer] [id number] Left 1 Implanted   TRY HUM/SHLDR COMPREHENSIVE/REVERSE MINI COCR STD 40MM - [id number] Implant TRY HUM/SHLDR COMPREHENSIVE/REVERSE MINI COCR STD 40MM  [id number] [id number] Left 1 Implanted       Specimen:          None        Findings:  OA    Complications: None    Description of Procedure: The patient was brought to the operating room and underwent general anesthesia, preoperative antibiotic and preoperative block, was placed in modified beach chair position, and then prepped and draped in sterile fashion. A deltopectoral incision was made. The interval was found and retractors were placed. The capsule was then opened and then tagged. The shoulder was then dislocated, retractors placed, and this was sequentially reamed, and then the intramedullary cutting guide was placed and the neck cut was made. The head was removed, the pins were removed, and then this was sequentially broached up to the same size as the stem. Glenoid retractors were then placed and then the central pin was then placed. The reamer was then used. Then the mini baseplate was inserted. The central screw hole was drilled and the appropriate screw was then inserted. The peripheral screws were then placed using the locking guide, and these measured and inserted. The glenosphere was packed into place. This was then trial reduced, and a the selected tray and bearing gave good stability. The broach was removed. The mini stem was then inserted followed by placement of the tray and bearing. This was reduced. Excellent range of motion and stability. This was then thoroughly irrigated and then repaired using the [place] needle with the Ethibond stitches and then #1 Vicryl, 2-0 Vicryl, and staples. A sterile dressing was applied followed by ice pack and UltraSling. The patient was then extubated and taken to recovery in stable condition. No complications.    Assistant: Jani Montes De Oca Tambrea  was responsible for performing  the following activities: Retraction, Suction, Irrigation, Closing, and Placing Dressing and their skilled assistance was necessary for the success of this case.    SURGICAL APPROACH: Deltopectoral      SURGICAL TECHNIQUE: Peel off        Erickson Hernandez MD     Date: 1/15/2024  Time: 13:56 EST

## 2024-01-15 NOTE — THERAPY EVALUATION
Acute Care - Physical Therapy Initial Evaluation   [place]     Patient Name: Devyn Steinberg  : 1960  MRN: [id number]  Today's Date: 1/15/2024     Admit date: 1/15/2024     Referring Physician: Elan Cardenas MD     Surgery Date:1/15/2024   Procedure(s) (LRB):  LEFT TOTAL SHOULDER REVERSE ARTHROPLASTY (Left)        Visit Dx:     ICD-10-CM ICD-9-CM   1. Difficulty walking  R26.2 719.7     Patient Active Problem List   Diagnosis    Abnormal liver enzymes    Pancreatic lesion    Calculus of gallbladder with biliary obstruction but without cholecystitis    Ascending cholangitis    Elevated LFTs    S/P laparoscopic cholecystectomy    Encounter for removal of biliary stent    Nontraumatic incomplete tear of left rotator cuff    Traumatic complete tear of left rotator cuff    S/p reverse total shoulder arthroplasty     Past Medical History:   Diagnosis Date    Acid reflux     Diabetes mellitus     BG RUNS AROUND 128-135 IN AM    Hyperlipidemia     Nontraumatic incomplete tear of rotator cuff, left     S/P laparoscopic cholecystectomy 2023     Past Surgical History:   Procedure Laterality Date    CHOLECYSTECTOMY N/A 2023    Procedure: CHOLECYSTECTOMY LAPAROSCOPIC;  Surgeon: Paul Hodge MD;  Location: [facility] MAIN OR;  Service: General;  Laterality: N/A;    ERCP N/A 2023    Procedure: ENDOSCOPIC RETROGRADE CHOLANGIOPANCREATOGRAPHY WITH SPHINCTEROTOMY, BALLOON SWEEP AND STENT PLACEMENT;  Surgeon: Rory Blas MD;  Location: [facility] ENDOSCOPY;  Service: Gastroenterology;  Laterality: N/A;  BILE DUCT SLUDGE, Ascending cholangitis    ERCP N/A 2023    Procedure: ENDOSCOPIC RETROGRADE CHOLANGIOPANCREATOGRAPHY WITH STENT REMOVAL AND BALLOON SWEEP;  Surgeon: Rory Blas MD;  Location: [facility] ENDOSCOPY;  Service: Gastroenterology;  Laterality: N/A;  SAME AS PREOP    HERNIA REPAIR      HERNIA REPAIR      SHOULDER ARTHROSCOPY W/ ROTATOR CUFF REPAIR Left 2023     Procedure: LEFT SHOULDER ARTHROSCOPY WITH OPEN DEBRIDEMENT OF MASSIVE ROTATOR CUFF TEAR;  Surgeon: Erickson Hernandez MD;  Location: [facility] OR [facility];  Service: Orthopedics;  Laterality: Left; PT STATED DIDN'T HAVE RCR    SHOULDER SURGERY      UPPER GASTROINTESTINAL ENDOSCOPY       PT Assessment (last 12 hours)       PT Evaluation and Treatment       Row Name 01/15/24 1515          Physical Therapy Time and Intention    Subjective Information complains of;weakness;fatigue;pain  -JAMAL (r) WB (t) JAMAL (c)     Document Type evaluation  -JAMAL (r) WB (t) JAMAL (c)     Mode of Treatment individual therapy;physical therapy  -JAMAL (r) WB (t) JAMAL (c)     Patient Effort good  -JAMAL (r) WB (t) JAMAL (c)     Symptoms Noted During/After Treatment increased pain;fatigue;nausea  -JAMAL (r) WB (t) JAMAL (c)       Row Name 01/15/24 151          General Information    Patient Profile Reviewed yes  -JAMAL (r) WB (t) JAMAL (c)     Patient Observations cooperative;agree to therapy;lethargic  -JAMAL (r) WB (t) JAMAL (c)     Prior Level of Function independent:;all household mobility  -JAMAL (r) WB (t) JAMAL (c)     Equipment Currently Used at Home none  -JAMAL (r) WB (t) JAMAL (c)     Existing Precautions/Restrictions fall;left;shoulder  -JAMAL (r) WB (t) JAMAL (c)       Row Name 01/15/24 1516          Range of Motion (ROM)    Range of Motion bilateral lower extremities;ROM is WFL  -JAMAL (r) WB (t) JAMAL (c)       Row Name 01/15/24 1514          Strength (Manual Muscle Testing)    Strength (Manual Muscle Testing) other (see comments)  MMT deferred due to nasua  -JAMAL (r) WB (t) JAMAL (c)       Row Name 01/15/24 6484          Bed Mobility    Bed Mobility bed mobility (all) activities  -JAMAL (r) WB (t) JAMAL (c)     All Activities, [place] (Bed Mobility) minimum assist (75% patient effort)  -JAMAL (r) WB (t) JAMAL (c)     Assistive Device (Bed Mobility) bed rails;draw sheet;head of bed elevated  -JAMLA (r) WB (t) JAMAL (c)       Row Name 01/15/24 1515          Transfers    Transfers sit-stand transfer;stand-sit  transfer  -JAMAL (r) WB (t) JAMAL (c)       Row Name 01/15/24 1515          Sit-Stand Transfer    Sit-Stand [place] (Transfers) minimum assist (75% patient effort)  -JAMAL (r) WB (t) JAMAL (c)     Assistive Device (Sit-Stand Transfers) --  -JAMAL       Row Name 01/15/24 1515          Stand-Sit Transfer    Stand-Sit [place] (Transfers) contact guard  -JAMAL (r) WB (t) JAMAL (c)     Assistive Device (Stand-Sit Transfers) --  -JAMAL       Row Name 01/15/24 1515          Gait/Stairs (Locomotion)    Gait/Stairs Locomotion gait/ambulation independence  -JAMAL (r) WB (t) JAMAL (c)     [place] Level (Gait) minimum assist (75% patient effort)  -JAMAL (r) WB (t) JAMAL (c)     Assistive Device (Gait) --  -JAMAL     Patient was able to Ambulate yes  -JAMAL (r) WB (t) JAMAL (c)     Distance in Feet (Gait) 20  -JAMAL (r) WB (t) JAMAL (c)     Pattern (Gait) step-through  -JAMAL (r) WB (t) JAMAL (c)       Row Name 01/15/24 1515          Balance    Balance Assessment standing dynamic balance  -JAMAL (r) WB (t) JAMAL (c)     Dynamic Standing Balance minimal assist  -JAMAL (r) WB (t) JAMAL (c)     Position/Device Used, Standing Balance supported;other (see comments)  R side UE support  -JAMAL (r) WB (t) JAMAL (c)     Balance Interventions standing;sit to stand;supported;dynamic  -JAMAL (r) WB (t) JAMAL (c)     Comment, Balance minor B knee buckle upon standing, pt able to correct himself  -JAMAL (r) WB (t) JAMAL (c)       Row Name             [REMOVED] Wound 09/27/23 Left axilla Incision    Wound - Properties Group Placement Date: 09/27/23  -JM Side: Left  -JM Location: axilla  -JM Primary Wound Type: Incision  -JM Removal Date: 01/15/24  -SC Removal Time: 1312  -SC    Retired Wound - Properties Group Placement Date: 09/27/23  -JM Side: Left  -JM Location: axilla  -JM Primary Wound Type: Incision  -JM Removal Date: 01/15/24  -SC Removal Time: 1312  -SC    Retired Wound - Properties Group Date first assessed: 09/27/23  - Side: Left  - Location: axilla  -JM Primary Wound Type: Incision  -JM Resolution  Date: 01/15/24  -SC Resolution Time: 1312  -SC      Row Name             Wound 01/15/24 1312 Left axilla Incision    Wound - Properties Group Placement Date: 01/15/24  -SC Placement Time: 1312  -SC Present on Original Admission: N  -SC Side: Left  -SC Location: axilla  -SC Primary Wound Type: Incision  -SC    Retired Wound - Properties Group Placement Date: 01/15/24  -SC Placement Time: 1312  -SC Present on Original Admission: N  -SC Side: Left  -SC Location: axilla  -SC Primary Wound Type: Incision  -SC    Retired Wound - Properties Group Date first assessed: 01/15/24  -SC Time first assessed: 1312  -SC Present on Original Admission: N  -SC Side: Left  -SC Location: axilla  -SC Primary Wound Type: Incision  -SC      Row Name 01/15/24 1515          Plan of Care Review    Plan of Care Reviewed With patient;family  -JAMAL (r) WB (t) JAMAL (c)     Progress no change  -JAMAL (r) WB (t) JAMAL (c)     Outcome Evaluation Pt demonstrated impaired gait, balance, and strength and presents below functional baseline. Skilled PT is warranted due to functional deficits.  -JAMAL (r) WB (t) JAMAL (c)       Row Name 01/15/24 1515          Positioning and Restraints    Pre-Treatment Position in bed  -JAMAL (r) WB (t) JAMAL (c)     Post Treatment Position chair  -JAMAL (r) WB (t) JAMAL (c)     In Chair reclined;sitting;call light within reach;encouraged to call for assist;with family/caregiver;with nsg  -JAMAL (r) WB (t) JAMAL (c)       Row Name 01/15/24 1515          Therapy Assessment/Plan (PT)    Rehab Potential (PT) good, to achieve stated therapy goals  -JAMAL (r) WB (t) JAMAL (c)     Criteria for Skilled Interventions Met (PT) yes;meets criteria;skilled treatment is necessary  -JAMAL (r) WB (t) JAMAL (c)     Therapy Frequency (PT) daily  -JAMAL (r) WB (t) JAMAL (c)     Predicted Duration of Therapy Intervention (PT) 10 days  -JAMAL (r) WB (t) JAMAL (c)     Problem List (PT) problems related to;balance;mobility;range of motion (ROM);strength;pain  -JAMAL (r) WB (t) JAMAL (c)     Activity  Limitations Related to Problem List (PT) unable to ambulate safely;unable to transfer safely  -JAMAL (r) WB (t) JAMAL (c)       Row Name 01/15/24 8228          PT Evaluation Complexity    History, PT Evaluation Complexity 1-2 personal factors and/or comorbidities  -JAMAL (r) WB (t) JAMAL (c)     Examination of Body Systems (PT Eval Complexity) 1-2 elements  -JAMAL (r) WB (t) JAMAL (c)     Clinical Presentation (PT Evaluation Complexity) evolving  -JAMAL (r) WB (t) JAMAL (c)     Clinical Decision Making (PT Evaluation Complexity) low complexity  -JAMAL (r) WB (t) JAMAL (c)     Overall Complexity (PT Evaluation Complexity) low complexity  -JAMAL (r) WB (t)       Row Name 01/15/24 1699          Therapy Plan Review/Discharge Plan (PT)    Therapy Plan Review (PT) evaluation/treatment results reviewed  -JAMAL (r) WB (t) JAMAL (c)       Row Name 01/15/24 1517          Physical Therapy Goals    Bed Mobility Goal Selection (PT) bed mobility, PT goal 1  -JAMAL (r) WB (t) JAMAL (c)     Transfer Goal Selection (PT) transfer, PT goal 1  -JAMAL (r) WB (t) JAMAL (c)     Gait Training Goal Selection (PT) gait training, PT goal 1  -JAMAL (r) WB (t) JAMAL (c)       Row Name 01/15/24 8626          Bed Mobility Goal 1 (PT)    Activity/Assistive Device (Bed Mobility Goal 1, PT) bed mobility activities, all  -JAMAL (r) WB (t) JAMAL (c)     [place] Level/Cues Needed (Bed Mobility Goal 1, PT) standby assist  -JAMAL (r) WB (t) JAMAL (c)     Time Frame (Bed Mobility Goal 1, PT) long term goal (LTG);10 days  -JAMAL (r) WB (t) JAMAL (c)       Row Name 01/15/24 9392          Transfer Goal 1 (PT)    Activity/Assistive Device (Transfer Goal 1, PT) sit-to-stand/stand-to-sit  -JAMAL (r) WB (t) JAMAL (c)     [place] Level/Cues Needed (Transfer Goal 1, PT) contact guard required  -JAMAL (r) WB (t) JAMAL (c)     Time Frame (Transfer Goal 1, PT) long term goal (LTG);10 days  -JAMAL (r) WB (t) JAMAL (c)       Row Name 01/15/24 0778          Gait Training Goal 1 (PT)    Activity/Assistive Device (Gait Training Goal 1, PT) gait (walking  locomotion);assistive device use  -JAMAL (r) WB (t) JAMAL (c)     [place] Level (Gait Training Goal 1, PT) contact guard required  -JAMAL (r) WB (t) JAMAL (c)     Distance (Gait Training Goal 1, PT) 250  -JAMAL (r) WB (t) JAMAL (c)     Time Frame (Gait Training Goal 1, PT) long term goal (LTG);10 days  -JAMAL (r) WB (t) JAMAL (c)               User Key  (r) = Recorded By, (t) = Taken By, (c) = Cosigned By      Initials Name Provider Type    SC Dixie Maher, RN Registered Nurse    Farzana Martinez RN Registered Nurse    Neil Jones, PT Physical Therapist    Chidi Rawls, PT Student PT Student                    Physical Therapy Education       Title: PT OT SLP Therapies (Done)       Topic: Physical Therapy (Done)       Point: Mobility training (Done)       Learning Progress Summary             Patient Acceptance, E,TB, VU by WB at 1/15/2024 1559                         Point: Home exercise program (Done)       Learning Progress Summary             Patient Acceptance, E,TB, VU by WB at 1/15/2024 1559                         Point: Body mechanics (Done)       Learning Progress Summary             Patient Acceptance, E,TB, VU by WB at 1/15/2024 1559                         Point: Precautions (Done)       Learning Progress Summary             Patient Acceptance, E,TB, VU by WB at 1/15/2024 1559                                         User Key       Initials Effective Dates Name Provider Type Discipline     01/09/24 -  Chidi Guo, PT Student PT Student PT                  PT Recommendation and Plan         Outcome Measures       Row Name 01/15/24 1500             How much help from another person do you currently need...    Turning from your back to your side while in flat bed without using bedrails? 3  -JAMAL (r) WB (t) JAMAL (c)      Moving from lying on back to sitting on the side of a flat bed without bedrails? 3  -JAMAL (r) WB (t) JAMAL (c)      Moving to and from a bed to a chair (including a wheelchair)? 3  -JAMAL  (r) WB (t) JAMAL (c)      Standing up from a chair using your arms (e.g., wheelchair, bedside chair)? 3  -JAMAL (r) WB (t) JAMAL (c)      Climbing 3-5 steps with a railing? 2  -JAMAL (r) WB (t) JAMAL (c)      To walk in hospital room? 3  -JAMAL (r) WB (t) JAMAL (c)      AM-PAC 6 Clicks Score (PT) 17  -JAMAL (r) WB (t)      Highest Level of Mobility Goal 5 --> Static standing  -JAMAL (r) WB (t)         Functional Assessment    Outcome Measure Options AM-PAC 6 Clicks Basic Mobility (PT)  -JAMAL (r) WB (t) JAMAL (c)                User Key  (r) = Recorded By, (t) = Taken By, (c) = Cosigned By      Initials Name Provider Type    Neil Jones, PT Physical Therapist    Chidi Rawls, PT Student PT Student                     Time Calculation:    PT Charges       Row Name 01/15/24 1558             Time Calculation    PT Received On 01/15/24  -JAMAL (r) WB (t) JAMAL (c)      PT Goal Re-Cert Due Date 01/24/24  -JAMAL (r) WB (t) JAMAL (c)         Untimed Charges    PT Eval/Re-eval Minutes 30  -JAMAL (r) WB (t) JAMAL (c)         Total Minutes    Untimed Charges Total Minutes 30  -JAMAL (r) WB (t)       Total Minutes 30  -JAMAL (r) WB (t)                User Key  (r) = Recorded By, (t) = Taken By, (c) = Cosigned By      Initials Name Provider Type    Neil Jones PT Physical Therapist    Chidi Rawls, PT Student PT Student                  Patient evaluation and/or treatment was performed under the direct supervision of a licensed physical therapist. Neil Coley, PT        PT G-Codes  Outcome Measure Options: AM-PAC 6 Clicks Basic Mobility (PT)  AM-PAC 6 Clicks Score (PT): 17    Neil Coley, PT  1/15/2024

## 2024-01-15 NOTE — ANESTHESIA PREPROCEDURE EVALUATION
Anesthesia Evaluation     Patient summary reviewed and Nursing notes reviewed   no history of anesthetic complications:   NPO Solid Status: > 8 hours  NPO Liquid Status: > 2 hours           Airway   Mallampati: II  TM distance: >3 FB  Neck ROM: full  No difficulty expected  Dental      Pulmonary - negative pulmonary ROS and normal exam    breath sounds clear to auscultation  Cardiovascular - negative cardio ROS and normal exam  Exercise tolerance: good (4-7 METS)    Rhythm: regular  Rate: normal    (+) hyperlipidemia      Neuro/Psych- negative ROS  GI/Hepatic/Renal/Endo - negative ROS   (+) GERD well controlled, diabetes mellitus type 2    Musculoskeletal (-) negative ROS    Abdominal    Substance History - negative use     OB/GYN negative ob/gyn ROS         Other - negative ROS       ROS/Med Hx Other: >4METS, HX DM,HLD, GERD. DENIES C/P, SOA. TJR. KT               Anesthesia Plan    ASA 3     general and ERAS Protocol     (Patient understands anesthesia not responsible for dental damage.)  intravenous induction     Anesthetic plan, risks, benefits, and alternatives have been provided, discussed and informed consent has been obtained with: patient.  Pre-procedure education provided  Plan discussed with CRNA.    CODE STATUS:

## 2024-01-16 VITALS
HEIGHT: 63 IN | SYSTOLIC BLOOD PRESSURE: 128 MMHG | BODY MASS INDEX: 29.02 KG/M2 | HEART RATE: 103 BPM | RESPIRATION RATE: 18 BRPM | OXYGEN SATURATION: 100 % | WEIGHT: 163.8 LBS | TEMPERATURE: 98.6 F | DIASTOLIC BLOOD PRESSURE: 77 MMHG

## 2024-01-16 LAB
ANION GAP SERPL CALCULATED.3IONS-SCNC: 13.3 MMOL/L (ref 5–15)
BASOPHILS # BLD AUTO: 0.01 10*3/MM3 (ref 0–0.2)
BASOPHILS NFR BLD AUTO: 0.1 % (ref 0–1.5)
BUN SERPL-MCNC: 18 MG/DL (ref 8–23)
BUN/CREAT SERPL: 19.8 (ref 7–25)
CALCIUM SPEC-SCNC: 8.9 MG/DL (ref 8.6–10.5)
CHLORIDE SERPL-SCNC: 102 MMOL/L (ref 98–107)
CO2 SERPL-SCNC: 20.7 MMOL/L (ref 22–29)
CREAT SERPL-MCNC: 0.91 MG/DL (ref 0.76–1.27)
DEPRECATED RDW RBC AUTO: 41.1 FL (ref 37–54)
EGFRCR SERPLBLD CKD-EPI 2021: 94.1 ML/MIN/1.73
EOSINOPHIL # BLD AUTO: 0 10*3/MM3 (ref 0–0.4)
EOSINOPHIL NFR BLD AUTO: 0 % (ref 0.3–6.2)
ERYTHROCYTE [DISTWIDTH] IN BLOOD BY AUTOMATED COUNT: 13.3 % (ref 12.3–15.4)
GLUCOSE BLDC GLUCOMTR-MCNC: 165 MG/DL (ref 70–99)
GLUCOSE BLDC GLUCOMTR-MCNC: 185 MG/DL (ref 70–99)
GLUCOSE SERPL-MCNC: 229 MG/DL (ref 65–99)
HCT VFR BLD AUTO: 43.1 % (ref 37.5–51)
HGB BLD-MCNC: 14.9 G/DL (ref 13–17.7)
IMM GRANULOCYTES # BLD AUTO: 0.05 10*3/MM3 (ref 0–0.05)
IMM GRANULOCYTES NFR BLD AUTO: 0.4 % (ref 0–0.5)
LYMPHOCYTES # BLD AUTO: 0.94 10*3/MM3 (ref 0.7–3.1)
LYMPHOCYTES NFR BLD AUTO: 7.1 % (ref 19.6–45.3)
MAGNESIUM SERPL-MCNC: 1.7 MG/DL (ref 1.6–2.4)
MCH RBC QN AUTO: 28.9 PG (ref 26.6–33)
MCHC RBC AUTO-ENTMCNC: 34.6 G/DL (ref 31.5–35.7)
MCV RBC AUTO: 83.5 FL (ref 79–97)
MONOCYTES # BLD AUTO: 0.85 10*3/MM3 (ref 0.1–0.9)
MONOCYTES NFR BLD AUTO: 6.4 % (ref 5–12)
NEUTROPHILS NFR BLD AUTO: 11.42 10*3/MM3 (ref 1.7–7)
NEUTROPHILS NFR BLD AUTO: 86 % (ref 42.7–76)
NRBC BLD AUTO-RTO: 0 /100 WBC (ref 0–0.2)
PLATELET # BLD AUTO: 175 10*3/MM3 (ref 140–450)
PMV BLD AUTO: 9.9 FL (ref 6–12)
POTASSIUM SERPL-SCNC: 4.3 MMOL/L (ref 3.5–5.2)
RBC # BLD AUTO: 5.16 10*6/MM3 (ref 4.14–5.8)
SODIUM SERPL-SCNC: 136 MMOL/L (ref 136–145)
WBC NRBC COR # BLD AUTO: 13.27 10*3/MM3 (ref 3.4–10.8)

## 2024-01-16 PROCEDURE — 97165 OT EVAL LOW COMPLEX 30 MIN: CPT

## 2024-01-16 PROCEDURE — 97535 SELF CARE MNGMENT TRAINING: CPT

## 2024-01-16 PROCEDURE — 80048 BASIC METABOLIC PNL TOTAL CA: CPT | Performed by: ORTHOPAEDIC SURGERY

## 2024-01-16 PROCEDURE — 94799 UNLISTED PULMONARY SVC/PX: CPT

## 2024-01-16 PROCEDURE — 25010000002 CEFAZOLIN IN DEXTROSE 2-4 GM/100ML-% SOLUTION: Performed by: ORTHOPAEDIC SURGERY

## 2024-01-16 PROCEDURE — 85025 COMPLETE CBC W/AUTO DIFF WBC: CPT | Performed by: PHYSICIAN ASSISTANT

## 2024-01-16 PROCEDURE — 97110 THERAPEUTIC EXERCISES: CPT

## 2024-01-16 PROCEDURE — 82948 REAGENT STRIP/BLOOD GLUCOSE: CPT

## 2024-01-16 PROCEDURE — 97116 GAIT TRAINING THERAPY: CPT

## 2024-01-16 PROCEDURE — 83735 ASSAY OF MAGNESIUM: CPT | Performed by: PHYSICIAN ASSISTANT

## 2024-01-16 PROCEDURE — 63710000001 INSULIN LISPRO (HUMAN) PER 5 UNITS: Performed by: PHYSICIAN ASSISTANT

## 2024-01-16 RX ORDER — ASPIRIN 325 MG
325 TABLET ORAL DAILY
Status: SHIPPED | OUTPATIENT
Start: 2024-01-16 | End: 2024-02-13

## 2024-01-16 RX ORDER — HYDROCODONE BITARTRATE AND ACETAMINOPHEN 7.5; 325 MG/1; MG/1
1-2 TABLET ORAL EVERY 4 HOURS PRN
Qty: 40 TABLET | Refills: 0 | Status: SHIPPED | OUTPATIENT
Start: 2024-01-16

## 2024-01-16 RX ADMIN — INSULIN LISPRO 2 UNITS: 100 INJECTION, SOLUTION INTRAVENOUS; SUBCUTANEOUS at 12:15

## 2024-01-16 RX ADMIN — FAMOTIDINE 40 MG: 20 TABLET ORAL at 08:31

## 2024-01-16 RX ADMIN — CEFAZOLIN SODIUM 2000 MG: 2 INJECTION, SOLUTION INTRAVENOUS at 05:28

## 2024-01-16 RX ADMIN — INSULIN LISPRO 2 UNITS: 100 INJECTION, SOLUTION INTRAVENOUS; SUBCUTANEOUS at 08:31

## 2024-01-16 RX ADMIN — FERROUS SULFATE TAB 325 MG (65 MG ELEMENTAL FE) 325 MG: 325 (65 FE) TAB at 08:31

## 2024-01-16 RX ADMIN — HYDROCODONE BITARTRATE AND ACETAMINOPHEN 1 TABLET: 7.5; 325 TABLET ORAL at 13:12

## 2024-01-16 RX ADMIN — Medication 10 ML: at 08:32

## 2024-01-16 NOTE — PLAN OF CARE
Problem: Adult Inpatient Plan of Care  Goal: Plan of Care Review  Outcome: Met  Goal: Patient-Specific Goal (Individualized)  Outcome: Met  Goal: Absence of Hospital-Acquired Illness or Injury  Outcome: Met  Intervention: Identify and Manage Fall Risk  Recent Flowsheet Documentation  Taken 1/16/2024 1020 by Ariela Ibarra RN  Safety Promotion/Fall Prevention: safety round/check completed  Taken 1/16/2024 0825 by Ariela Ibarra RN  Safety Promotion/Fall Prevention: safety round/check completed  Taken 1/16/2024 0720 by Ariela Ibarra RN  Safety Promotion/Fall Prevention: safety round/check completed  Intervention: Prevent Skin Injury  Recent Flowsheet Documentation  Taken 1/16/2024 0720 by Ariela Ibarra RN  Body Position: position changed independently  Skin Protection:   adhesive use limited   tubing/devices free from skin contact  Intervention: Prevent and Manage VTE (Venous Thromboembolism) Risk  Recent Flowsheet Documentation  Taken 1/16/2024 0720 by Ariela Ibarra RN  Activity Management: activity encouraged  VTE Prevention/Management:   bilateral   compression stockings off  Range of Motion: ROM (range of motion) performed  Intervention: Prevent Infection  Recent Flowsheet Documentation  Taken 1/16/2024 0720 by Ariela Ibarra RN  Infection Prevention:   environmental surveillance performed   hand hygiene promoted   rest/sleep promoted   single patient room provided  Goal: Optimal Comfort and Wellbeing  Outcome: Met  Intervention: Monitor Pain and Promote Comfort  Recent Flowsheet Documentation  Taken 1/16/2024 0720 by Ariela Ibarra RN  Pain Management Interventions:   see MAR   care clustered   cold applied   quiet environment facilitated  Intervention: Provide Person-Centered Care  Recent Flowsheet Documentation  Taken 1/16/2024 0720 by Ariela Ibarra RN  Trust Relationship/Rapport:   care explained   emotional support provided   questions answered   thoughts/feelings acknowledged  Goal:  Readiness for Transition of Care  Outcome: Met     Problem: Pain Acute  Goal: Acceptable Pain Control and Functional Ability  Outcome: Met  Intervention: Prevent or Manage Pain  Recent Flowsheet Documentation  Taken 1/16/2024 1020 by Ariela Ibarra RN  Medication Review/Management: medications reviewed  Taken 1/16/2024 0825 by Ariela Ibarra RN  Medication Review/Management: medications reviewed  Taken 1/16/2024 0720 by Ariela Ibarra RN  Sensory Stimulation Regulation: care clustered  Bowel Elimination Promotion:   adequate fluid intake promoted   ambulation promoted  Sleep/Rest Enhancement: awakenings minimized  Medication Review/Management: medications reviewed  Intervention: Develop Pain Management Plan  Recent Flowsheet Documentation  Taken 1/16/2024 0720 by Ariela Ibarra RN  Pain Management Interventions:   see MAR   care clustered   cold applied   quiet environment facilitated  Intervention: Optimize Psychosocial Wellbeing  Recent Flowsheet Documentation  Taken 1/16/2024 0720 by Ariela Ibarra RN  Supportive Measures: active listening utilized  Diversional Activities: television     Problem: Fall Injury Risk  Goal: Absence of Fall and Fall-Related Injury  Outcome: Met  Intervention: Identify and Manage Contributors  Recent Flowsheet Documentation  Taken 1/16/2024 1020 by Ariela Ibarra RN  Medication Review/Management: medications reviewed  Taken 1/16/2024 0825 by Ariela Ibarra RN  Medication Review/Management: medications reviewed  Taken 1/16/2024 0720 by Ariela Ibarra RN  Medication Review/Management: medications reviewed  Self-Care Promotion: independence encouraged  Intervention: Promote Injury-Free Environment  Recent Flowsheet Documentation  Taken 1/16/2024 1020 by Ariela Ibarra RN  Safety Promotion/Fall Prevention: safety round/check completed  Taken 1/16/2024 0825 by Ariela Ibarra RN  Safety Promotion/Fall Prevention: safety round/check completed  Taken 1/16/2024 0720 by  Chadwick, Ariela, RN  Safety Promotion/Fall Prevention: safety round/check completed     Problem: Adjustment to Surgery (Shoulder Arthroplasty)  Goal: Optimal Coping  Outcome: Met  Intervention: Support Psychosocial Response to Surgery and Mobility Changes  Recent Flowsheet Documentation  Taken 1/16/2024 0720 by Ariela Ibarra RN  Supportive Measures: active listening utilized     Problem: Bleeding (Shoulder Arthroplasty)  Goal: Absence of Bleeding  Outcome: Met  Intervention: Monitor and Manage Bleeding  Recent Flowsheet Documentation  Taken 1/16/2024 0720 by Ariela Ibarra RN  Bleeding Management: dressing monitored     Problem: Bowel Motility Impaired (Shoulder Arthroplasty)  Goal: Effective Bowel Elimination  Outcome: Met  Intervention: Enhance Bowel Motility and Elimination  Recent Flowsheet Documentation  Taken 1/16/2024 0720 by Ariela Ibarra RN  Bowel Elimination Management: toileting offered  Bowel Motility Enhancement:   ambulation promoted   fluid intake encouraged     Problem: Fluid and Electrolyte Imbalance (Shoulder Arthroplasty)  Goal: Fluid and Electrolyte Balance  Outcome: Met     Problem: Functional Ability Impaired (Shoulder Arthroplasty)  Goal: Optimal Functional Ability  Outcome: Met  Intervention: Promote Optimal Functional Status  Recent Flowsheet Documentation  Taken 1/16/2024 0720 by Ariela Ibarra RN  Activity Management: activity encouraged  Self-Care Promotion: independence encouraged     Problem: Infection (Shoulder Arthroplasty)  Goal: Absence of Infection Signs and Symptoms  Outcome: Met     Problem: Neurovascular Compromise (Shoulder Arthroplasty)  Goal: Intact Neurovascular Status  Outcome: Met  Intervention: Prevent or Manage Neurovascular Compromise  Recent Flowsheet Documentation  Taken 1/16/2024 0720 by Ariela Ibarra RN  Compartment Syndrome Management: active flexion/extension encouraged  Compartment Syndrome Surveillance: no pain with passive muscle stretch      Problem: Ongoing Anesthesia Effects (Shoulder Arthroplasty)  Goal: Anesthesia/Sedation Recovery  Outcome: Met  Intervention: Optimize Anesthesia Recovery  Recent Flowsheet Documentation  Taken 1/16/2024 1020 by Ariela Ibarra RN  Safety Promotion/Fall Prevention: safety round/check completed  Taken 1/16/2024 0917 by Ariela Ibarra RN  Patient Tolerance (IS):   good   no adverse signs/symptoms present  Administration (IS): self-administered  Level Incentive Spirometer (mL): 2000  Number of Repetitions (IS): 10  Taken 1/16/2024 0825 by Ariela Ibarra RN  Safety Promotion/Fall Prevention: safety round/check completed  Taken 1/16/2024 0733 by Ariela Ibarra RN  Patient Tolerance (IS):   good   no adverse signs/symptoms present  Administration (IS):   self-administered   instruction provided, follow-up   proper technique demonstrated  Level Incentive Spirometer (mL): 2000  Number of Repetitions (IS): 10  Taken 1/16/2024 0720 by Ariela Ibarra RN  Safety Promotion/Fall Prevention: safety round/check completed  Reorientation Measures: clock in view     Problem: Pain (Shoulder Arthroplasty)  Goal: Acceptable Pain Control  Outcome: Met  Intervention: Prevent or Manage Pain  Recent Flowsheet Documentation  Taken 1/16/2024 0720 by Ariela Ibarra RN  Pain Management Interventions:   see MAR   care clustered   cold applied   quiet environment facilitated  Diversional Activities: television     Problem: Postoperative Nausea and Vomiting (Shoulder Arthroplasty)  Goal: Nausea and Vomiting Relief  Outcome: Met  Intervention: Prevent or Manage Nausea and Vomiting  Recent Flowsheet Documentation  Taken 1/16/2024 0720 by Ariela Ibarra RN  Nausea/Vomiting Interventions:   see MAR   sips clear liquids given     Problem: Postoperative Urinary Retention (Shoulder Arthroplasty)  Goal: Effective Urinary Elimination  Outcome: Met  Intervention: Monitor and Manage Urinary Retention  Recent Flowsheet Documentation  Taken  1/16/2024 0720 by Ariela Ibarra RN  Urinary Elimination Promotion: toileting offered   Goal Outcome Evaluation:   Pt has had no new changes throughout shift and continues to remain stable. Pt has left RTSA performed yesterday by Dr. Hernandez. Pt has had no complaints of pain. Pt states that he is experiencing numbness and tingling from the block. Pt is stand by assist with gait belt. Pt has voided without issue. Pt plans to DC home today with spouse as transportation. Pt will use meds to bed and has no DME needs. Pt will be doing outpatient therapy at  PT 1/22 at 1300.

## 2024-01-16 NOTE — PROGRESS NOTES
Orthopedic Total Shoulder Progress Note    Subjective outpatient PT, follow-up 2 weeks    Post-Operative Day: 1 post-left total shoulder arthroplasty  Systemic or Specific Complaints: No Complaints    Objective     Vital signs in last 24 hours:  Temp:  [97.2 °F (36.2 °C)-98.6 °F (37 °C)] 98.6 °F (37 °C)  Heart Rate:  [] 102  Resp:  [14-20] 18  BP: (113-167)/(60-97) 127/74  FiO2 (%):  [34 %-56 %] 34 %    General: alert, appears stated age, and cooperative   Neurovascular: Radial nerve: Intact, Ulnar nerve: Intact, and Median nerve: Intact  Capillary refill: Normal   Wound: Wound clean and dry no evidence of infection.   Range of Motion: Limited flexion and Limited extension     Data Review  CBC:  Results from last 7 days   Lab Units 01/16/24  0541   WBC 10*3/mm3 13.27*   RBC 10*6/mm3 5.16   HEMOGLOBIN g/dL 14.9   HEMATOCRIT % 43.1   PLATELETS 10*3/mm3 175       Assessment & Plan     Status post-left total shoulder arthroplasty: Doing well postoperatively.     Pain Relief: some relief    Discharge today, Return to Clinic: 2 weeks    Activity: up with assistance     LOS: 0 days     Erickson Hernandez MD    Date: 1/16/2024  Time: 08:31 EST

## 2024-01-16 NOTE — THERAPY EVALUATION
Patient Name: Devyn Steinberg  : 1960    MRN: [id number]                              Today's Date: 2024       Admit Date: 1/15/2024    Visit Dx:     ICD-10-CM ICD-9-CM   1. Difficulty walking  R26.2 719.7     Patient Active Problem List   Diagnosis    Abnormal liver enzymes    Pancreatic lesion    Calculus of gallbladder with biliary obstruction but without cholecystitis    Ascending cholangitis    Elevated LFTs    S/P laparoscopic cholecystectomy    Encounter for removal of biliary stent    Nontraumatic incomplete tear of left rotator cuff    Traumatic complete tear of left rotator cuff    S/p reverse total shoulder arthroplasty     Past Medical History:   Diagnosis Date    Acid reflux     Diabetes mellitus     BG RUNS AROUND 128-135 IN AM    Hyperlipidemia     Nontraumatic incomplete tear of rotator cuff, left     S/P laparoscopic cholecystectomy 2023     Past Surgical History:   Procedure Laterality Date    CHOLECYSTECTOMY N/A 2023    Procedure: CHOLECYSTECTOMY LAPAROSCOPIC;  Surgeon: Paul Hodge MD;  Location: [facility] MAIN OR;  Service: General;  Laterality: N/A;    ERCP N/A 2023    Procedure: ENDOSCOPIC RETROGRADE CHOLANGIOPANCREATOGRAPHY WITH SPHINCTEROTOMY, BALLOON SWEEP AND STENT PLACEMENT;  Surgeon: Rory Blas MD;  Location: [facility] ENDOSCOPY;  Service: Gastroenterology;  Laterality: N/A;  BILE DUCT SLUDGE, Ascending cholangitis    ERCP N/A 2023    Procedure: ENDOSCOPIC RETROGRADE CHOLANGIOPANCREATOGRAPHY WITH STENT REMOVAL AND BALLOON SWEEP;  Surgeon: Rory Blas MD;  Location: [facility] ENDOSCOPY;  Service: Gastroenterology;  Laterality: N/A;  SAME AS PREOP    HERNIA REPAIR      HERNIA REPAIR      SHOULDER ARTHROSCOPY W/ ROTATOR CUFF REPAIR Left 2023    Procedure: LEFT SHOULDER ARTHROSCOPY WITH OPEN DEBRIDEMENT OF MASSIVE ROTATOR CUFF TEAR;  Surgeon: Erickson Hernandez MD;  Location: [facility] OR [facility];  Service: Orthopedics;  Laterality: Left; PT  STATED DIDN'T HAVE RCR    SHOULDER SURGERY      TOTAL SHOULDER ARTHROPLASTY W/ DISTAL CLAVICLE EXCISION Left 1/15/2024    Procedure: LEFT TOTAL SHOULDER REVERSE ARTHROPLASTY;  Surgeon: Erickson Hernandez MD;  Location: [facility] MAIN OR;  Service: Orthopedics;  Laterality: Left;    UPPER GASTROINTESTINAL ENDOSCOPY        General Information       Row Name 01/16/24 0845 01/16/24 0839       OT Time and Intention    Document Type therapy note (daily note)  -PG evaluation  -PG    Mode of Treatment individual therapy;occupational therapy  -PG individual therapy;occupational therapy  -PG      Row Name 01/16/24 0839          General Information    Prior Level of Function independent:;transfer;ADL's  -PG     Existing Precautions/Restrictions fall;left;shoulder;non-weight bearing  -PG     Barriers to Rehab none identified  -PG       Row Name 01/16/24 0839          Occupational Profile    Reason for Services/Referral (Occupational Profile) Patient is a 63-year-old male admitted for left shoulder arthroplasty.  No previous OT services identified.  Patient is being evaluated by Occupational Therapy due to recent decline in ADL function.  -PG       Row Name 01/16/24 0839          Living Environment    People in Home spouse  -PG       Row Name 01/16/24 0839          Cognition    Orientation Status (Cognition) oriented x 3  -PG       Row Name 01/16/24 0839          Safety Issues, Functional Mobility    Impairments Affecting Function (Mobility) strength;pain;range of motion (ROM)  -PG               User Key  (r) = Recorded By, (t) = Taken By, (c) = Cosigned By      Initials Name Provider Type    PG Jero Andrade, OT Occupational Therapist                     Mobility/ADL's       Row Name 01/16/24 0841          Transfers    Transfers bed-chair transfer  -PG       Row Name 01/16/24 0845 01/16/24 0841       Bed-Chair Transfer    Bed-Chair [place] (Transfers) contact guard  -PG contact guard  -PG      Row Name 01/16/24 0857           Sit-Stand Transfer    Sit-Stand [place] (Transfers) contact guard  -PG       Row Name 01/16/24 0841          Activities of Daily Living    BADL Assessment/Intervention bathing;upper body dressing;lower body dressing;grooming;toileting  -PG       Row Name 01/16/24 0845 01/16/24 0841       Bathing Assessment/Intervention    [place] Level (Bathing) bathing skills;minimum assist (75% patient effort);verbal cues  -PG bathing skills;minimum assist (75% patient effort)  -PG      Row Name 01/16/24 0845 01/16/24 0841       Upper Body Dressing Assessment/Training    [place] Level (Upper Body Dressing) upper body dressing skills;moderate assist (50% patient effort);verbal cues  -PG upper body dressing skills;moderate assist (50% patient effort)  -PG      Row Name 01/16/24 0845 01/16/24 0841       Lower Body Dressing Assessment/Training    [place] Level (Lower Body Dressing) lower body dressing skills;moderate assist (50% patient effort);verbal cues  -PG lower body dressing skills;moderate assist (50% patient effort)  -PG      Row Name 01/16/24 0845 01/16/24 0841       Grooming Assessment/Training    [place] Level (Grooming) grooming skills;minimum assist (75% patient effort)  -PG grooming skills;minimum assist (75% patient effort)  -PG      Row Name 01/16/24 0845 01/16/24 0841       Toileting Assessment/Training    [place] Level (Toileting) toileting skills;minimum assist (75% patient effort);verbal cues  -PG toileting skills;minimum assist (75% patient effort)  -PG              User Key  (r) = Recorded By, (t) = Taken By, (c) = Cosigned By      Initials Name Provider Type    PG Jero Andrade OT Occupational Therapist                   Obj/Interventions       Row Name 01/16/24 0842          Sensory Assessment (Somatosensory)    Sensory Assessment (Somatosensory) sensation intact  -PG       Row Name 01/16/24 0842          Vision Assessment/Intervention    Visual Impairment/Limitations WFL  -PG        Row Name 01/16/24 0842          Range of Motion Comprehensive    General Range of Motion no range of motion deficits identified  -PG       Row Name 01/16/24 0842          Strength Comprehensive (MMT)    General Manual Muscle Testing (MMT) Assessment no strength deficits identified  -PG       Row Name 01/16/24 0846          Shoulder (Therapeutic Exercise)    Shoulder (Therapeutic Exercise) pendulum exercises  -PG     Shoulder Pendulum Exercises (Therapeutic Exercise) 30 seconds duration;standing  -PG       Row Name 01/16/24 0846          Elbow/Forearm (Therapeutic Exercise)    Elbow/Forearm (Therapeutic Exercise) PROM (passive range of motion)  -PG     Elbow/Forearm AAROM (Therapeutic Exercise) 10 repetitions;right assist left  -PG       Row Name 01/16/24 0846 01/16/24 0842       Motor Skills    Motor Skills -- coordination;functional endurance  -PG    Coordination -- WFL  -PG    Functional Endurance -- Fair plus  -PG    Therapeutic Exercise shoulder;elbow/forearm  -PG --              User Key  (r) = Recorded By, (t) = Taken By, (c) = Cosigned By      Initials Name Provider Type    PG Jero Andrade, OT Occupational Therapist                   Goals/Plan       [facility] Name 01/16/24 0843          Transfer Goal 1 (OT)    Activity/Assistive Device (Transfer Goal 1, OT) transfers, all  -PG     [place] Level/Cues Needed (Transfer Goal 1, OT) modified independence  -PG     Time Frame (Transfer Goal 1, OT) long term goal (LTG);10 days  -PG       [facility] Name 01/16/24 0843          Bathing Goal 1 (OT)    Activity/Device (Bathing Goal 1, OT) bathing skills, all  -PG     [place] Level/Cues Needed (Bathing Goal 1, OT) modified independence  -PG     Time Frame (Bathing Goal 1, OT) long term goal (LTG);10 days  -PG       [facility] Name 01/16/24 0843          Dressing Goal 1 (OT)    Activity/Device (Dressing Goal 1, OT) dressing skills, all  -PG     [place]/Cues Needed (Dressing Goal 1, OT) modified independence  -PG     Time  Frame (Dressing Goal 1, OT) long term goal (LTG);10 days  -PG       Row Name 01/16/24 0843          Toileting Goal 1 (OT)    Activity/Device (Toileting Goal 1, OT) toileting skills, all  -PG     [place] Level/Cues Needed (Toileting Goal 1, OT) modified independence  -PG     Time Frame (Toileting Goal 1, OT) long term goal (LTG);10 days  -PG       Row Name 01/16/24 0843          Grooming Goal 1 (OT)    Activity/Device (Grooming Goal 1, OT) grooming skills, all  -PG     [place] (Grooming Goal 1, OT) modified independence  -PG     Time Frame (Grooming Goal 1, OT) long term goal (LTG);10 days  -PG       Row Name 01/16/24 0843          Problem Specific Goal 1 (OT)    Problem Specific Goal 1 (OT) Patient will demonstrate independence with left upper extremity pendulum home exercise program  -PG     Time Frame (Problem Specific Goal 1, OT) long term goal (LTG);10 days  -PG       Row Name 01/16/24 0843          Therapy Assessment/Plan (OT)    Planned Therapy Interventions (OT) BADL retraining;occupation/activity based interventions;patient/caregiver education/training;transfer/mobility retraining;ROM/therapeutic exercise;passive ROM/stretching  -PG               User Key  (r) = Recorded By, (t) = Taken By, (c) = Cosigned By      Initials Name Provider Type    PG Jero Andrade, OT Occupational Therapist                   Clinical Impression       Row Name 01/16/24 0842          Pain Assessment    Pretreatment Pain Rating 0/10 - no pain  -PG     Posttreatment Pain Rating 0/10 - no pain  -PG       Row Name 01/16/24 0842          Plan of Care Review    Plan of Care Reviewed With patient  -PG     Progress no change  -PG     Outcome Evaluation Patient presents with limitations affecting strength, activity tolerance, and balance impacting patient's ability to return home safely and independently.  The skills of a therapist will be required to safely and effectively implement the following treatment plan to restore  maximal level of function  -PG       Row Name 01/16/24 0842          Therapy Assessment/Plan (OT)    Patient/Family Therapy Goal Statement (OT) Able to use left arm without pain  -PG     Rehab Potential (OT) good, to achieve stated therapy goals  -PG     Criteria for Skilled Therapeutic Interventions Met (OT) meets criteria;skilled treatment is necessary;yes  -PG     Therapy Frequency (OT) 5 times/wk  -PG       Row Name 01/16/24 0842          Therapy Plan Review/Discharge Plan (OT)    Anticipated Discharge Disposition (OT) home with outpatient therapy services  -PG               User Key  (r) = Recorded By, (t) = Taken By, (c) = Cosigned By      Initials Name Provider Type    PG Jero Andrade, OT Occupational Therapist                   Outcome Measures       Row Name 01/16/24 0844          How much help from another is currently needed...    Putting on and taking off regular lower body clothing? 2  -PG     Bathing (including washing, rinsing, and drying) 3  -PG     Toileting (which includes using toilet bed pan or urinal) 3  -PG     Putting on and taking off regular upper body clothing 3  -PG     Taking care of personal grooming (such as brushing teeth) 3  -PG     Eating meals 4  -PG     AM-PAC 6 Clicks Score (OT) 18  -PG       Row Name 01/16/24 0800          How much help from another person do you currently need...    Turning from your back to your side while in flat bed without using bedrails? 3  -RH     Moving from lying on back to sitting on the side of a flat bed without bedrails? 3  -RH     Moving to and from a bed to a chair (including a wheelchair)? 3  -RH     Standing up from a chair using your arms (e.g., wheelchair, bedside chair)? 3  -RH     Climbing 3-5 steps with a railing? 4  -RH     To walk in hospital room? 4  -RH     AM-PAC 6 Clicks Score (PT) 20  -RH     Highest Level of Mobility Goal 6 --> Walk 10 steps or more  -RH       Row Name 01/16/24 0844          Functional Assessment    Outcome Measure  Options AM-PAC 6 Clicks Daily Activity (OT);Optimal Instrument  -PG       Row Name 01/16/24 0844          Optimal Instrument    Optimal Instrument Optimal - 3  -PG     Bending/Stooping 2  -PG     Standing 1  -PG     Reaching 1  -PG     From the list, choose the 3 activities you would most like to be able to do without any difficulty Bending/stooping;Standing;Reaching  -PG     Total Score Optimal - 3 4  -PG               User Key  (r) = Recorded By, (t) = Taken By, (c) = Cosigned By      Initials Name Provider Type     Radames Anthony, MONTY Physical Therapist Assistant    PG Jero Andrade OT Occupational Therapist                    Occupational Therapy Education       Title: PT OT SLP Therapies (Done)       Topic: Occupational Therapy (Done)       Point: ADL training (Done)       Description:   Instruct learner(s) on proper safety adaptation and remediation techniques during self care or transfers.   Instruct in proper use of assistive devices.                  Learning Progress Summary             Patient Acceptance, E,D, DU by PG at 1/16/2024 0845                         Point: Home exercise program (Done)       Description:   Instruct learner(s) on appropriate technique for monitoring, assisting and/or progressing therapeutic exercises/activities.                  Learning Progress Summary             Patient Acceptance, E,D, DU by PG at 1/16/2024 0845                         Point: Precautions (Done)       Description:   Instruct learner(s) on prescribed precautions during self-care and functional transfers.                  Learning Progress Summary             Patient Acceptance, E,D, DU by PG at 1/16/2024 0845                         Point: Body mechanics (Done)       Description:   Instruct learner(s) on proper positioning and spine alignment during self-care, functional mobility activities and/or exercises.                  Learning Progress Summary             Patient Acceptance, E,D, DU by PG at 1/16/2024  0845                                         User Key       Initials Effective Dates Name Provider Type Discipline    PG 06/16/21 -  Jero Andrade OT Occupational Therapist OT                  OT Recommendation and Plan  Planned Therapy Interventions (OT): BADL retraining, occupation/activity based interventions, patient/caregiver education/training, transfer/mobility retraining, ROM/therapeutic exercise, passive ROM/stretching  Therapy Frequency (OT): 5 times/wk  Plan of Care Review  Plan of Care Reviewed With: patient  Progress: no change  Outcome Evaluation: Patient presents with limitations affecting strength, activity tolerance, and balance impacting patient's ability to return home safely and independently.  The skills of a therapist will be required to safely and effectively implement the following treatment plan to restore maximal level of function     Time Calculation:   Evaluation Complexity (OT)  Review Occupational Profile/Medical/Therapy History Complexity: brief/low complexity  Assessment, Occupational Performance/Identification of Deficit Complexity: 3-5 performance deficits  Clinical Decision Making Complexity (OT): problem focused assessment/low complexity  Overall Complexity of Evaluation (OT): low complexity     Time Calculation- OT       Row Name 01/16/24 0847 01/16/24 0837          Time Calculation- OT    OT Received On 01/16/24  -PG --     OT Goal Re-Cert Due Date 01/25/24  -PG --        Timed Charges    77665 - OT Therapeutic Exercise Minutes 10  -PG --     98499 - Gait Training Minutes  -- 6  -RH     69807 - OT Self Care/Mgmt Minutes 15  -PG --        Untimed Charges    OT Eval/Re-eval Minutes 20  -PG --        Total Minutes    Timed Charges Total Minutes 25  -PG 6  -RH     Untimed Charges Total Minutes 20  -PG --      Total Minutes 45  -PG 6  -RH               User Key  (r) = Recorded By, (t) = Taken By, (c) = Cosigned By      Initials Name Provider Type    RH Radames Anthony, [place] Physical  Therapist Assistant    PG Jero Andrade OT Occupational Therapist                  Therapy Charges for Today       Code Description Service Date Service Provider Modifiers Qty    03905943718 HC OT THER PROC EA 15 MIN 1/16/2024 Jero Andrade OT GO 1    60399832805 HC OT SELF CARE/MGMT/TRAIN EA 15 MIN 1/16/2024 Jero Andrade OT GO 1    [id number]  OT EVAL LOW COMPLEXITY 2 1/16/2024 Jero Andrade OT GO 1                 Jero Andrade OT  1/16/2024

## 2024-01-16 NOTE — PROGRESS NOTES
[facility]   Hospitalist Progress Note       Patient Name: Devyn Steinberg  : 1960  MRN: [id number]  Primary Care Physician: Uvaldo, KINJAL Santos  Date of admission: 1/15/2024  Today's Date: 2024  Room / Bed:   227/1  Subjective   Chief Complaint: Medical management     HPI:     Devyn Steinberg is a 63 y.o. male who is being seen by hospitalist for general medical management of chronic medical issues.  He has a history of diabetes, HTN, dyslipidemia, GERD, degenerative joint/osteoarthritis of the left shoulder.  Left total shoulder reverse arthroplasty 1/15/24 by Dr. Hernandez.  Home medications reviewed and include Nexium, Humalog, lisinopril, metformin, Crestor, vitamin D.  Recent lab work reviewed and includes hemoglobin A1c 6.8, creatinine 0.9, potassium 4.3, sodium 138, AST 15, ALT 15, total bilirubin 0.8, WBC 6.4, hemoglobin 16.5, platelets 171.     Interval Followup: 2024    No overnight complaints  Morning BMP acceptable.  Vital signs stable.  On room air.  Glucose controlled, 160 range  No palpitations dizziness syncope nausea vomiting shortness of air or chest pains.  Left arm/shoulder pain well-controlled  Ambulating in the hallway with therapy.  Doing very well.  Eager to return home today.      REVIEW OF SYSTEMS:   Left shoulder pain  Objective   Temp:  [97.2 °F (36.2 °C)-98.6 °F (37 °C)] 98.6 °F (37 °C)  Heart Rate:  [] 103  Resp:  [14-20] 18  BP: (113-141)/(60-97) 128/77  Flow (L/min):  [2-5] 2  FiO2 (%):  [34 %-56 %] 34 %  PHYSICAL EXAM   CON: WN. WD. NAD.   NECK:  No thyromegaly. No stridor.   RESP:  CTA. No wheezes. No crackles.  No work of breathing or tachypnea.   CV:  Rhythm regular. Rate WNL. No murmur noted.  No edema.  GI:  Soft and nontender. Nondistended.    EXT: Left shoulder immobilizer/sling in place.  PSYCH:  Alert. Oriented. Normal affect and mood.  NEURO:  No dysarthria or aphasia. No unilateral weakness or paresthesia.  SKIN: No chronic venous stasis changes or  varicosities.  No cellulitis  Results from last 7 days   Lab Units 01/16/24  0541   WBC 10*3/mm3 13.27*   HEMOGLOBIN g/dL 14.9   HEMATOCRIT % 43.1   PLATELETS 10*3/mm3 175     Results from last 7 days   Lab Units 01/16/24  0541   SODIUM mmol/L 136   POTASSIUM mmol/L 4.3   CO2 mmol/L 20.7*   CHLORIDE mmol/L 102   ANION GAP mmol/L 13.3   BUN mg/dL 18   CREATININE mg/dL 0.91   GLUCOSE mg/dL 229*         COMPLEXITY OF DATA / DECISION MAKING     []  Moderate: One acute illness or mild exacerbation of chronic or 2 stable chronic or tx side effects   []  High:  Severe acute illness or severe exacerbation of chronic - potential for major debility / life threatening         I have personally reviewed the results from the time of this admission to 1/16/2024 12:51 EST:  []  Laboratory:  []  Microbiology: []  Radiology:  []  Telemetry:   []  Cardiology/Vascular:  []  Pathology:  []  Prior external records:  []  Independent historian provided additional details:      []  Discussed case with specialists:    []  Independent interpretation of ECG/Imaging etc:             []  Moderate: Rx management, low risk surgery, suboptimal social situation   []  High:  Rx with close monitoring for toxicity, mod-high risk surgery, DNR decision, Comfort initiated, IV pain meds    Assessment / Plan   Assessment:    Medical management  DM ([place])  HTN  Dyslipidemia  Hx pancreatitis  Left rotator cuff tear/severe osteoarthritis  Left total shoulder reverse arthroplasty 1/15/24 by Dr. Hernandez     Plan:    Reviewed morning BMP /acceptable  Feels well.  Resume home medical regimen.  Encourage incentive spirometer at home  Daily PT with left shoulder restrictions  Pain med Rx per orthopedist  DVT prophylaxis per orthopedist (aspirin 325 daily x 4 weeks)  DVT prophylaxis:  Mechanical DVT prophylaxis orders are present.  CODE STATUS:      Code Status (Patient has no pulse and is not breathing): CPR (Attempt to Resuscitate)  Medical  Interventions (Patient has pulse or is breathing): Full Support       Electronically signed by JULIA Pitt, 01/16/24, 12:51 PM EST.

## 2024-01-16 NOTE — PLAN OF CARE
Goal Outcome Evaluation:  Plan of Care Reviewed With: patient        Progress: no change  Outcome Evaluation: Patient presents with limitations affecting strength, activity tolerance, and balance impacting patient's ability to return home safely and independently.  The skills of a therapist will be required to safely and effectively implement the following treatment plan to restore maximal level of function      Anticipated Discharge Disposition (OT): home with outpatient therapy services

## 2024-01-16 NOTE — THERAPY TREATMENT NOTE
Acute Care - Physical Therapy Treatment Note   Giovanna     Patient Name: Devyn Steinberg  : 1960  MRN: [id number]  Today's Date: 2024      Visit Dx:     ICD-10-CM ICD-9-CM   1. Difficulty walking  R26.2 719.7     Patient Active Problem List   Diagnosis    Abnormal liver enzymes    Pancreatic lesion    Calculus of gallbladder with biliary obstruction but without cholecystitis    Ascending cholangitis    Elevated LFTs    S/P laparoscopic cholecystectomy    Encounter for removal of biliary stent    Nontraumatic incomplete tear of left rotator cuff    Traumatic complete tear of left rotator cuff    S/p reverse total shoulder arthroplasty     Past Medical History:   Diagnosis Date    Acid reflux     Diabetes mellitus     BG RUNS AROUND 128-135 IN AM    Hyperlipidemia     Nontraumatic incomplete tear of rotator cuff, left     S/P laparoscopic cholecystectomy 2023     Past Surgical History:   Procedure Laterality Date    CHOLECYSTECTOMY N/A 2023    Procedure: CHOLECYSTECTOMY LAPAROSCOPIC;  Surgeon: Paul Hodge MD;  [facility];  Service: General;  Laterality: N/A;    ERCP N/A 2023    Procedure: ENDOSCOPIC RETROGRADE CHOLANGIOPANCREATOGRAPHY WITH SPHINCTEROTOMY, BALLOON SWEEP AND STENT PLACEMENT;  Surgeon: Rory Blas MD;  Location: [facility] ENDOSCOPY;  Service: Gastroenterology;  Laterality: N/A;  BILE DUCT SLUDGE, Ascending cholangitis    ERCP N/A 2023    Procedure: ENDOSCOPIC RETROGRADE CHOLANGIOPANCREATOGRAPHY WITH STENT REMOVAL AND BALLOON SWEEP;  Surgeon: Rory Blas MD;  Location: [facility] ENDOSCOPY;  Service: Gastroenterology;  Laterality: N/A;  SAME AS PREOP    HERNIA REPAIR      HERNIA REPAIR      SHOULDER ARTHROSCOPY W/ ROTATOR CUFF REPAIR Left 2023    Procedure: LEFT SHOULDER ARTHROSCOPY WITH OPEN DEBRIDEMENT OF MASSIVE ROTATOR CUFF TEAR;  Surgeon: Erickson Hernandez MD;  [facility] [place] [facility]  Service: Orthopedics;  Laterality: Left;  PT STATED DIDN'T HAVE RCR    SHOULDER SURGERY      TOTAL SHOULDER ARTHROPLASTY W/ DISTAL CLAVICLE EXCISION Left 1/15/2024    Procedure: LEFT TOTAL SHOULDER REVERSE ARTHROPLASTY;  Surgeon: Erickson Hernandez MD;  Location: [facility] MAIN OR;  Service: Orthopedics;  Laterality: Left;    UPPER GASTROINTESTINAL ENDOSCOPY       PT Assessment (last 12 hours)       PT Evaluation and Treatment       Row Name 01/16/24 0838          Physical Therapy Time and Intention    Subjective Information complains of;numbness  -     Document Type therapy note (daily note)  -     Mode of Treatment physical therapy;individual therapy  -     Patient Effort good  -       Row Name 01/16/24 0838          Pain    Posttreatment Pain Rating 0/10 - no pain  -       Row Name 01/16/24 0838          Mobility    Extremity Weight-bearing Status left upper extremity  -     Left Upper Extremity (Weight-bearing Status) non weight-bearing (NWB)  -       Row Name 01/16/24 0838          Transfers    Transfers sit-stand transfer;stand-sit transfer  -     Maintains Weight-bearing Status (Transfers) able to maintain  -       Row Name 01/16/24 0838          Sit-Stand Transfer    Sit-Stand Wirt (Transfers) contact guard  -     Assistive Device (Sit-Stand Transfers) --  Pt transfers without AD.  -     Comment, (Sit-Stand Transfer) Pt with L shoulder immobilzer in place.  -       Row Name 01/16/24 0838          Stand-Sit Transfer    Stand-Sit Wirt (Transfers) contact guard  -       Row Name 01/16/24 0838          Gait/Stairs (Locomotion)    Gait/Stairs Locomotion gait/ambulation independence;gait/ambulation assistive device;distance ambulated;gait pattern;gait deviations  -     Wirt Level (Gait) standby assist  -     Assistive Device (Gait) --  Pt amb without AD.  -     Distance in Feet (Gait) 300  -     Pattern (Gait) 2-point;step-through  -     Deviations/Abnormal Patterns (Gait) gait speed decreased;stride  length decreased  -     Gait Assessment/Intervention Pt amb with SBA without AD with 2 point step-through gait pattern with decreased gait speed and stride length.  -RH     Negotiation (Stairs) stairs independence;stairs assistive device;handrail location;number of steps;ascending technique;descending technique  -     Early Level (Stairs) contact guard  -     Handrail Location (Stairs) right side (ascending);right side (descending)  -     Number of Steps (Stairs) 5  -RH     Ascending Technique (Stairs) step-over-step  -RH     Descending Technique (Stairs) step-over-step  -RH       Row Name 01/16/24 0838          Balance    Dynamic Standing Balance standby assist  -       Row Name             Wound 01/15/24 1312 Left axilla Incision    Wound - Properties Group Placement Date: 01/15/24  -SC Placement Time: 1312  -SC Present on Original Admission: N  -SC Side: Left  -SC Location: axilla  -SC Primary Wound Type: Incision  -SC    Retired Wound - Properties Group Placement Date: 01/15/24  -SC Placement Time: 1312  -SC Present on Original Admission: N  -SC Side: Left  -SC Location: axilla  -SC Primary Wound Type: Incision  -SC    Retired Wound - Properties Group Date first assessed: 01/15/24  -SC Time first assessed: 1312  -SC Present on Original Admission: N  -SC Side: Left  -SC Location: axilla  -SC Primary Wound Type: Incision  -SC      Row Name 01/16/24 0838          Positioning and Restraints    In Chair reclined;call light within reach;encouraged to call for assist;exit alarm on  -       Row Name 01/16/24 0838          Progress Summary (PT)    Progress Toward Functional Goals (PT) progress toward functional goals is good  -RH               User Key  (r) = Recorded By, (t) = Taken By, (c) = Cosigned By      Initials Name Provider Type    Dixie Storey, RN Registered Nurse    Radames Ornelas PTA Physical Therapist Assistant                    Physical Therapy Education       Title: PT OT  SLP Therapies (Done)       Topic: Physical Therapy (Done)       Point: Mobility training (Done)       Learning Progress Summary             Patient Acceptance, E,TB, VU by WB at 1/15/2024 1559                         Point: Home exercise program (Done)       Learning Progress Summary             Patient Acceptance, E,TB, VU by WB at 1/15/2024 1559                         Point: Body mechanics (Done)       Learning Progress Summary             Patient Acceptance, E,TB, VU by WB at 1/15/2024 1559                         Point: Precautions (Done)       Learning Progress Summary             Patient Acceptance, E,TB, VU by WB at 1/15/2024 1559                                         User Key       Initials Effective Dates Name Provider Type Discipline    WB 01/09/24 -  Chidi Guo, PT Student PT Student PT                  PT Recommendation and Plan     Progress Summary (PT)  Progress Toward Functional Goals (PT): progress toward functional goals is good   Outcome Measures       Row Name 01/16/24 0800 01/15/24 1500          How much help from another person do you currently need...    Turning from your back to your side while in flat bed without using bedrails? 3  -RH 3  -JAMAL (r) WB (t) JAMAL (c)     Moving from lying on back to sitting on the side of a flat bed without bedrails? 3  -RH 3  -JAMAL (r) WB (t) JAMAL (c)     Moving to and from a bed to a chair (including a wheelchair)? 3  -RH 3  -JAMAL (r) WB (t) JAMAL (c)     Standing up from a chair using your arms (e.g., wheelchair, bedside chair)? 3  -RH 3  -JAMAL (r) WB (t) JAMAL (c)     Climbing 3-5 steps with a railing? 4  -RH 2  -JAMAL (r) WB (t) JAMAL (c)     To walk in hospital room? 4  -RH 3  -JAMAL (r) WB (t) JAMAL (c)     AM-PAC 6 Clicks Score (PT) 20  -RH 17  -JAMAL (r) WB (t)     Highest Level of Mobility Goal 6 --> Walk 10 steps or more  -RH 5 --> Static standing  -JAMAL (r) WB (t)        Functional Assessment    Outcome Measure Options -- AM-PAC 6 Clicks Basic Mobility (PT)  -JAMAL (r) WB (t) JAMAL  (c)               User Key  (r) = Recorded By, (t) = Taken By, (c) = Cosigned By      Initials Name Provider Type     Radames Anthony PTA Physical Therapist Assistant    Neil Jones, PT Physical Therapist    Chidi Rawls, PT Student PT Student                     Time Calculation:    PT Charges       Row Name 01/16/24 0837             Time Calculation    PT Received On 01/16/24  -RH         Timed Charges    61629 - Gait Training Minutes  6  -RH      20875 - PT Therapeutic Activity Minutes 6  -RH         Total Minutes    Timed Charges Total Minutes 12  -RH       Total Minutes 12  -RH                User Key  (r) = Recorded By, (t) = Taken By, (c) = Cosigned By      Initials Name Provider Type     Radames Anthony PTA Physical Therapist Assistant                  Therapy Charges for Today       Code Description Service Date Service Provider Modifiers Qty    [id number] HC GAIT TRAINING EA 15 MIN 1/16/2024 Radames Anthony PTA GP 1            PT G-Codes  Outcome Measure Options: AM-PAC 6 Clicks Basic Mobility (PT)  AM-PAC 6 Clicks Score (PT): 20    Radames Anthony PTA  1/16/2024

## 2024-01-16 NOTE — SIGNIFICANT NOTE
01/16/24 0734   Plan   Final Discharge Disposition Code 01 - home or self-care   Final Note Outpatient PT at  PT in [facility]. Appt: 1/22/24 at 1PM.

## 2024-01-16 NOTE — PLAN OF CARE
Goal Outcome Evaluation:      Pt did not complain of pain during the shift. VSS. Pt was able to ambulate without difficulty. Pt expects to go home with outpatient therapy upon discharge. Darian Urbina RN

## 2024-01-18 DIAGNOSIS — Z47.1 AFTERCARE FOLLOWING LEFT SHOULDER JOINT REPLACEMENT SURGERY: Primary | ICD-10-CM

## 2024-01-18 DIAGNOSIS — Z96.612 AFTERCARE FOLLOWING LEFT SHOULDER JOINT REPLACEMENT SURGERY: Primary | ICD-10-CM

## 2024-01-22 ENCOUNTER — TREATMENT (OUTPATIENT)
Dept: PHYSICAL THERAPY | Facility: CLINIC | Age: 64
End: 2024-01-22
Payer: OTHER MISCELLANEOUS

## 2024-01-22 DIAGNOSIS — M25.512 LEFT SHOULDER PAIN, UNSPECIFIED CHRONICITY: ICD-10-CM

## 2024-01-22 DIAGNOSIS — M62.81 MUSCLE WEAKNESS OF LEFT UPPER EXTREMITY: ICD-10-CM

## 2024-01-22 DIAGNOSIS — Z96.612 S/P REVERSE TOTAL SHOULDER ARTHROPLASTY, LEFT: Primary | ICD-10-CM

## 2024-01-22 DIAGNOSIS — M25.612 DECREASED ROM OF LEFT SHOULDER: ICD-10-CM

## 2024-01-22 NOTE — PROGRESS NOTES
Physical Therapy Initial Evaluation and Plan of Care      [facility]: [address] [place] [address]      Patient: Devyn Steinberg   : 1960  Diagnosis/ICD-10 Code:  S/P reverse total shoulder arthroplasty, left [Z96.612]  Referring practitioner: Erickson Hernandez MD  Date of Initial Visit: 2024  Today's Date: 2024  Patient seen for 1 sessions           Subjective Questionnaire: QuickDASH: 52      Subjective   Pt reports to physical therapy s/p L Reverse total shoulder replacement on 1/15/2024. Pt reports their R shoulder is sore. Pt states they have taken their sling off some at home and performed their exs. Pt reports their wife drove them today. Pt has been taking their pain medication, TENS, and using ice. Pt reports their current pain is a 7/10. Pt has had times close to 10/10 without taking their pain medication. Pt reports the lowest their pain has been is a 6/10.       Pt occupation:      Past Medical Hx: DM, medication for blood pressure. Hernia surgery.       Objective          Observations   Left Shoulder   Positive for incision.     Additional Shoulder Observation Details  Pt has bruising present to their L elbow from their shoulder. Pt does not have increased redness or warmth to their R shoulder at this time.     Neurological Testing     Additional Neurological Details  Pt denies numbness and tingling.     Active Range of Motion     Right Shoulder   Flexion: 140 degrees   Abduction: 154 degrees   External rotation BTH: T2   Internal rotation BTB: T8     Passive Range of Motion   Left Shoulder   Flexion: 70 degrees with pain  Abduction: 70 degrees with pain    Strength/Myotome Testing     Right Shoulder     Planes of Motion   Flexion: 4   Abduction: 4+   External rotation at 0°: 5   Internal rotation at 0°: 5     Isolated Muscles   Biceps: 5   Triceps: 5     Additional Strength Details  L shoulder not tested today.      See Exercise, Manual, and Modality  Logs for complete treatment.       Assessment & Plan       Assessment  Impairments: abnormal coordination, abnormal muscle firing, abnormal or restricted ROM, activity intolerance, impaired balance, impaired physical strength, lacks appropriate home exercise program, pain with function and weight-bearing intolerance   Functional limitations: carrying objects, lifting, sleeping, pulling, pushing, uncomfortable because of pain, moving in bed, reaching behind back, reaching overhead and unable to perform repetitive tasks   Assessment details: Pt reports to physical therapy s/p L Reverse total shoulder replacement on 1/15/2024. Pt presents w/ decreased ROM, increased stiffness, pain, and decreased strength. Pt has increased perceived deficits described by QuickDAJE. Pt was educated on their HEP. Pt was also educated on their restrictions. Pt will benefit from skilled physical therapy, to address their current impairments and limitations, in order to improve upon their functional mobility to return to performing ADLs and work tasks safely and without restrictions.       Prognosis: good    Goals  Plan Goals: SHOULDER  PROBLEMS:     1. The patient has limited ROM of the L shoulder.    LTG 1: 12 weeks:  The patient will demonstrate 140 degrees of L shoulder flexion, 140 degrees of shoulder abduction, 65 degrees of shoulder external rotation, and 65 degrees of shoulder internal rotation to allow the patient to reach into upper kitchen cabinets and manipulate clothing behind the back with greater ease.    STATUS:  New   STG 1a: 6 weeks:  The patient will demonstrate 100 degrees of L shoulder flexion, 100 degrees of shoulder abduction, 45 degrees of shoulder external rotation, and 45 degrees of shoulder internal rotation.    STATUS:  New   TREATMENT: Manual therapy, therapeutic exercise, home exercise instruction, and modalities as needed to include: electrical stimulation, ultrasound, moist heat, and ice.    2. The patient  has limited strength of the L shoulder.   LTG 2: 12 weeks:  The patient will demonstrate 4+/5 strength for L shoulder flexion, abduction, external rotation, and internal rotation in order to demonstrate improved shoulder stability.    STATUS:  New   STG 2a: 8 weeks:  The patient will demonstrate 3+/5 strength for L shoulder flexion, abduction, external rotation, and internal rotation.    STATUS:  New   STG2b:  2 weeks:  The patient will be independent with home exercises.     STATUS:  New   TREATMENT: Manual therapy, therapeutic exercise, home exercise instruction, and modalities as needed to include: electrical stimulation, ultrasound, moist heat, and ice.     3. The patient complains of pain to the L shoulder.   LTG 3: 12 weeks:  The patient will report a pain rating of 1/10 or better in order to improve sleep quality and tolerance to performance of activities of daily living.    STATUS:  New   STG 3a: 8 weeks:  The patient will report a pain rating of 3/10 or better.     STATUS:  New   TREATMENT: Manual therapy, therapeutic exercise, home exercise instruction, and modalities as needed to include: electrical stimulation, ultrasound, moist heat, and ice.    4. Carrying, Moving, and Handling Objects Functional Limitation     LTG 4: 12 weeks:  The patient will demonstrate 9 % limitation by achieving a score of 15 on the QuickDASH.    STATUS:  New   STG 4a: 6 weeks:  The patient will demonstrate 43 % limitation by achieving a score of 30 on the QuickDASH.      STATUS:  New   TREATMENT:  Manual therapy, therapeutic exercise, home exercise instruction, and modalities as needed to include: moist heat, electrical stimulation, and ultrasound.           PLAN:  Therapy options: will receive skilled therapy services  Planned modality interventions: Cryotherapy, Heat, and TENS  Planned therapy interventions:balance/weight-bearing training, ADL retraining, soft tissue mobilization, strengthening, stretching, therapeutic  activities, manual therapy, joint mobilization, home exercise program/patient education, gait training, functional ROM exercises, flexibility, body mechanics training, postural training, and neuromuscular re-education  Frequency: 3x per week  Duration in weeks: 12  Treatment plan discussed with: patient      Visit Diagnoses:    ICD-10-CM ICD-9-CM   1. S/P reverse total shoulder arthroplasty, left  Z96.612 V43.61   2. Muscle weakness of left upper extremity  M62.81 728.87   3. Decreased ROM of left shoulder  M25.612 719.51   4. Left shoulder pain, unspecified chronicity  M25.512 719.41       History # of Personal Factors and/or Comorbidities: MODERATE (1-2)  Examination of Body System(s): # of elements: LOW (1-2)  Clinical Presentation: STABLE   Clinical Decision Making: LOW       Timed:         Manual Therapy:    15     mins  78968;     Therapeutic Exercise:    10     mins  08226;     Neuromuscular Adrian:    0    mins  26551;    Therapeutic Activity:     0     mins  48675;     Gait Trainin     mins  37482;     Ultrasound:     0     mins  57908;    Ionto                               0    mins   88662  Self Care                       0     mins   10443  Canalith Repos    0     mins 81037      Un-Timed:  Electrical Stimulation:    0     mins  58826 ( );  Dry Needling     0     mins self-pay  Traction     0     mins 77089  Low Eval     20     Mins  91263  Mod Eval     0     Mins  48321  High Eval                       0     Mins  31328  Re-Eval                           0    mins  11485    Timed Treatment:   25   mins   Total Treatment:     45   mins    PT SIGNATURE: Daniele Benedict PT, DPT    Electronically signed 2024    [place] License: PT - [id number]    Initial Certification  Certification Period: 2024 thru 2024  I certify that the therapy services are furnished while this patient is under my care.  The services outlined above are required by this patient, and will be reviewed every 90  days.     PHYSICIAN: Erickson Hernandez MD  NPI: [id number]      DATE:     Please sign and return via fax to [phone]. Thank you, [facility].

## 2024-01-24 ENCOUNTER — TELEPHONE (OUTPATIENT)
Dept: ORTHOPEDIC SURGERY | Facility: CLINIC | Age: 64
End: 2024-01-24
Payer: OTHER MISCELLANEOUS

## 2024-01-24 DIAGNOSIS — R26.2 DIFFICULTY WALKING: ICD-10-CM

## 2024-01-24 RX ORDER — HYDROCODONE BITARTRATE AND ACETAMINOPHEN 7.5; 325 MG/1; MG/1
1 TABLET ORAL EVERY 4 HOURS PRN
Qty: 42 TABLET | Refills: 0 | Status: SHIPPED | OUTPATIENT
Start: 2024-01-24

## 2024-01-30 ENCOUNTER — OFFICE VISIT (OUTPATIENT)
Dept: ORTHOPEDIC SURGERY | Facility: CLINIC | Age: 64
End: 2024-01-30
Payer: OTHER MISCELLANEOUS

## 2024-01-30 ENCOUNTER — TREATMENT (OUTPATIENT)
Dept: PHYSICAL THERAPY | Facility: CLINIC | Age: 64
End: 2024-01-30

## 2024-01-30 VITALS — OXYGEN SATURATION: 98 % | SYSTOLIC BLOOD PRESSURE: 137 MMHG | HEART RATE: 82 BPM | DIASTOLIC BLOOD PRESSURE: 83 MMHG

## 2024-01-30 DIAGNOSIS — M25.512 LEFT SHOULDER PAIN, UNSPECIFIED CHRONICITY: Primary | ICD-10-CM

## 2024-01-30 DIAGNOSIS — Z96.612 AFTERCARE FOLLOWING LEFT SHOULDER JOINT REPLACEMENT SURGERY: ICD-10-CM

## 2024-01-30 DIAGNOSIS — M25.612 DECREASED ROM OF LEFT SHOULDER: ICD-10-CM

## 2024-01-30 DIAGNOSIS — Z47.1 AFTERCARE FOLLOWING LEFT SHOULDER JOINT REPLACEMENT SURGERY: ICD-10-CM

## 2024-01-30 DIAGNOSIS — Z96.612 S/P REVERSE TOTAL SHOULDER ARTHROPLASTY, LEFT: Primary | ICD-10-CM

## 2024-01-30 DIAGNOSIS — M62.81 MUSCLE WEAKNESS OF LEFT UPPER EXTREMITY: ICD-10-CM

## 2024-01-30 DIAGNOSIS — M25.512 LEFT SHOULDER PAIN, UNSPECIFIED CHRONICITY: ICD-10-CM

## 2024-01-30 PROCEDURE — 99024 POSTOP FOLLOW-UP VISIT: CPT | Performed by: PHYSICIAN ASSISTANT

## 2024-01-30 NOTE — PROGRESS NOTES
"Chief Complaint  Follow-up of the Left Shoulder    Subjective      Devyn Steinberg presents to [facility] for follow-up of left total shoulder reverse arthroplasty performed on 1/15/2024 by Dr. Hernandez.  He presents today with sling in place.  He has started outpatient physical therapy through [facility].  Reports that his shoulder is \"sore and lets me know its there\".  Endorses compliance with sling and participation in home exercise program.    Objective   Allergies   Allergen Reactions    Trulicity [Dulaglutide] Other (See Comments)     Pancreatitis    Tyloxapol Swelling       Vital Signs:   /83   Pulse 82   SpO2 98%       Physical Exam    Constitutional: Awake, alert. Well nourished appearance.    Integumentary: Warm, dry, intact. No obvious rashes.    HENT: Atraumatic, normocephalic.   Respiratory: Non labored respirations .   Cardiovascular: Intact peripheral pulses.    Psychiatric: Normal mood and affect. A&O X3    Ortho Exam  Left shoulder: Skin is warm, dry, and intact.  Well-healing surgical incision visualized without any evidence of wound dehiscence, surrounding erythema, warmth, or drainage.  Patient demonstrates shoulder pendulums.  Full flexion and extension of the wrist and elbow.  Full pronation and supination.  Patient is able to form a full fist.  Good thumb opposition.  Sensation and distal neurovascular intact.    Imaging Results (Most Recent)       Procedure Component Value Units Date/Time    XR Scapula Left [[id number]] Resulted: 01/31/24 0956     Updated: 01/31/24 0956    Narrative:      X-Ray Report:  Study: X-rays ordered, taken in the office, and reviewed today.   Site: Left scapula Xray  Indication: TSRA  View: AP/Lateral view(s)  Findings: Intact left total shoulder reverse arthroplasty. No evidence of   hardware malfunction, loosening, subsidence, or periprosthetic fracture.   Prior studies available for comparison: yes                "        Assessment and Plan   Problem List Items Addressed This Visit    None  Visit Diagnoses       Left shoulder pain, unspecified chronicity    -  Primary    Relevant Orders    XR Scapula Left (Completed)    Aftercare following left shoulder joint replacement surgery                Follow Up   Return in about 4 weeks (around 2/27/2024).    Tobacco Use: Low Risk  (1/30/2024)    Patient History     Smoking Tobacco Use: Never     Smokeless Tobacco Use: Never     Passive Exposure: Past     Patient is a non-smoker.  Did not discuss tobacco cessation options.    Patient Instructions   X-rays taken and reviewed.     Sutures/staples removed in office today. Steri-strips applied. Patient educated on incision care. May shower, but do not submerge in water until fully healed. Do not apply creams or lotions. Allow steri-strips to fall off on their own in 7-10 days.     Continue sling for the next 4 weeks. May remove for hygiene and shoulder pendulums, gentle range of motion exercises to the elbow, wrist, and hand/fingers. No active range of motion of the shoulder. No lifting, pushing, or pulling.     Continue PT for passive stretching/gentle range of motion only.     Follow-up in 4 weeks. No x-rays needed. Call with any changes or concerns.     Patient was given instructions and counseling regarding his condition or for health maintenance advice. Please see specific information pulled into the AVS if appropriate.

## 2024-01-30 NOTE — PATIENT INSTRUCTIONS
X-rays taken and reviewed.     Sutures/staples removed in office today. Steri-strips applied. Patient educated on incision care. May shower, but do not submerge in water until fully healed. Do not apply creams or lotions. Allow steri-strips to fall off on their own in 7-10 days.     Continue sling for the next 4 weeks. May remove for hygiene and shoulder pendulums, gentle range of motion exercises to the elbow, wrist, and hand/fingers. No active range of motion of the shoulder. No lifting, pushing, or pulling.     Continue PT for passive stretching/gentle range of motion only.     Follow-up in 4 weeks. No x-rays needed. Call with any changes or concerns.

## 2024-01-30 NOTE — PROGRESS NOTES
Physical Therapy Daily Treatment Note      Patient: Devyn Steinberg   : 1960  Referring practitioner: Erickson Hernandez MD  Date of Initial Visit: Type: THERAPY  Noted: 2024  Today's Date: 2024  Patient seen for 2 sessions         Subjective Questionnaire:       Subjective Evaluation    History of Present Illness    Subjective comment: Pt reports 5/10 L shoulder pain currently and reports it gets worse at night and he has trouble sleeping.  Pt reports he is taking Tylenol and truies to take prescription medication when pain gets real bad.       Objective   See Exercise, Manual, and Modality Logs for complete treatment.       Assessment & Plan       Assessment  Assessment details: Pt presents to clinic wearing L shoulder sling with ABD pillow. Pt's L shoulder is tight.  Pt slowly progressed L shoulder passive flexion to 90 degrees.  Pt is going to ortho appt. Following today's visit and hopes to get staples out.  Pt reports distal two staples are very bothersome as they feel they are in his armpit.  Pt will benefit from continued PT.         Visit Diagnoses:    ICD-10-CM ICD-9-CM   1. S/P reverse total shoulder arthroplasty, left  Z96.612 V43.61   2. Muscle weakness of left upper extremity  M62.81 728.87   3. Decreased ROM of left shoulder  M25.612 719.51   4. Left shoulder pain, unspecified chronicity  M25.512 719.41       Progress per Plan of Care and Progress strengthening /stabilization /functional activity           Timed:  Manual Therapy:    28     mins  19319;  Therapeutic Exercise:    1     mins  12335;     Neuromuscular Adrian:        mins  65544;    Therapeutic Activity:          mins  42501;     Gait Training:           mins  71418;     Ultrasound:          mins  46525;    Electrical Stimulation:         mins  20325 ( );  Aquatic Therapy          mins  39744    Untimed:  Electrical Stimulation:         mins  08228 ( );  Mechanical Traction:         mins  75944;     Timed  Treatment:   29   mins   Total Treatment:     29   mins    Electronically signed    Odessa Ratliff PTA  Physical Therapist Assistant    [facility] license: [id number]

## 2024-02-01 ENCOUNTER — TELEPHONE (OUTPATIENT)
Dept: ORTHOPEDIC SURGERY | Facility: CLINIC | Age: 64
End: 2024-02-01
Payer: OTHER MISCELLANEOUS

## 2024-02-01 NOTE — TELEPHONE ENCOUNTER
Caller: CHARI    Relationship: LAURA    Best call back number: [phone] DIRECT     What form or medical record are you requesting: OFFICE VIST NOTES 1-30-24    Who is requesting this form or medical record from you: LAURA    How would you like to receive the form or medical records (pick-up, mail, fax): FAX  If fax, what is the fax number: [phone]    Additional notes: OFFICE VISIT NOTES 1-30-24 AND WORK STATUS NEEDED     EMAIL: [email or web address]

## 2024-02-05 ENCOUNTER — TREATMENT (OUTPATIENT)
Dept: PHYSICAL THERAPY | Facility: CLINIC | Age: 64
End: 2024-02-05

## 2024-02-05 DIAGNOSIS — M25.612 DECREASED ROM OF LEFT SHOULDER: ICD-10-CM

## 2024-02-05 DIAGNOSIS — Z96.612 S/P REVERSE TOTAL SHOULDER ARTHROPLASTY, LEFT: ICD-10-CM

## 2024-02-05 DIAGNOSIS — M62.81 MUSCLE WEAKNESS OF LEFT UPPER EXTREMITY: ICD-10-CM

## 2024-02-05 DIAGNOSIS — M25.512 LEFT SHOULDER PAIN, UNSPECIFIED CHRONICITY: Primary | ICD-10-CM

## 2024-02-05 PROCEDURE — 97140 MANUAL THERAPY 1/> REGIONS: CPT | Performed by: PHYSICAL THERAPIST

## 2024-02-05 NOTE — PROGRESS NOTES
Physical Therapy Daily Treatment Note      Patient: Devyn Steinberg   : 1960  Referring practitioner: Erickson Hernandez MD  Date of Initial Visit: Type: THERAPY  Noted: 2024  Today's Date: 2024  Patient seen for 3 sessions           Subjective Questionnaire:       Subjective Evaluation    History of Present Illness    Subjective comment: Pt reported his L shoulder pain is 4-5/10 and he took Tylenol about one hour before PT appointment.  Pt reports he doesn't take prescription pain medicine.       Objective   See Exercise, Manual, and Modality Logs for complete treatment.       Assessment & Plan       Assessment  Assessment details: Pt's L shoulder range is tight.  Pt's L shoulder achieved 93 degrees passive flexion and 85 degrees passive Abduction.  Pt achieved active internal rotation behind the back to T12.   Pt tolerated strengthening well an is progressing.  Continue with POC.        Visit Diagnoses:    ICD-10-CM ICD-9-CM   1. Left shoulder pain, unspecified chronicity  M25.512 719.41   2. Decreased ROM of left shoulder  M25.612 719.51   3. Muscle weakness of left upper extremity  M62.81 728.87   4. S/P reverse total shoulder arthroplasty, left  Z96.612 V43.61       Progress per Plan of Care and Progress strengthening /stabilization /functional activity           Timed:  Manual Therapy:    28     mins  79655;  Therapeutic Exercise:         mins  01463;     Neuromuscular Adrian:        mins  50708;    Therapeutic Activity:          mins  48983;     Gait Training:           mins  53861;     Ultrasound:          mins  75991;    Electrical Stimulation:         mins  01089 ( );  Aquatic Therapy          mins  13306    Untimed:  Electrical Stimulation:         mins  85811 ( );  Mechanical Traction:         mins  55040;     Timed Treatment:   28   mins   Total Treatment:     28   mins    Electronically signed    Odessa Ratliff PTA  Physical Therapist Assistant    SAURAB[facility] license: [id number]

## 2024-02-08 ENCOUNTER — TREATMENT (OUTPATIENT)
Dept: PHYSICAL THERAPY | Facility: CLINIC | Age: 64
End: 2024-02-08
Payer: OTHER MISCELLANEOUS

## 2024-02-08 DIAGNOSIS — M25.512 LEFT SHOULDER PAIN, UNSPECIFIED CHRONICITY: Primary | ICD-10-CM

## 2024-02-08 DIAGNOSIS — Z96.612 S/P REVERSE TOTAL SHOULDER ARTHROPLASTY, LEFT: ICD-10-CM

## 2024-02-08 DIAGNOSIS — M25.612 DECREASED ROM OF LEFT SHOULDER: ICD-10-CM

## 2024-02-08 DIAGNOSIS — M62.81 MUSCLE WEAKNESS OF LEFT UPPER EXTREMITY: ICD-10-CM

## 2024-02-08 NOTE — PROGRESS NOTES
Physical Therapy Daily Treatment Note  [place] [facility] [address] [facility] [place] [address]      Patient: Devyn Steinberg   : 1960  Diagnosis/ICD-10 Code:  Left shoulder pain, unspecified chronicity [M25.512]  Referring practitioner: Erickson Hernandez MD  Date of Initial Visit: Type: THERAPY  Noted: 2024  Today's Date: 2024  Patient seen for 4 sessions           Subjective   The patient reported they do continue to get soreness in their L shoulder. Pt reports their shoulder 'feels funny' at times.     Objective     L shoulder flexion: AAROM w/ cane and therapist; 122    See Exercise, Manual, and Modality Logs for complete treatment.     Assessment/Plan  Discussed w/ pt about performing painless isometrics at home for HEP.   Patient will continue to benefit from skilled physical therapy to further address their deficits in strength and ROM in order to improve upon their functional mobility and to return to performing ADLs without restrictions and pain.         Timed:  Manual Therapy:    15     mins  00751;  Therapeutic Exercise:    8     mins  24509;     Neuromuscular Adrian:   8    mins  21601;    Therapeutic Activity:     0     mins  41446;     Gait Trainin     mins  05871;     Aquatics                         0      mins  51609    Un-timed:  Mechanical Traction      0     mins  00405  Electrical Stimulation:    0     mins  71591 ( );      Timed Treatment:   31   mins   Total Treatment:     31   mins    Daniele Benedict PT, DPT    Electronically signed 2024    [place] License: PT - [id number]

## 2024-02-12 ENCOUNTER — TREATMENT (OUTPATIENT)
Dept: PHYSICAL THERAPY | Facility: CLINIC | Age: 64
End: 2024-02-12
Payer: OTHER MISCELLANEOUS

## 2024-02-12 DIAGNOSIS — M25.612 DECREASED ROM OF LEFT SHOULDER: ICD-10-CM

## 2024-02-12 DIAGNOSIS — M62.81 MUSCLE WEAKNESS OF LEFT UPPER EXTREMITY: ICD-10-CM

## 2024-02-12 DIAGNOSIS — Z96.612 S/P REVERSE TOTAL SHOULDER ARTHROPLASTY, LEFT: ICD-10-CM

## 2024-02-12 DIAGNOSIS — M25.512 LEFT SHOULDER PAIN, UNSPECIFIED CHRONICITY: Primary | ICD-10-CM

## 2024-02-12 PROCEDURE — 97140 MANUAL THERAPY 1/> REGIONS: CPT

## 2024-02-12 PROCEDURE — 97110 THERAPEUTIC EXERCISES: CPT

## 2024-02-15 ENCOUNTER — TREATMENT (OUTPATIENT)
Dept: PHYSICAL THERAPY | Facility: CLINIC | Age: 64
End: 2024-02-15
Payer: OTHER MISCELLANEOUS

## 2024-02-15 DIAGNOSIS — R26.2 DIFFICULTY WALKING: ICD-10-CM

## 2024-02-15 DIAGNOSIS — M25.512 LEFT SHOULDER PAIN, UNSPECIFIED CHRONICITY: Primary | ICD-10-CM

## 2024-02-15 DIAGNOSIS — M25.612 DECREASED ROM OF LEFT SHOULDER: ICD-10-CM

## 2024-02-15 DIAGNOSIS — M62.81 MUSCLE WEAKNESS OF LEFT UPPER EXTREMITY: ICD-10-CM

## 2024-02-15 DIAGNOSIS — Z96.612 S/P REVERSE TOTAL SHOULDER ARTHROPLASTY, LEFT: ICD-10-CM

## 2024-02-15 RX ORDER — HYDROCODONE BITARTRATE AND ACETAMINOPHEN 7.5; 325 MG/1; MG/1
1 TABLET ORAL EVERY 4 HOURS PRN
Qty: 42 TABLET | Refills: 0 | Status: SHIPPED | OUTPATIENT
Start: 2024-02-15

## 2024-02-19 ENCOUNTER — TREATMENT (OUTPATIENT)
Dept: PHYSICAL THERAPY | Facility: CLINIC | Age: 64
End: 2024-02-19
Payer: OTHER MISCELLANEOUS

## 2024-02-19 DIAGNOSIS — M62.81 MUSCLE WEAKNESS OF LEFT UPPER EXTREMITY: ICD-10-CM

## 2024-02-19 DIAGNOSIS — M25.612 DECREASED ROM OF LEFT SHOULDER: ICD-10-CM

## 2024-02-19 DIAGNOSIS — Z96.612 S/P REVERSE TOTAL SHOULDER ARTHROPLASTY, LEFT: ICD-10-CM

## 2024-02-19 DIAGNOSIS — M25.512 LEFT SHOULDER PAIN, UNSPECIFIED CHRONICITY: Primary | ICD-10-CM

## 2024-02-19 PROCEDURE — 97110 THERAPEUTIC EXERCISES: CPT

## 2024-02-19 PROCEDURE — 97140 MANUAL THERAPY 1/> REGIONS: CPT

## 2024-02-19 NOTE — PROGRESS NOTES
Physical Therapy Daily Treatment Note  [place] [facility] [address] [facility] [place] [address]      Patient: Devyn Steinberg   : 1960  Diagnosis/ICD-10 Code:  Left shoulder pain, unspecified chronicity [M25.512]  Referring practitioner: Erickson Hernandez MD  Date of Initial Visit: Type: THERAPY  Noted: 2024  Today's Date: 2024  Patient seen for 7 sessions           Subjective   The patient reported their pain still continues to fluctuate. Pt reports they do have to continue to move as they do not tolerate sitting back on their couch.     Objective   See Exercise, Manual, and Modality Logs for complete treatment.     Assessment/Plan  Pt continues to have increased L shoulder stiffness. Pt encouraged to continue performing AAROM into flexion and stretches at home. Pt provided theraband to further facilitate their HEP. Patient will continue to benefit from skilled physical therapy to further address their deficits in strength and ROM in order to improve upon their functional mobility and to return to ADLs without restrictions.          Timed:  Manual Therapy:    8     mins  11119;  Therapeutic Exercise:    23     mins  84827;     Neuromuscular Adrian:   0    mins  85957;    Therapeutic Activity:     0     mins  70985;     Gait Trainin     mins  04195;     Aquatics                         0      mins  20888    Un-timed:  Mechanical Traction      0     mins  04572  Electrical Stimulation:    0     mins  96389 ( );      Timed Treatment:   31   mins   Total Treatment:     31   mins    Daniele Benedict PT, DPT    Electronically signed 2024    [place] License: PT - [id number]

## 2024-02-22 ENCOUNTER — TREATMENT (OUTPATIENT)
Dept: PHYSICAL THERAPY | Facility: CLINIC | Age: 64
End: 2024-02-22
Payer: OTHER MISCELLANEOUS

## 2024-02-22 DIAGNOSIS — M62.81 MUSCLE WEAKNESS OF LEFT UPPER EXTREMITY: ICD-10-CM

## 2024-02-22 DIAGNOSIS — M25.612 DECREASED ROM OF LEFT SHOULDER: ICD-10-CM

## 2024-02-22 DIAGNOSIS — Z96.612 S/P REVERSE TOTAL SHOULDER ARTHROPLASTY, LEFT: ICD-10-CM

## 2024-02-22 DIAGNOSIS — M25.512 LEFT SHOULDER PAIN, UNSPECIFIED CHRONICITY: Primary | ICD-10-CM

## 2024-02-22 NOTE — PROGRESS NOTES
Progress Note   [facility]: [address] [place] [address]      Patient: Devyn Steinberg   : 1960  Diagnosis/ICD-10 Code:  Left shoulder pain, unspecified chronicity [M25.512]  Referring practitioner: Erickson Hernandez MD  Date of Initial Visit: Type: THERAPY  Noted: 2024  Today's Date: 2024  Patient seen for 8 sessions      Subjective:   Subjective Questionnaire: QuickDASH: 44  Clinical Progress: improved  Home Program Compliance: Yes  Treatment has included: ADL retraining, soft tissue mobilization, strengthening, stretching, therapeutic activities, manual therapy, joint mobilization, home exercise program/patient education, functional ROM exercises, flexibility, body mechanics training, postural training, and neuromuscular re-education    Subjective   Pt reports their current level of pain today is a 5/10. Pt reports they feel like they have slept funny last night, increasing their L shoulder pain. Pt reports they are still using their pain medication to control their levels of pain.      Objective   Active Assisted Range of Motion   L shoulder  Flexion: 105     Passive Range of Motion   Left Shoulder   Flexion: 120 degrees with pain  Abduction: 95 degrees with pain    See Exercise, Manual, and Modality Logs for complete treatment.     Assessment/Plan  Impairments: abnormal coordination, abnormal muscle firing, abnormal or restricted ROM, activity intolerance, impaired balance, impaired physical strength, lacks appropriate home exercise program, pain with function and weight-bearing intolerance   Functional limitations: carrying objects, lifting, sleeping, pulling, pushing, uncomfortable because of pain, moving in bed, reaching behind back, reaching overhead and unable to perform repetitive tasks     Pt reported to physical therapy s/p L Reverse total shoulder replacement on 1/15/2024. Pt does have improvement in their shoulder ROM as well as their ability to perform AAROM. Pt has  slight improvement in their perceived deficits described by Alen. Pt was encouraged to continue performing their HEP. Pt also encouraged to maintain awareness while working out of their sling. Pt's goals will be addressed at this time to reflect their current progress in therapy. Patient will continue to benefit from skilled physical therapy to further address their deficits in strength and ROM in order to improve upon their functional mobility and to return to performing ADLs without pain.       Goals  Plan Goals: SHOULDER  PROBLEMS:      1. The patient has limited ROM of the L shoulder.                   LTG 1: 12 weeks:  The patient will demonstrate 140 degrees of L shoulder flexion, 140 degrees of shoulder abduction, 65 degrees of shoulder external rotation, and 65 degrees of shoulder internal rotation to allow the patient to reach into upper kitchen cabinets and manipulate clothing behind the back with greater ease.                          STATUS:  progressing              STG 1a: 6 weeks:  The patient will demonstrate 100 degrees of L shoulder flexion, 100 degrees of shoulder abduction, 45 degrees of shoulder external rotation, and 45 degrees of shoulder internal rotation.                          STATUS:  progressing              TREATMENT: Manual therapy, therapeutic exercise, home exercise instruction, and modalities as needed to include: electrical stimulation, ultrasound, moist heat, and ice.     2. The patient has limited strength of the L shoulder.              LTG 2: 12 weeks:  The patient will demonstrate 4+/5 strength for L shoulder flexion, abduction, external rotation, and internal rotation in order to demonstrate improved shoulder stability.                          STATUS:  progressing              STG 2a: 8 weeks:  The patient will demonstrate 3+/5 strength for L shoulder flexion, abduction, external rotation, and internal rotation.                          STATUS:  progressing               STG2b:  2 weeks:  The patient will be independent with home exercises.                           STATUS:  progressing              TREATMENT: Manual therapy, therapeutic exercise, home exercise instruction, and modalities as needed to include: electrical stimulation, ultrasound, moist heat, and ice.                3. The patient complains of pain to the L shoulder.              LTG 3: 12 weeks:  The patient will report a pain rating of 1/10 or better in order to improve sleep quality and tolerance to performance of activities of daily living.                          STATUS:  progressing              STG 3a: 8 weeks:  The patient will report a pain rating of 3/10 or better.                           STATUS:  progressing              TREATMENT: Manual therapy, therapeutic exercise, home exercise instruction, and modalities as needed to include: electrical stimulation, ultrasound, moist heat, and ice.     4. Carrying, Moving, and Handling Objects Functional Limitation                               LTG 4: 12 weeks:  The patient will demonstrate 9 % limitation by achieving a score of 15 on the QuickDASH.                          STATUS: progressing              STG 4a: 6 weeks:  The patient will demonstrate 43 % limitation by achieving a score of 30 on the QuickDASH.                            STATUS:  progressing              TREATMENT:  Manual therapy, therapeutic exercise, home exercise instruction, and modalities as needed to include: moist heat, electrical stimulation, and ultrasound.       Progress toward previous goals: Not Met      Recommendations: Continue as planned  Timeframe: 3 a week, for 2 months   Prognosis to achieve goals: fair    PT Signature: Daniele Benedict PT, DPT    Electronically signed 2/22/2024    [place] License: PT - [id number]         Timed:  Manual Therapy:    15     mins  11454;  Therapeutic Exercise:    15     mins  55971;     Neuromuscular Adrian:    0    mins  45280;    Therapeutic Activity:     0      mins  80852;     Gait Trainin     mins  30497;     Aquatics                         0      mins  16699    Un-timed:  Mechanical Traction      0     mins  47437  Dry Needling     0     mins self-pay  Electrical Stimulation:    0     mins  42353 ( );    Timed Treatment:   30   mins   Total Treatment:     30   mins

## 2024-02-26 ENCOUNTER — TREATMENT (OUTPATIENT)
Dept: PHYSICAL THERAPY | Facility: CLINIC | Age: 64
End: 2024-02-26
Payer: OTHER MISCELLANEOUS

## 2024-02-26 DIAGNOSIS — M62.81 MUSCLE WEAKNESS OF LEFT UPPER EXTREMITY: ICD-10-CM

## 2024-02-26 DIAGNOSIS — Z96.612 S/P REVERSE TOTAL SHOULDER ARTHROPLASTY, LEFT: ICD-10-CM

## 2024-02-26 DIAGNOSIS — M25.612 DECREASED ROM OF LEFT SHOULDER: ICD-10-CM

## 2024-02-26 DIAGNOSIS — M25.512 LEFT SHOULDER PAIN, UNSPECIFIED CHRONICITY: Primary | ICD-10-CM

## 2024-02-26 PROCEDURE — 97140 MANUAL THERAPY 1/> REGIONS: CPT

## 2024-02-26 PROCEDURE — 97110 THERAPEUTIC EXERCISES: CPT

## 2024-02-26 NOTE — PROGRESS NOTES
Physical Therapy Daily Treatment Note  [place] [facility]: [address] [facility] [place] [address]      Patient: Devyn Steinberg   : 1960  Diagnosis/ICD-10 Code:  Left shoulder pain, unspecified chronicity [M25.512]  Referring practitioner: Erickson Hernandez MD  Date of Initial Visit: Type: THERAPY  Noted: 2024  Today's Date: 2024  Patient seen for 9 sessions           Subjective   The patient reported they did go without their sling all day Saturday, though had some increased pain on  because of it. Pt reports they are a little sore today, as they just got up. Pt reports their current level of pain is a 5/10.    Objective   See Exercise, Manual, and Modality Logs for complete treatment.     Assessment/Plan  Pt continues to have increased soreness w/ ROM exs. Pt also has a tendency to guard during manual therapy due to pain. Patient will continue to benefit from skilled physical therapy to further address their deficits in strength and ROM in order to improve upon their functional mobility and to return to daily tasks w/o restrictions.          Timed:  Manual Therapy:    8     mins  88537;  Therapeutic Exercise:    23     mins  88808;     Neuromuscular Adrian:   0    mins  88106;    Therapeutic Activity:     0     mins  02622;     Gait Trainin     mins  62519;     Aquatics                         0      mins  45156    Un-timed:  Mechanical Traction      0     mins  18550  Electrical Stimulation:    0     mins  82536 ( );      Timed Treatment:   31   mins   Total Treatment:     31   mins    Daniele Benedict PT, DPT    Electronically signed 2024    [place] License: PT - [id number]

## 2024-02-27 ENCOUNTER — OFFICE VISIT (OUTPATIENT)
Dept: ORTHOPEDIC SURGERY | Facility: CLINIC | Age: 64
End: 2024-02-27
Payer: OTHER MISCELLANEOUS

## 2024-02-27 VITALS — OXYGEN SATURATION: 97 % | SYSTOLIC BLOOD PRESSURE: 154 MMHG | DIASTOLIC BLOOD PRESSURE: 80 MMHG | HEART RATE: 97 BPM

## 2024-02-27 DIAGNOSIS — Z96.612 AFTERCARE FOLLOWING LEFT SHOULDER JOINT REPLACEMENT SURGERY: Primary | ICD-10-CM

## 2024-02-27 DIAGNOSIS — Z47.1 AFTERCARE FOLLOWING LEFT SHOULDER JOINT REPLACEMENT SURGERY: Primary | ICD-10-CM

## 2024-02-27 PROCEDURE — 99024 POSTOP FOLLOW-UP VISIT: CPT | Performed by: PHYSICIAN ASSISTANT

## 2024-02-27 NOTE — PROGRESS NOTES
"Chief Complaint  Follow-up of the Left Shoulder    Subjective      Devyn Steinberg presents to [facility] for follow-up of left total shoulder reverse arthroplasty performed on 1/15/2024 by Dr. Hernandez.  He presents today without sling in place, reporting he has discontinued use over the course of the last week under the direction of physical therapy.  He is participating in outpatient PT through [facility] and reports that he has been told that his shoulder is \"stiff\".  Does report anticipated strengthening efforts starting soon.  Reports that his shoulder is \"sore\".    Objective   Allergies   Allergen Reactions    Trulicity [Dulaglutide] Other (See Comments)     Pancreatitis    Tyloxapol Swelling       Vital Signs:   /80   Pulse 97   SpO2 97%       Physical Exam    Constitutional: Awake, alert. Well nourished appearance.    Integumentary: Warm, dry, intact. No obvious rashes.    HENT: Atraumatic, normocephalic.   Respiratory: Non labored respirations .   Cardiovascular: Intact peripheral pulses.    Psychiatric: Normal mood and affect. A&O X3    Ortho Exam  Left shoulder: Well-healed surgical scar noted.  Skin is warm, dry, and intact.  Active assisted shoulder forward flexion to 110 degrees.  Full flexion and extension of the wrist and elbow.  Full pronation and supination.  Patient is able to form a full fist.  Good thumb opposition.  Sensation and distal neurovascular intact.    Imaging Results (Most Recent)       None                      Assessment and Plan   Problem List Items Addressed This Visit    None  Visit Diagnoses       Aftercare following left shoulder joint replacement surgery    -  Primary            Follow Up   Return in about 6 weeks (around 4/9/2024).    Tobacco Use: Low Risk  (2/27/2024)    Patient History     Smoking Tobacco Use: Never     Smokeless Tobacco Use: Never     Passive Exposure: Past       Educated on risk of smoking. Discussed " options for smoking cessation.    Patient Instructions   Patient may discontinue sling use. Advised still no active ROM of the shoulder, until directed by PT. continue physical therapy and patient may start active assist ROM/exercises. No lifting, pushing, or pulling. Nothing heavier than 1 lb in affected arm.    Continue icing shoulder up to 3-4 times daily for no longer than 15 to 20 minutes at a time as needed for pain or swelling.    Follow-up in 6 weeks.  Call with changes or concerns.  X-rays needed.       Patient was given instructions and counseling regarding his condition or for health maintenance advice. Please see specific information pulled into the AVS if appropriate.

## 2024-02-27 NOTE — PATIENT INSTRUCTIONS
Patient may discontinue sling use. Advised still no active ROM of the shoulder, until directed by PT. continue physical therapy and patient may start active assist ROM/exercises. No lifting, pushing, or pulling. Nothing heavier than 1 lb in affected arm.    Continue icing shoulder up to 3-4 times daily for no longer than 15 to 20 minutes at a time as needed for pain or swelling.    Follow-up in 6 weeks.  Call with changes or concerns.  X-rays needed.

## 2024-02-28 ENCOUNTER — TELEPHONE (OUTPATIENT)
Dept: ORTHOPEDIC SURGERY | Facility: CLINIC | Age: 64
End: 2024-02-28
Payer: OTHER MISCELLANEOUS

## 2024-02-28 NOTE — TELEPHONE ENCOUNTER
Caller: SONY    Relationship: NURSE  WITH LAURA    Best call back number: [phone]    What form or medical record are you requesting: OFFICE NOTES, WORK STATUS AND ANY ORDERS FROM PATIENT'S APPT ON 02.27.24    Who is requesting this form or medical record from you: SONY WITH LAURA     How would you like to receive the form or medical records (pick-up, mail, fax):   If fax, what is the fax number:  [phone]    Timeframe paperwork needed: ASAP

## 2024-02-29 ENCOUNTER — TELEPHONE (OUTPATIENT)
Dept: ORTHOPEDIC SURGERY | Facility: CLINIC | Age: 64
End: 2024-02-29
Payer: OTHER MISCELLANEOUS

## 2024-02-29 ENCOUNTER — TREATMENT (OUTPATIENT)
Dept: PHYSICAL THERAPY | Facility: CLINIC | Age: 64
End: 2024-02-29
Payer: OTHER MISCELLANEOUS

## 2024-02-29 DIAGNOSIS — M25.612 DECREASED ROM OF LEFT SHOULDER: ICD-10-CM

## 2024-02-29 DIAGNOSIS — M62.81 MUSCLE WEAKNESS OF LEFT UPPER EXTREMITY: ICD-10-CM

## 2024-02-29 DIAGNOSIS — Z96.612 S/P REVERSE TOTAL SHOULDER ARTHROPLASTY, LEFT: ICD-10-CM

## 2024-02-29 DIAGNOSIS — M25.512 LEFT SHOULDER PAIN, UNSPECIFIED CHRONICITY: Primary | ICD-10-CM

## 2024-02-29 NOTE — TELEPHONE ENCOUNTER
Caller: CHARI    Relationship: KELLIE     What form or medical record are you requesting: OFFICE NOTES 02/27/24    Who is requesting this form or medical record from you: EMPLOYER    How would you like to receive the form or medical records (pick-up, mail, fax): FAX  If fax, what is the fax number: [phone]    Timeframe paperwork needed: ASAP

## 2024-02-29 NOTE — PROGRESS NOTES
Physical Therapy Daily Treatment Note  [place] [facility] [address] [facility] [place] [address]      Patient: Devyn Steinberg   : 1960  Diagnosis/ICD-10 Code:  Left shoulder pain, unspecified chronicity [M25.512]  Referring practitioner: Erickson Hernandez MD  Date of Initial Visit: Type: THERAPY  Noted: 2024  Today's Date: 2024  Patient seen for 10 sessions           Subjective   The patient reported their shoulder is still feeling like a toothache today. Pt reports their pain is a 4/10. Pt feels a little better mentally since taking their sling off.     Objective   See Exercise, Manual, and Modality Logs for complete treatment.     Assessment/Plan  Pt does have some soreness w/ ER and IR step outs today. Will continue to monitor this moving forward. Patient will continue to benefit from skilled physical therapy to further address their deficits in strength and ROM in order to improve upon their functional mobility and to return to performing ADLs w/ decreased pain and improved function.          Timed:  Manual Therapy:    8     mins  83463;  Therapeutic Exercise:    23     mins  86461;     Neuromuscular Adrian:   0    mins  66913;    Therapeutic Activity:     0     mins  56401;     Gait Trainin     mins  45577;     Aquatics                         0      mins  09316    Un-timed:  Mechanical Traction      0     mins  58279  Electrical Stimulation:    0     mins  16984 ( );      Timed Treatment:   31   mins   Total Treatment:     31   mins    Daniele Benedict PT, DPT    Electronically signed 2024    [place] License: PT - [id number]

## 2024-03-04 ENCOUNTER — TELEPHONE (OUTPATIENT)
Dept: PHYSICAL THERAPY | Facility: CLINIC | Age: 64
End: 2024-03-04
Payer: OTHER MISCELLANEOUS

## 2024-03-04 NOTE — TELEPHONE ENCOUNTER
Caller: Devyn Steinberg    Relationship: Self         What was the call regarding: WAITING ON

## 2024-03-07 ENCOUNTER — TREATMENT (OUTPATIENT)
Dept: PHYSICAL THERAPY | Facility: CLINIC | Age: 64
End: 2024-03-07
Payer: OTHER MISCELLANEOUS

## 2024-03-07 DIAGNOSIS — Z96.612 S/P REVERSE TOTAL SHOULDER ARTHROPLASTY, LEFT: ICD-10-CM

## 2024-03-07 DIAGNOSIS — M62.81 MUSCLE WEAKNESS OF LEFT UPPER EXTREMITY: ICD-10-CM

## 2024-03-07 DIAGNOSIS — M25.512 LEFT SHOULDER PAIN, UNSPECIFIED CHRONICITY: Primary | ICD-10-CM

## 2024-03-07 DIAGNOSIS — M25.612 DECREASED ROM OF LEFT SHOULDER: ICD-10-CM

## 2024-03-07 NOTE — PROGRESS NOTES
Physical Therapy Daily Treatment Note  [place] [facility] [address] [facility] [place] [address]      Patient: Devyn Steinberg   : 1960  Diagnosis/ICD-10 Code:  Left shoulder pain, unspecified chronicity [M25.512]  Referring practitioner: Erickson Hernandez MD  Date of Initial Visit: Type: THERAPY  Noted: 2024  Today's Date: 3/7/2024  Patient seen for 11 sessions           Subjective   The patient reported they are doing well today. Pt reports they are at a 4/10 today in their L shoulder. Pt reports their pain continues to fluctuate from no pain to a lot of pain. Pt reports they are still waking up every night due to pain.      Objective   See Exercise, Manual, and Modality Logs for complete treatment.     Assessment/Plan  Pt continues to have stiffness and discomfort at end range of motion in their L shoulder. Will continue to progress pt in their ROM and strength to their tolerance for their maximal therapeutic outcome and to return to ADLs w/ decreased pain.        Timed:  Manual Therapy:    10     mins  38122;  Therapeutic Exercise:    24     mins  88337;     Neuromuscular Adrian:   0    mins  80572;    Therapeutic Activity:     0     mins  62922;     Gait Trainin     mins  22861;     Aquatics                         0      mins  12872    Un-timed:  Mechanical Traction      0     mins  74371  Electrical Stimulation:    0     mins  43081 ( );      Timed Treatment:   34   mins   Total Treatment:     34   mins    Daniele Benedict PT, DPT    Electronically signed 3/7/2024    [place] License: PT - [id number]

## 2024-03-11 ENCOUNTER — TREATMENT (OUTPATIENT)
Dept: PHYSICAL THERAPY | Facility: CLINIC | Age: 64
End: 2024-03-11
Payer: OTHER MISCELLANEOUS

## 2024-03-11 DIAGNOSIS — M62.81 MUSCLE WEAKNESS OF LEFT UPPER EXTREMITY: ICD-10-CM

## 2024-03-11 DIAGNOSIS — M25.512 LEFT SHOULDER PAIN, UNSPECIFIED CHRONICITY: Primary | ICD-10-CM

## 2024-03-11 DIAGNOSIS — Z96.612 S/P REVERSE TOTAL SHOULDER ARTHROPLASTY, LEFT: ICD-10-CM

## 2024-03-11 DIAGNOSIS — M25.612 DECREASED ROM OF LEFT SHOULDER: ICD-10-CM

## 2024-03-11 NOTE — PROGRESS NOTES
Physical Therapy Daily Treatment Note  [place] [facility] [address] [facility] [place] [address]      Patient: Devyn Steinberg   : 1960  Diagnosis/ICD-10 Code:  Left shoulder pain, unspecified chronicity [M25.512]  Referring practitioner: Erickson Hernandez MD  Date of Initial Visit: Type: THERAPY  Noted: 2024  Today's Date: 3/11/2024  Patient seen for 12 sessions           Subjective   The patient reported they had a 'blood blister' on the top of their incision of their L shoulder this AM. Pt reports they took a shower and it stopped bleeding. Pt reports they are a little stiff today.    Objective   See Exercise, Manual, and Modality Logs for complete treatment.     Assessment/Plan  Pt tolerates therapy session well today. Pt does have increased discomfort as their UE is moved away from their body. Patient will continue to benefit from skilled physical therapy to further address their deficits in strength and ROM in order to improve upon their functional mobility and to return to ADLs w/ decreased pain.          Timed:  Manual Therapy:    0     mins  93753;  Therapeutic Exercise:    15     mins  68599;     Neuromuscular Adrian:   8    mins  00560;    Therapeutic Activity:     8     mins  31763;     Gait Trainin     mins  60768;     Aquatics                         0      mins  92790    Un-timed:  Mechanical Traction      0     mins  55886  Electrical Stimulation:    0     mins  31517 ( );      Timed Treatment:   31   mins   Total Treatment:     31   mins    Daniele Benedict PT, DPT    Electronically signed 3/11/2024    [place] License: PT - [id number]

## 2024-03-14 ENCOUNTER — TREATMENT (OUTPATIENT)
Dept: PHYSICAL THERAPY | Facility: CLINIC | Age: 64
End: 2024-03-14
Payer: OTHER MISCELLANEOUS

## 2024-03-14 DIAGNOSIS — Z96.612 S/P REVERSE TOTAL SHOULDER ARTHROPLASTY, LEFT: ICD-10-CM

## 2024-03-14 DIAGNOSIS — M62.81 MUSCLE WEAKNESS OF LEFT UPPER EXTREMITY: ICD-10-CM

## 2024-03-14 DIAGNOSIS — Z96.612 AFTERCARE FOLLOWING LEFT SHOULDER JOINT REPLACEMENT SURGERY: Primary | ICD-10-CM

## 2024-03-14 DIAGNOSIS — M25.612 DECREASED ROM OF LEFT SHOULDER: ICD-10-CM

## 2024-03-14 DIAGNOSIS — Z47.1 AFTERCARE FOLLOWING LEFT SHOULDER JOINT REPLACEMENT SURGERY: Primary | ICD-10-CM

## 2024-03-14 DIAGNOSIS — M25.512 LEFT SHOULDER PAIN, UNSPECIFIED CHRONICITY: Primary | ICD-10-CM

## 2024-03-14 NOTE — PROGRESS NOTES
Physical Therapy Daily Treatment Note      Patient: Devyn Steinberg   : 1960  Referring practitioner: Erickson Hernandez MD  Date of Initial Visit: Type: THERAPY  Noted: 10/3/2023  Today's Date: 3/14/2024  Patient seen for 14 sessions           Subjective Questionnaire:       Subjective Evaluation    History of Present Illness    Subjective comment: Pt reports 4/10 L shoulder pain level.  Pt reports he forgot to take his pain medication this morning.       Objective   See Exercise, Manual, and Modality Logs for complete treatment.       Assessment & Plan       Assessment  Assessment details: Pt's L shoulder is tight and pt was slow to move with all band motions.  Pt was able to loosen up after manual work and achieved passive L shoulder flexion and ABD 98 degrees.  Pt was encouraged to increased active assist work at home and pt stated he will.  Continue with POC.      Visit Diagnoses:    ICD-10-CM ICD-9-CM   1. Left shoulder pain, unspecified chronicity  M25.512 719.41   2. Muscle weakness of left upper extremity  M62.81 728.87   3. Decreased ROM of left shoulder  M25.612 719.51   4. S/P reverse total shoulder arthroplasty, left  Z96.612 V43.61       Progress per Plan of Care and Progress strengthening /stabilization /functional activity           Timed:  Manual Therapy:    8     mins  52978;  Therapeutic Exercise:    14     mins  51535;     Neuromuscular Adrian:        mins  57124;    Therapeutic Activity:     8     mins  87628;     Gait Training:           mins  01169;     Ultrasound:          mins  41097;    Electrical Stimulation:         mins  13702 ( );  Aquatic Therapy          mins  99591    Untimed:  Electrical Stimulation:         mins  73169 ( );  Mechanical Traction:         mins  19322;     Timed Treatment:   30   mins   Total Treatment:     30   mins    Electronically signed    Odessa Ratliff PTA  Physical Therapist Assistant    [facility] license: [id number]

## 2024-03-18 ENCOUNTER — TREATMENT (OUTPATIENT)
Dept: PHYSICAL THERAPY | Facility: CLINIC | Age: 64
End: 2024-03-18
Payer: OTHER MISCELLANEOUS

## 2024-03-18 DIAGNOSIS — M25.512 LEFT SHOULDER PAIN, UNSPECIFIED CHRONICITY: Primary | ICD-10-CM

## 2024-03-18 DIAGNOSIS — Z96.612 S/P REVERSE TOTAL SHOULDER ARTHROPLASTY, LEFT: ICD-10-CM

## 2024-03-18 DIAGNOSIS — M62.81 MUSCLE WEAKNESS OF LEFT UPPER EXTREMITY: ICD-10-CM

## 2024-03-18 DIAGNOSIS — M25.612 DECREASED ROM OF LEFT SHOULDER: ICD-10-CM

## 2024-03-18 NOTE — PROGRESS NOTES
Physical Therapy Daily Treatment Note      Patient: Devyn Steinberg   : 1960  Referring practitioner: Erickson Hernandez MD  Date of Initial Visit: Type: THERAPY  Noted: 2024  Today's Date: 3/18/2024  Patient seen for 13 sessions           Subjective Questionnaire:       Subjective Evaluation    History of Present Illness    Subjective comment: Pt reports his L shoulder pain was 6-7/10 yesterday and 3/10 today.  Pt reports he took oxycodone this morning whichhe hadn't been using and taking Tylenol.       Objective   See Exercise, Manual, and Modality Logs for complete treatment.       Assessment & Plan       Assessment  Assessment details: Pt's L shoulder is tight.  Pt reports he has had pain over the weekend and hasn't worked on range of motion.  Pt stops pulley and ladder at first pain and was encouraged to continue. Pt loosened up and was able to tolerate passive stretching.  Continue with POC.        Visit Diagnoses:    ICD-10-CM ICD-9-CM   1. Left shoulder pain, unspecified chronicity  M25.512 719.41   2. Muscle weakness of left upper extremity  M62.81 728.87   3. Decreased ROM of left shoulder  M25.612 719.51   4. S/P reverse total shoulder arthroplasty, left  Z96.612 V43.61       Progress per Plan of Care and Progress strengthening /stabilization /functional activity           Timed:  Manual Therapy:    10     mins  20102;  Therapeutic Exercise:    12     mins  94584;     Neuromuscular Adrian:        mins  82643;    Therapeutic Activity:     8     mins  97614;     Gait Training:           mins  77041;     Ultrasound:          mins  01946;    Electrical Stimulation:         mins  92700 ( );  Aquatic Therapy          mins  25488    Untimed:  Electrical Stimulation:         mins  00982 ( );  Mechanical Traction:         mins  87721;     Timed Treatment:   30   mins   Total Treatment:     30   mins    Electronically signed    Odessa Ratliff PTA  Physical Therapist Assistant    [facility] license:  [id number]

## 2024-03-21 ENCOUNTER — TREATMENT (OUTPATIENT)
Dept: PHYSICAL THERAPY | Facility: CLINIC | Age: 64
End: 2024-03-21
Payer: OTHER MISCELLANEOUS

## 2024-03-21 DIAGNOSIS — M25.512 LEFT SHOULDER PAIN, UNSPECIFIED CHRONICITY: Primary | ICD-10-CM

## 2024-03-21 DIAGNOSIS — M62.81 MUSCLE WEAKNESS OF LEFT UPPER EXTREMITY: ICD-10-CM

## 2024-03-21 DIAGNOSIS — Z96.612 S/P REVERSE TOTAL SHOULDER ARTHROPLASTY, LEFT: ICD-10-CM

## 2024-03-21 DIAGNOSIS — M25.612 DECREASED ROM OF LEFT SHOULDER: ICD-10-CM

## 2024-03-21 NOTE — PROGRESS NOTES
Progress Note   [facility]: [address] [place] [address]      Patient: Devyn Steinberg   : 1960  Diagnosis/ICD-10 Code:  Left shoulder pain, unspecified chronicity [M25.512]  Referring practitioner: Erickson Hernandez MD  Date of Initial Visit: Type: THERAPY  Noted: 2024  Today's Date: 3/21/2024  Patient seen for 14 sessions      Subjective:   Subjective Questionnaire: QuickDASH: 40  Clinical Progress: improved  Home Program Compliance: Yes  Treatment has included: balance/weight-bearing training, ADL retraining, soft tissue mobilization, strengthening, stretching, therapeutic activities, manual therapy, joint mobilization, home exercise program/patient education, gait training, functional ROM exercises, flexibility, body mechanics training, postural training, and neuromuscular re-education    Subjective   Pt reports they are still continuing to have pain in their L shoulder. Pt continues to struggle w/ tying their shoes, reaching behind their back to wash, taking their shirt off, and putting on deodorant. All of these actions do continue to increase pain in their L shoulder. Pt reports they are still using their pain medication due to pain. Pt reports they are currently at a 3/10.        Objective          Strength/Myotome Testing     Left Shoulder     Planes of Motion   Flexion: 3+   Abduction: 4-   External rotation at 0°: 4   Internal rotation at 0°: 4         Active Range of Motion   L shoulder  Flexion: 85  ABD: 55  IR@ 0 de  ER @ 0 deg: 10      Passive Range of Motion   Left Shoulder   Flexion: 120 degrees with pain  Abduction: 95 degrees with pain  ER @ 45: 5 degrees with pain  IR @ 45: 35 degrees with pain      See Exercise, Manual, and Modality Logs for complete treatment.     Assessment/Plan  Impairments: abnormal coordination, abnormal muscle firing, abnormal or restricted ROM, activity intolerance, impaired balance, impaired physical strength, lacks appropriate home  exercise program, pain with function and weight-bearing intolerance   Functional limitations: carrying objects, lifting, sleeping, pulling, pushing, uncomfortable because of pain, moving in bed, reaching behind back, reaching overhead and unable to perform repetitive tasks      Pt reported to physical therapy s/p L Reverse total shoulder replacement on 1/15/2024. Pt's ROM has remained consistent w/n the last month of therapy. Pt has decreased strength, limiting them from utilizing all of their available ROM. Pt continues to have increased perceived deficits described by Alen. Continued to encourage pt to perform strengthening exs at home to further facilitate their gains in therapy. Pt is limited at times due to increased pain. Pt's goals will be addressed at this time to reflect their progress in therapy. Patient will continue to benefit from skilled physical therapy to further address their deficits in strength and ROM in order to improve upon their functional mobility and to return to ADLs and daily tasks w/ less restriction and pain.         Goals  Plan Goals: SHOULDER  PROBLEMS:      1. The patient has limited ROM of the L shoulder.                   LTG 1: 12 weeks:  The patient will demonstrate 140 degrees of L shoulder flexion, 140 degrees of shoulder abduction, 65 degrees of shoulder external rotation, and 65 degrees of shoulder internal rotation to allow the patient to reach into upper kitchen cabinets and manipulate clothing behind the back with greater ease.                          STATUS:  progressing              STG 1a: 6 weeks:  The patient will demonstrate 100 degrees of L shoulder flexion, 100 degrees of shoulder abduction, 45 degrees of shoulder external rotation, and 45 degrees of shoulder internal rotation.                          STATUS:  progressing              TREATMENT: Manual therapy, therapeutic exercise, home exercise instruction, and modalities as needed to include: electrical  stimulation, ultrasound, moist heat, and ice.     2. The patient has limited strength of the L shoulder.              LTG 2: 12 weeks:  The patient will demonstrate 4+/5 strength for L shoulder flexion, abduction, external rotation, and internal rotation in order to demonstrate improved shoulder stability.                          STATUS:  progressing              STG 2a: 8 weeks:  The patient will demonstrate 3+/5 strength for L shoulder flexion, abduction, external rotation, and internal rotation.                          STATUS:  met              STG2b:  2 weeks:  The patient will be independent with home exercises.                           STATUS:  progressing              TREATMENT: Manual therapy, therapeutic exercise, home exercise instruction, and modalities as needed to include: electrical stimulation, ultrasound, moist heat, and ice.                3. The patient complains of pain to the L shoulder.              LTG 3: 12 weeks:  The patient will report a pain rating of 1/10 or better in order to improve sleep quality and tolerance to performance of activities of daily living.                          STATUS:  progressing              STG 3a: 8 weeks:  The patient will report a pain rating of 3/10 or better.                           STATUS:  progressing              TREATMENT: Manual therapy, therapeutic exercise, home exercise instruction, and modalities as needed to include: electrical stimulation, ultrasound, moist heat, and ice.     4. Carrying, Moving, and Handling Objects Functional Limitation                               LTG 4: 12 weeks:  The patient will demonstrate 9 % limitation by achieving a score of 15 on the QuickDASH.                          STATUS: progressing              STG 4a: 6 weeks:  The patient will demonstrate 43 % limitation by achieving a score of 30 on the QuickDASH.                            STATUS:  progressing              TREATMENT:  Manual therapy, therapeutic  exercise, home exercise instruction, and modalities as needed to include: moist heat, electrical stimulation, and ultrasound.     Progress toward previous goals: Partially Met      Recommendations: Continue as planned  Timeframe: 2 a week, for 3 months   Prognosis to achieve goals: poor    PT Signature: Daniele Benedict PT, DPT    Electronically signed 3/21/2024    [place] License: PT - [id number]       Timed:  Manual Therapy:    8     mins  74293;  Therapeutic Exercise:    25     mins  34455;     Neuromuscular Adrian:    0    mins  78755;    Therapeutic Activity:     0     mins  16046;     Gait Trainin     mins  62763;     Aquatics                         0      mins  20744    Un-timed:  Mechanical Traction      0     mins  94170  Dry Needling     0     mins self-pay  Electrical Stimulation:    0     mins  59082 ( );    Timed Treatment:   33   mins   Total Treatment:     33   mins

## 2024-03-25 ENCOUNTER — TREATMENT (OUTPATIENT)
Dept: PHYSICAL THERAPY | Facility: CLINIC | Age: 64
End: 2024-03-25
Payer: OTHER MISCELLANEOUS

## 2024-03-25 DIAGNOSIS — M25.612 DECREASED ROM OF LEFT SHOULDER: ICD-10-CM

## 2024-03-25 DIAGNOSIS — M62.81 MUSCLE WEAKNESS OF LEFT UPPER EXTREMITY: ICD-10-CM

## 2024-03-25 DIAGNOSIS — M25.512 LEFT SHOULDER PAIN, UNSPECIFIED CHRONICITY: Primary | ICD-10-CM

## 2024-03-25 DIAGNOSIS — Z96.612 S/P REVERSE TOTAL SHOULDER ARTHROPLASTY, LEFT: ICD-10-CM

## 2024-03-25 NOTE — PROGRESS NOTES
Physical Therapy Daily Treatment Note  [place] [facility] [address] [facility] [place] [address]      Patient: Devyn Steinberg   : 1960  Diagnosis/ICD-10 Code:  Left shoulder pain, unspecified chronicity [M25.512]  Referring practitioner: Erickson Hernandez MD  Date of Initial Visit: Type: THERAPY  Noted: 2024  Today's Date: 3/25/2024  Patient seen for 15 sessions           Subjective   The patient reported they are having some increased pain today in their L shoulder. Pt reports they had to use some of their pain medication.     Objective   See Exercise, Manual, and Modality Logs for complete treatment.     Assessment/Plan  Encouraged pt to continue ROM exs when at home, due to focus on strengthening today. Patient will continue to benefit from skilled physical therapy to further address their deficits in strength and ROM in order to improve upon their functional mobility and to return to ADLs w/ decreased restriction.          Timed:  Manual Therapy:    0     mins  82732;  Therapeutic Exercise:    23     mins  21216;     Neuromuscular Adrian:   0    mins  50884;    Therapeutic Activity:     8     mins  02383;     Gait Trainin     mins  29223;     Aquatics                         0      mins  10022    Un-timed:  Mechanical Traction      0     mins  63663  Electrical Stimulation:    0     mins  64431 ( );      Timed Treatment:   31   mins   Total Treatment:     31   mins    Daniele Benedict PT, DPT    Electronically signed 3/25/2024    [place] License: PT - [id number]

## 2024-03-27 ENCOUNTER — TELEPHONE (OUTPATIENT)
Dept: ORTHOPEDIC SURGERY | Facility: CLINIC | Age: 64
End: 2024-03-27
Payer: OTHER MISCELLANEOUS

## 2024-03-27 NOTE — TELEPHONE ENCOUNTER
Caller: Rupesh Steinbergon    Relationship: Self    Best call back number:[phone]    Requested Prescriptions:     HYDROcodone-acetaminophen (Norco) 7.5-325 MG per tablet     Requested Prescriptions      No prescriptions requested or ordered in this encounter        Pharmacy where request should be sent:   PHARMACY ON FILE    Last office visit with prescribing clinician: 10/3/2023   Last telemedicine visit with prescribing clinician: Visit date not found   Next office visit with prescribing clinician: Visit date not found     Additional details provided by patient: 4 PILLS LEFT    Does the patient have less than a 3 day supply:  [x] Yes  [] No    Would you like a call back once the refill request has been completed: [] Yes [] No    If the office needs to give you a call back, can they leave a voicemail: [] Yes [] No    Samantha Dallas Rep   03/27/24 14:33 EDT

## 2024-03-28 ENCOUNTER — TREATMENT (OUTPATIENT)
Dept: PHYSICAL THERAPY | Facility: CLINIC | Age: 64
End: 2024-03-28
Payer: OTHER MISCELLANEOUS

## 2024-03-28 DIAGNOSIS — M62.81 MUSCLE WEAKNESS OF LEFT UPPER EXTREMITY: ICD-10-CM

## 2024-03-28 DIAGNOSIS — M25.612 DECREASED ROM OF LEFT SHOULDER: ICD-10-CM

## 2024-03-28 DIAGNOSIS — Z96.612 S/P REVERSE TOTAL SHOULDER ARTHROPLASTY, LEFT: ICD-10-CM

## 2024-03-28 DIAGNOSIS — M25.512 LEFT SHOULDER PAIN, UNSPECIFIED CHRONICITY: Primary | ICD-10-CM

## 2024-03-28 RX ORDER — TRAMADOL HYDROCHLORIDE 50 MG/1
50 TABLET ORAL EVERY 6 HOURS PRN
Qty: 28 TABLET | Refills: 0 | Status: SHIPPED | OUTPATIENT
Start: 2024-03-28

## 2024-03-28 NOTE — PROGRESS NOTES
Physical Therapy Daily Treatment Note  [place] [facility] [address] [facility] [place] [address]      Patient: Devyn Steinberg   : 1960  Diagnosis/ICD-10 Code:  Left shoulder pain, unspecified chronicity [M25.512]  Referring practitioner: Erickson Hernandez MD  Date of Initial Visit: Type: THERAPY  Noted: 2024  Today's Date: 3/28/2024  Patient seen for 16 sessions           Subjective   The patient reported they are still using some of their pain medication due to increased L shoulder pain. Pt reports they are still struggling w/ performing tasks such as dressing (collar on shirts), washing their hair, and tying their shoes due to current restrictions of their L shoulder.      Objective   See Exercise, Manual, and Modality Logs for complete treatment.     Assessment/Plan  Will continue to progress pt w/ their strengthening as tolerated. Encouraged pt to perform eccentric shoulder flexion exs at home as part of their HEP to further utilize their available ROM. Patient will continue to benefit from skilled physical therapy to further address their deficits in strength and ROM in order to improve upon their functional mobility and to return to performing ADLs w/ decreased pain and without restrictions.          Timed:  Manual Therapy:    0     mins  83018;  Therapeutic Exercise:    24     mins  36647;     Neuromuscular Adrian:   0    mins  31930;    Therapeutic Activity:     8     mins  98196;     Gait Trainin     mins  06325;     Aquatics                         0      mins  05661    Un-timed:  Mechanical Traction      0     mins  53610  Electrical Stimulation:    0     mins  86188 ( );      Timed Treatment:   32   mins   Total Treatment:     32   mins    Daniele Benedict PT, DPT    Electronically signed 3/28/2024    [place] License: PT - [id number]

## 2024-04-01 ENCOUNTER — TREATMENT (OUTPATIENT)
Dept: PHYSICAL THERAPY | Facility: CLINIC | Age: 64
End: 2024-04-01
Payer: OTHER MISCELLANEOUS

## 2024-04-01 DIAGNOSIS — M25.512 LEFT SHOULDER PAIN, UNSPECIFIED CHRONICITY: Primary | ICD-10-CM

## 2024-04-01 DIAGNOSIS — Z96.612 S/P REVERSE TOTAL SHOULDER ARTHROPLASTY, LEFT: ICD-10-CM

## 2024-04-01 DIAGNOSIS — M25.612 DECREASED ROM OF LEFT SHOULDER: ICD-10-CM

## 2024-04-01 DIAGNOSIS — M62.81 MUSCLE WEAKNESS OF LEFT UPPER EXTREMITY: ICD-10-CM

## 2024-04-01 NOTE — PROGRESS NOTES
Outpatient Physical Therapy  [address] [facility], [place] [address]                            Physical Therapy Daily Treatment Note    Patient: Devyn Steinberg   : 1960  Diagnosis/ICD-10 Code:  Left shoulder pain, unspecified chronicity [M25.512]  Referring practitioner: Erickson Hernandez MD  Date of Initial Visit: Type: THERAPY  Noted: 2024  Today's Date: 2024  Patient seen for 17 sessions           Subjective   Devyn Steinberg reports: that he still has a lot of pain in the shoulder, states that it woke him up twice last night. Reports that he's still taking Hydrocodone for pain control.     Objective   Decreased AROM to about 90deg with wall slides.    See Exercise, Manual, and Modality Logs for complete treatment.     Assessment/Plan  Devyn still experiencing increased L shoulder pain, even pain waking him up at night. Pt had some increased discomfort with majority of exercises. Pt would benefit from skilled PT to address Range of Motion  and Strength deficits, pain management and any concerns with ADLs.       Progress per Plan of Care         Timed:  Manual Therapy:         mins  40962;  Therapeutic Exercise:    10     mins  49524;     Neuromuscular Adrian:    10    mins  97237;    Therapeutic Activity:     10     mins  42290;     Gait Training:           mins  89009;        Untimed:  Electrical Stimulation:         mins  71548 ( );  Mechanical Traction:         mins  61863;       Timed Treatment:   30   mins   Total Treatment:     30   mins      Electronically signed:     Faiza Garcia PTA  Physical Therapist Assistant  [facility] License #: [id number]

## 2024-04-04 ENCOUNTER — TREATMENT (OUTPATIENT)
Dept: PHYSICAL THERAPY | Facility: CLINIC | Age: 64
End: 2024-04-04
Payer: OTHER MISCELLANEOUS

## 2024-04-04 DIAGNOSIS — Z96.612 S/P REVERSE TOTAL SHOULDER ARTHROPLASTY, LEFT: ICD-10-CM

## 2024-04-04 DIAGNOSIS — M25.512 LEFT SHOULDER PAIN, UNSPECIFIED CHRONICITY: Primary | ICD-10-CM

## 2024-04-04 DIAGNOSIS — M25.612 DECREASED ROM OF LEFT SHOULDER: ICD-10-CM

## 2024-04-04 DIAGNOSIS — M62.81 MUSCLE WEAKNESS OF LEFT UPPER EXTREMITY: ICD-10-CM

## 2024-04-04 NOTE — PROGRESS NOTES
"Physical Therapy Daily Treatment Note  [place] [facility] [address] [place] [address]    Patient: Devyn Steinberg   : 1960  Referring practitioner: Erickson Hernandez MD  Date of Initial Visit: Type: THERAPY  Noted: 2024  Today's Date: 2024  Patient seen for 18 sessions           Subjective  Devyn Steinberg reports: his L shoulder has a constant toothache. \"Sometimes just sitting on the couch it gets worse.\" Devyn stated his R shoulder was burning after session of exercise, 5/10.    Objective   See Exercise, Manual, and Modality Logs for complete treatment.     Assessment/Plan  Devny related he was hoping to be further along than he is. \"I can't tuck in my shirt and I wear slip on shoes cause I can't reach and tie my shoes.\" Further therapist directed intervention remains necessary to aid return of strength, functional ability and aid pain control.     Visit Diagnoses:    ICD-10-CM ICD-9-CM   1. Left shoulder pain, unspecified chronicity  M25.512 719.41   2. Muscle weakness of left upper extremity  M62.81 728.87   3. Decreased ROM of left shoulder  M25.612 719.51   4. S/P reverse total shoulder arthroplasty, left  Z96.612 V43.61       Progress per Plan of Care and Progress strengthening /stabilization /functional activity           Timed:  Manual Therapy:         mins  84587;  Therapeutic Exercise:    15     mins  99450;     Neuromuscular Adrian:        mins  24390;    Therapeutic Activity:     15     mins  82238;     Gait Training:           mins  36142;     Ultrasound:          mins  12019;    Electrical Stimulation:         mins  35806 ( );  Aquatics  __   mins   61863    Untimed:  Electrical Stimulation:         mins  37493 ( );  Mechanical Traction:         mins  69297;     Timed Treatment:   30   mins   Total Treatment:     30   mins    Electronically Signed:  Justina Austin PTA  Physical Therapist Assistant    [place] PTA license [id number]            "

## 2024-04-08 ENCOUNTER — TREATMENT (OUTPATIENT)
Dept: PHYSICAL THERAPY | Facility: CLINIC | Age: 64
End: 2024-04-08
Payer: OTHER MISCELLANEOUS

## 2024-04-08 DIAGNOSIS — M25.512 LEFT SHOULDER PAIN, UNSPECIFIED CHRONICITY: Primary | ICD-10-CM

## 2024-04-08 DIAGNOSIS — Z96.612 S/P REVERSE TOTAL SHOULDER ARTHROPLASTY, LEFT: ICD-10-CM

## 2024-04-08 DIAGNOSIS — M62.81 MUSCLE WEAKNESS OF LEFT UPPER EXTREMITY: ICD-10-CM

## 2024-04-08 DIAGNOSIS — M25.612 DECREASED ROM OF LEFT SHOULDER: ICD-10-CM

## 2024-04-08 PROCEDURE — 97110 THERAPEUTIC EXERCISES: CPT | Performed by: PHYSICAL THERAPIST

## 2024-04-08 PROCEDURE — 97112 NEUROMUSCULAR REEDUCATION: CPT | Performed by: PHYSICAL THERAPIST

## 2024-04-08 PROCEDURE — 97530 THERAPEUTIC ACTIVITIES: CPT | Performed by: PHYSICAL THERAPIST

## 2024-04-08 NOTE — PROGRESS NOTES
Outpatient Physical Therapy  [address] [facility], [place] [address]                            Physical Therapy Daily Treatment Note    Patient: Devyn Steinberg   : 1960  Diagnosis/ICD-10 Code:  Left shoulder pain, unspecified chronicity [M25.512]  Referring practitioner: Erickson Hernandez MD  Date of Initial Visit: Type: THERAPY  Noted: 2024  Today's Date: 2024  Patient seen for 19 sessions           Subjective   Devyn Steinberg reports: that he's still having quite a bit of shoulder pain, taking prescription pain medication. States that he goes to see the Ortho MD tomorrow, nervous if they release him back to work that he will get fired for not being able to do his job.     Objective   Increased discomfort with weighted activities and decreased AROM over shoulder height.    See Exercise, Manual, and Modality Logs for complete treatment.     Assessment/Plan  Devyn still experiencing increased L shoulder pain. Pt had some increased discomfort with weight activities. Pt would benefit from skilled PT to address Range of Motion  and Strength deficits, pain management and any concerns with ADLs.       Progress per Plan of Care         Timed:  Manual Therapy:         mins  24337;  Therapeutic Exercise:    10     mins  62784;     Neuromuscular Adrian:    8    mins  59450;    Therapeutic Activity:     12     mins  26593;     Gait Training:           mins  97573;        Untimed:  Electrical Stimulation:         mins  90730 ( );  Mechanical Traction:         mins  18514;       Timed Treatment:   30   mins   Total Treatment:     30   mins      Electronically signed:     Faiza Garcia PTA  Physical Therapist Assistant  [facility] [place] License #: [id number]

## 2024-04-09 ENCOUNTER — OFFICE VISIT (OUTPATIENT)
Dept: ORTHOPEDIC SURGERY | Facility: CLINIC | Age: 64
End: 2024-04-09
Payer: OTHER MISCELLANEOUS

## 2024-04-09 VITALS
WEIGHT: 158 LBS | BODY MASS INDEX: 28 KG/M2 | OXYGEN SATURATION: 98 % | DIASTOLIC BLOOD PRESSURE: 73 MMHG | HEART RATE: 68 BPM | SYSTOLIC BLOOD PRESSURE: 159 MMHG | HEIGHT: 63 IN

## 2024-04-09 DIAGNOSIS — Z96.612 AFTERCARE FOLLOWING LEFT SHOULDER JOINT REPLACEMENT SURGERY: Primary | ICD-10-CM

## 2024-04-09 DIAGNOSIS — Z47.1 AFTERCARE FOLLOWING LEFT SHOULDER JOINT REPLACEMENT SURGERY: Primary | ICD-10-CM

## 2024-04-09 PROCEDURE — 99024 POSTOP FOLLOW-UP VISIT: CPT | Performed by: PHYSICIAN ASSISTANT

## 2024-04-09 RX ORDER — HYDROCODONE BITARTRATE AND ACETAMINOPHEN 5; 325 MG/1; MG/1
1 TABLET ORAL EVERY 6 HOURS PRN
Qty: 28 TABLET | Refills: 0 | Status: SHIPPED | OUTPATIENT
Start: 2024-04-09

## 2024-04-09 NOTE — PROGRESS NOTES
"Chief Complaint  Follow-up and Pain of the Left Shoulder    Subjective      Devyn Steinberg presents to [facility] for follow-up of left total shoulder reverse arthroplasty performed on 1/15/2024 by Dr. Hernandez.  He presents today without sling in place.  Reports that his shoulder is \"sore like a tooth ache in my arm\".  Does report continued improvement with time and any participation in physical therapy.  He is participating in PT through [facility].  Does report that his shoulder still awakens him at night.  Reports he works as a  and is unable to return to work at this time.    Objective   Allergies   Allergen Reactions    Trulicity [Dulaglutide] Other (See Comments)     Pancreatitis    Tyloxapol Swelling       Vital Signs:   /73   Pulse 68   Ht 160 cm (63\")   Wt 71.7 kg (158 lb)   SpO2 98%   BMI 27.99 kg/m²       Physical Exam    Constitutional: Awake, alert. Well nourished appearance.    Integumentary: Warm, dry, intact. No obvious rashes.    HENT: Atraumatic, normocephalic.   Respiratory: Non labored respirations .   Cardiovascular: Intact peripheral pulses.    Psychiatric: Normal mood and affect. A&O X3    Ortho Exam  Left shoulder: Well-healed surgical scar noted.  Skin is warm, dry, and intact.  Active shoulder forward flexion to 105 degrees, abduction 60 degrees.  Internal rotation to side pocket.  Full flexion and extension of the wrist and elbow.  Full pronation and supination.  Sensation and distal neurovascular intact.    Imaging Results (Most Recent)       Procedure Component Value Units Date/Time    XR Scapula Left [[id number]] Resulted: 04/09/24 0954     Updated: 04/09/24 0954    Narrative:      X-Ray Report:  Study: X-rays ordered, taken in the office, and reviewed today.   Site: Left scapula Xray  Indication: TSRA  View: AP/Lateral, Standing, and [place] view(s)  Findings: Intact left total shoulder reverse arthroplasty. No evidence of   hardware " malfunction, loosening, subsidence, or periprosthetic fracture.   Prior studies available for comparison: yes                       Assessment and Plan   Problem List Items Addressed This Visit    None  Visit Diagnoses       Aftercare following left shoulder joint replacement surgery    -  Primary    Relevant Orders    XR Scapula Left (Completed)    Ambulatory Referral to Physical Therapy POST OP            Follow Up   Return in about 6 weeks (around 5/21/2024).    Tobacco Use: Low Risk  (4/9/2024)    Patient History     Smoking Tobacco Use: Never     Smokeless Tobacco Use: Never     Passive Exposure: Past     Patient is a non-smoker.  Did not discuss tobacco cessation options.    Patient Instructions   X-rays reviewed, showing hardware intact. Advised to continue PT to completion. Continue home exercise program to progress strength and ROM. Continue icing as needed up to 3-4 times daily for no longer than 15 to 20 minutes at a time.    Continue with lifelong antibiotic prophylaxis with dental procedures following total joint replacement.    Follow-up in 6 weeks. Call sooner, if needed with any changes or concerns. No x-rays.     Patient was given instructions and counseling regarding his condition or for health maintenance advice. Please see specific information pulled into the AVS if appropriate.

## 2024-04-09 NOTE — PATIENT INSTRUCTIONS
X-rays reviewed, showing hardware intact. Advised to continue PT to completion. Continue home exercise program to progress strength and ROM. Continue icing as needed up to 3-4 times daily for no longer than 15 to 20 minutes at a time.    Continue with lifelong antibiotic prophylaxis with dental procedures following total joint replacement.    Follow-up in 6 weeks. Call sooner, if needed with any changes or concerns. No x-rays.

## 2024-04-10 ENCOUNTER — TELEPHONE (OUTPATIENT)
Dept: ORTHOPEDIC SURGERY | Facility: CLINIC | Age: 64
End: 2024-04-10
Payer: OTHER MISCELLANEOUS

## 2024-04-10 NOTE — TELEPHONE ENCOUNTER
Caller: JOSIE    Relationship: [employer]     Best call back number: [phone] SONY    What form or medical record are you requesting: OFFICE NOTES 04/09/2024    Who is requesting this form or medical record from you: [employer]     How would you like to receive the form or medical records (pick-up, mail, fax): FAX  If fax, what is the fax number: [phone]

## 2024-04-11 ENCOUNTER — TREATMENT (OUTPATIENT)
Dept: PHYSICAL THERAPY | Facility: CLINIC | Age: 64
End: 2024-04-11
Payer: OTHER MISCELLANEOUS

## 2024-04-11 DIAGNOSIS — M25.512 LEFT SHOULDER PAIN, UNSPECIFIED CHRONICITY: Primary | ICD-10-CM

## 2024-04-11 DIAGNOSIS — M62.81 MUSCLE WEAKNESS OF LEFT UPPER EXTREMITY: ICD-10-CM

## 2024-04-11 DIAGNOSIS — M25.612 DECREASED ROM OF LEFT SHOULDER: ICD-10-CM

## 2024-04-11 DIAGNOSIS — Z96.612 S/P REVERSE TOTAL SHOULDER ARTHROPLASTY, LEFT: ICD-10-CM

## 2024-04-11 NOTE — PROGRESS NOTES
"Physical Therapy Daily Treatment Note  [place] [facility] [address] [place] [address]    Patient: Devyn Steinberg   : 1960  Referring practitioner: Erickson Hernandez MD  Date of Initial Visit: Type: THERAPY  Noted: 2024  Today's Date: 2024  Patient seen for 20 sessions           Subjective  Devyn Steinberg reports: he is the same, rating pain at 3-4/10. \"Went to the doctor and he said my muscles ar inflamed. Gave me some medicine. They gave me a cooling machine and I sit with it and it feels so good.\"     Objective   See Exercise, Manual, and Modality Logs for complete treatment.       Assessment/Plan  Advanced function noted with strengthening exercises today. Devyn has remaining \"toothache\" c/o at his L shoulder. He is still limited in overall strength requiring ongoing intervention to aid recovery after L RTSR.    Visit Diagnoses:    ICD-10-CM ICD-9-CM   1. Left shoulder pain, unspecified chronicity  M25.512 719.41   2. Muscle weakness of left upper extremity  M62.81 728.87   3. Decreased ROM of left shoulder  M25.612 719.51   4. S/P reverse total shoulder arthroplasty, left  Z96.612 V43.61       Progress per Plan of Care and Progress strengthening /stabilization /functional activity           Timed:  Manual Therapy:         mins  89102;  Therapeutic Exercise:    13     mins  38728;     Neuromuscular Adrian:        mins  17766;    Therapeutic Activity:     15     mins  41889;     Gait Training:           mins  58770;     Ultrasound:          mins  64635;    Electrical Stimulation:         mins  22182 ( );  Aquatics  __   mins   46158    Untimed:  Electrical Stimulation:         mins  18261 ( );  Mechanical Traction:         mins  46166;     Timed Treatment:   28   mins   Total Treatment:     28   mins    Electronically Signed:  Justina Austin PTA  Physical Therapist Assistant    LAUREL MILLAN license [id number]            "

## 2024-04-15 ENCOUNTER — TREATMENT (OUTPATIENT)
Dept: PHYSICAL THERAPY | Facility: CLINIC | Age: 64
End: 2024-04-15
Payer: OTHER MISCELLANEOUS

## 2024-04-15 DIAGNOSIS — M25.512 LEFT SHOULDER PAIN, UNSPECIFIED CHRONICITY: Primary | ICD-10-CM

## 2024-04-15 DIAGNOSIS — M25.612 DECREASED ROM OF LEFT SHOULDER: ICD-10-CM

## 2024-04-15 DIAGNOSIS — M62.81 MUSCLE WEAKNESS OF LEFT UPPER EXTREMITY: ICD-10-CM

## 2024-04-15 DIAGNOSIS — Z96.612 S/P REVERSE TOTAL SHOULDER ARTHROPLASTY, LEFT: ICD-10-CM

## 2024-04-15 PROCEDURE — 97112 NEUROMUSCULAR REEDUCATION: CPT

## 2024-04-15 PROCEDURE — 97530 THERAPEUTIC ACTIVITIES: CPT

## 2024-04-15 PROCEDURE — 97110 THERAPEUTIC EXERCISES: CPT

## 2024-04-15 NOTE — PROGRESS NOTES
Outpatient Physical Therapy  [address] [facility], [place] [address]                            Physical Therapy Daily Treatment Note    Patient: Devyn Steinberg   : 1960  Diagnosis/ICD-10 Code:  Left shoulder pain, unspecified chronicity [M25.512]  Referring practitioner: Erickson Hernandez MD  Date of Initial Visit: Type: THERAPY  Noted: 2024  Today's Date: 4/15/2024  Patient seen for 21 sessions           Subjective   Devyn Steinberg reports: that the shoulder is still bothering him. States that he does have an ice machine that he uses when it gets hurting.     Objective   Discomfort throughout PT session.    See Exercise, Manual, and Modality Logs for complete treatment.     Assessment/Plan  Devyn still experiencing increased L shoulder pain. Pt had some increased discomfort throughout PT session. Pt would benefit from skilled PT to address Range of Motion  and Strength deficits, pain management and any concerns with ADLs.   .    Progress per Plan of Care         Timed:  Manual Therapy:         mins  69910;  Therapeutic Exercise:    10     mins  00872;     Neuromuscular Adrian:    8    mins  24078;    Therapeutic Activity:     12     mins  29644;     Gait Training:           mins  01713;        Untimed:  Electrical Stimulation:         mins  71978 ( );  Mechanical Traction:         mins  20316;       Timed Treatment:   30   mins   Total Treatment:     30   mins      Electronically signed:     Faiza Garcia PTA  Physical Therapist Assistant  [facility] License #: [id number]

## 2024-04-18 ENCOUNTER — TREATMENT (OUTPATIENT)
Dept: PHYSICAL THERAPY | Facility: CLINIC | Age: 64
End: 2024-04-18
Payer: OTHER MISCELLANEOUS

## 2024-04-18 DIAGNOSIS — M25.512 LEFT SHOULDER PAIN, UNSPECIFIED CHRONICITY: Primary | ICD-10-CM

## 2024-04-18 DIAGNOSIS — M25.612 DECREASED ROM OF LEFT SHOULDER: ICD-10-CM

## 2024-04-18 DIAGNOSIS — M62.81 MUSCLE WEAKNESS OF LEFT UPPER EXTREMITY: ICD-10-CM

## 2024-04-18 DIAGNOSIS — Z96.612 S/P REVERSE TOTAL SHOULDER ARTHROPLASTY, LEFT: ICD-10-CM

## 2024-04-18 NOTE — PROGRESS NOTES
Progress Note / Re-Certification  [place] [facility]: [address] [facility] [place] [address]      Patient: Devyn Steinberg   : 1960  Diagnosis/ICD-10 Code:  Left shoulder pain, unspecified chronicity [M25.512]  Referring practitioner: Erickson Hernandez MD  Date of Initial Visit: Type: THERAPY  Noted: 2024  Today's Date: 2024  Patient seen for 22 sessions      Subjective:   Subjective Questionnaire: QuickDASH: 38  Clinical Progress: unchanged  Home Program Compliance: Yes  Treatment has included: ADL retraining, soft tissue mobilization, strengthening, stretching, therapeutic activities, manual therapy, joint mobilization, home exercise program/patient education, functional ROM exercises, flexibility, body mechanics training, postural training, and neuromuscular re-education    Subjective   Pt reports their pain is about a 2-3/10. This does not change much. Pt is still using pain medication to help w/ this. When items are close to their body, they can perform the task. Pt struggles w/ overhead and behind their back activities. Pt has wife put belt on for them.       Objective     Strength/Myotome Testing      Left Shoulder      Planes of Motion   Flexion: 4-  Abduction: 4   External rotation at 0°: 4+   Internal rotation at 0°: 5          Active Range of Motion   L shoulder  Flexion: 89  ABD: 75  IR@ 0 de  ER @ 0 de      Passive Range of Motion   Left Shoulder   Flexion: 120 degrees with pain  Abduction: 95 degrees with pain  ER @ 45: 20 degrees with pain  IR @ 45: 45 degrees with pain      See Exercise, Manual, and Modality Logs for complete treatment.     Assessment/Plan  Impairments: abnormal coordination, abnormal muscle firing, abnormal or restricted ROM, activity intolerance, impaired physical strength  Functional limitations: carrying objects, lifting, sleeping, pulling, pushing, reaching behind back, reaching overhead and unable to perform repetitive tasks      Pt reported to physical  therapy s/p L Reverse total shoulder replacement on 1/15/2024. Pt demonstrates improvement in their AROM as well as their strength. Pt does have some improvement in their rotational PROM, which is likely due to increased strength in rotation as well. Pt continues to struggle w/ tasks such as reaching overhead and behind their back. Pt has resorted to help from their wife, as they cannot dress independently. Pt also continues to have increased deficits described by Alen. Patient will continue to benefit from skilled physical therapy to further improve upon their strength to use available ROM functionally and to have improvement in their performance of pain w/ daily tasks.       Goals  Plan Goals: SHOULDER  PROBLEMS:      1. The patient has limited ROM of the L shoulder.                   LTG 1: 12 weeks:  The patient will demonstrate 140 degrees of L shoulder flexion, 140 degrees of shoulder abduction, 65 degrees of shoulder external rotation, and 65 degrees of shoulder internal rotation to allow the patient to reach into upper kitchen cabinets and manipulate clothing behind the back with greater ease.                          STATUS:  progressing              STG 1a: 6 weeks:  The patient will demonstrate 100 degrees of L shoulder flexion, 100 degrees of shoulder abduction, 45 degrees of shoulder external rotation, and 45 degrees of shoulder internal rotation.                          STATUS:  progressing              TREATMENT: Manual therapy, therapeutic exercise, home exercise instruction, and modalities as needed to include: electrical stimulation, ultrasound, moist heat, and ice.     2. The patient has limited strength of the L shoulder.              LTG 2: 12 weeks:  The patient will demonstrate 4+/5 strength for L shoulder flexion, abduction, external rotation, and internal rotation in order to demonstrate improved shoulder stability.                          STATUS:  progressing              STG 2a: 8  weeks:  The patient will demonstrate 3+/5 strength for L shoulder flexion, abduction, external rotation, and internal rotation.                          STATUS:  met              STG2b:  2 weeks:  The patient will be independent with home exercises.                           STATUS:  progressing              TREATMENT: Manual therapy, therapeutic exercise, home exercise instruction, and modalities as needed to include: electrical stimulation, ultrasound, moist heat, and ice.                3. The patient complains of pain to the L shoulder.              LTG 3: 12 weeks:  The patient will report a pain rating of 1/10 or better in order to improve sleep quality and tolerance to performance of activities of daily living.                          STATUS:  progressing              STG 3a: 8 weeks:  The patient will report a pain rating of 3/10 or better.                           STATUS:  progressing              TREATMENT: Manual therapy, therapeutic exercise, home exercise instruction, and modalities as needed to include: electrical stimulation, ultrasound, moist heat, and ice.     4. Carrying, Moving, and Handling Objects Functional Limitation                               LTG 4: 12 weeks:  The patient will demonstrate 9 % limitation by achieving a score of 15 on the QuickDASH.                          STATUS: progressing              STG 4a: 6 weeks:  The patient will demonstrate 43 % limitation by achieving a score of 30 on the QuickDASH.                            STATUS:  progressing              TREATMENT:  Manual therapy, therapeutic exercise, home exercise instruction, and modalities as needed to include: moist heat, electrical stimulation, and ultrasound.       Progress toward previous goals: Partially Met      Recommendations: Continue with recommendations ;   Timeframe: 2 a week, for 2 months   Prognosis to achieve goals: poor    PT Signature: Daniele Benedict PT, DPT    Electronically signed 4/18/2024    [place]  License: PT - [id number]     Based upon review of the patient's progress and continued therapy plan, it is my medical opinion that Devyn Steinberg should continue physical therapy treatment at [facility] PHYSICAL THERAPY  [address] [facility] [place] [address] [phone].    Signature: __________________________________  Erickson Hernandez MD    90 Day Recertification  Certification Period: 2024 thru 2024  I certify that the therapy services are furnished while this patient is under my care.  The services outlined above are required by this patient, and will be reviewed every 90 days.     PHYSICIAN: Erickson Hernandez MD  NPI: [id number]      DATE: 24     Please sign and return via fax to [phone]. Thank you, [facility].    Timed:  Manual Therapy:    8     mins  53554;  Therapeutic Exercise:    15     mins  53214;     Neuromuscular Adrian:     0   mins  00270;    Therapeutic Activity:     8     mins  66680;     Gait Trainin     mins  78691;     Aquatics                         0      mins  32249    Un-timed:  Mechanical Traction      0     mins  69186  Dry Needling     0     mins self-pay  Electrical Stimulation:    0     mins  44583 ( );    Timed Treatment:   31   mins   Total Treatment:     31   mins

## 2024-04-22 ENCOUNTER — TREATMENT (OUTPATIENT)
Dept: PHYSICAL THERAPY | Facility: CLINIC | Age: 64
End: 2024-04-22
Payer: OTHER MISCELLANEOUS

## 2024-04-22 DIAGNOSIS — M62.81 MUSCLE WEAKNESS OF LEFT UPPER EXTREMITY: ICD-10-CM

## 2024-04-22 DIAGNOSIS — Z96.612 S/P REVERSE TOTAL SHOULDER ARTHROPLASTY, LEFT: ICD-10-CM

## 2024-04-22 DIAGNOSIS — M25.512 LEFT SHOULDER PAIN, UNSPECIFIED CHRONICITY: Primary | ICD-10-CM

## 2024-04-22 DIAGNOSIS — M25.612 DECREASED ROM OF LEFT SHOULDER: ICD-10-CM

## 2024-04-22 PROCEDURE — 97530 THERAPEUTIC ACTIVITIES: CPT

## 2024-04-22 PROCEDURE — 97110 THERAPEUTIC EXERCISES: CPT

## 2024-04-22 NOTE — PROGRESS NOTES
Outpatient Physical Therapy  [address] [facility], [place] [address]                            Physical Therapy Daily Treatment Note    Patient: Devyn Steinberg   : 1960  Diagnosis/ICD-10 Code:  Left shoulder pain, unspecified chronicity [M25.512]  Referring practitioner: Ericksno Hernandez MD  Date of Initial Visit: Type: THERAPY  Noted: 2024  Today's Date: 2024  Patient seen for 23 sessions           Subjective   Devyn Steinberg reports: 2/10 pain in left shoulder coming into therapy today. Pt stated that the worst his pain is in the mornings then it will start feeling better as the day goes on. Pt states that he still cannot reach behind his back.        Objective     See Exercise, Manual, and Modality Logs for complete treatment.     Assessment/Plan  Devyn still experiencing increased left  shoulder pain, especially when he wakes up in the mornings. Pt tolerated exercises well, with some discomfort with scaption lift offs. Pt still has limiting ROM in all planes and will continue to benefit from skilled PT to address Range of Motion  and Strength deficits, pain management and any concerns with ADLs.       Progress per Plan of Care         Timed:  Manual Therapy:         mins  13314;  Therapeutic Exercise:    20     mins  04051;     Neuromuscular Adrian:        mins  54465;    Therapeutic Activity:     10     mins  89351;     Gait Training:           mins  93036;        Untimed:  Electrical Stimulation:         mins  58232 ( );  Mechanical Traction:         mins  55999;       Timed Treatment:   30   mins   Total Treatment:     30   mins      Electronically signed:   Aida Lewis PTA Student     Faiza Garcia PTA  Physical Therapist Assistant  [facility] License #: [id number]

## 2024-04-25 ENCOUNTER — TREATMENT (OUTPATIENT)
Dept: PHYSICAL THERAPY | Facility: CLINIC | Age: 64
End: 2024-04-25
Payer: OTHER MISCELLANEOUS

## 2024-04-25 DIAGNOSIS — M25.612 DECREASED ROM OF LEFT SHOULDER: ICD-10-CM

## 2024-04-25 DIAGNOSIS — M25.512 LEFT SHOULDER PAIN, UNSPECIFIED CHRONICITY: Primary | ICD-10-CM

## 2024-04-25 DIAGNOSIS — Z96.612 S/P REVERSE TOTAL SHOULDER ARTHROPLASTY, LEFT: ICD-10-CM

## 2024-04-25 DIAGNOSIS — M62.81 MUSCLE WEAKNESS OF LEFT UPPER EXTREMITY: ICD-10-CM

## 2024-04-25 NOTE — PROGRESS NOTES
Physical Therapy Daily Treatment Note  [place] [facility]: [address] [place] [address]      Patient: Devyn Steinberg   : 1960  Diagnosis/ICD-10 Code:  Left shoulder pain, unspecified chronicity [M25.512]  Referring practitioner: Erickson Hernandez MD  Date of Initial Visit: Type: THERAPY  Noted: 2024  Today's Date: 2024  Patient seen for 24 sessions           Subjective   The patient reported they are about a 2/10 in L shoulder today. Pt has used pain medication to achieve.     Objective   See Exercise, Manual, and Modality Logs for complete treatment.     Assessment/Plan  Continued to emphasize purposeful scapular motion w/ exs today. Will continue to progress pt to their tolerance for improvement in their ROM and strength for improved functional use of their L UE when performing ADLs.        Timed:  Manual Therapy:    0     mins  28905;  Therapeutic Exercise:    15     mins  09056;     Neuromuscular Adrian:   8    mins  31159;    Therapeutic Activity:     8     mins  15107;     Gait Trainin     mins  84890;     Aquatics                         0      mins  16213    Un-timed:  Mechanical Traction      0     mins  21158  Electrical Stimulation:    0     mins  44653 ( );      Timed Treatment:   31   mins   Total Treatment:     31   mins    Daniele Benedict PT, DPT    Electronically signed 2024    [place] License: PT - [id number]

## 2024-04-29 ENCOUNTER — TREATMENT (OUTPATIENT)
Dept: PHYSICAL THERAPY | Facility: CLINIC | Age: 64
End: 2024-04-29
Payer: OTHER MISCELLANEOUS

## 2024-04-29 DIAGNOSIS — Z96.612 S/P REVERSE TOTAL SHOULDER ARTHROPLASTY, LEFT: ICD-10-CM

## 2024-04-29 DIAGNOSIS — M25.612 DECREASED ROM OF LEFT SHOULDER: ICD-10-CM

## 2024-04-29 DIAGNOSIS — M25.512 LEFT SHOULDER PAIN, UNSPECIFIED CHRONICITY: Primary | ICD-10-CM

## 2024-04-29 DIAGNOSIS — M62.81 MUSCLE WEAKNESS OF LEFT UPPER EXTREMITY: ICD-10-CM

## 2024-04-29 PROCEDURE — 97530 THERAPEUTIC ACTIVITIES: CPT

## 2024-04-29 PROCEDURE — 97110 THERAPEUTIC EXERCISES: CPT

## 2024-04-29 PROCEDURE — 97112 NEUROMUSCULAR REEDUCATION: CPT

## 2024-04-29 NOTE — PROGRESS NOTES
Physical Therapy Daily Treatment Note  [place] [facility] [address] [facility] [place] [address]      Patient: Devyn Steinberg   : 1960  Diagnosis/ICD-10 Code:  Left shoulder pain, unspecified chronicity [M25.512]  Referring practitioner: Erickson Hernandez MD  Date of Initial Visit: Type: THERAPY  Noted: 2024  Today's Date: 2024  Patient seen for 25 sessions           Subjective   The patient reported they had some increased pain in their L shoulder on Friday. Pt reports their pain is down to a 2-3/10 in their L shoulder.     Objective   See Exercise, Manual, and Modality Logs for complete treatment.     Assessment/Plan  Pt tolerated session well today. Discussed w/ pt about being mindful of their levels of pain throughout the day. Indicated pain may have been from advancing scapular strengthening. Patient will continue to benefit from skilled physical therapy to further address their deficits in strength and shoulder stability in order to improve upon their functional mobility and to return to ADLs w/ greater ease.          Timed:  Manual Therapy:    0     mins  88499;  Therapeutic Exercise:    15     mins  79998;     Neuromuscular Adrian:   8    mins  70757;    Therapeutic Activity:     8     mins  85523;     Gait Trainin     mins  70736;     Aquatics                         0      mins  80293    Un-timed:  Mechanical Traction      0     mins  22967  Electrical Stimulation:    0     mins  90509 ( );      Timed Treatment:   31   mins   Total Treatment:     31   mins    Daniele Benedict PT, DPT    Electronically signed 2024    [place] License: PT - [id number]

## 2024-05-02 ENCOUNTER — TREATMENT (OUTPATIENT)
Dept: PHYSICAL THERAPY | Facility: CLINIC | Age: 64
End: 2024-05-02
Payer: OTHER MISCELLANEOUS

## 2024-05-02 DIAGNOSIS — Z96.612 S/P REVERSE TOTAL SHOULDER ARTHROPLASTY, LEFT: ICD-10-CM

## 2024-05-02 DIAGNOSIS — M62.81 MUSCLE WEAKNESS OF LEFT UPPER EXTREMITY: ICD-10-CM

## 2024-05-02 DIAGNOSIS — M25.612 DECREASED ROM OF LEFT SHOULDER: ICD-10-CM

## 2024-05-02 DIAGNOSIS — M25.512 LEFT SHOULDER PAIN, UNSPECIFIED CHRONICITY: Primary | ICD-10-CM

## 2024-05-02 NOTE — PROGRESS NOTES
Physical Therapy Daily Treatment Note  [place] [facility] [address] [facility] [place] [address]      Patient: Devyn Steinberg   : 1960  Diagnosis/ICD-10 Code:  Left shoulder pain, unspecified chronicity [M25.512]  Referring practitioner: Erickson Hernandez MD  Date of Initial Visit: Type: THERAPY  Noted: 2024  Today's Date: 2024  Patient seen for 26 sessions           Subjective   The patient reported no new changes at this time. Pt reports they feel a pressure in their bone where the implant was placed.     Objective   See Exercise, Manual, and Modality Logs for complete treatment.     Assessment/Plan  Discussed w/ pt about continuing to utilize their L shoulder as much as possible to strengthen throughout their day. Patient will continue to benefit from skilled physical therapy to further address their deficits in strength and ROM in order to improve upon their functional mobility and to return to ADLs w/ greater ease.          Timed:  Manual Therapy:    0     mins  05440;  Therapeutic Exercise:    15     mins  48080;     Neuromuscular Adrian:   8    mins  89812;    Therapeutic Activity:     8     mins  18940;     Gait Trainin     mins  45393;     Aquatics                         0      mins  17458    Un-timed:  Mechanical Traction      0     mins  48508  Electrical Stimulation:    0     mins  10096 ( );      Timed Treatment:   31   mins   Total Treatment:     31   mins    Daniele Benedict PT, DPT    Electronically signed 2024    [place] License: PT - [id number]

## 2024-05-07 ENCOUNTER — TREATMENT (OUTPATIENT)
Dept: PHYSICAL THERAPY | Facility: CLINIC | Age: 64
End: 2024-05-07
Payer: OTHER MISCELLANEOUS

## 2024-05-07 DIAGNOSIS — Z96.612 S/P REVERSE TOTAL SHOULDER ARTHROPLASTY, LEFT: ICD-10-CM

## 2024-05-07 DIAGNOSIS — M25.512 LEFT SHOULDER PAIN, UNSPECIFIED CHRONICITY: Primary | ICD-10-CM

## 2024-05-07 DIAGNOSIS — M25.612 DECREASED ROM OF LEFT SHOULDER: ICD-10-CM

## 2024-05-07 DIAGNOSIS — M62.81 MUSCLE WEAKNESS OF LEFT UPPER EXTREMITY: ICD-10-CM

## 2024-05-07 PROCEDURE — 97530 THERAPEUTIC ACTIVITIES: CPT

## 2024-05-07 PROCEDURE — 97110 THERAPEUTIC EXERCISES: CPT

## 2024-05-09 ENCOUNTER — TREATMENT (OUTPATIENT)
Dept: PHYSICAL THERAPY | Facility: CLINIC | Age: 64
End: 2024-05-09
Payer: OTHER MISCELLANEOUS

## 2024-05-09 DIAGNOSIS — M62.81 MUSCLE WEAKNESS OF LEFT UPPER EXTREMITY: ICD-10-CM

## 2024-05-09 DIAGNOSIS — M25.612 DECREASED ROM OF LEFT SHOULDER: ICD-10-CM

## 2024-05-09 DIAGNOSIS — Z96.612 S/P REVERSE TOTAL SHOULDER ARTHROPLASTY, LEFT: ICD-10-CM

## 2024-05-09 DIAGNOSIS — M25.512 LEFT SHOULDER PAIN, UNSPECIFIED CHRONICITY: Primary | ICD-10-CM

## 2024-05-14 ENCOUNTER — TREATMENT (OUTPATIENT)
Dept: PHYSICAL THERAPY | Facility: CLINIC | Age: 64
End: 2024-05-14
Payer: OTHER MISCELLANEOUS

## 2024-05-14 DIAGNOSIS — M25.612 DECREASED ROM OF LEFT SHOULDER: ICD-10-CM

## 2024-05-14 DIAGNOSIS — Z96.612 S/P REVERSE TOTAL SHOULDER ARTHROPLASTY, LEFT: ICD-10-CM

## 2024-05-14 DIAGNOSIS — M25.512 LEFT SHOULDER PAIN, UNSPECIFIED CHRONICITY: Primary | ICD-10-CM

## 2024-05-14 DIAGNOSIS — M62.81 MUSCLE WEAKNESS OF LEFT UPPER EXTREMITY: ICD-10-CM

## 2024-05-14 PROCEDURE — 97110 THERAPEUTIC EXERCISES: CPT

## 2024-05-14 PROCEDURE — 97530 THERAPEUTIC ACTIVITIES: CPT

## 2024-05-14 PROCEDURE — 97112 NEUROMUSCULAR REEDUCATION: CPT

## 2024-05-14 NOTE — PROGRESS NOTES
Physical Therapy Daily Treatment Note  [place] [facility]: [address] [facility] [place] [address]      Patient: Devyn Steinberg   : 1960  Diagnosis/ICD-10 Code:  Left shoulder pain, unspecified chronicity [M25.512]  Referring practitioner: No ref. provider found  Date of Initial Visit: Type: THERAPY  Noted: 2024  Today's Date: 2024  Patient seen for 29 sessions           Subjective   The patient reported their L shoulder is currently in a 4/10 pain. Pt reports they did weedeat some this weekend. Pt reports they were able to do this for about 20 min.     Objective   See Exercise, Manual, and Modality Logs for complete treatment.     Assessment/Plan  Pt continues to have limitations in their ROM as well as their strength. Pt has some reported progression in their levels of daily pain. Plan to continue to emphasize strengthening and scapular mechanics to further improve their function and reaching abilities.        Timed:  Manual Therapy:    0     mins  59553;  Therapeutic Exercise:    15     mins  07238;     Neuromuscular Adrian:   8    mins  18808;    Therapeutic Activity:     8     mins  93835;     Gait Trainin     mins  13340;     Aquatics                         0      mins  59672    Un-timed:  Mechanical Traction      0     mins  09937  Electrical Stimulation:    0     mins  09232 ( );      Timed Treatment:   31   mins   Total Treatment:     31   mins    Daniele Benedict PT, DPT    Electronically signed 2024    [place] License: PT - [id number]

## 2024-05-16 ENCOUNTER — TREATMENT (OUTPATIENT)
Dept: PHYSICAL THERAPY | Facility: CLINIC | Age: 64
End: 2024-05-16
Payer: OTHER MISCELLANEOUS

## 2024-05-16 DIAGNOSIS — M25.612 DECREASED ROM OF LEFT SHOULDER: ICD-10-CM

## 2024-05-16 DIAGNOSIS — Z96.612 S/P REVERSE TOTAL SHOULDER ARTHROPLASTY, LEFT: ICD-10-CM

## 2024-05-16 DIAGNOSIS — M62.81 MUSCLE WEAKNESS OF LEFT UPPER EXTREMITY: ICD-10-CM

## 2024-05-16 DIAGNOSIS — M25.512 LEFT SHOULDER PAIN, UNSPECIFIED CHRONICITY: Primary | ICD-10-CM

## 2024-05-16 NOTE — PROGRESS NOTES
"Progress Note   [facility]: [address] [place] [address]      Patient: Devyn Steinberg   : 1960  Diagnosis/ICD-10 Code:  Left shoulder pain, unspecified chronicity [M25.512]  Referring practitioner: Erickson Hernandez MD  Date of Initial Visit: Type: THERAPY  Noted: 2024  Today's Date: 2024  Patient seen for 30 sessions      Subjective:   Subjective Questionnaire: QuickDASH: 40  Clinical Progress: unchanged  Home Program Compliance: Yes  Treatment has included: balance/weight-bearing training, ADL retraining, soft tissue mobilization, strengthening, stretching, therapeutic activities, manual therapy, joint mobilization, home exercise program/patient education, gait training, functional ROM exercises, flexibility, body mechanics training, postural training, and neuromuscular re-education    Subjective   Pt reports they are continuing to struggle w/ overhead motions. Pt's pain is currently about a 3/10. Pt reports being discouraged on their ROM and ability to reach overhead and behind them. Pt has their grandchildren help them at times w/ reaching tasks due to the discomfort and struggles they do have. Pt also has difficulty sleeping, especially if they roll onto their L shoulder. \"If I roll on my arm, I'm done.\" Pt feels as though if they returned to work, they would not be able to perform their job, due to the reaching abilities.       Objective   Strength/Myotome Testing      Left Shoulder      Planes of Motion   Flexion: 4  Abduction: 4   External rotation at 0°: 4+   Internal rotation at 0°: 5          Active Range of Motion   L shoulder  Flexion: 90  ABD: 75  IR@ 0 de  ER @ 0 de      Passive Range of Motion   Left Shoulder   Flexion: 125 degrees with pain  Abduction: 100 degrees with pain  ER @ 45: 35 degrees with pain  IR @ 45: 57 degrees with pain  See Exercise, Manual, and Modality Logs for complete treatment.     Assessment/Plan  Impairments: abnormal coordination, " abnormal muscle firing, abnormal or restricted ROM, activity intolerance, impaired physical strength  Functional limitations: carrying objects, lifting, sleeping, pulling, pushing, reaching behind back, reaching overhead and unable to perform repetitive tasks      Pt reported to physical therapy s/p L Reverse total shoulder replacement on 1/15/2024. Pt demonstrates improvement in their ROM as well as their strength today. Pt is still unable to utilize all available ROM, decreasing their functional reach for over shoulder level tasks. Pt continues to have pain, and rely on their pain medication at this time to control it. Pt relies on the help of family members to complete some of their ADLs, including tucking in their shirt and putting on their belt. Pt has shown progress in therapy, though this has been slower than anticipated. Pt's goals will be addressed at this time. Patient will continue to benefit from skilled physical therapy to further address their deficits in strength and ROM in order to improve upon their functional mobility and to improve upon their ability to perform reaching tasks and all ADLs.         Goals  Plan Goals: SHOULDER  PROBLEMS:      1. The patient has limited ROM of the L shoulder.                   LTG 1: 12 weeks:  The patient will demonstrate 140 degrees of L shoulder flexion, 140 degrees of shoulder abduction, 65 degrees of shoulder external rotation, and 65 degrees of shoulder internal rotation to allow the patient to reach into upper kitchen cabinets and manipulate clothing behind the back with greater ease.                          STATUS:  progressing              STG 1a: 6 weeks:  The patient will demonstrate 100 degrees of L shoulder flexion, 100 degrees of shoulder abduction, 45 degrees of shoulder external rotation, and 45 degrees of shoulder internal rotation.                          STATUS:  partially met              TREATMENT: Manual therapy, therapeutic exercise, home  exercise instruction, and modalities as needed to include: electrical stimulation, ultrasound, moist heat, and ice.     2. The patient has limited strength of the L shoulder.              LTG 2: 12 weeks:  The patient will demonstrate 4+/5 strength for L shoulder flexion, abduction, external rotation, and internal rotation in order to demonstrate improved shoulder stability.                          STATUS:  progressing              STG 2a: 8 weeks:  The patient will demonstrate 3+/5 strength for L shoulder flexion, abduction, external rotation, and internal rotation.                          STATUS:  met              STG2b:  2 weeks:  The patient will be independent with home exercises.                           STATUS:  progressing              TREATMENT: Manual therapy, therapeutic exercise, home exercise instruction, and modalities as needed to include: electrical stimulation, ultrasound, moist heat, and ice.                3. The patient complains of pain to the L shoulder.              LTG 3: 12 weeks:  The patient will report a pain rating of 1/10 or better in order to improve sleep quality and tolerance to performance of activities of daily living.                          STATUS:  progressing              STG 3a: 8 weeks:  The patient will report a pain rating of 3/10 or better.                           STATUS:  met              TREATMENT: Manual therapy, therapeutic exercise, home exercise instruction, and modalities as needed to include: electrical stimulation, ultrasound, moist heat, and ice.     4. Carrying, Moving, and Handling Objects Functional Limitation                               LTG 4: 12 weeks:  The patient will demonstrate 9 % limitation by achieving a score of 15 on the QuickDASH.                          STATUS: progressing              STG 4a: 6 weeks:  The patient will demonstrate 43 % limitation by achieving a score of 30 on the QuickDASH.                            STATUS:   progressing              TREATMENT:  Manual therapy, therapeutic exercise, home exercise instruction, and modalities as needed to include: moist heat, electrical stimulation, and ultrasound.     Progress toward previous goals: Partially Met      Recommendations: Continue as planned  Timeframe: 2 a week, for 3 months   Prognosis to achieve goals: fair    PT Signature: Daniele Benedict PT, DPT    Electronically signed 2024    [place] License: PT - [id number]       Timed:  Manual Therapy:    15     mins  55458;  Therapeutic Exercise:    13     mins  96610;     Neuromuscular Adrian:    0    mins  85179;    Therapeutic Activity:     0     mins  37405;     Gait Trainin     mins  88843;     Aquatics                         0      mins  95469    Un-timed:  Mechanical Traction      0     mins  18353  Dry Needling     0     mins self-pay  Electrical Stimulation:    0     mins  64281 ( );    Timed Treatment:   28   mins   Total Treatment:     28   mins

## 2024-05-20 ENCOUNTER — TREATMENT (OUTPATIENT)
Dept: PHYSICAL THERAPY | Facility: CLINIC | Age: 64
End: 2024-05-20
Payer: OTHER MISCELLANEOUS

## 2024-05-20 DIAGNOSIS — M25.612 DECREASED ROM OF LEFT SHOULDER: ICD-10-CM

## 2024-05-20 DIAGNOSIS — Z96.612 S/P REVERSE TOTAL SHOULDER ARTHROPLASTY, LEFT: ICD-10-CM

## 2024-05-20 DIAGNOSIS — M62.81 MUSCLE WEAKNESS OF LEFT UPPER EXTREMITY: ICD-10-CM

## 2024-05-20 DIAGNOSIS — M25.512 LEFT SHOULDER PAIN, UNSPECIFIED CHRONICITY: Primary | ICD-10-CM

## 2024-05-20 PROCEDURE — 97110 THERAPEUTIC EXERCISES: CPT

## 2024-05-20 PROCEDURE — 97112 NEUROMUSCULAR REEDUCATION: CPT

## 2024-05-20 PROCEDURE — 97530 THERAPEUTIC ACTIVITIES: CPT

## 2024-05-20 NOTE — PROGRESS NOTES
Physical Therapy Daily Treatment Note  [place] [facility] [address] [facility] [place] [place] [address]      Patient: Devyn Steinberg   : 1960  Diagnosis/ICD-10 Code:  Left shoulder pain, unspecified chronicity [M25.512]  Referring practitioner: Erickson Hernandez MD  Date of Initial Visit: Type: THERAPY  Noted: 2024  Today's Date: 2024  Patient seen for 31 sessions           Subjective   The patient reported they are a little more sore in their back. Pt feels as though this is going to be like that for a while. Pt does follow up w/ ortho tomorrow.     Objective   See Exercise, Manual, and Modality Logs for complete treatment.     Assessment/Plan  Pt continues to tolerate therapy well, though they are still struggling w/ over head motions. Pt is to follow up w/ ortho. Patient will continue to benefit from skilled physical therapy to further address their deficits in strength and ROM in order to improve upon their functional mobility and to return to ADLs w/o restrictions.          Timed:  Manual Therapy:    0     mins  95484;  Therapeutic Exercise:    15     mins  30578;     Neuromuscular Adrian:   8    mins  99918;    Therapeutic Activity:     8     mins  57165;     Gait Trainin     mins  73435;     Aquatics                         0      mins  44850    Un-timed:  Mechanical Traction      0     mins  67145  Electrical Stimulation:    0     mins  54881 ( );      Timed Treatment:   31   mins   Total Treatment:     31   mins    Daniele Benedict PT, DPT    Electronically signed 2024    [place] License: PT - [id number]

## 2024-05-21 ENCOUNTER — OFFICE VISIT (OUTPATIENT)
Dept: ORTHOPEDIC SURGERY | Facility: CLINIC | Age: 64
End: 2024-05-21
Payer: OTHER MISCELLANEOUS

## 2024-05-21 VITALS — DIASTOLIC BLOOD PRESSURE: 94 MMHG | HEART RATE: 88 BPM | OXYGEN SATURATION: 98 % | SYSTOLIC BLOOD PRESSURE: 168 MMHG

## 2024-05-21 DIAGNOSIS — Z47.1 AFTERCARE FOLLOWING LEFT SHOULDER JOINT REPLACEMENT SURGERY: Primary | ICD-10-CM

## 2024-05-21 DIAGNOSIS — Z96.612 AFTERCARE FOLLOWING LEFT SHOULDER JOINT REPLACEMENT SURGERY: Primary | ICD-10-CM

## 2024-05-21 NOTE — PROGRESS NOTES
"Chief Complaint  Follow-up of the Left Shoulder    Subjective      Devyn Steinberg presents to [facility] for follow-up of left total shoulder reverse arthroplasty performed on 1/15/2024 by Dr. Hernandez.  He presents today for follow-up stating that his shoulder is \"doing good, a little better\".  He has remained in outpatient physical therapy through [facility].  Reports he has 10 sessions remaining.  He has been participating twice weekly.  Reports continued improvement in strength and ROM.  Currently reports strengthening is working on the 3+ pounds.    Objective   Allergies   Allergen Reactions    Trulicity [Dulaglutide] Other (See Comments)     Pancreatitis    Tyloxapol Swelling       Vital Signs:   /94   Pulse 88   SpO2 98%       Physical Exam    Constitutional: Awake, alert. Well nourished appearance.    Integumentary: Warm, dry, intact. No obvious rashes.    HENT: Atraumatic, normocephalic.   Respiratory: Non labored respirations .   Cardiovascular: Intact peripheral pulses.    Psychiatric: Normal mood and affect. A&O X3    Ortho Exam  Left shoulder: Skin is warm, dry, and intact.  Active shoulder forward flexion to 115 degrees, active assisted shoulder forward flexion to 140 degrees, abduction 160 degrees, internal rotation to side pocket.  Full flexion extension of the wrist and elbow.  Full pronation and supination.  Patient is able to form a full fist.  Good thumb opposition.  Sensation and distal neurovascular intact.    Imaging Results (Most Recent)       None                      Assessment and Plan   Problem List Items Addressed This Visit    None  Visit Diagnoses       Aftercare following left shoulder joint replacement surgery    -  Primary    Relevant Orders    Ambulatory Referral to Physical Therapy            Follow Up   Return in about 6 weeks (around 7/2/2024).    Tobacco Use: Low Risk  (5/21/2024)    Patient History     Smoking Tobacco Use: " Never     Smokeless Tobacco Use: Never     Passive Exposure: Past     Patient is a non-smoker.  Did not discuss tobacco cessation options.    Patient Instructions   Patient with continued improvement. Advised to continue PT to completion to progress strength and ROM. Updated PT order placed. Continue home exercises.     Continue icing as needed up to 4 times daily for no longer than 15-20 mins at a time.     Follow-up in 6 weeks. No x-rays needed. Eval readiness to return to work on light duty restrictions. Call with changes or concerns.     Patient was given instructions and counseling regarding his condition or for health maintenance advice. Please see specific information pulled into the AVS if appropriate.

## 2024-05-21 NOTE — PATIENT INSTRUCTIONS
Patient with continued improvement. Advised to continue PT to completion to progress strength and ROM. Updated PT order placed. Continue home exercises.     Continue icing as needed up to 4 times daily for no longer than 15-20 mins at a time.     Follow-up in 6 weeks. No x-rays needed. Eval readiness to return to work on light duty restrictions. Call with changes or concerns.

## 2024-05-23 ENCOUNTER — TREATMENT (OUTPATIENT)
Dept: PHYSICAL THERAPY | Facility: CLINIC | Age: 64
End: 2024-05-23
Payer: OTHER MISCELLANEOUS

## 2024-05-23 DIAGNOSIS — M25.612 DECREASED ROM OF LEFT SHOULDER: ICD-10-CM

## 2024-05-23 DIAGNOSIS — M25.512 LEFT SHOULDER PAIN, UNSPECIFIED CHRONICITY: Primary | ICD-10-CM

## 2024-05-23 DIAGNOSIS — Z96.612 S/P REVERSE TOTAL SHOULDER ARTHROPLASTY, LEFT: ICD-10-CM

## 2024-05-23 DIAGNOSIS — M62.81 MUSCLE WEAKNESS OF LEFT UPPER EXTREMITY: ICD-10-CM

## 2024-05-23 NOTE — PROGRESS NOTES
Physical Therapy Daily Treatment Note  [place] [facility] [address] [facility] [place] [address]      Patient: Devyn Steinberg   : 1960  Diagnosis/ICD-10 Code:  Left shoulder pain, unspecified chronicity [M25.512]  Referring practitioner: Erickson Hernandez MD  Date of Initial Visit: Type: THERAPY  Noted: 2024  Today's Date: 2024  Patient seen for 32 sessions           Subjective   The patient reported they went to the doctors, which they continued to give them some time off of work due to their current impairments. Pt is expected to return to work at a light duty capacity at that time.     Objective   See Exercise, Manual, and Modality Logs for complete treatment.     Assessment/Plan  Pt continues to have increased shoulder stiffness and pain w/ ROM exs. Patient will continue to benefit from skilled physical therapy to further address their deficits in strength and ROM in order to improve upon their functional mobility and to return to ADLs w/ decreased restriction.          Timed:  Manual Therapy:    8     mins  49197;  Therapeutic Exercise:    23     mins  43709;     Neuromuscular Adrian:   0    mins  96657;    Therapeutic Activity:     9     mins  09659;     Gait Trainin     mins  02001;     Aquatics                         0      mins  94778    Un-timed:  Mechanical Traction      0     mins  58617  Electrical Stimulation:    0     mins  06212 ( );      Timed Treatment:   40   mins   Total Treatment:     40   mins    Daniele Benedict PT, DPT    Electronically signed 2024    [place] License: PT - [id number]

## 2024-05-24 ENCOUNTER — TELEPHONE (OUTPATIENT)
Dept: ORTHOPEDIC SURGERY | Facility: CLINIC | Age: 64
End: 2024-05-24
Payer: OTHER MISCELLANEOUS

## 2024-05-24 NOTE — TELEPHONE ENCOUNTER
Caller: SONY FROM [facility] [employer]    Relationship: [employer]    Best call back number: [phone]    What form or medical record are you requesting:  OFFICE NOTES FROM 5/21/24    Who is requesting this form or medical record from you: [employer]    How would you like to receive the form or medical records (pick-up, mail, fax): FAX  If fax, what is the fax number: [phone]

## 2024-05-28 ENCOUNTER — TREATMENT (OUTPATIENT)
Dept: PHYSICAL THERAPY | Facility: CLINIC | Age: 64
End: 2024-05-28
Payer: OTHER MISCELLANEOUS

## 2024-05-28 DIAGNOSIS — Z96.612 S/P REVERSE TOTAL SHOULDER ARTHROPLASTY, LEFT: ICD-10-CM

## 2024-05-28 DIAGNOSIS — M25.512 LEFT SHOULDER PAIN, UNSPECIFIED CHRONICITY: Primary | ICD-10-CM

## 2024-05-28 DIAGNOSIS — M62.81 MUSCLE WEAKNESS OF LEFT UPPER EXTREMITY: ICD-10-CM

## 2024-05-28 DIAGNOSIS — M25.612 DECREASED ROM OF LEFT SHOULDER: ICD-10-CM

## 2024-05-28 PROCEDURE — 97140 MANUAL THERAPY 1/> REGIONS: CPT

## 2024-05-28 PROCEDURE — 97530 THERAPEUTIC ACTIVITIES: CPT

## 2024-05-28 PROCEDURE — 97110 THERAPEUTIC EXERCISES: CPT

## 2024-05-28 NOTE — PROGRESS NOTES
Physical Therapy Daily Treatment Note  [place] [facility] [address] [facility] [place] [address]      Patient: Devyn Steinberg   : 1960  Diagnosis/ICD-10 Code:  Left shoulder pain, unspecified chronicity [M25.512]  Referring practitioner: Erickson Hernandez MD  Date of Initial Visit: Type: THERAPY  Noted: 2024  Today's Date: 2024  Patient seen for 33 sessions           Subjective   The patient reported they are doing well today. Pt reports no new changes.     Objective   See Exercise, Manual, and Modality Logs for complete treatment.     Assessment/Plan  Pt continues to have increased pain w/ end range of motion w/ manual. Pt also complains about some thoracic muscular pain. Patient will continue to benefit from skilled physical therapy to further address their deficits in strength and ROM in order to improve upon their functional mobility and to return to work.          Timed:  Manual Therapy:    8     mins  62702;  Therapeutic Exercise:    15     mins  69679;     Neuromuscular Adrian:   0    mins  47069;    Therapeutic Activity:     8     mins  01269;     Gait Trainin     mins  37521;     Aquatics                         0      mins  53736    Un-timed:  Mechanical Traction      0     mins  45629  Electrical Stimulation:    0     mins  95556 ( );      Timed Treatment:   31   mins   Total Treatment:     31   mins    Daniele Benedict PT, DPT    Electronically signed 2024    [place] License: PT - [id number]

## 2024-05-30 ENCOUNTER — TREATMENT (OUTPATIENT)
Dept: PHYSICAL THERAPY | Facility: CLINIC | Age: 64
End: 2024-05-30
Payer: OTHER MISCELLANEOUS

## 2024-05-30 DIAGNOSIS — M62.81 MUSCLE WEAKNESS OF LEFT UPPER EXTREMITY: ICD-10-CM

## 2024-05-30 DIAGNOSIS — Z96.612 S/P REVERSE TOTAL SHOULDER ARTHROPLASTY, LEFT: ICD-10-CM

## 2024-05-30 DIAGNOSIS — M25.612 DECREASED ROM OF LEFT SHOULDER: ICD-10-CM

## 2024-05-30 DIAGNOSIS — M25.512 LEFT SHOULDER PAIN, UNSPECIFIED CHRONICITY: Primary | ICD-10-CM

## 2024-05-30 NOTE — PROGRESS NOTES
"   Physical Therapy Daily Treatment Note  [place] [facility]: [address] [facility] [place] [address]      Patient: Devyn Steinberg   : 1960  Diagnosis/ICD-10 Code:  Left shoulder pain, unspecified chronicity [M25.512]  Referring practitioner: Erickson Hernandez MD  Date of Initial Visit: Type: THERAPY  Noted: 2024  Today's Date: 2024  Patient seen for 34 sessions           Subjective   The patient reported their L shoulder still feels like a \"toothache\". Pt reports at times this gets a little worse if they do too much.     Objective   See Exercise, Manual, and Modality Logs for complete treatment.     Assessment/Plan  Pt was progressed in resistance today and tolerates this well. Pt still struggles w/ overhead lifting. Patient will continue to benefit from skilled physical therapy to further address their deficits in strength and ROM in order to improve upon their functional mobility and to ADLs and work tasks.          Timed:  Manual Therapy:    8     mins  16177;  Therapeutic Exercise:    15     mins  70814;     Neuromuscular Adrian:   8    mins  27744;    Therapeutic Activity:     0     mins  50200;     Gait Trainin     mins  91795;     Aquatics                         0      mins  70822    Un-timed:  Mechanical Traction      0     mins  70369  Electrical Stimulation:    0     mins  94041 ( );      Timed Treatment:   31   mins   Total Treatment:     31   mins    Daniele Benedict PT, DPT    Electronically signed 2024    [place] License: PT - [id number]                  "

## 2024-06-04 ENCOUNTER — TREATMENT (OUTPATIENT)
Dept: PHYSICAL THERAPY | Facility: CLINIC | Age: 64
End: 2024-06-04
Payer: OTHER MISCELLANEOUS

## 2024-06-04 DIAGNOSIS — M62.81 MUSCLE WEAKNESS OF LEFT UPPER EXTREMITY: ICD-10-CM

## 2024-06-04 DIAGNOSIS — M25.612 DECREASED ROM OF LEFT SHOULDER: ICD-10-CM

## 2024-06-04 DIAGNOSIS — Z96.612 S/P REVERSE TOTAL SHOULDER ARTHROPLASTY, LEFT: ICD-10-CM

## 2024-06-04 DIAGNOSIS — M25.512 LEFT SHOULDER PAIN, UNSPECIFIED CHRONICITY: Primary | ICD-10-CM

## 2024-06-04 PROCEDURE — 97110 THERAPEUTIC EXERCISES: CPT

## 2024-06-04 PROCEDURE — 97530 THERAPEUTIC ACTIVITIES: CPT

## 2024-06-04 NOTE — PROGRESS NOTES
Physical Therapy Daily Treatment Note  [place] [facility]: [address] [place] [address]      Patient: Devyn Steinberg   : 1960  Diagnosis/ICD-10 Code:  Left shoulder pain, unspecified chronicity [M25.512]  Referring practitioner: Erickson Hernandez MD  Date of Initial Visit: Type: THERAPY  Noted: 2024  Today's Date: 2024  Patient seen for 35 sessions           Subjective   The patient reported their muscles are still sore. Otherwise, their pain is about the same.     Objective   See Exercise, Manual, and Modality Logs for complete treatment.     Assessment/Plan  Pt continues to tolerates therapeutic exs well, w/n their available ROM. Continued to emphasize pt's ROM exs as HEP. Patient will continue to benefit from skilled physical therapy to further address their deficits in strength and ROM in order to improve upon their functional mobility and to return to ADLs w/ decreased restrictions.          Timed:  Manual Therapy:    0     mins  31506;  Therapeutic Exercise:    23     mins  32177;     Neuromuscular Adrian:   0    mins  33361;    Therapeutic Activity:     8     mins  65352;     Gait Trainin     mins  10592;     Aquatics                         0      mins  25806    Un-timed:  Mechanical Traction      0     mins  28743  Electrical Stimulation:    0     mins  14732 ( );      Timed Treatment:   31   mins   Total Treatment:     31   mins    Daniele Benedict PT, DPT    Electronically signed 2024    [place] License: PT - [id number]

## 2024-06-06 ENCOUNTER — TREATMENT (OUTPATIENT)
Dept: PHYSICAL THERAPY | Facility: CLINIC | Age: 64
End: 2024-06-06
Payer: OTHER MISCELLANEOUS

## 2024-06-06 DIAGNOSIS — Z96.612 S/P REVERSE TOTAL SHOULDER ARTHROPLASTY, LEFT: ICD-10-CM

## 2024-06-06 DIAGNOSIS — M25.512 LEFT SHOULDER PAIN, UNSPECIFIED CHRONICITY: Primary | ICD-10-CM

## 2024-06-06 DIAGNOSIS — M25.612 DECREASED ROM OF LEFT SHOULDER: ICD-10-CM

## 2024-06-06 DIAGNOSIS — M62.81 MUSCLE WEAKNESS OF LEFT UPPER EXTREMITY: ICD-10-CM

## 2024-06-06 NOTE — PROGRESS NOTES
Physical Therapy Daily Treatment Note  [facility] [address] [facility] [place] [address]      Patient: Devyn Steinberg   : 1960  Diagnosis/ICD-10 Code:  Left shoulder pain, unspecified chronicity [M25.512]  Referring practitioner: Erickson Hernandez MD  Date of Initial Visit: Type: THERAPY  Noted: 2024  Today's Date: 2024  Patient seen for 36 sessions           Subjective   The patient reported they are about the same today.     Objective   See Exercise, Manual, and Modality Logs for complete treatment.     Assessment/Plan  Pt continues to have pain and stiffness at then end of their ROM. Due to lack of ROM, pt is still limited in tasks overhead. Patient will continue to benefit from skilled physical therapy to further address their deficits in strength and ROM in order to improve upon their functional mobility and to return to ADLs w/ decreased pain.          Timed:  Manual Therapy:    10     mins  33262;  Therapeutic Exercise:    12     mins  65206;     Neuromuscular Adrian:   0    mins  11304;    Therapeutic Activity:     8     mins  35430;     Gait Trainin     mins  79535;     Aquatics                         0      mins  08093    Un-timed:  Mechanical Traction      0     mins  06834  Electrical Stimulation:    0     mins  72779 ( );      Timed Treatment:   30   mins   Total Treatment:     30   mins    Daniele Benedict PT, DPT    Electronically signed 2024    [place] License: PT - [id number]

## 2024-06-11 ENCOUNTER — TREATMENT (OUTPATIENT)
Dept: PHYSICAL THERAPY | Facility: CLINIC | Age: 64
End: 2024-06-11
Payer: OTHER MISCELLANEOUS

## 2024-06-11 DIAGNOSIS — M25.612 DECREASED ROM OF LEFT SHOULDER: ICD-10-CM

## 2024-06-11 DIAGNOSIS — Z96.612 S/P REVERSE TOTAL SHOULDER ARTHROPLASTY, LEFT: ICD-10-CM

## 2024-06-11 DIAGNOSIS — M25.512 LEFT SHOULDER PAIN, UNSPECIFIED CHRONICITY: Primary | ICD-10-CM

## 2024-06-11 DIAGNOSIS — M62.81 MUSCLE WEAKNESS OF LEFT UPPER EXTREMITY: ICD-10-CM

## 2024-06-11 NOTE — PROGRESS NOTES
"Physical Therapy Daily Treatment Note  [place] [facility] [address] [place] [address]    Patient: Devyn Steinberg   : 1960  Referring practitioner: Erickson Hernandez MD  Date of Initial Visit: Type: THERAPY  Noted: 2024  Today's Date: 2024  Patient seen for 37 sessions           Subjective  Devyn Steinberg reports: his pain remains 3/10, \"but it can go up if I move wrong.\"   Devyn described \"burning sensation\" with exercises, indicating his full shoulder cap.    Objective   See Exercise, Manual, and Modality Logs for complete treatment.     Assessment/Plan  AROM remains limited L shoulder. Devyn reported he knows he lacks endurance. Devyn is to undergo a PN his first visit next week.    Visit Diagnoses:    ICD-10-CM ICD-9-CM   1. Left shoulder pain, unspecified chronicity  M25.512 719.41   2. Muscle weakness of left upper extremity  M62.81 728.87   3. Decreased ROM of left shoulder  M25.612 719.51   4. S/P reverse total shoulder arthroplasty, left  Z96.612 V43.61       Progress per Plan of Care and Progress strengthening /stabilization /functional activity         Timed:  Manual Therapy:         mins  36437;  Therapeutic Exercise:    10     mins  90732;     Neuromuscular Adrian:    8    mins  39930;    Therapeutic Activity:     12     mins  31949;     Gait Training:           mins  17691;     Ultrasound:          mins  18806;    Electrical Stimulation:         mins  01872 ( );  Aquatics  __   mins   89902    Untimed:  Electrical Stimulation:         mins  57271 ( );  Mechanical Traction:         mins  17281;     Timed Treatment:   30   mins   Total Treatment:     30   mins    Electronically Signed:  Justina Austin PTA  Physical Therapist Assistant    [place] PTA license [id number]            "

## 2024-06-13 ENCOUNTER — TREATMENT (OUTPATIENT)
Dept: PHYSICAL THERAPY | Facility: CLINIC | Age: 64
End: 2024-06-13
Payer: OTHER MISCELLANEOUS

## 2024-06-13 DIAGNOSIS — M62.81 MUSCLE WEAKNESS OF LEFT UPPER EXTREMITY: ICD-10-CM

## 2024-06-13 DIAGNOSIS — Z96.612 S/P REVERSE TOTAL SHOULDER ARTHROPLASTY, LEFT: ICD-10-CM

## 2024-06-13 DIAGNOSIS — M25.612 DECREASED ROM OF LEFT SHOULDER: ICD-10-CM

## 2024-06-13 DIAGNOSIS — M25.512 LEFT SHOULDER PAIN, UNSPECIFIED CHRONICITY: Primary | ICD-10-CM

## 2024-06-13 NOTE — PROGRESS NOTES
"Physical Therapy Daily Treatment Note      Patient: Devyn Steinberg   : 1960  Referring practitioner: Erickson Hernandez MD  Date of Initial Visit: Type: THERAPY  Noted: 2024  Today's Date: 2024  Patient seen for 38 sessions           Subjective Questionnaire:       Subjective Evaluation    History of Present Illness    Subjective comment: Pt reports L shoulder pain is a constant 3/10 and if he does some activity and it will let him know.       Objective   See Exercise, Manual, and Modality Logs for complete treatment.       Assessment & Plan       Assessment  Assessment details: Pt reported \"burning\" in L shoulder while performing Cybex rows.  Pt reported he has no stamina in LLE.  Pt tolerated all other strengthening well.  Continue with POC.        Visit Diagnoses:    ICD-10-CM ICD-9-CM   1. Left shoulder pain, unspecified chronicity  M25.512 719.41   2. Muscle weakness of left upper extremity  M62.81 728.87   3. Decreased ROM of left shoulder  M25.612 719.51   4. S/P reverse total shoulder arthroplasty, left  Z96.612 V43.61       Progress per Plan of Care and Progress strengthening /stabilization /functional activity           Timed:  Manual Therapy:         mins  77388;  Therapeutic Exercise:    23     mins  61084;     Neuromuscular Adrian:        mins  15979;    Therapeutic Activity:     8     mins  75377;     Gait Training:           mins  62617;     Ultrasound:          mins  50843;    Electrical Stimulation:         mins  95328 ( );  Aquatic Therapy          mins  84166    Untimed:  Electrical Stimulation:         mins  94859 ( );  Mechanical Traction:         mins  80077;     Timed Treatment:   31   mins   Total Treatment:     31   mins    Electronically signed    Odessa Ratliff PTA  Physical Therapist Assistant    [place] license: [id number]    "

## 2024-06-18 ENCOUNTER — TREATMENT (OUTPATIENT)
Dept: PHYSICAL THERAPY | Facility: CLINIC | Age: 64
End: 2024-06-18
Payer: COMMERCIAL

## 2024-06-18 DIAGNOSIS — M25.512 LEFT SHOULDER PAIN, UNSPECIFIED CHRONICITY: Primary | ICD-10-CM

## 2024-06-18 DIAGNOSIS — Z96.612 S/P REVERSE TOTAL SHOULDER ARTHROPLASTY, LEFT: ICD-10-CM

## 2024-06-18 DIAGNOSIS — M25.612 DECREASED ROM OF LEFT SHOULDER: ICD-10-CM

## 2024-06-18 DIAGNOSIS — M62.81 MUSCLE WEAKNESS OF LEFT UPPER EXTREMITY: ICD-10-CM

## 2024-06-18 NOTE — PROGRESS NOTES
"Progress Note   [place] [facility]: [address] [place] [address]      Patient: Devyn Steinberg   : 1960  Diagnosis/ICD-10 Code:  Left shoulder pain, unspecified chronicity [M25.512]  Referring practitioner: Erickson Hernandez MD  Date of Initial Visit: Type: THERAPY  Noted: 2024  Today's Date: 2024  Patient seen for 39 sessions      Subjective:   Subjective Questionnaire: QuickDASH: 39  Clinical Progress: unchanged  Home Program Compliance: Yes  Treatment has included: ADL retraining, soft tissue mobilization, strengthening, stretching, therapeutic activities, manual therapy, joint mobilization, home exercise program/patient education, functional ROM exercises, flexibility, body mechanics training, postural training, and neuromuscular re-education    Subjective   Pt reports their L shoulder feels about the same. Pt does feel as though their is a change in the \"pressure\" sensation in their L upper arm. Pt continues to have pain at night, waking them up. Pt did clean their garage the other day, which resulted in increased pain the next day. Pt reports they do go back to ortho on 2024 to discuss return to work on light duty. Pt reports they are unsure of what they would be doing, as there is not much for them to do at work for light duty.       Objective     Strength/Myotome Testing      Left Shoulder      Planes of Motion   Flexion: 4  Abduction: 4   External rotation at 0°: 4+   Internal rotation at 0°: 5          Active Range of Motion   L shoulder  Flexion: 92  ABD: 72  IR@ 0 de  BTB: to hip  ER @ 0 de   BTH: to occiput     Passive Range of Motion   Left Shoulder   Flexion: 133 degrees with pain  Abduction: 100 degrees with pain  ER @ 45: 40 degrees with pain  IR @ 45: 57 degrees with pain      See Exercise, Manual, and Modality Logs for complete treatment.     Assessment/Plan  Impairments: abnormal coordination, abnormal muscle firing, abnormal or restricted ROM, activity " intolerance, impaired physical strength  Functional limitations: carrying objects, lifting, sleeping, pulling, pushing, reaching behind back, reaching overhead and unable to perform repetitive tasks      Pt reported to physical therapy s/p L Reverse total shoulder replacement on 1/15/2024. Pt demonstrates improvement in their ROM, though this is after manual therapy for PROM. Pt continues to have restrictions limiting them from performing overhead work, as well as weakness to lifting objects greater than 10 pounds w/o increased pain. Pt continues to rely on pain medication due to increased pain w/ use of their L UE as well as to sleep for the duration of the night. Though they have had slow gains in ROM, pt has effectively reached a plateau in their strength gains as well as w/ their perceived deficits described by Alen w/n therapy. Continue to encourage pt to further follow up w/ work comp and ortho due to continued deficits. Will continue to provide skilled therapy to further wok on improvements in strength and ROM to return to ADLs  and work tasks w/ decreased restriction.       Goals  Plan Goals: SHOULDER  PROBLEMS:      1. The patient has limited ROM of the L shoulder.                   LTG 1: 12 weeks:  The patient will demonstrate 140 degrees of L shoulder flexion, 140 degrees of shoulder abduction, 65 degrees of shoulder external rotation, and 65 degrees of shoulder internal rotation to allow the patient to reach into upper kitchen cabinets and manipulate clothing behind the back with greater ease.                          STATUS:  progressing              STG 1a: 6 weeks:  The patient will demonstrate 100 degrees of L shoulder flexion, 100 degrees of shoulder abduction, 45 degrees of shoulder external rotation, and 45 degrees of shoulder internal rotation.                          STATUS:  partially met              TREATMENT: Manual therapy, therapeutic exercise, home exercise instruction, and  modalities as needed to include: electrical stimulation, ultrasound, moist heat, and ice.     2. The patient has limited strength of the L shoulder.              LTG 2: 12 weeks:  The patient will demonstrate 4+/5 strength for L shoulder flexion, abduction, external rotation, and internal rotation in order to demonstrate improved shoulder stability.                          STATUS:  progressing              STG 2a: 8 weeks:  The patient will demonstrate 3+/5 strength for L shoulder flexion, abduction, external rotation, and internal rotation.                          STATUS:  met              STG2b:  2 weeks:  The patient will be independent with home exercises.                           STATUS:  progressing              TREATMENT: Manual therapy, therapeutic exercise, home exercise instruction, and modalities as needed to include: electrical stimulation, ultrasound, moist heat, and ice.                3. The patient complains of pain to the L shoulder.              LTG 3: 12 weeks:  The patient will report a pain rating of 1/10 or better in order to improve sleep quality and tolerance to performance of activities of daily living.                          STATUS:  progressing              STG 3a: 8 weeks:  The patient will report a pain rating of 3/10 or better.                           STATUS:  met              TREATMENT: Manual therapy, therapeutic exercise, home exercise instruction, and modalities as needed to include: electrical stimulation, ultrasound, moist heat, and ice.     4. Carrying, Moving, and Handling Objects Functional Limitation                               LTG 4: 12 weeks:  The patient will demonstrate 9 % limitation by achieving a score of 15 on the QuickDASH.                          STATUS: progressing              STG 4a: 6 weeks:  The patient will demonstrate 43 % limitation by achieving a score of 30 on the QuickDASH.                            STATUS:  progressing              TREATMENT:   Manual therapy, therapeutic exercise, home exercise instruction, and modalities as needed to include: moist heat, electrical stimulation, and ultrasound      Progress toward previous goals: Partially Met      Recommendations: Continue as planned  Timeframe: 2 a week, for 2 months   Prognosis to achieve goals: poor    PT Signature: Daniele Benedict PT, DPT    Electronically signed 2024    [place] License: PT - [id number]       Timed:  Manual Therapy:    15     mins  45572;  Therapeutic Exercise:    10     mins  36040;     Neuromuscular Adrian:    0    mins  45907;    Therapeutic Activity:     10     mins  41852;     Gait Trainin     mins  63390;     Aquatics                         0      mins  55955    Un-timed:  Mechanical Traction      0     mins  83831  Dry Needling     0     mins self-pay  Electrical Stimulation:    0     mins  15289 ( );    Timed Treatment:   35   mins   Total Treatment:     35   mins

## 2024-06-20 ENCOUNTER — TREATMENT (OUTPATIENT)
Dept: PHYSICAL THERAPY | Facility: CLINIC | Age: 64
End: 2024-06-20
Payer: OTHER MISCELLANEOUS

## 2024-06-20 DIAGNOSIS — M62.81 MUSCLE WEAKNESS OF LEFT UPPER EXTREMITY: ICD-10-CM

## 2024-06-20 DIAGNOSIS — M25.512 LEFT SHOULDER PAIN, UNSPECIFIED CHRONICITY: Primary | ICD-10-CM

## 2024-06-20 DIAGNOSIS — M25.612 DECREASED ROM OF LEFT SHOULDER: ICD-10-CM

## 2024-06-20 DIAGNOSIS — Z96.612 S/P REVERSE TOTAL SHOULDER ARTHROPLASTY, LEFT: ICD-10-CM

## 2024-06-20 NOTE — PROGRESS NOTES
Physical Therapy Daily Treatment Note  [place] [facility]: [address] [facility]   [place] [address]      Patient: Devyn Steinberg   : 1960  Diagnosis/ICD-10 Code:  Left shoulder pain, unspecified chronicity [M25.512]  Referring practitioner: Erickson Hernandez MD  Date of Initial Visit: Type: THERAPY  Noted: 2024  Today's Date: 2024  Patient seen for 40 sessions           Subjective   The patient reported they go to see their ortho in a couple of weeks.     Objective  See Exercise, Manual, and Modality Logs for complete treatment.     Assessment/Plan  Pt continues to have increased stiffness in their L shoulder, as well as weakness. Pt will continue to be progressed to their tolerance, though they are close to reaching a plateau in their strength gains.        Timed:  Manual Therapy:    8     mins  59224;  Therapeutic Exercise:    15     mins  82543;     Neuromuscular Adrian:   8    mins  23669;    Therapeutic Activity:     0     mins  60980;     Gait Trainin     mins  32900;     Aquatics                         0      mins  84809    Un-timed:  Mechanical Traction      0     mins  12840  Electrical Stimulation:    0     mins  42066 ( );      Timed Treatment:   31   mins   Total Treatment:     31   mins    Daniele Benedict PT, DPT    Electronically signed 2024    [place] License: PT - [id number]

## 2024-06-25 ENCOUNTER — TREATMENT (OUTPATIENT)
Dept: PHYSICAL THERAPY | Facility: CLINIC | Age: 64
End: 2024-06-25
Payer: OTHER MISCELLANEOUS

## 2024-06-25 DIAGNOSIS — Z96.612 S/P REVERSE TOTAL SHOULDER ARTHROPLASTY, LEFT: ICD-10-CM

## 2024-06-25 DIAGNOSIS — M62.81 MUSCLE WEAKNESS OF LEFT UPPER EXTREMITY: ICD-10-CM

## 2024-06-25 DIAGNOSIS — M25.512 LEFT SHOULDER PAIN, UNSPECIFIED CHRONICITY: Primary | ICD-10-CM

## 2024-06-25 DIAGNOSIS — M25.612 DECREASED ROM OF LEFT SHOULDER: ICD-10-CM

## 2024-06-25 NOTE — PROGRESS NOTES
Physical Therapy Daily Treatment Note  [place] [facility] [address] [facility] [place] [address]      Patient: Devyn Steinberg   : 1960  Diagnosis/ICD-10 Code:  Left shoulder pain, unspecified chronicity [M25.512]  Referring practitioner: Erickson Hernandez MD  Date of Initial Visit: Type: THERAPY  Noted: 2024  Today's Date: 2024  Patient seen for 41 sessions           Subjective   The patient reported they are having some increased L shoulder pain today. Pt does not recall anything they did to increase their pain. Pt reports they just woke up this AM sore.      Objective   See Exercise, Manual, and Modality Logs for complete treatment.     Assessment/Plan  Pt continues to have increased discomfort w/ ABD and ER of their L shoulder. Continued to encouraged HEP to further progress ROM. Patient will continue to benefit from skilled physical therapy to further address their deficits in strength and ROM in order to improve upon their functional mobility and to return to ADLs w/ decreased restrictions.          Timed:  Manual Therapy:    8     mins  66240;  Therapeutic Exercise:    10     mins  78683;     Neuromuscular Adrian:   0    mins  93982;    Therapeutic Activity:     10     mins  61976;     Gait Trainin     mins  57686;     Aquatics                         0      mins  77998    Un-timed:  Mechanical Traction      0     mins  03463  Electrical Stimulation:    0     mins  86553 ( );      Timed Treatment:   28   mins   Total Treatment:     28   mins    Daniele Benedict PT, DPT    Electronically signed 2024    [place] License: PT - [id number]

## 2024-06-27 ENCOUNTER — TREATMENT (OUTPATIENT)
Dept: PHYSICAL THERAPY | Facility: CLINIC | Age: 64
End: 2024-06-27
Payer: OTHER MISCELLANEOUS

## 2024-06-27 DIAGNOSIS — Z96.612 S/P REVERSE TOTAL SHOULDER ARTHROPLASTY, LEFT: ICD-10-CM

## 2024-06-27 DIAGNOSIS — M25.512 LEFT SHOULDER PAIN, UNSPECIFIED CHRONICITY: Primary | ICD-10-CM

## 2024-06-27 DIAGNOSIS — M25.612 DECREASED ROM OF LEFT SHOULDER: ICD-10-CM

## 2024-06-27 DIAGNOSIS — M62.81 MUSCLE WEAKNESS OF LEFT UPPER EXTREMITY: ICD-10-CM

## 2024-06-27 NOTE — PROGRESS NOTES
Physical Therapy Daily Treatment Note  [place] [facility] [address] [facility] [place] [address]      Patient: Devyn tSeinberg   : 1960  Diagnosis/ICD-10 Code:  Left shoulder pain, unspecified chronicity [M25.512]  Referring practitioner: Erickson Hernandez MD  Date of Initial Visit: Type: THERAPY  Noted: 2024  Today's Date: 2024  Patient seen for 42 sessions           Subjective   The patient reported they are currently in a 3/10. Pt reports they did not do much of anything yesterday.     Objective   See Exercise, Manual, and Modality Logs for complete treatment.     Assessment/Plan  Pt continues to have increased shoulder stiffness, pain, and weakness. Pt continues to struggle w/ tasks overhead due to ROM and strength limitations. Patient will continue to benefit from skilled physical therapy to further address their deficits in strength and ROM in order to improve upon their functional mobility and to return to ADLs w/ decreased restrictions.          Timed:  Manual Therapy:    8     mins  34313;  Therapeutic Exercise:    14     mins  71409;     Neuromuscular Adrian:   0    mins  05111;    Therapeutic Activity:     10     mins  98836;     Gait Trainin     mins  76492;     Aquatics                         0      mins  50343    Un-timed:  Mechanical Traction      0     mins  36717  Electrical Stimulation:    0     mins  77880 ( );      Timed Treatment:   32   mins   Total Treatment:     32   mins    Daniele Benedict PT, DPT    Electronically signed 2024    [place] License: PT - [id number]

## 2024-07-01 NOTE — PROGRESS NOTES
"Chief Complaint  Follow-up of the Left Shoulder    Subjective      Devyn Steinberg presents to [facility] for follow up of his left shoulder.  Patient is status post left total shoulder reverse arthroplasty performed by Dr. Hernandez on 1/15/2024.  Patient was last seen evaluated in office on 5/21/2024 and was finishing up his physical therapy and was given transition to home exercises.     Today he states he states that he has been going to physical therapy at [facility] twice weekly.  He states he is still seeing some improvement in the shoulder but he is reporting a significant tightness within the shoulder and the back.  He states that his shoulder constantly feels like a toothache.  He reports taking Aleve intermittently but does not report that that helps the discomfort.  He states in physical therapy they are using approximately 7 pounds of weight lifting.  He is still having some significant weakness in the shoulder as well.    Allergies   Allergen Reactions    Trulicity [Dulaglutide] Other (See Comments)     Pancreatitis    Tyloxapol Swelling       Objective     Vital Signs:   Vitals:    07/02/24 0953   BP: (!) 173/107   Pulse: 83   SpO2: 97%   Weight: 71.7 kg (158 lb 1.1 oz)   Height: 160 cm (63\")     Body mass index is 28 kg/m².    I reviewed the patient's chief complaint, history of present illness, review of systems, past medical history, surgical history, family history, social history, medications, and allergy list.     REVIEW OF SYSTEMS    Constitutional: Denies fevers, chills, weight loss  Cardiovascular: Denies chest pain, shortness of breath  Skin: Denies rashes, acute skin changes  Neurologic: Denies headache, loss of consciousness  MSK: Left shoulder pain.     Ortho Exam  Shoulder   General: Alert. No acute distress.  Left upper Extremity: Well-healed incision no skin discoloration, atrophy, or swelling.  90 degrees active elevation.  Active assisted forward shoulder range of " motion 120 degrees.  External rotation to 45 degrees degrees.  Unable to perform internal rotation. Demonstrates intact active elbow ROM. Demonstrates intact active wrist ROM. Sensation intact. Palpable radial pulse. Neurovascularly intact.            Imaging Results (Most Recent)       None                Assessment and Plan   Diagnoses and all orders for this visit:    1. S/P reverse total shoulder arthroplasty, left (Primary)  -     diclofenac (VOLTAREN) 75 MG EC tablet; Take 1 tablet by mouth 2 (Two) Times a Day.  Dispense: 60 tablet; Refill: 1  -     Ambulatory Referral to Physical Therapy    2. Aftercare following left shoulder joint replacement surgery  -     diclofenac (VOLTAREN) 75 MG EC tablet; Take 1 tablet by mouth 2 (Two) Times a Day.  Dispense: 60 tablet; Refill: 1  -     Ambulatory Referral to Physical Therapy    3. Left shoulder pain, unspecified chronicity  -     diclofenac (VOLTAREN) 75 MG EC tablet; Take 1 tablet by mouth 2 (Two) Times a Day.  Dispense: 60 tablet; Refill: 1  -     Ambulatory Referral to Physical Therapy         Devyn Steinberg present to [facility] for a follow up of their left shoulder. They are approximately 12 weeks post op left total shoulder reverse arthroplasty performed by Dr Hernandez on 1/15/24.  X-rays taken and reviewed with patient today in office. Patient is doing well.  Encouraged to continue home exercises for ROM and strengthening. May continue icing as needed for no more than 20 minutes at a time for comfort and inflammation. Educated patient about using caution with overhead pushing and pulling.  Physical therapy was reordered as well as diclofenac to help with anti-inflammatories.    Incision well healed. May apply Vitamin E, cocoa butter, etc. over incision to aid in scar appearance. Educated the patient that numbness over/surrounding incision can still be present at this time. It should continue to improve over the next several months. However, at the 1 year  rebecca, if still experiencing numbness, it may not return.     Discussed the need for antibiotic prophylaxis with dental procedures following total joint replacement for life. Patient instructed to contact office if dental office is reluctant to prescribe antibiotics prior to procedure and we will prescribe them.     Follow-up in 6 weeks.     Call with questions or concerns.          Tobacco Use: Low Risk  (7/2/2024)    Patient History     Smoking Tobacco Use: Never     Smokeless Tobacco Use: Never     Passive Exposure: Past     Patient reports that they are a nonsmoker; cessation education not applicable.            Follow Up   Return in about 6 weeks (around 8/13/2024).  There are no Patient Instructions on file for this visit.  Patient was given instructions and counseling regarding his condition or for health maintenance advice. Please see specific information pulled into the AVS if appropriate.       Dictated Utilizing Dragon Dictation. Please note that portions of this note were completed with a voice recognition program. Part of this note may be an electronic transcription/translation of spoken language to printed text using the Dragon Dictation System.

## 2024-07-02 ENCOUNTER — OFFICE VISIT (OUTPATIENT)
Dept: ORTHOPEDIC SURGERY | Facility: CLINIC | Age: 64
End: 2024-07-02
Payer: OTHER MISCELLANEOUS

## 2024-07-02 ENCOUNTER — TREATMENT (OUTPATIENT)
Dept: PHYSICAL THERAPY | Facility: CLINIC | Age: 64
End: 2024-07-02
Payer: OTHER MISCELLANEOUS

## 2024-07-02 VITALS
HEART RATE: 83 BPM | WEIGHT: 158.07 LBS | HEIGHT: 63 IN | DIASTOLIC BLOOD PRESSURE: 107 MMHG | SYSTOLIC BLOOD PRESSURE: 173 MMHG | OXYGEN SATURATION: 97 % | BODY MASS INDEX: 28.01 KG/M2

## 2024-07-02 DIAGNOSIS — Z96.612 S/P REVERSE TOTAL SHOULDER ARTHROPLASTY, LEFT: ICD-10-CM

## 2024-07-02 DIAGNOSIS — Z96.612 AFTERCARE FOLLOWING LEFT SHOULDER JOINT REPLACEMENT SURGERY: ICD-10-CM

## 2024-07-02 DIAGNOSIS — Z96.612 S/P REVERSE TOTAL SHOULDER ARTHROPLASTY, LEFT: Primary | ICD-10-CM

## 2024-07-02 DIAGNOSIS — M62.81 MUSCLE WEAKNESS OF LEFT UPPER EXTREMITY: ICD-10-CM

## 2024-07-02 DIAGNOSIS — M25.612 DECREASED ROM OF LEFT SHOULDER: ICD-10-CM

## 2024-07-02 DIAGNOSIS — M25.512 LEFT SHOULDER PAIN, UNSPECIFIED CHRONICITY: ICD-10-CM

## 2024-07-02 DIAGNOSIS — Z47.1 AFTERCARE FOLLOWING LEFT SHOULDER JOINT REPLACEMENT SURGERY: ICD-10-CM

## 2024-07-02 DIAGNOSIS — M25.512 LEFT SHOULDER PAIN, UNSPECIFIED CHRONICITY: Primary | ICD-10-CM

## 2024-07-02 RX ORDER — DICLOFENAC SODIUM 75 MG/1
75 TABLET, DELAYED RELEASE ORAL 2 TIMES DAILY
Qty: 60 TABLET | Refills: 1 | Status: SHIPPED | OUTPATIENT
Start: 2024-07-02

## 2024-07-02 NOTE — PROGRESS NOTES
"   Physical Therapy Daily Treatment Note  [place] [facility]: [address] [facility] [place] [address]      Patient: Devyn Steinberg   : 1960  Diagnosis/ICD-10 Code:  Left shoulder pain, unspecified chronicity [M25.512]  Referring practitioner: Erickson Hernandez MD  Date of Initial Visit: Type: THERAPY  Noted: 2024  Today's Date: 2024  Patient seen for 43 sessions           Subjective   The patient reported they do continue to wake up at night due to pain. Pt reports this AM they woke up feeling like their \"arm was on fire\". Pt indicates this posteriorly, to their scapula.     Objective   See Exercise, Manual, and Modality Logs for complete treatment.     Assessment/Plan  Pt continues to have stiffness in their R shoulder in all planes. Pt is also limited in their strength, specifically above shoulder level. Pt has effectively reached a plateau in this w/ therapy at this time. Pt will continue to be progressed to their tolerance to further improve upon their ability to perform ADLs.        Timed:  Manual Therapy:    8     mins  62763;  Therapeutic Exercise:    23     mins  76619;     Neuromuscular Adrian:   0    mins  72688;    Therapeutic Activity:     0     mins  65378;     Gait Trainin     mins  26070;     Aquatics                         0      mins  62312    Un-timed:  Mechanical Traction      0     mins  05919  Electrical Stimulation:    0     mins  83217 ( );      Timed Treatment:   31   mins   Total Treatment:     31   mins    Daniele Benedict PT, DPT    Electronically signed 2024    [place] License: PT - [id number]                  "

## 2024-07-03 ENCOUNTER — TELEPHONE (OUTPATIENT)
Dept: ORTHOPEDIC SURGERY | Facility: CLINIC | Age: 64
End: 2024-07-03
Payer: COMMERCIAL

## 2024-07-03 NOTE — TELEPHONE ENCOUNTER
Hub staff attempted to follow warm transfer process and was unsuccessful     Caller: KELLIE    Relationship to patient: W/C    FAX number: [phone]    Patient is needing: WORK STATUS AND OFFICE NOTES

## 2024-07-19 ENCOUNTER — TREATMENT (OUTPATIENT)
Dept: PHYSICAL THERAPY | Facility: CLINIC | Age: 64
End: 2024-07-19
Payer: OTHER MISCELLANEOUS

## 2024-07-19 DIAGNOSIS — M25.512 LEFT SHOULDER PAIN, UNSPECIFIED CHRONICITY: Primary | ICD-10-CM

## 2024-07-19 DIAGNOSIS — M62.81 MUSCLE WEAKNESS OF LEFT UPPER EXTREMITY: ICD-10-CM

## 2024-07-19 DIAGNOSIS — Z96.612 S/P REVERSE TOTAL SHOULDER ARTHROPLASTY, LEFT: ICD-10-CM

## 2024-07-19 DIAGNOSIS — M25.612 DECREASED ROM OF LEFT SHOULDER: ICD-10-CM

## 2024-07-19 NOTE — PROGRESS NOTES
Progress Note / Re-Certification  [facility]: [address] [place] [address]      Patient: Devyn Steinberg   : 1960  Diagnosis/ICD-10 Code:  Left shoulder pain, unspecified chronicity [M25.512]  Referring practitioner: Erickson Hernandez MD  Date of Initial Visit: Type: THERAPY  Noted: 2024  Today's Date: 2024  Patient seen for 44 sessions      Subjective:   Subjective Questionnaire: QuickDASH: 41  Clinical Progress: unchanged  Home Program Compliance: Yes  Treatment has included: ADL retraining, soft tissue mobilization, strengthening, stretching, therapeutic activities, manual therapy, joint mobilization, home exercise program/patient education, functional ROM exercises, flexibility, body mechanics training, postural training, and neuromuscular re-education    Subjective   Pt reports they did follow up w/ ortho and reports they want him to go back to work. At this time their return to work date is 2024.       Objective   Strength/Myotome Testing      Left Shoulder      Planes of Motion   Flexion: 4  Abduction: 4-   External rotation at 0°: 4+   Internal rotation at 0°: 5          Active Range of Motion   L shoulder  Flexion: 97  ABD: 72  IR@ 0 de  BTB: to hip  ER @ 0 de   BTH: to occiput     Passive Range of Motion   Left Shoulder   Flexion: 133 degrees with pain  Abduction: 100 degrees with pain  ER @ 45: 45 degrees with pain  IR @ 45: 57 degrees with pain      See Exercise, Manual, and Modality Logs for complete treatment.     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 Pt continues to be limited in their ROM as  well as their strength. Pt does show minimal gains in their ROM, though no changes in their perceived deficits described by QuickDASH. Pt follows up w/ ortho in another 6 weeks to assess their improvement. Pt also has an anticipated return to work on 8/13/2024. Pt will likely need restrictions to achieve a return to work on that date. Patient will continue to benefit from skilled physical therapy to further address their deficits in strength and ROM in order to improve upon their functional mobility and to return to ADLs w/ decreased restrictions. .         Goals  Plan Goals: SHOULDER  PROBLEMS:      1. The patient has limited ROM of the L shoulder.                   LTG 1: 12 weeks:  The patient will demonstrate 140 degrees of L shoulder flexion, 140 degrees of shoulder abduction, 65 degrees of shoulder external rotation, and 65 degrees of shoulder internal rotation to allow the patient to reach into upper kitchen cabinets and manipulate clothing behind the back with greater ease.                          STATUS:  progressing              STG 1a: 6 weeks:  The patient will demonstrate 100 degrees of L shoulder flexion, 100 degrees of shoulder abduction, 45 degrees of shoulder external rotation, and 45 degrees of shoulder internal rotation.                          STATUS:  partially met              TREATMENT: Manual therapy, therapeutic exercise, home exercise instruction, and modalities as needed to include: electrical stimulation, ultrasound, moist heat, and ice.     2. The patient has limited strength of the L shoulder.              LTG 2: 12 weeks:  The patient will demonstrate 4+/5 strength for L shoulder flexion, abduction, external rotation, and internal rotation in order to demonstrate improved shoulder stability.                          STATUS:  progressing              STG 2a: 8 weeks:  The patient will demonstrate 3+/5 strength for L shoulder flexion, abduction, external rotation, and internal  rotation.                          STATUS:  met              STG2b:  2 weeks:  The patient will be independent with home exercises.                           STATUS:  progressing              TREATMENT: Manual therapy, therapeutic exercise, home exercise instruction, and modalities as needed to include: electrical stimulation, ultrasound, moist heat, and ice.                3. The patient complains of pain to the L shoulder.              LTG 3: 12 weeks:  The patient will report a pain rating of 1/10 or better in order to improve sleep quality and tolerance to performance of activities of daily living.                          STATUS:  progressing              STG 3a: 8 weeks:  The patient will report a pain rating of 3/10 or better.                           STATUS:  met              TREATMENT: Manual therapy, therapeutic exercise, home exercise instruction, and modalities as needed to include: electrical stimulation, ultrasound, moist heat, and ice.     4. Carrying, Moving, and Handling Objects Functional Limitation                               LTG 4: 12 weeks:  The patient will demonstrate 9 % limitation by achieving a score of 15 on the QuickDASH.                          STATUS: progressing              STG 4a: 6 weeks:  The patient will demonstrate 43 % limitation by achieving a score of 30 on the QuickDASH.                            STATUS:  progressing              TREATMENT:  Manual therapy, therapeutic exercise, home exercise instruction, and modalities as needed to include: moist heat, electrical stimulation, and ultrasound    Progress toward previous goals: Partially Met      Recommendations: Continue as planned  Timeframe: 2 a week, for 3 months   Prognosis to achieve goals: poor    PT Signature: Daniele Benedict PT, DPT    Electronically signed 7/19/2024    [place] License: PT - [id number]     Based upon review of the patient's progress and continued therapy plan, it is my medical opinion that Devyn Steinberg should  continue physical therapy treatment [facility]  [address] [place] [address] [phone].    Signature: __________________________________  Erickson Hernandez MD    90 Day Recertification  Certification Period: 2024 thru 10/16/2024  I certify that the therapy services are furnished while this patient is under my care.  The services outlined above are required by this patient, and will be reviewed every 90 days.     PHYSICIAN: Erickson Hernandez MD  NPI: [id number]      DATE: 24     Please sign and return via fax to [phone]. Thank you, [facility].    Timed:  Manual Therapy:    10     mins  72621;  Therapeutic Exercise:    10     mins  72054;     Neuromuscular Adrian:    0    mins  97012;    Therapeutic Activity:     10     mins  64829;     Gait Trainin     mins  67743;     Aquatics                         0      mins  88855    Un-timed:  Mechanical Traction      0     mins  83423  Dry Needling     0     mins self-pay  Electrical Stimulation:    0     mins  60703 ( );    Timed Treatment:   30   mins   Total Treatment:     30   mins

## 2024-07-23 ENCOUNTER — TREATMENT (OUTPATIENT)
Dept: PHYSICAL THERAPY | Facility: CLINIC | Age: 64
End: 2024-07-23
Payer: OTHER MISCELLANEOUS

## 2024-07-23 DIAGNOSIS — M25.512 LEFT SHOULDER PAIN, UNSPECIFIED CHRONICITY: Primary | ICD-10-CM

## 2024-07-23 DIAGNOSIS — M25.612 DECREASED ROM OF LEFT SHOULDER: ICD-10-CM

## 2024-07-23 DIAGNOSIS — Z96.612 S/P REVERSE TOTAL SHOULDER ARTHROPLASTY, LEFT: ICD-10-CM

## 2024-07-23 DIAGNOSIS — M62.81 MUSCLE WEAKNESS OF LEFT UPPER EXTREMITY: ICD-10-CM

## 2024-07-23 NOTE — PROGRESS NOTES
Outpatient Physical Therapy  [address] [facility], [place] [address]                            Physical Therapy Daily Treatment Note    Patient: Devyn Steinberg   : 1960  Diagnosis/ICD-10 Code:  Left shoulder pain, unspecified chronicity [M25.512]  Referring practitioner: Erickson Hernandez MD  Date of Initial Visit: Type: THERAPY  Noted: 2024  Today's Date: 2024  Patient seen for 45 sessions           Subjective   Devyn Steinberg reports: stiffness in his left shoulder. Pt states he still cannot sleep on his left side without pain.      Objective     See Exercise, Manual, and Modality Logs for complete treatment.     Assessment/Plan  Devyn is still experiencing increased left shoulder pain and stiffness, especially when attempting to reach over head. Pt tolerated exercises and manual well, no complaints of increased pain or discomfort. Pt would benefit from skilled PT to address Range of Motion  and Strength deficits, pain management and any concerns with ADLs.       Progress per Plan of Care         Timed:  Manual Therapy:    12     mins  03455;  Therapeutic Exercise:    15     mins  93905;     Neuromuscular Adrian:        mins  69534;    Therapeutic Activity:          mins  04876;     Gait Training:           mins  60205;        Untimed:  Electrical Stimulation:         mins  22440 ( );  Mechanical Traction:         mins  07422;       Timed Treatment:   27   mins   Total Treatment:     27   mins      Electronically signed:     Aida Lewis PTA  Physical Therapist Assistant  [facility] License #: [id number]

## 2024-07-25 ENCOUNTER — TREATMENT (OUTPATIENT)
Dept: PHYSICAL THERAPY | Facility: CLINIC | Age: 64
End: 2024-07-25
Payer: OTHER MISCELLANEOUS

## 2024-07-25 DIAGNOSIS — Z96.612 S/P REVERSE TOTAL SHOULDER ARTHROPLASTY, LEFT: ICD-10-CM

## 2024-07-25 DIAGNOSIS — M62.81 MUSCLE WEAKNESS OF LEFT UPPER EXTREMITY: ICD-10-CM

## 2024-07-25 DIAGNOSIS — M25.512 LEFT SHOULDER PAIN, UNSPECIFIED CHRONICITY: Primary | ICD-10-CM

## 2024-07-25 DIAGNOSIS — M25.612 DECREASED ROM OF LEFT SHOULDER: ICD-10-CM

## 2024-07-25 NOTE — PROGRESS NOTES
Outpatient Physical Therapy  [address] [facility], [place] [address]                            Physical Therapy Daily Treatment Note    Patient: Devyn Steinberg   : 1960  Diagnosis/ICD-10 Code:  Left shoulder pain, unspecified chronicity [M25.512]  Referring practitioner: Erickson Hernandez MD  Date of Initial Visit: Type: THERAPY  Noted: 2024  Today's Date: 2024  Patient seen for 46 sessions           Subjective   Devyn Steinberg reports: the usual tightness and stiffness. Pt denies pain at this time for PT. Pt states that work comp reached out to him and they were wanting him to return back to work.      Objective     See Exercise, Manual, and Modality Logs for complete treatment.     Assessment/Plan  Devny continues to be limited with shoulder abduction, ER and flexion. Pt tolerated exercises and manual well, he does experience some pain during PROM of shoulder flexion and abduction.  Pt would benefit from skilled PT to address range of motion, strength deficits, pain management and any concerns with ADLs.       Progress per Plan of Care         Timed:  Manual Therapy:    15     mins  68208;  Therapeutic Exercise:    14     mins  60871;     Neuromuscular Adrian:        mins  09991;    Therapeutic Activity:          mins  00328;     Gait Training:           mins  97353;        Untimed:  Electrical Stimulation:         mins  49293 ( );  Mechanical Traction:         mins  93459;       Timed Treatment:   29   mins   Total Treatment:     29   mins      Electronically signed:     Aida Lewis PTA  Physical Therapist Assistant  [facility] License #: [id number]

## 2024-07-30 ENCOUNTER — TREATMENT (OUTPATIENT)
Dept: PHYSICAL THERAPY | Facility: CLINIC | Age: 64
End: 2024-07-30
Payer: OTHER MISCELLANEOUS

## 2024-07-30 DIAGNOSIS — M25.612 DECREASED ROM OF LEFT SHOULDER: ICD-10-CM

## 2024-07-30 DIAGNOSIS — M25.512 LEFT SHOULDER PAIN, UNSPECIFIED CHRONICITY: Primary | ICD-10-CM

## 2024-07-30 DIAGNOSIS — M62.81 MUSCLE WEAKNESS OF LEFT UPPER EXTREMITY: ICD-10-CM

## 2024-07-30 DIAGNOSIS — Z96.612 S/P REVERSE TOTAL SHOULDER ARTHROPLASTY, LEFT: ICD-10-CM

## 2024-07-30 PROCEDURE — 97110 THERAPEUTIC EXERCISES: CPT

## 2024-07-30 PROCEDURE — 97530 THERAPEUTIC ACTIVITIES: CPT

## 2024-07-30 NOTE — PROGRESS NOTES
"                           Outpatient Physical Therapy  [address] , [place] [address]                            Physical Therapy Daily Treatment Note    Patient: Devyn Steinberg   : 1960  Diagnosis/ICD-10 Code:  Left shoulder pain, unspecified chronicity [M25.512]  Referring practitioner: Erickson Hernandez MD  Date of Initial Visit: Type: THERAPY  Noted: 2024  Today's Date: 2024  Patient seen for 47 sessions           Subjective   Devyn Steinberg reports: \"usual soreness and tightness in my shoulder.\" Pt states he has a follow up with Ortho coming up soon.       Objective     See Exercise, Manual, and Modality Logs for complete treatment.     Assessment/Plan  Devyn still experiencing pain with shoulder flexion, abduction and external rotation. Pt was able to complete counter lift off exercises while increasing his flexion ROM . Pt tolerated exercises well, no complaints of pain. Pt experienced some discomfort during cybex chest press in the posterior aspect of his shoulder. Pt will continue to benefit from skilled PT services to address range of motion, strength deficits,  pain management and any concerns with ADLs.       Progress per Plan of Care         Timed:  Manual Therapy:         mins  99828;  Therapeutic Exercise:    16     mins  82101;     Neuromuscular Adrian:        mins  64608;    Therapeutic Activity:     14     mins  34590;     Gait Training:           mins  52111;        Untimed:  Electrical Stimulation:         mins  44180 ( );  Mechanical Traction:         mins  59557;       Timed Treatment:   30   mins   Total Treatment:     30   mins      Electronically signed:     Aida Lewis PTA  Physical Therapist Assistant  [facility] License #: [id number]  "

## 2024-08-01 ENCOUNTER — TREATMENT (OUTPATIENT)
Dept: PHYSICAL THERAPY | Facility: CLINIC | Age: 64
End: 2024-08-01
Payer: OTHER MISCELLANEOUS

## 2024-08-01 DIAGNOSIS — M25.612 DECREASED ROM OF LEFT SHOULDER: ICD-10-CM

## 2024-08-01 DIAGNOSIS — M62.81 MUSCLE WEAKNESS OF LEFT UPPER EXTREMITY: ICD-10-CM

## 2024-08-01 DIAGNOSIS — Z96.612 S/P REVERSE TOTAL SHOULDER ARTHROPLASTY, LEFT: ICD-10-CM

## 2024-08-01 DIAGNOSIS — M25.512 LEFT SHOULDER PAIN, UNSPECIFIED CHRONICITY: Primary | ICD-10-CM

## 2024-08-01 NOTE — PROGRESS NOTES
Outpatient Physical Therapy  [address] [facility], [place] [address]                            Physical Therapy Daily Treatment Note    Patient: Devyn Steinberg   : 1960  Diagnosis/ICD-10 Code:  Left shoulder pain, unspecified chronicity [M25.512]  Referring practitioner: Erickson Hernandez MD  Date of Initial Visit: Type: THERAPY  Noted: 2024  Today's Date: 2024  Patient seen for 48 sessions           Subjective   Devyn Steinberg reports: he was sore after last PT session. Pt states he always has an ache in his left shoulder.       Objective     See Exercise, Manual, and Modality Logs for complete treatment.     Assessment/Plan  Devyn continues to be limited due to pain with shoulder flexion, abduction and external rotation. Pt tolerated exercises and manual well, some pain noted with PROM shoulder flexion and abduction.  Pt will continue to  benefit from skilled PT services to address range of motion, strength deficits, pain management and any concerns with ADLs.       Progress per Plan of Care         Timed:  Manual Therapy:    12     mins  56763;  Therapeutic Exercise:    16     mins  62358;     Neuromuscular Adrian:        mins  80712;    Therapeutic Activity:          mins  94345;     Gait Training:           mins  81692;        Untimed:  Electrical Stimulation:         mins  13886 ( );  Mechanical Traction:         mins  23822;       Timed Treatment:   28   mins   Total Treatment:     28   mins      Electronically signed:     Aida Lewis PTA  Physical Therapist Assistant  [facility] License #: [id number]

## 2024-08-06 ENCOUNTER — TREATMENT (OUTPATIENT)
Dept: PHYSICAL THERAPY | Facility: CLINIC | Age: 64
End: 2024-08-06
Payer: OTHER MISCELLANEOUS

## 2024-08-06 DIAGNOSIS — M62.81 MUSCLE WEAKNESS OF LEFT UPPER EXTREMITY: ICD-10-CM

## 2024-08-06 DIAGNOSIS — M25.512 LEFT SHOULDER PAIN, UNSPECIFIED CHRONICITY: Primary | ICD-10-CM

## 2024-08-06 DIAGNOSIS — Z96.612 S/P REVERSE TOTAL SHOULDER ARTHROPLASTY, LEFT: ICD-10-CM

## 2024-08-06 DIAGNOSIS — M25.612 DECREASED ROM OF LEFT SHOULDER: ICD-10-CM

## 2024-08-06 NOTE — PROGRESS NOTES
Outpatient Physical Therapy  [address] [facility], [place] [address]                            Physical Therapy Daily Treatment Note    Patient: Devyn Steinberg   : 1960  Diagnosis/ICD-10 Code:  Left shoulder pain, unspecified chronicity [M25.512]  Referring practitioner: Erickson Hernandez MD  Date of Initial Visit: Type: THERAPY  Noted: 2024  Today's Date: 2024  Patient seen for 49 sessions           Subjective   Devyn Steinberg reports: that his shoulder is a little more sore, states that it's probably just something he has to get used to.   States that he is still having quite a bit of pain in the shoulder, still taking pain medication. Reports that it usually just numbs the pain versus taking it away.     Objective   Some increased discomfort with Shoulder Flexion and Scaption off blocks.     See Exercise, Manual, and Modality Logs for complete treatment.     Assessment/Plan  Devyn still experiencing increased L shoulder pain, pain medication numbs the pain versus taking it away completely. Pt had some increased discomfort with Shoulder Flexion and Scaption. Pt would benefit from skilled PT to address Range of Motion  and Strength deficits, pain management and any concerns with ADLs.       Progress per Plan of Care         Timed:  Manual Therapy:         mins  78947;  Therapeutic Exercise:    8     mins  13492;     Neuromuscular Adrian:    8    mins  53426;    Therapeutic Activity:     8     mins  60575;     Gait Training:           mins  47790;      Untimed:  Electrical Stimulation:         mins  82168 ( );  Mechanical Traction:         mins  47243;     Timed Treatment:   24   mins   Total Treatment:     24   mins      Electronically signed:     Faiza Garcia PTA  Physical Therapist Assistant  [facility] License #: [id number]

## 2024-08-08 ENCOUNTER — TREATMENT (OUTPATIENT)
Dept: PHYSICAL THERAPY | Facility: CLINIC | Age: 64
End: 2024-08-08
Payer: OTHER MISCELLANEOUS

## 2024-08-08 DIAGNOSIS — M25.512 LEFT SHOULDER PAIN, UNSPECIFIED CHRONICITY: Primary | ICD-10-CM

## 2024-08-08 DIAGNOSIS — Z96.612 S/P REVERSE TOTAL SHOULDER ARTHROPLASTY, LEFT: ICD-10-CM

## 2024-08-08 DIAGNOSIS — M62.81 MUSCLE WEAKNESS OF LEFT UPPER EXTREMITY: ICD-10-CM

## 2024-08-08 DIAGNOSIS — M25.612 DECREASED ROM OF LEFT SHOULDER: ICD-10-CM

## 2024-08-08 NOTE — PROGRESS NOTES
Outpatient Physical Therapy  [address] [facility], [place] [address]                            Physical Therapy Daily Treatment Note    Patient: Devyn Steinberg   : 1960  Diagnosis/ICD-10 Code:  Left shoulder pain, unspecified chronicity [M25.512]  Referring practitioner: Erickson Hernandez MD  Date of Initial Visit: Type: THERAPY  Noted: 2024  Today's Date: 2024  Patient seen for 50 sessions           Subjective   Devyn Steinberg reports: the back of his shoulder is a little more sore than usual. Pt states he has more pain when trying to lift his arm out to the side than forward.      Objective     See Exercise, Manual, and Modality Logs for complete treatment.     Assessment/Plan  Devyn still experiencing increased left shoulder pain with any activity requiring the use of his UE above 90 degrees.  Pt was able to complete exercises with some discomfort with shoulder flexion and scaption off the blocks. Pt also experienced some discomfort during PROM of shoulder flexion and abduction.  Pt will continue to benefit from skilled PT services to address range of motion, strength deficits,  pain management and any concerns with ADLs.       Progress per Plan of Care         Timed:  Manual Therapy:    8     mins  73041;  Therapeutic Exercise:    15     mins  50096;     Neuromuscular Adrian:        mins  80743;    Therapeutic Activity:     14     mins  27200;     Gait Training:           mins  99871;        Untimed:  Electrical Stimulation:         mins  64929 ( );  Mechanical Traction:         mins  31294;       Timed Treatment:   37   mins   Total Treatment:     37   mins      Electronically signed:     Aida Lewis PTA  Physical Therapist Assistant  [facility] License #: [id number]

## 2024-08-13 ENCOUNTER — TREATMENT (OUTPATIENT)
Dept: PHYSICAL THERAPY | Facility: CLINIC | Age: 64
End: 2024-08-13
Payer: OTHER MISCELLANEOUS

## 2024-08-13 ENCOUNTER — OFFICE VISIT (OUTPATIENT)
Dept: ORTHOPEDIC SURGERY | Facility: CLINIC | Age: 64
End: 2024-08-13
Payer: OTHER MISCELLANEOUS

## 2024-08-13 VITALS
OXYGEN SATURATION: 96 % | HEIGHT: 63 IN | WEIGHT: 159.83 LBS | BODY MASS INDEX: 28.32 KG/M2 | HEART RATE: 84 BPM | SYSTOLIC BLOOD PRESSURE: 160 MMHG | DIASTOLIC BLOOD PRESSURE: 80 MMHG

## 2024-08-13 DIAGNOSIS — Z96.612 AFTERCARE FOLLOWING LEFT SHOULDER JOINT REPLACEMENT SURGERY: ICD-10-CM

## 2024-08-13 DIAGNOSIS — Z96.612 S/P REVERSE TOTAL SHOULDER ARTHROPLASTY, LEFT: Primary | ICD-10-CM

## 2024-08-13 DIAGNOSIS — Z96.612 S/P REVERSE TOTAL SHOULDER ARTHROPLASTY, LEFT: ICD-10-CM

## 2024-08-13 DIAGNOSIS — M25.612 DECREASED ROM OF LEFT SHOULDER: ICD-10-CM

## 2024-08-13 DIAGNOSIS — M62.81 MUSCLE WEAKNESS OF LEFT UPPER EXTREMITY: ICD-10-CM

## 2024-08-13 DIAGNOSIS — Z47.1 AFTERCARE FOLLOWING LEFT SHOULDER JOINT REPLACEMENT SURGERY: ICD-10-CM

## 2024-08-13 DIAGNOSIS — M25.512 LEFT SHOULDER PAIN, UNSPECIFIED CHRONICITY: Primary | ICD-10-CM

## 2024-08-13 DIAGNOSIS — M25.512 LEFT SHOULDER PAIN, UNSPECIFIED CHRONICITY: ICD-10-CM

## 2024-08-13 PROCEDURE — 99213 OFFICE O/P EST LOW 20 MIN: CPT

## 2024-08-13 RX ORDER — ESOMEPRAZOLE MAGNESIUM 40 MG/1
1 CAPSULE, DELAYED RELEASE ORAL DAILY
COMMUNITY
Start: 2024-08-07

## 2024-08-13 RX ORDER — DICLOFENAC SODIUM 75 MG/1
75 TABLET, DELAYED RELEASE ORAL 2 TIMES DAILY
Qty: 60 TABLET | Refills: 1 | Status: SHIPPED | OUTPATIENT
Start: 2024-08-13

## 2024-08-13 NOTE — PROGRESS NOTES
Physical Therapy Daily Treatment Note      Patient: Devyn Steinberg   : 1960  Referring practitioner: Erickson Hernandez MD  Date of Initial Visit: Type: THERAPY  Noted: 2024  Today's Date: 2024  Patient seen for 51 sessions           Subjective Questionnaire:       Subjective Evaluation    History of Present Illness    Subjective comment: Pt reports constant  2-3/10 R posterior upper arm (posterior deltoid)  pain and upper scapular pain.       Objective   See Exercise, Manual, and Modality Logs for complete treatment.       Assessment & Plan       Assessment  Assessment details: Pt tolerates strengthening well and added recumbent flexion with wt to kncrease pt's ability to lift overhead.  Pt arrived late and time was adjusted. Pt reports he just came from orto appointment and was sent back to work on light duty, 5 lb lifting limit with one arm and no overhead lifting. Pt was advised to continue with PT and was sent to pain management.  Continue with POC.        Visit Diagnoses:    ICD-10-CM ICD-9-CM   1. Left shoulder pain, unspecified chronicity  M25.512 719.41   2. Muscle weakness of left upper extremity  M62.81 728.87   3. Decreased ROM of left shoulder  M25.612 719.51   4. S/P reverse total shoulder arthroplasty, left  Z96.612 V43.61       Progress per Plan of Care and Progress strengthening /stabilization /functional activity           Timed:  Manual Therapy:         mins  98340;  Therapeutic Exercise:    11     mins  09229;     Neuromuscular Adrian:        mins  44568;    Therapeutic Activity:     10     mins  71340;     Gait Training:           mins  07780;     Ultrasound:          mins  71724;    Electrical Stimulation:         mins  96964 ( );  Aquatic Therapy          mins  65946    Untimed:  Electrical Stimulation:         mins  41966 ( );  Mechanical Traction:         mins  19250;     Timed Treatment:   21   mins   Total Treatment:     21   mins    Electronically  signed    Odessa Ratliff PTA  Physical Therapist Assistant    [facility] license: [id number]

## 2024-08-13 NOTE — PROGRESS NOTES
"Chief Complaint  Pain and Follow-up of the Left Shoulder    Subjective      Devyn Steinberg presents to [facility] for follow up of his left shoulder.  Patient is 6-month status post left total shoulder reverse arthroplasty performed Dr. Hernandez on 1/15/2024.  Patient has been attending physical therapy at [facility] and seeing minimal improvement over the last 6 weeks.  Patient reports today that he has been doing approximately 7 pounds of weight lifting at physical therapy but seems like he cannot progress from that.  He states that pain is constant like a toothache all over all the time.  He states that the pain is mainly on the posterior shoulder blade area and he experiences a significant sensation of tightness.  He states that he has been off work at this time and is a .  He states that he has difficulty with overhead since he is unable to get his shoulder range of motion greater than 90 degrees.  He states that his biggest concern is that he has lost significant amount of strength in his left shoulder.    Allergies   Allergen Reactions    Trulicity [Dulaglutide] Other (See Comments)     Pancreatitis    Tyloxapol Swelling       Objective     Vital Signs:   Vitals:    08/13/24 0844   BP: 160/80   Pulse: 84   SpO2: 96%   Weight: 72.5 kg (159 lb 13.3 oz)   Height: 160 cm (63\")     Body mass index is 28.31 kg/m².    I reviewed the patient's chief complaint, history of present illness, review of systems, past medical history, surgical history, family history, social history, medications, and allergy list.     REVIEW OF SYSTEMS    Constitutional: Denies fevers, chills, weight loss  Cardiovascular: Denies chest pain, shortness of breath  Skin: Denies rashes, acute skin changes  Neurologic: Denies headache, loss of consciousness  MSK: Left shoulder pain.     Ortho Exam  Shoulder   General: Alert. No acute distress.  Left upper Extremity: Well-healed incision.  " 90 degrees active elevation.  Passive forward shoulder elevation 130 degrees.  External rotation to 45 degrees. Internal rotation to side pocket.  Demonstrates intact active elbow ROM. Demonstrates intact active wrist ROM. Sensation intact. Palpable radial pulse. Neurovascularly intact.            Imaging Results (Most Recent)       None                Assessment and Plan   Diagnoses and all orders for this visit:    1. S/P reverse total shoulder arthroplasty, left (Primary)  -     Ambulatory Referral to Physical Therapy for Evaluation & Treatment    2. Aftercare following left shoulder joint replacement surgery  -     Ambulatory Referral to Physical Therapy for Evaluation & Treatment    3. Left shoulder pain, unspecified chronicity  -     Ambulatory Referral to Pain Management  -     Ambulatory Referral to Physical Therapy for Evaluation & Treatment         Devyn Steinberg present to [facility] for a follow up of their left shoulder. He is post op left total shoulder reverse arthroplasty performed by Dr Hernandez on 1/15/24.  X-rays taken and reviewed with patient today in office. Patient is doing well.  Encouraged to continue home exercises for ROM and strengthening. May continue icing as needed for no more than 20 minutes at a time for comfort and inflammation. Educated patient about using caution with overhead pushing and pulling.     Physical therapy contacted our office yesterday to discuss the concern that he has plateaued at physical therapy for the last several weeks.  Active range of motion measurements per physical therapy include 97 degrees flexion, 72 degrees abduction, external rotation 35 degrees, internal rotation 65 degrees.  Passive range of motion measurements include 133 degrees with pain forward shoulder flexion, 100 degrees with pain on abduction, external rotation of 45 degrees with discomfort and internal rotation to 57 degrees with discomfort.  Occupational Therapy is reports strength is a  4-5.     Discussed with Dr. Hernandez.  Patient would like to return to physical therapy to help with further strength training.  Pain management referral was placed for the patient have evaluation of his left shoulder pain that is persistent.  Diclofenac was prescribed and he has been taking it twice daily.  He is requesting refill today.     Return to work note written today for him to return on light duty.  Predominantly one arm duty with lifting 5 pounds or less with the left arm.  No overhead work.  Patient states he has applied for disability at this time.    Call with questions or concerns.          Tobacco Use: Low Risk  (8/13/2024)    Patient History     Smoking Tobacco Use: Never     Smokeless Tobacco Use: Never     Passive Exposure: Past                Follow Up   No follow-ups on file.  There are no Patient Instructions on file for this visit.  Patient was given instructions and counseling regarding his condition or for health maintenance advice. Please see specific information pulled into the AVS if appropriate.       Dictated Utilizing Dragon Dictation. Please note that portions of this note were completed with a voice recognition program. Part of this note may be an electronic transcription/translation of spoken language to printed text using the Dragon Dictation System.

## 2024-08-15 ENCOUNTER — TREATMENT (OUTPATIENT)
Dept: PHYSICAL THERAPY | Facility: CLINIC | Age: 64
End: 2024-08-15
Payer: OTHER MISCELLANEOUS

## 2024-08-15 DIAGNOSIS — M25.612 DECREASED ROM OF LEFT SHOULDER: ICD-10-CM

## 2024-08-15 DIAGNOSIS — M62.81 MUSCLE WEAKNESS OF LEFT UPPER EXTREMITY: ICD-10-CM

## 2024-08-15 DIAGNOSIS — M25.512 LEFT SHOULDER PAIN, UNSPECIFIED CHRONICITY: Primary | ICD-10-CM

## 2024-08-15 DIAGNOSIS — Z96.612 S/P REVERSE TOTAL SHOULDER ARTHROPLASTY, LEFT: ICD-10-CM

## 2024-08-15 NOTE — PROGRESS NOTES
Progress Note   [facility]: [address] [place] [address]      Patient: Devyn Steinberg   : 1960  Diagnosis/ICD-10 Code:  Left shoulder pain, unspecified chronicity [M25.512]  Referring practitioner: Erickson Hernandez MD  Date of Initial Visit: Type: THERAPY  Noted: 2024  Today's Date: 8/15/2024  Patient seen for 52 sessions      Subjective:   Subjective Questionnaire: QuickDASH: 33  Clinical Progress: improved  Home Program Compliance: Yes  Treatment has included: ADL retraining, soft tissue mobilization, strengthening, stretching, therapeutic activities, manual therapy, joint mobilization, home exercise program/patient education, functional ROM exercises, flexibility, body mechanics training, postural training, and neuromuscular re-education    Subjective   Pt reports their shoulder is a solid 3/10 today. Pt reports they took their note of restrictions to [employer], and they were instructed to reach out to [employer] to have these accommodations met. Pt reports they also go to pain management for their first appointment soon.        Objective   Strength/Myotome Testing      Left Shoulder      Planes of Motion   Flexion: 4-  Abduction: 4   External rotation at 0°: 4+   Internal rotation at 0°: 4+          Active Range of Motion   L shoulder  Flexion: 99  ABD: 72  IR@ 0 de  BTB: to hip  ER @ 0 de   BTH: to occiput     Passive Range of Motion   Left Shoulder   Flexion: 133 degrees with pain  Abduction: 100 degrees with pain  ER @ 45: 45 degrees with pain  IR @ 45: 55 degrees with pain      See Exercise, Manual, and Modality Logs for complete treatment.     Assessment/Plan  Impairments: abnormal coordination, abnormal muscle firing, abnormal or restricted ROM, activity intolerance, impaired physical strength  Functional limitations: carrying objects, lifting, sleeping, pulling, pushing, reaching behind back, reaching overhead and unable to perform repetitive tasks      Pt reported to  physical therapy s/p L Reverse total shoulder replacement on 1/15/2024. Pt has been participating in therapy since 10/3/2024 for their L shoulder due to injury at work. Pt continues to have persisting deficits in strength as well as ROM. Pt also continues to have increased disability described by [facility]. Pt's current limitations prevent them from performing tasks over shoulder height and behind their back. Pt does well w/ reclined shoulder flexion today, and will be progressed w/ resistance exs to their tolerance. Patient will continue to benefit from skilled physical therapy to further address their deficits in strength and ROM in order to improve upon their functional mobility and to return to ADLs w/ decreased restrictions.         Goals  Plan Goals: SHOULDER  PROBLEMS:      1. The patient has limited ROM of the L shoulder.                   LTG 1: 12 weeks:  The patient will demonstrate 140 degrees of L shoulder flexion, 140 degrees of shoulder abduction, 65 degrees of shoulder external rotation, and 65 degrees of shoulder internal rotation to allow the patient to reach into upper kitchen cabinets and manipulate clothing behind the back with greater ease.                          STATUS:  progressing              STG 1a: 6 weeks:  The patient will demonstrate 100 degrees of L shoulder flexion, 100 degrees of shoulder abduction, 45 degrees of shoulder external rotation, and 45 degrees of shoulder internal rotation.                          STATUS:  partially met              TREATMENT: Manual therapy, therapeutic exercise, home exercise instruction, and modalities as needed to include: electrical stimulation, ultrasound, moist heat, and ice.     2. The patient has limited strength of the L shoulder.              LTG 2: 12 weeks:  The patient will demonstrate 4+/5 strength for L shoulder flexion, abduction, external rotation, and internal rotation in order to demonstrate improved shoulder stability.                           STATUS:  progressing              STG 2a: 8 weeks:  The patient will demonstrate 3+/5 strength for L shoulder flexion, abduction, external rotation, and internal rotation.                          STATUS:  met              STG2b:  2 weeks:  The patient will be independent with home exercises.                           STATUS:  progressing              TREATMENT: Manual therapy, therapeutic exercise, home exercise instruction, and modalities as needed to include: electrical stimulation, ultrasound, moist heat, and ice.                3. The patient complains of pain to the L shoulder.              LTG 3: 12 weeks:  The patient will report a pain rating of 1/10 or better in order to improve sleep quality and tolerance to performance of activities of daily living.                          STATUS:  progressing              STG 3a: 8 weeks:  The patient will report a pain rating of 3/10 or better.                           STATUS:  met              TREATMENT: Manual therapy, therapeutic exercise, home exercise instruction, and modalities as needed to include: electrical stimulation, ultrasound, moist heat, and ice.     4. Carrying, Moving, and Handling Objects Functional Limitation                               LTG 4: 12 weeks:  The patient will demonstrate 9 % limitation by achieving a score of 15 on the QuickDASH.                          STATUS: progressing              STG 4a: 6 weeks:  The patient will demonstrate 43 % limitation by achieving a score of 30 on the QuickDASH.                            STATUS:  progressing              TREATMENT:  Manual therapy, therapeutic exercise, home exercise instruction, and modalities as needed to include: moist heat, electrical stimulation, and ultrasound    Progress toward previous goals: Partially Met      Recommendations: Continue as planned  Timeframe: 2 a week, for 2 months   Prognosis to achieve goals: poor    PT Signature: Daniele Bendeict PT,  DPT    Electronically signed 8/15/2024    [place] License: PT - [id number]         Timed:  Manual Therapy:    8     mins  38965;  Therapeutic Exercise:    20     mins  90254;     Neuromuscular Adrian:    0    mins  73818;    Therapeutic Activity:     0     mins  52926;     Gait Trainin     mins  13697;     Aquatics                         0      mins  52899    Un-timed:  Mechanical Traction      0     mins  59218  Dry Needling     0     mins self-pay  Electrical Stimulation:    0     mins  23433 ( );    Timed Treatment:   28   mins   Total Treatment:     28   mins

## 2024-08-22 ENCOUNTER — TREATMENT (OUTPATIENT)
Dept: PHYSICAL THERAPY | Facility: CLINIC | Age: 64
End: 2024-08-22
Payer: OTHER MISCELLANEOUS

## 2024-08-22 DIAGNOSIS — M25.612 DECREASED ROM OF LEFT SHOULDER: ICD-10-CM

## 2024-08-22 DIAGNOSIS — M25.512 LEFT SHOULDER PAIN, UNSPECIFIED CHRONICITY: Primary | ICD-10-CM

## 2024-08-22 DIAGNOSIS — Z96.612 S/P REVERSE TOTAL SHOULDER ARTHROPLASTY, LEFT: ICD-10-CM

## 2024-08-22 DIAGNOSIS — M62.81 MUSCLE WEAKNESS OF LEFT UPPER EXTREMITY: ICD-10-CM

## 2024-08-22 NOTE — PROGRESS NOTES
"Physical Therapy Daily Treatment Note  [facility] [address] [place] [address]    Patient: Devyn Steinberg   : 1960  Referring practitioner: Erickson Hernandez MD  Date of Initial Visit: Type: THERAPY  Noted: 2024  Today's Date: 2024  Patient seen for 53 sessions           Subjective  Devyn Steinberg reports: he is waiting on contact from pain management. \"I am hopeful they can get rid of the pain.\"     Objective   See Exercise, Manual, and Modality Logs for complete treatment.     Assessment/Plan  Devyn tolerated advancement in resistance/reps with a few of his exercises. Care as per updated POC 8-15-24.    Visit Diagnoses:    ICD-10-CM ICD-9-CM   1. Left shoulder pain, unspecified chronicity  M25.512 719.41   2. Muscle weakness of left upper extremity  M62.81 728.87   3. Decreased ROM of left shoulder  M25.612 719.51   4. S/P reverse total shoulder arthroplasty, left  Z96.612 V43.61       Progress per Plan of Care and Progress strengthening /stabilization /functional activity         Timed:  Manual Therapy:         mins  42038;  Therapeutic Exercise:    10     mins  11525;     Neuromuscular Adrian:    11    mins  07706;    Therapeutic Activity:     10     mins  22523;     Gait Training:           mins  86302;     Ultrasound:          mins  68211;    Electrical Stimulation:         mins  43809 ( );  Aquatics  __   mins   91816    Untimed:  Electrical Stimulation:         mins  53383 ( );  Mechanical Traction:         mins  76406;     Timed Treatment:   31   mins   Total Treatment:     31   mins    Electronically Signed:  Justina Austin PTA  Physical Therapist Assistant    [place] PTA license [id number]            "

## 2024-08-27 ENCOUNTER — TREATMENT (OUTPATIENT)
Dept: PHYSICAL THERAPY | Facility: CLINIC | Age: 64
End: 2024-08-27
Payer: OTHER MISCELLANEOUS

## 2024-08-27 DIAGNOSIS — M25.612 DECREASED ROM OF LEFT SHOULDER: ICD-10-CM

## 2024-08-27 DIAGNOSIS — Z96.612 S/P REVERSE TOTAL SHOULDER ARTHROPLASTY, LEFT: ICD-10-CM

## 2024-08-27 DIAGNOSIS — M25.512 LEFT SHOULDER PAIN, UNSPECIFIED CHRONICITY: Primary | ICD-10-CM

## 2024-08-27 DIAGNOSIS — M62.81 MUSCLE WEAKNESS OF LEFT UPPER EXTREMITY: ICD-10-CM

## 2024-08-27 NOTE — PROGRESS NOTES
"Physical Therapy Daily Treatment Note  [place] [facility] [address] [place] [address]    Patient: Devyn Steinberg   : 1960  Referring practitioner: Erickson Hernandez MD  Date of Initial Visit: Type: THERAPY  Noted: 2024  Today's Date: 2024  Patient seen for 54 sessions           Subjective  Devyn Steinberg reports:  his pain is increased to day, \"maybe how I slept on it, a solid 3/10.\" Devyn went on to explain that he has a burning sensation in his L scapular border. \"I can't use my tools, the weed eater or chainsaw.\"    Objective   See Exercise, Manual, and Modality Logs for complete treatment.     Assessment/Plan  Devyn has essentially reached a plateau with his AROM, strength and functional ability. He is waiting for a call from pain management per his report.      Visit Diagnoses:    ICD-10-CM ICD-9-CM   1. Left shoulder pain, unspecified chronicity  M25.512 719.41   2. Muscle weakness of left upper extremity  M62.81 728.87   3. Decreased ROM of left shoulder  M25.612 719.51   4. S/P reverse total shoulder arthroplasty, left  Z96.612 V43.61       Progress per Plan of Care and Progress strengthening /stabilization /functional activity         Timed:  Manual Therapy:         mins  56429;  Therapeutic Exercise:    10     mins  52126;     Neuromuscular Adrian:    10    mins  47005;    Therapeutic Activity:     10     mins  20568;     Gait Training:           mins  71358;     Ultrasound:          mins  81383;    Electrical Stimulation:         mins  57297 ( );  Aquatics  __   mins   19861    Untimed:  Electrical Stimulation:         mins  30475 ( );  Mechanical Traction:         mins  50751;     Timed Treatment:   30   mins   Total Treatment:     30   mins    Electronically Signed:  Justina Austin PTA  Physical Therapist Assistant    [employer]UREL MILLAN license [id number]            "

## 2024-08-29 ENCOUNTER — TREATMENT (OUTPATIENT)
Dept: PHYSICAL THERAPY | Facility: CLINIC | Age: 64
End: 2024-08-29
Payer: OTHER MISCELLANEOUS

## 2024-08-29 DIAGNOSIS — Z96.612 S/P REVERSE TOTAL SHOULDER ARTHROPLASTY, LEFT: ICD-10-CM

## 2024-08-29 DIAGNOSIS — M25.612 DECREASED ROM OF LEFT SHOULDER: ICD-10-CM

## 2024-08-29 DIAGNOSIS — M25.512 LEFT SHOULDER PAIN, UNSPECIFIED CHRONICITY: Primary | ICD-10-CM

## 2024-08-29 DIAGNOSIS — M62.81 MUSCLE WEAKNESS OF LEFT UPPER EXTREMITY: ICD-10-CM

## 2024-09-03 ENCOUNTER — TREATMENT (OUTPATIENT)
Dept: PHYSICAL THERAPY | Facility: CLINIC | Age: 64
End: 2024-09-03
Payer: OTHER MISCELLANEOUS

## 2024-09-03 DIAGNOSIS — M25.512 LEFT SHOULDER PAIN, UNSPECIFIED CHRONICITY: Primary | ICD-10-CM

## 2024-09-03 DIAGNOSIS — M25.612 DECREASED ROM OF LEFT SHOULDER: ICD-10-CM

## 2024-09-03 DIAGNOSIS — Z96.612 S/P REVERSE TOTAL SHOULDER ARTHROPLASTY, LEFT: ICD-10-CM

## 2024-09-03 DIAGNOSIS — M62.81 MUSCLE WEAKNESS OF LEFT UPPER EXTREMITY: ICD-10-CM

## 2024-09-03 PROCEDURE — 97530 THERAPEUTIC ACTIVITIES: CPT

## 2024-09-03 PROCEDURE — 97110 THERAPEUTIC EXERCISES: CPT

## 2024-09-03 PROCEDURE — 97112 NEUROMUSCULAR REEDUCATION: CPT

## 2024-09-03 NOTE — PROGRESS NOTES
"Physical Therapy Daily Treatment Note  [place] [facility] [address] [place] [address]    Patient: Devyn Steinberg   : 1960  Referring practitioner: Erickson Hernandez MD  Date of Initial Visit: Type: THERAPY  Noted: 2024  Today's Date: 9/3/2024  Patient seen for 56 sessions           Subjective  Devyn Steinberg reports:  his pain is 3/10. \"See the pain management 9-15-24.\"    Objective   See Exercise, Manual, and Modality Logs for complete treatment.     Assessment/Plan  Ongoing deficits in gross strength, ROM and functional ability with physician ordered continuation of therapy.    Visit Diagnoses:    ICD-10-CM ICD-9-CM   1. Left shoulder pain, unspecified chronicity  M25.512 719.41   2. Muscle weakness of left upper extremity  M62.81 728.87   3. Decreased ROM of left shoulder  M25.612 719.51   4. S/P reverse total shoulder arthroplasty, left  Z96.612 V43.61       Progress per Plan of Care and Progress strengthening /stabilization /functional activity         Timed:  Manual Therapy:         mins  65493;  Therapeutic Exercise:    10     mins  50668;     Neuromuscular Adrian:    11    mins  62499;    Therapeutic Activity:     11     mins  15506;     Gait Training:           mins  87684;     Ultrasound:          mins  54231;    Electrical Stimulation:         mins  30114 ( );  Aquatics  __   mins   64289    Untimed:  Electrical Stimulation:         mins  62363 ( );  Mechanical Traction:         mins  29145;     Timed Treatment:   32   mins   Total Treatment:     32   mins    Electronically Signed:  Justina Austin PTA  Physical Therapist Assistant    [place] PTA license [id number]            "

## 2024-09-05 ENCOUNTER — TREATMENT (OUTPATIENT)
Dept: PHYSICAL THERAPY | Facility: CLINIC | Age: 64
End: 2024-09-05
Payer: OTHER MISCELLANEOUS

## 2024-09-05 DIAGNOSIS — M62.81 MUSCLE WEAKNESS OF LEFT UPPER EXTREMITY: ICD-10-CM

## 2024-09-05 DIAGNOSIS — Z96.612 S/P REVERSE TOTAL SHOULDER ARTHROPLASTY, LEFT: ICD-10-CM

## 2024-09-05 DIAGNOSIS — M25.512 LEFT SHOULDER PAIN, UNSPECIFIED CHRONICITY: Primary | ICD-10-CM

## 2024-09-05 DIAGNOSIS — M25.612 DECREASED ROM OF LEFT SHOULDER: ICD-10-CM

## 2024-09-05 NOTE — PROGRESS NOTES
Physical Therapy Daily Treatment Note  [place] [facility] [address] [place] [address]      Patient: Devyn Steinberg   : 1960  Diagnosis/ICD-10 Code:  Left shoulder pain, unspecified chronicity [M25.512]  Referring practitioner: Erickson Hernandez MD  Date of Initial Visit: Type: THERAPY  Noted: 2024  Today's Date: 2024  Patient seen for 57 sessions           Subjective   The patient reported their L shoulder has been about the same. Pt reports they have an appointment w/ pain management scheduled.     Objective   See Exercise, Manual, and Modality Logs for complete treatment.     Assessment/Plan  Incorporated eccentric exs to focus on overhead reaching and UE control. Pt continues to require assistance to reach overhead. Pt also continue to have increased stiffness in their L shoulder. Patient will continue to benefit from skilled physical therapy to further address their deficits in strength and ROM in order to improve upon their functional mobility and to return to reaching tasks.          Timed:  Manual Therapy:    0     mins  21228;  Therapeutic Exercise:    15     mins  11616;     Neuromuscular Adrian:   8    mins  54297;    Therapeutic Activity:     8     mins  42509;     Gait Trainin     mins  26991;     Aquatics                         0      mins  09314    Un-timed:  Mechanical Traction      0     mins  82306  Electrical Stimulation:    0     mins  76583 ( );      Timed Treatment:   31   mins   Total Treatment:     31   mins    Daniele Benedict PT, DPT    Electronically signed 2024    [place] License: PT - [id number]

## 2024-09-10 ENCOUNTER — TREATMENT (OUTPATIENT)
Dept: PHYSICAL THERAPY | Facility: CLINIC | Age: 64
End: 2024-09-10
Payer: OTHER MISCELLANEOUS

## 2024-09-10 DIAGNOSIS — Z96.612 S/P REVERSE TOTAL SHOULDER ARTHROPLASTY, LEFT: ICD-10-CM

## 2024-09-10 DIAGNOSIS — M62.81 MUSCLE WEAKNESS OF LEFT UPPER EXTREMITY: ICD-10-CM

## 2024-09-10 DIAGNOSIS — M25.512 LEFT SHOULDER PAIN, UNSPECIFIED CHRONICITY: Primary | ICD-10-CM

## 2024-09-10 DIAGNOSIS — M25.612 DECREASED ROM OF LEFT SHOULDER: ICD-10-CM

## 2024-09-10 PROCEDURE — 97112 NEUROMUSCULAR REEDUCATION: CPT

## 2024-09-10 PROCEDURE — 97530 THERAPEUTIC ACTIVITIES: CPT

## 2024-09-10 PROCEDURE — 97110 THERAPEUTIC EXERCISES: CPT

## 2024-09-10 NOTE — PROGRESS NOTES
Physical Therapy Daily Treatment Note  [place] [facility] [address] [facility] [place] [address]      Patient: Devyn Steinberg   : 1960  Diagnosis/ICD-10 Code:  Left shoulder pain, unspecified chronicity [M25.512]  Referring practitioner: Erickson Hernandez MD  Date of Initial Visit: Type: THERAPY  Noted: 2024  Today's Date: 9/10/2024  Patient seen for 58 sessions           Subjective   The patient reported they will be following up w/ pain management. Pt reports they always have some kind of pain.     Objective   See Exercise, Manual, and Modality Logs for complete treatment.     Assessment/Plan  Continued to encourage pt to perform ROM and strength as much as they can. Pt does well w/ eccentric lowering exs today, though they are only able to reach into 90 deg of shoulder flexion. Will continue to progress pt w/ skilled therapy to their tolerance.        Timed:  Manual Therapy:    0     mins  92126;  Therapeutic Exercise:    15     mins  31174;     Neuromuscular Adrian:   8    mins  53258;    Therapeutic Activity:     8     mins  64331;     Gait Trainin     mins  12596;     Aquatics                         0      mins  97437    Un-timed:  Mechanical Traction      0     mins  35685  Electrical Stimulation:    0     mins  42939 ( );      Timed Treatment:   31   mins   Total Treatment:     31   mins    Daniele Benedict PT, DPT    Electronically signed 9/10/2024    [place] License: PT - [id number]

## 2024-09-12 ENCOUNTER — TREATMENT (OUTPATIENT)
Dept: PHYSICAL THERAPY | Facility: CLINIC | Age: 64
End: 2024-09-12
Payer: OTHER MISCELLANEOUS

## 2024-09-12 DIAGNOSIS — M25.612 DECREASED ROM OF LEFT SHOULDER: ICD-10-CM

## 2024-09-12 DIAGNOSIS — M25.512 LEFT SHOULDER PAIN, UNSPECIFIED CHRONICITY: Primary | ICD-10-CM

## 2024-09-12 DIAGNOSIS — Z96.612 S/P REVERSE TOTAL SHOULDER ARTHROPLASTY, LEFT: ICD-10-CM

## 2024-09-12 DIAGNOSIS — M62.81 MUSCLE WEAKNESS OF LEFT UPPER EXTREMITY: ICD-10-CM

## 2024-09-12 NOTE — PROGRESS NOTES
Progress Note   [facility]: [address] [place] [address]      Patient: Devyn Steinberg   : 1960  Diagnosis/ICD-10 Code:  Left shoulder pain, unspecified chronicity [M25.512]  Referring practitioner: Erickson Hernandez MD  Date of Initial Visit: Type: THERAPY  Noted: 2024  Today's Date: 2024  Patient seen for 59 sessions      Subjective:   Subjective Questionnaire: QuickDASH: 39  Clinical Progress: worse  Home Program Compliance: Yes  Treatment has included: balance/weight-bearing training, ADL retraining, soft tissue mobilization, strengthening, stretching, therapeutic activities, manual therapy, joint mobilization, home exercise program/patient education, gait training, functional ROM exercises, flexibility, body mechanics training, postural training, and neuromuscular re-education    Subjective   Pt reports their L shoulder feels about the same as it did the last progress note. Pt does go to pain management on 2024 for their L shoulder. Pt reports over the past couple of weeks, they have had some increased pain. Pt reports a constant ache. Today they rate their pain as a 3/10. It can still reach times w/ a 5-6/10 described as an ache. 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       Objective   Strength/Myotome Testing      Left Shoulder      Planes of Motion   Flexion: 4  Abduction: 4   External rotation at 0°: 4+   Internal rotation at 0°: 4+          Active Range of Motion   L shoulder  Flexion: 97  ABD: 74  IR@ 0 deg: to stomach; WFL  BTB: to hip  ER @ 0 de   BTH: C2     Passive Range of Motion   Left Shoulder   Flexion: 133 degrees with pain  Abduction: 100 degrees with pain  ER @ 45: 32 degrees with pain  IR @ 45: 52 degrees with pain       See Exercise, Manual, and Modality Logs for complete treatment.     Assessment/Plan  Impairments: abnormal coordination, abnormal muscle firing, abnormal or restricted ROM,  activity intolerance, impaired physical strength  Functional limitations: carrying objects, lifting, sleeping, pulling, pushing, reaching behind back, reaching overhead and unable to perform repetitive tasks      Pt reported to physical therapy s/p L Reverse total shoulder replacement on 1/15/2024. Pt is almost 8 months out of shoulder surgery at this time. Pt is also 11 months post op from their original surgical intervention (L shoulder debridement). Pt continues to maintain their ROM, excluding ER. Pt also continues to effectively maintain their strength. Pt does have some increased soreness w/n their L shoulder and this has effected their NDI. Continued to encourage pt to perform HEP as frequently as possible. Pt does follow up w/ ortho in the coming weeks (9/24/2024). Will continue to follow up w/ pt w/ skilled therapy for further L shoulder maintenance.     Goals  Plan Goals: SHOULDER  PROBLEMS:      1. The patient has limited ROM of the L shoulder.                   LTG 1: 12 weeks:  The patient will demonstrate 140 degrees of L shoulder flexion, 140 degrees of shoulder abduction, 65 degrees of shoulder external rotation, and 65 degrees of shoulder internal rotation to allow the patient to reach into upper kitchen cabinets and manipulate clothing behind the back with greater ease.                          STATUS:  progressing              STG 1a: 6 weeks:  The patient will demonstrate 100 degrees of L shoulder flexion, 100 degrees of shoulder abduction, 45 degrees of shoulder external rotation, and 45 degrees of shoulder internal rotation.                          STATUS:  partially met              TREATMENT: Manual therapy, therapeutic exercise, home exercise instruction, and modalities as needed to include: electrical stimulation, ultrasound, moist heat, and ice.     2. The patient has limited strength of the L shoulder.              LTG 2: 12 weeks:  The patient will demonstrate 4+/5 strength for L  shoulder flexion, abduction, external rotation, and internal rotation in order to demonstrate improved shoulder stability.                          STATUS:  progressing              STG 2a: 8 weeks:  The patient will demonstrate 3+/5 strength for L shoulder flexion, abduction, external rotation, and internal rotation.                          STATUS:  met              STG2b:  2 weeks:  The patient will be independent with home exercises.                           STATUS:  progressing              TREATMENT: Manual therapy, therapeutic exercise, home exercise instruction, and modalities as needed to include: electrical stimulation, ultrasound, moist heat, and ice.                3. The patient complains of pain to the L shoulder.              LTG 3: 12 weeks:  The patient will report a pain rating of 1/10 or better in order to improve sleep quality and tolerance to performance of activities of daily living.                          STATUS:  progressing              STG 3a: 8 weeks:  The patient will report a pain rating of 3/10 or better.                           STATUS:  met              TREATMENT: Manual therapy, therapeutic exercise, home exercise instruction, and modalities as needed to include: electrical stimulation, ultrasound, moist heat, and ice.     4. Carrying, Moving, and Handling Objects Functional Limitation                               LTG 4: 12 weeks:  The patient will demonstrate 9 % limitation by achieving a score of 15 on the QuickDASH.                          STATUS: progressing              STG 4a: 6 weeks:  The patient will demonstrate 43 % limitation by achieving a score of 30 on the QuickDASH.                            STATUS:  progressing              TREATMENT:  Manual therapy, therapeutic exercise, home exercise instruction, and modalities as needed to include: moist heat, electrical stimulation, and ultrasound      Progress toward previous goals: Partially Met; plateaued in goals       Recommendations: Continue with recommendations follow up w/ ortho  Timeframe: 2 a week, for 1 months   Prognosis to achieve goals: poor    PT Signature: Daniele Benedict PT, DPT    Electronically signed 2024    [place] License: PT - [id number]       Timed:  Manual Therapy:    10     mins  70222;  Therapeutic Exercise:    15     mins  24905;     Neuromuscular Adrian:    0    mins  73331;    Therapeutic Activity:     0     mins  63485;     Gait Trainin     mins  21732;     Aquatics                         0      mins  06285    Un-timed:  Mechanical Traction      0     mins  76836  Dry Needling     0     mins self-pay  Electrical Stimulation:    0     mins  57743 ( );    Timed Treatment:   25   mins   Total Treatment:     25   mins

## 2024-09-17 ENCOUNTER — TREATMENT (OUTPATIENT)
Dept: PHYSICAL THERAPY | Facility: CLINIC | Age: 64
End: 2024-09-17
Payer: OTHER MISCELLANEOUS

## 2024-09-17 DIAGNOSIS — Z96.612 S/P REVERSE TOTAL SHOULDER ARTHROPLASTY, LEFT: ICD-10-CM

## 2024-09-17 DIAGNOSIS — M62.81 MUSCLE WEAKNESS OF LEFT UPPER EXTREMITY: ICD-10-CM

## 2024-09-17 DIAGNOSIS — M25.512 LEFT SHOULDER PAIN, UNSPECIFIED CHRONICITY: Primary | ICD-10-CM

## 2024-09-17 DIAGNOSIS — M25.612 DECREASED ROM OF LEFT SHOULDER: ICD-10-CM

## 2024-09-19 ENCOUNTER — TREATMENT (OUTPATIENT)
Dept: PHYSICAL THERAPY | Facility: CLINIC | Age: 64
End: 2024-09-19
Payer: OTHER MISCELLANEOUS

## 2024-09-19 DIAGNOSIS — M62.81 MUSCLE WEAKNESS OF LEFT UPPER EXTREMITY: ICD-10-CM

## 2024-09-19 DIAGNOSIS — M25.512 LEFT SHOULDER PAIN, UNSPECIFIED CHRONICITY: Primary | ICD-10-CM

## 2024-09-19 DIAGNOSIS — M25.612 DECREASED ROM OF LEFT SHOULDER: ICD-10-CM

## 2024-09-19 DIAGNOSIS — Z96.612 S/P REVERSE TOTAL SHOULDER ARTHROPLASTY, LEFT: ICD-10-CM

## 2024-09-24 ENCOUNTER — TREATMENT (OUTPATIENT)
Dept: PHYSICAL THERAPY | Facility: CLINIC | Age: 64
End: 2024-09-24
Payer: OTHER MISCELLANEOUS

## 2024-09-24 ENCOUNTER — OFFICE VISIT (OUTPATIENT)
Dept: ORTHOPEDIC SURGERY | Facility: CLINIC | Age: 64
End: 2024-09-24
Payer: OTHER MISCELLANEOUS

## 2024-09-24 VITALS
OXYGEN SATURATION: 95 % | WEIGHT: 160 LBS | BODY MASS INDEX: 28.35 KG/M2 | DIASTOLIC BLOOD PRESSURE: 92 MMHG | HEIGHT: 63 IN | SYSTOLIC BLOOD PRESSURE: 160 MMHG | HEART RATE: 88 BPM

## 2024-09-24 DIAGNOSIS — Z47.1 AFTERCARE FOLLOWING LEFT SHOULDER JOINT REPLACEMENT SURGERY: ICD-10-CM

## 2024-09-24 DIAGNOSIS — Z96.612 AFTERCARE FOLLOWING LEFT SHOULDER JOINT REPLACEMENT SURGERY: ICD-10-CM

## 2024-09-24 DIAGNOSIS — M25.612 DECREASED ROM OF LEFT SHOULDER: ICD-10-CM

## 2024-09-24 DIAGNOSIS — M25.512 LEFT SHOULDER PAIN, UNSPECIFIED CHRONICITY: Primary | ICD-10-CM

## 2024-09-24 DIAGNOSIS — M62.81 MUSCLE WEAKNESS OF LEFT UPPER EXTREMITY: ICD-10-CM

## 2024-09-24 DIAGNOSIS — M25.512 LEFT SHOULDER PAIN, UNSPECIFIED CHRONICITY: ICD-10-CM

## 2024-09-24 DIAGNOSIS — Z96.612 S/P REVERSE TOTAL SHOULDER ARTHROPLASTY, LEFT: ICD-10-CM

## 2024-09-24 DIAGNOSIS — Z96.612 S/P REVERSE TOTAL SHOULDER ARTHROPLASTY, LEFT: Primary | ICD-10-CM

## 2024-09-24 PROCEDURE — 99213 OFFICE O/P EST LOW 20 MIN: CPT

## 2024-09-24 PROCEDURE — 97530 THERAPEUTIC ACTIVITIES: CPT

## 2024-09-24 PROCEDURE — 97112 NEUROMUSCULAR REEDUCATION: CPT

## 2024-09-24 PROCEDURE — 97140 MANUAL THERAPY 1/> REGIONS: CPT

## 2024-09-24 PROCEDURE — 97110 THERAPEUTIC EXERCISES: CPT

## 2024-09-24 RX ORDER — ROSUVASTATIN CALCIUM 40 MG/1
1 TABLET, COATED ORAL NIGHTLY
COMMUNITY

## 2024-09-24 RX ORDER — LISINOPRIL 10 MG/1
1 TABLET ORAL DAILY
COMMUNITY
Start: 2024-09-03

## 2024-09-26 ENCOUNTER — TREATMENT (OUTPATIENT)
Dept: PHYSICAL THERAPY | Facility: CLINIC | Age: 64
End: 2024-09-26
Payer: OTHER MISCELLANEOUS

## 2024-09-26 DIAGNOSIS — M62.81 MUSCLE WEAKNESS OF LEFT UPPER EXTREMITY: ICD-10-CM

## 2024-09-26 DIAGNOSIS — M25.612 DECREASED ROM OF LEFT SHOULDER: ICD-10-CM

## 2024-09-26 DIAGNOSIS — Z96.612 S/P REVERSE TOTAL SHOULDER ARTHROPLASTY, LEFT: Primary | ICD-10-CM

## 2024-09-26 DIAGNOSIS — M25.512 LEFT SHOULDER PAIN, UNSPECIFIED CHRONICITY: ICD-10-CM

## 2024-10-03 ENCOUNTER — TREATMENT (OUTPATIENT)
Dept: PHYSICAL THERAPY | Facility: CLINIC | Age: 64
End: 2024-10-03
Payer: OTHER MISCELLANEOUS

## 2024-10-03 DIAGNOSIS — Z96.612 S/P REVERSE TOTAL SHOULDER ARTHROPLASTY, LEFT: Primary | ICD-10-CM

## 2024-10-03 DIAGNOSIS — M62.81 MUSCLE WEAKNESS OF LEFT UPPER EXTREMITY: ICD-10-CM

## 2024-10-03 DIAGNOSIS — M25.512 LEFT SHOULDER PAIN, UNSPECIFIED CHRONICITY: ICD-10-CM

## 2024-10-03 DIAGNOSIS — M25.612 DECREASED ROM OF LEFT SHOULDER: ICD-10-CM

## 2024-10-03 NOTE — PROGRESS NOTES
Physical Therapy Daily Treatment Note  [facility]: [address] [place] [address]      Patient: Devyn Steinberg   : 1960  Diagnosis/ICD-10 Code:  S/P reverse total shoulder arthroplasty, left [Z96.612]  Referring practitioner: Erickson Hernandez MD  Date of Initial Visit: Type: THERAPY  Noted: 2024  Today's Date: 10/3/2024  Patient seen for 64 sessions           Subjective   The patient reported no change in their L shoulder at this time.     Objective   See Exercise, Manual, and Modality Logs for complete treatment.     Assessment/Plan  Continue to progress pt to their tolerance. Pt follows up w/ ortho on 2024. Progress per POC.        Timed:  Manual Therapy:    0     mins  86009;  Therapeutic Exercise:    23     mins  84510;     Neuromuscular Adrian:   0    mins  76803;    Therapeutic Activity:     11     mins  52405;     Gait Trainin     mins  03233;     Aquatics                         0      mins  49235    Un-timed:  Mechanical Traction      0     mins  08101  Electrical Stimulation:    0     mins  14670 ( );      Timed Treatment:   34   mins   Total Treatment:     34   mins    Daniele Benedict PT, DPT    Electronically signed 10/3/2024    [place] License: PT - [id number]

## 2024-10-08 ENCOUNTER — TREATMENT (OUTPATIENT)
Dept: PHYSICAL THERAPY | Facility: CLINIC | Age: 64
End: 2024-10-08
Payer: OTHER MISCELLANEOUS

## 2024-10-08 DIAGNOSIS — M62.81 MUSCLE WEAKNESS OF LEFT UPPER EXTREMITY: ICD-10-CM

## 2024-10-08 DIAGNOSIS — Z96.612 S/P REVERSE TOTAL SHOULDER ARTHROPLASTY, LEFT: Primary | ICD-10-CM

## 2024-10-08 DIAGNOSIS — M25.612 DECREASED ROM OF LEFT SHOULDER: ICD-10-CM

## 2024-10-08 DIAGNOSIS — M25.512 LEFT SHOULDER PAIN, UNSPECIFIED CHRONICITY: ICD-10-CM

## 2024-10-08 PROCEDURE — 97140 MANUAL THERAPY 1/> REGIONS: CPT

## 2024-10-08 PROCEDURE — 97110 THERAPEUTIC EXERCISES: CPT

## 2024-10-08 PROCEDURE — 97164 PT RE-EVAL EST PLAN CARE: CPT

## 2024-10-08 NOTE — PROGRESS NOTES
"Progress Note / Re-Certification  [facility]: [address] [place] [address]      Patient: Devyn Steinberg   : 1960  Diagnosis/ICD-10 Code:  S/P reverse total shoulder arthroplasty, left [Z96.612]  Referring practitioner: Erickson Hernandez MD  Date of Initial Visit: Type: THERAPY  Noted: 2024  Today's Date: 10/8/2024  Patient seen for 65 sessions      Subjective:   Subjective Questionnaire: QuickDASH: 36  Clinical Progress: unchanged  Home Program Compliance: Yes  Treatment has included: soft tissue mobilization, strengthening, stretching, therapeutic activities, manual therapy, joint mobilization, home exercise program/patient education, functional ROM exercises, flexibility, body mechanics training, postural training, and neuromuscular re-education    Subjective   Pt reports their L shoulder pain is about a 3/10 today. Pt reports their shoulder seems to stay about this level at rest. \"As long as I keep my arm close to my chest, I'm alright\".       Objective   Strength/Myotome Testing      Left Shoulder      Planes of Motion   Flexion: 4-  Abduction: 4   External rotation at 0°: 4+   Internal rotation at 0°: 4+          Active Range of Motion   L shoulder  Flexion: 104  ABD: 74  IR@ 0 deg: to stomach; WFL  BTB: to hip  ER @ 0 de   BTH: Head      Passive Range of Motion   Left Shoulder   Flexion: 133 degrees with pain  Abduction: 100 degrees with pain  ER @ 45: 32 degrees with pain  IR @ 45: 52 degrees with pain      See Exercise, Manual, and Modality Logs for complete treatment.     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 Pt " has continued to maintain their ROM at this time. Pt continues to demonstrate a plateau w/n therapy, even w/ progressing their eccentric exs. Pt continues to follow up w/ ortho, and has this follow up on 11/19/2024. Pt also continues to follow up w/ pain management to help w/ their pain. Pt's goals have been addressed to reflect their progress. Will continue to progress pt to their tolerance w/ skilled therapy, as they are following up w/ therapy.     Goals  Plan Goals: SHOULDER  PROBLEMS:      1. The patient has limited ROM of the L shoulder.                   LTG 1: 12 weeks:  The patient will demonstrate 140 degrees of L shoulder flexion, 140 degrees of shoulder abduction, 65 degrees of shoulder external rotation, and 65 degrees of shoulder internal rotation to allow the patient to reach into upper kitchen cabinets and manipulate clothing behind the back with greater ease.                          STATUS:  progressing              STG 1a: 6 weeks:  The patient will demonstrate 100 degrees of L shoulder flexion, 100 degrees of shoulder abduction, 45 degrees of shoulder external rotation, and 45 degrees of shoulder internal rotation.                          STATUS:  partially met              TREATMENT: Manual therapy, therapeutic exercise, home exercise instruction, and modalities as needed to include: electrical stimulation, ultrasound, moist heat, and ice.     2. The patient has limited strength of the L shoulder.              LTG 2: 12 weeks:  The patient will demonstrate 4+/5 strength for L shoulder flexion, abduction, external rotation, and internal rotation in order to demonstrate improved shoulder stability.                          STATUS:  progressing              STG 2a: 8 weeks:  The patient will demonstrate 3+/5 strength for L shoulder flexion, abduction, external rotation, and internal rotation.                          STATUS:  met              STG2b:  2 weeks:  The patient will be independent with  home exercises.                           STATUS:  met               TREATMENT: Manual therapy, therapeutic exercise, home exercise instruction, and modalities as needed to include: electrical stimulation, ultrasound, moist heat, and ice.                3. The patient complains of pain to the L shoulder.              LTG 3: 12 weeks:  The patient will report a pain rating of 1/10 or better in order to improve sleep quality and tolerance to performance of activities of daily living.                          STATUS:  progressing              STG 3a: 8 weeks:  The patient will report a pain rating of 3/10 or better.                           STATUS:  met              TREATMENT: Manual therapy, therapeutic exercise, home exercise instruction, and modalities as needed to include: electrical stimulation, ultrasound, moist heat, and ice.     4. Carrying, Moving, and Handling Objects Functional Limitation                               LTG 4: 12 weeks:  The patient will demonstrate 9 % limitation by achieving a score of 15 on the QuickDASH.                          STATUS: progressing              STG 4a: 6 weeks:  The patient will demonstrate 43 % limitation by achieving a score of 30 on the QuickDASH.                            STATUS:  progressing              TREATMENT:  Manual therapy, therapeutic exercise, home exercise instruction, and modalities as needed to include: moist heat, electrical stimulation, and ultrasound    Progress toward previous goals: Partially Met      Recommendations: Continue with recommendations continue following up w/ ortho   Timeframe: 2 a week, for 3 months   Prognosis to achieve goals: poor    PT Signature: Daniele Benedict PT, DPT    Electronically signed 10/8/2024    [place] License: PT - [id number]     Based upon review of the patient's progress and continued therapy plan, it is my medical opinion that Devyn Steinberg should continue physical therapy treatment at [facility]  PHYSICAL THERAPY  [address] [place] [address]  [phone].    Signature: __________________________________  Erickson Hernandez MD    90 Day Recertification  Certification Period: 10/8/2024 thru 2025  I certify that the therapy services are furnished while this patient is under my care.  The services outlined above are required by this patient, and will be reviewed every 90 days.     PHYSICIAN: Erickson Hernandez MD  NPI: [id number]      DATE: 10/08/24     Please sign and return via fax to [phone]. Thank you, [facility].    Timed:  Manual Therapy:    10     mins  38727;  Therapeutic Exercise:    10     mins  12320;     Neuromuscular Adrian:    0    mins  93715;    Therapeutic Activity:     0     mins  30990;     Gait Trainin     mins  42959;     Aquatics                         0      mins  80350    Un-timed:  Mechanical Traction      0     mins  62903  Dry Needling     0     mins self-pay  Electrical Stimulation:    0     mins  46206 ( );  Re-Eval:         10     mins  43949    Timed Treatment:   20   mins   Total Treatment:     30   mins

## 2024-10-10 ENCOUNTER — TREATMENT (OUTPATIENT)
Dept: PHYSICAL THERAPY | Facility: CLINIC | Age: 64
End: 2024-10-10
Payer: OTHER MISCELLANEOUS

## 2024-10-10 DIAGNOSIS — M62.81 MUSCLE WEAKNESS OF LEFT UPPER EXTREMITY: ICD-10-CM

## 2024-10-10 DIAGNOSIS — Z96.612 S/P REVERSE TOTAL SHOULDER ARTHROPLASTY, LEFT: Primary | ICD-10-CM

## 2024-10-10 DIAGNOSIS — M25.512 LEFT SHOULDER PAIN, UNSPECIFIED CHRONICITY: ICD-10-CM

## 2024-10-10 DIAGNOSIS — M25.612 DECREASED ROM OF LEFT SHOULDER: ICD-10-CM

## 2024-10-10 NOTE — PROGRESS NOTES
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         Objective       See Exercise, Manual, and Modality Logs for complete treatment.     Assessment/Plan  Devyn presents to therapy this session with stiffness in his left shoulder. Pt continues to be limited with shoulder range of motion.  Pt tolerated exercises and manual well, with no complaints of increased pain or discomfort. Patient will continue to benefit from skilled physical therapy services to further address pain management,  their deficits in strength, and range of motion in order to improve upon their functional mobility for any ADL concerns.      Progress per Plan of Care         Timed:  Manual Therapy:    12     mins  26919;  Therapeutic Exercise:    8     mins  22818;     Neuromuscular Adrian:        mins  42098;    Therapeutic Activity:     8     mins  40501;     Gait Training:           mins  27645;        Untimed:  Electrical Stimulation:         mins  28320 ( );  Mechanical Traction:         mins  57264;       Timed Treatment:   28   mins   Total Treatment:     28   mins      Electronically signed:     Betzaida Lewis PTA  Physical Therapist Assistant  [facility] License #: [id number]

## 2024-10-15 ENCOUNTER — TREATMENT (OUTPATIENT)
Dept: PHYSICAL THERAPY | Facility: CLINIC | Age: 64
End: 2024-10-15
Payer: OTHER MISCELLANEOUS

## 2024-10-15 DIAGNOSIS — M25.512 LEFT SHOULDER PAIN, UNSPECIFIED CHRONICITY: ICD-10-CM

## 2024-10-15 DIAGNOSIS — M25.612 DECREASED ROM OF LEFT SHOULDER: ICD-10-CM

## 2024-10-15 DIAGNOSIS — M62.81 MUSCLE WEAKNESS OF LEFT UPPER EXTREMITY: ICD-10-CM

## 2024-10-15 DIAGNOSIS — Z96.612 S/P REVERSE TOTAL SHOULDER ARTHROPLASTY, LEFT: Primary | ICD-10-CM

## 2024-10-15 PROCEDURE — 97112 NEUROMUSCULAR REEDUCATION: CPT | Performed by: PHYSICAL THERAPIST

## 2024-10-15 PROCEDURE — 97110 THERAPEUTIC EXERCISES: CPT | Performed by: PHYSICAL THERAPIST

## 2024-10-15 PROCEDURE — 97530 THERAPEUTIC ACTIVITIES: CPT | Performed by: PHYSICAL THERAPIST

## 2024-10-15 NOTE — PROGRESS NOTES
Outpatient Physical Therapy  [address] [facility] [place] [address]                            Physical Therapy Daily Treatment Note    Patient: Devyn Steinberg   : 1960  Diagnosis/ICD-10 Code:  S/P reverse total shoulder arthroplasty, left [Z96.612]  Referring practitioner: Erickson Hernandez MD  Date of Initial Visit: Type: THERAPY  Noted: 2024  Today's Date: 10/15/2024  Patient seen for 67 sessions           Subjective   Devyn Steinberg reports: that he still has limited range of motion in the shoulder, states that the shoulder will wake him up if he rolls over on to his left side at night. He goes to see pain management next week, they want to do an injection in his neck.     Objective   Increased difficulty with strength training.    See Exercise, Manual, and Modality Logs for complete treatment.     Assessment/Plan  Devyn still experiencing increased L shoulder pain, especially if he rolls over on the L side at night. Pt tolerated exercises well, increased difficulty with strengthen training. Pt would benefit from skilled PT to address Range of Motion  and Strength deficits, pain management and any concerns with ADLs.       Progress per Plan of Care         Timed:  Manual Therapy:         mins  94942;  Therapeutic Exercise:    10     mins  53686;     Neuromuscular Adrian:    8    mins  78808;    Therapeutic Activity:     12     mins  31758;     Gait Training:           mins  10702;      Untimed:  Electrical Stimulation:         mins  15104 ( );  Mechanical Traction:         mins  09584;     Timed Treatment:   30   mins   Total Treatment:     30   mins      Electronically signed:     Faiza Garcia PTA  Physical Therapist Assistant  hospitals License #: [id number]

## 2024-10-24 ENCOUNTER — TREATMENT (OUTPATIENT)
Dept: PHYSICAL THERAPY | Facility: CLINIC | Age: 64
End: 2024-10-24
Payer: COMMERCIAL

## 2024-10-24 DIAGNOSIS — M25.612 DECREASED ROM OF LEFT SHOULDER: ICD-10-CM

## 2024-10-24 DIAGNOSIS — M62.81 MUSCLE WEAKNESS OF LEFT UPPER EXTREMITY: ICD-10-CM

## 2024-10-24 DIAGNOSIS — Z96.612 S/P REVERSE TOTAL SHOULDER ARTHROPLASTY, LEFT: Primary | ICD-10-CM

## 2024-10-24 DIAGNOSIS — M25.512 LEFT SHOULDER PAIN, UNSPECIFIED CHRONICITY: ICD-10-CM

## 2024-10-24 NOTE — PROGRESS NOTES
"Physical Therapy Daily Treatment Note/Discharge  [place] [facility] [address] [place] [address]    Patient: Devyn Steinberg   : 1960  Referring practitioner: Erickson Hernandez MD  Date of Initial Visit: Type: THERAPY  Noted: 2024  Today's Date: 10/24/2024  Patient seen for 68 sessions           Subjective  Devyn Steinberg reports: he knows the conversation he and MARGARET Benedict PT had as to his recovery reaching a plateau. \"I am anxious for the shots to aid the pain.\" \"I think I am as good as I can get.\"       Objective   Quick Dash score 35= 40-59%.    See Exercise, Manual, and Modality Logs for complete treatment.       Assessment/Plan  1. The patient has limited ROM of the L shoulder.                   LTG 1: 12 weeks:  The patient will demonstrate 140 degrees of L shoulder flexion, 140 degrees of shoulder abduction, 65 degrees of shoulder external rotation, and 65 degrees of shoulder internal rotation to allow the patient to reach into upper kitchen cabinets and manipulate clothing behind the back with greater ease.                          STATUS:  not met              STG 1a: 6 weeks:  The patient will demonstrate 100 degrees of L shoulder flexion, 100 degrees of shoulder abduction, 45 degrees of shoulder external rotation, and 45 degrees of shoulder internal rotation.                          STATUS:  not met              TREATMENT: Manual therapy, therapeutic exercise, home exercise instruction, and modalities as needed to include: electrical stimulation, ultrasound, moist heat, and ice.     2. The patient has limited strength of the L shoulder.              LTG 2: 12 weeks:  The patient will demonstrate 4+/5 strength for L shoulder flexion, abduction, external rotation, and internal rotation in order to demonstrate improved shoulder stability.                          STATUS:  not met              STG 2a: 8 weeks:  The patient will demonstrate 3+/5 strength for L shoulder flexion, abduction, " external rotation, and internal rotation.                          STATUS:  met              STG2b:  2 weeks:  The patient will be independent with home exercises.                           STATUS:  met               TREATMENT: Manual therapy, therapeutic exercise, home exercise instruction, and modalities as needed to include: electrical stimulation, ultrasound, moist heat, and ice.                3. The patient complains of pain to the L shoulder.              LTG 3: 12 weeks:  The patient will report a pain rating of 1/10 or better in order to improve sleep quality and tolerance to performance of activities of daily living.                          STATUS:  progressing              STG 3a: 8 weeks:  The patient will report a pain rating of 3/10 or better.                           STATUS:  met              TREATMENT: Manual therapy, therapeutic exercise, home exercise instruction, and modalities as needed to include: electrical stimulation, ultrasound, moist heat, and ice.     4. Carrying, Moving, and Handling Objects Functional Limitation                               LTG 4: 12 weeks:  The patient will demonstrate 9 % limitation by achieving a score of 15 on the QuickDASH.                          STATUS: not met              STG 4a: 6 weeks:  The patient will demonstrate 43 % limitation by achieving a score of 30 on the QuickDASH.                            STATUS:  not met              TREATMENT:  Manual therapy, therapeutic exercise, home exercise instruction, and modalities as needed to include: moist heat, electrical stimulation, and ultrasound     Visit Diagnoses:    ICD-10-CM ICD-9-CM   1. S/P reverse total shoulder arthroplasty, left  Z96.612 V43.61   2. Left shoulder pain, unspecified chronicity  M25.512 719.41   3. Muscle weakness of left upper extremity  M62.81 728.87   4. Decreased ROM of left shoulder  M25.612 719.51     Discharge effective after today's session.         Timed:  Manual Therapy:          mins  29712;  Therapeutic Exercise:         mins  11694;     Neuromuscular Adrian:        mins  62098;    Therapeutic Activity:     24     mins  68740;     Gait Training:           mins  94141;     Ultrasound:          mins  67101;    Electrical Stimulation:         mins  00623 ( );  Aquatics  __   mins   74245    Untimed:  Electrical Stimulation:         mins  10845 ( );  Mechanical Traction:         mins  23752;     Timed Treatment:   24   mins   Total Treatment:     24   mins    Electronically Signed:  Justina Austin PTA  Physical Therapist Assistant    [facility] license [id number]

## 2024-11-21 ENCOUNTER — OFFICE VISIT (OUTPATIENT)
Dept: ORTHOPEDIC SURGERY | Facility: CLINIC | Age: 64
End: 2024-11-21
Payer: OTHER MISCELLANEOUS

## 2024-11-21 VITALS
OXYGEN SATURATION: 97 % | BODY MASS INDEX: 28.16 KG/M2 | HEART RATE: 72 BPM | HEIGHT: 63 IN | SYSTOLIC BLOOD PRESSURE: 144 MMHG | WEIGHT: 158.95 LBS | DIASTOLIC BLOOD PRESSURE: 84 MMHG

## 2024-11-21 DIAGNOSIS — Z96.612 S/P REVERSE TOTAL SHOULDER ARTHROPLASTY, LEFT: Primary | ICD-10-CM

## 2024-11-21 DIAGNOSIS — M25.512 LEFT SHOULDER PAIN, UNSPECIFIED CHRONICITY: ICD-10-CM

## 2024-11-21 DIAGNOSIS — Z47.1 AFTERCARE FOLLOWING LEFT SHOULDER JOINT REPLACEMENT SURGERY: ICD-10-CM

## 2024-11-21 DIAGNOSIS — Z96.612 AFTERCARE FOLLOWING LEFT SHOULDER JOINT REPLACEMENT SURGERY: ICD-10-CM

## 2024-11-21 RX ORDER — CHLORPHENIR/PHENYLEPH/ASPIRIN 2-7.8-325
TABLET, EFFERVESCENT ORAL
COMMUNITY
Start: 2024-10-21

## 2024-11-21 RX ORDER — ASPIRIN 81 MG/1
TABLET, CHEWABLE ORAL
COMMUNITY

## 2024-11-21 RX ORDER — INSULIN DEGLUDEC 100 U/ML
INJECTION, SOLUTION SUBCUTANEOUS
COMMUNITY
Start: 2024-10-21

## 2024-11-21 RX ORDER — INSULIN HUMAN 4-8-12(60)
KIT INHALATION
COMMUNITY
Start: 2024-11-18

## 2024-11-21 RX ORDER — PEN NEEDLE, DIABETIC 32 GX5/16"
NEEDLE, DISPOSABLE MISCELLANEOUS
COMMUNITY
Start: 2024-10-21

## 2024-11-21 RX ORDER — EMPAGLIFLOZIN 25 MG/1
25 TABLET, FILM COATED ORAL EVERY MORNING
COMMUNITY

## 2024-11-21 NOTE — PROGRESS NOTES
"Chief Complaint  Pain and Follow-up of the Left Shoulder    Subjective      Devyn Steinberg presents to [facility] for follow up of his left shoulder.  Patient is 10 months status post left total shoulder reverse arthroplasty performed Dr. Hernandez on 1/15/2024.  Patient has been seen at [facility] for 68 sessions for this since the beginning.  Most recent visit office notes indicate that he has reached a plateau in regards to his range of motion and states \"I think I am as good as I can get\".  During his last office visit patient was seeing pain management and their treatment plan includes nerve ablation to help with the pain on the shoulder.    Today patient states, he is doing better.  He is on pain medication intermittently from pain management.  He states he did receive 1 nerve ablation shot which did not take.  He states that overall he is doing okay but is still experiencing weakness and decreased range of motion in the shoulder.  He states that he is going to discontinue physical therapy because his Worker's Comp. has not paid for any further visits.    Allergies   Allergen Reactions    Dulaglutide Other (See Comments)     Pancreatitis    Tyloxapol Swelling       Objective     Vital Signs:   Vitals:    11/21/24 0918   BP: 144/84   Pulse: 72   SpO2: 97%   Weight: 72.1 kg (158 lb 15.2 oz)   Height: 160 cm (63\")     Body mass index is 28.16 kg/m².    I reviewed the patient's chief complaint, history of present illness, review of systems, past medical history, surgical history, family history, social history, medications, and allergy list.     Ortho Exam  Shoulder   General: Alert. No acute distress.  Left upper Extremity: 140 degrees active elevation. 150  passive forward shoulder elevation.  External rotation to 45 degrees. Internal rotation to side pocket.   Demonstrates intact active elbow ROM. Demonstrates intact active wrist ROM. Sensation intact. Palpable radial " pulse. Neurovascularly intact.            Imaging Results (Most Recent)       None                Assessment and Plan   Diagnoses and all orders for this visit:    1. S/P reverse total shoulder arthroplasty, left (Primary)    2. Aftercare following left shoulder joint replacement surgery    3. Left shoulder pain, unspecified chronicity         Devyn Steinberg presents to [facility] for his left shoulder.  Patient is 10 months status post left total shoulder reverse arthroplasty.  This is a worker's Comp. claim.  Reviewed most recent physical therapy notes.  Patient will continue to be seen and continue treatment plan with pain management.  Recommended patient be reevaluated for ROLANDO/impairment rating by Worker's Comp.  Unable to place patient at [facility] until at least 1 year post-operation.  Work note written.    Patient will follow-up in approximately 8 to 12 weeks for his annual 1 year appointment.  Will obtain x-rays of left shoulder at that time.      Tobacco Use: Low Risk  (11/21/2024)    Patient History     Smoking Tobacco Use: Never     Smokeless Tobacco Use: Never     Passive Exposure: Past     Patient reports that they are a nonsmoker; cessation education not applicable.            Follow Up   No follow-ups on file.  There are no Patient Instructions on file for this visit.    Patient was given instructions and counseling regarding his condition or for health maintenance advice. Please see specific information pulled into the AVS if appropriate.       Dictated Utilizing Dragon Dictation. Please note that portions of this note were completed with a voice recognition program. Part of this note may be an electronic transcription/translation of spoken language to printed text using the Dragon Dictation System.

## 2024-11-22 ENCOUNTER — TELEPHONE (OUTPATIENT)
Dept: ORTHOPEDIC SURGERY | Facility: CLINIC | Age: 64
End: 2024-11-22

## 2024-11-22 NOTE — TELEPHONE ENCOUNTER
Caller: JR    Relationship: KIT    Best call back number: [phone]*    What form or medical record are you requesting: OFFICE NOTES 11/21/24 AND WORK STATUS    Who is requesting this form or medical record from you: WORK COMP    How would you like to receive the form or medical records (pick-up, mail, fax): FAX  If fax, what is the fax number: [phone]*    Timeframe paperwork needed: ASAP

## 2025-01-23 ENCOUNTER — OFFICE VISIT (OUTPATIENT)
Dept: ORTHOPEDIC SURGERY | Facility: CLINIC | Age: 65
End: 2025-01-23
Payer: OTHER MISCELLANEOUS

## 2025-01-23 VITALS
SYSTOLIC BLOOD PRESSURE: 147 MMHG | HEART RATE: 80 BPM | OXYGEN SATURATION: 96 % | DIASTOLIC BLOOD PRESSURE: 88 MMHG | WEIGHT: 158 LBS | BODY MASS INDEX: 28 KG/M2 | HEIGHT: 63 IN

## 2025-01-23 DIAGNOSIS — Z47.1 AFTERCARE FOLLOWING LEFT SHOULDER JOINT REPLACEMENT SURGERY: ICD-10-CM

## 2025-01-23 DIAGNOSIS — Z96.612 S/P REVERSE TOTAL SHOULDER ARTHROPLASTY, LEFT: Primary | ICD-10-CM

## 2025-01-23 DIAGNOSIS — Z96.612 AFTERCARE FOLLOWING LEFT SHOULDER JOINT REPLACEMENT SURGERY: ICD-10-CM

## 2025-01-23 DIAGNOSIS — M25.512 LEFT SHOULDER PAIN, UNSPECIFIED CHRONICITY: ICD-10-CM

## 2025-01-23 NOTE — PROGRESS NOTES
"Chief Complaint  Follow-up of the Left Shoulder    Subjective      Devyn Steinberg presents to [facility] for follow up of his left shoulder.  They are 1 year year(s) postop left total shoulder reverse arthroplasty performed by Dr. Hernandez on 1/15/2024.  This is a workers comp case. He has been to an ROLANDO per workers comp.  He also has an independent evaluation scheduled per his attorneys.  He states his shoulder is \"probably as good as this can get\".  He has been doing his home exercises stretches but he still has some pain.  He takes diclofenac twice daily and Tylenol intermittently as needed.    Allergies   Allergen Reactions    Dulaglutide Other (See Comments)     Pancreatitis    Tyloxapol Swelling       Objective     Vital Signs:   Vitals:    01/23/25 0803   BP: 147/88   Pulse: 80   SpO2: 96%   Weight: 71.7 kg (158 lb)   Height: 160 cm (62.99\")     Body mass index is 28 kg/m².    I reviewed the patient's chief complaint, history of present illness, review of systems, past medical history, surgical history, family history, social history, medications, and allergy list.     Ortho Exam    General: Alert. No acute distress.  Left upper Extremity: Incision well-healed.  not tender to palpation. No skin discoloration, atrophy, or swelling. 110 degrees active elevation.  160 degrees passive forward shoulder elevation.  External rotation to 45 degrees. Internal rotation to side pocket. Demonstrates intact active elbow ROM. Demonstrates intact active wrist ROM. Sensation intact. Palpable radial pulse. Neurovascularly intact.            Imaging Results (Most Recent)       Procedure Component Value Units Date/Time    XR Scapula Left [[id number]] Resulted: 01/23/25 0819     Updated: 01/23/25 0819    Narrative:      X-Ray Report:  Study: X-rays ordered, taken in the office, and reviewed today  Site: Left shoulder xray  Indication: Left total shoulder reverse arthroplasty follow-up  View: AP, lateral " left shoulder view(s)  Findings: Stable and intact left total shoulder reverse arthroplasty.  No   evidence of hardware complications or loosening.  No periprosthetic   fractures visualized.  Prior studies available for comparison: yes                 Assessment and Plan   Diagnoses and all orders for this visit:    1. S/P reverse total shoulder arthroplasty, left (Primary)  -     XR Scapula Left    2. Aftercare following left shoulder joint replacement surgery  -     XR Scapula Left    3. Left shoulder pain, unspecified chronicity  -     XR Scapula Left         Devyn Steinberg is 1 year(s) post-op left total shoulder reverse arthroplasty performed by Dr Hernandez on 1/15/2024. X-rays reviewed, showing hardware intact and no complications.  Patient is doing well. Continue home exercise program to progress strength and ROM.     Discussed the importance of lifelong antibiotic prophylaxis with dental procedures following total joint replacement. In the event of a dental procedure, patient is welcome to contact our office to obtain antibiotics prior to the procedure if their dentist does not provide the prescription.     We also discussed that if a fall/injury were to occur to the affected extremity was advised for patient to obtain x-rays for clarification that no hardware has been compromised or if showing signs of loosening.    We did discuss the possibility of return to physical therapy however patient denies and states that he can do all his exercises at home.    At this time, can consider patient at [facility] for total joint -however he still will have restrictions going forward because these will be suggested permanent restrictions secondary to his condition postoperatively.  Patient will need ROLANDO/impairment rating arranged by Worker's Comp. as we do not provide impairment ratings for permanent disability.    Work note written.    Patient verbalized understanding.     Follow-up in 1 year. We will repeat xray's of the  prosthetic joint at that time.   Call sooner or return to care, if needed with any changes or concerns.        Tobacco Use: Low Risk  (1/23/2025)    Patient History     Smoking Tobacco Use: Never     Smokeless Tobacco Use: Never     Passive Exposure: Past     Patient reports that they are a nonsmoker; cessation education not applicable.            Follow Up   Return in about 6 months (around 7/23/2025).  There are no Patient Instructions on file for this visit.    Patient was given instructions and counseling regarding his condition or for health maintenance advice. Please see specific information pulled into the AVS if appropriate.       Dictated Utilizing Dragon Dictation. Please note that portions of this note were completed with a voice recognition program. Part of this note may be an electronic transcription/translation of spoken language to printed text using the Dragon Dictation System.

## 2025-03-14 ENCOUNTER — OFFICE VISIT (OUTPATIENT)
Dept: ORTHOPEDIC SURGERY | Facility: CLINIC | Age: 65
End: 2025-03-14
Payer: MEDICARE

## 2025-03-14 VITALS
OXYGEN SATURATION: 96 % | WEIGHT: 170.2 LBS | HEIGHT: 63 IN | HEART RATE: 81 BPM | DIASTOLIC BLOOD PRESSURE: 79 MMHG | SYSTOLIC BLOOD PRESSURE: 142 MMHG | BODY MASS INDEX: 30.16 KG/M2

## 2025-03-14 DIAGNOSIS — M17.11 OSTEOARTHRITIS OF RIGHT KNEE, UNSPECIFIED OSTEOARTHRITIS TYPE: ICD-10-CM

## 2025-03-14 DIAGNOSIS — M25.561 RIGHT KNEE PAIN, UNSPECIFIED CHRONICITY: Primary | ICD-10-CM

## 2025-03-14 RX ORDER — DICLOFENAC SODIUM 75 MG/1
75 TABLET, DELAYED RELEASE ORAL 2 TIMES DAILY
Qty: 60 TABLET | Refills: 1 | Status: SHIPPED | OUTPATIENT
Start: 2025-03-14

## 2025-03-14 NOTE — PROGRESS NOTES
"Chief Complaint  Initial Evaluation of the Right Knee     Subjective      Devyn Steinberg presents to [facility] for initial evaluation of the right knee. He had a pop with transition from sit to stand.  He noted that was about a month ago.  Prior to that he had no pain in the knee.  He went to  on 3/5/25 and gave him a brace.  He take anti inflammatory as needed. He had pain on the medial aspect of the knee and had pain with sleeping.  He notes the pain and swelling is much better then the initial injury.      Allergies   Allergen Reactions    Dulaglutide Other (See Comments)     Pancreatitis    Tyloxapol Swelling        Social History     Socioeconomic History    Marital status:    Tobacco Use    Smoking status: Never     Passive exposure: Past    Smokeless tobacco: Never   Vaping Use    Vaping status: Never Used   Substance and Sexual Activity    Alcohol use: Never    Drug use: Never    Sexual activity: Defer        I reviewed the patient's chief complaint, history of present illness, review of systems, past medical history, surgical history, family history, social history, medications, and allergy list.     Review of Systems     Constitutional: Denies fevers, chills, weight loss  Cardiovascular: Denies chest pain, shortness of breath  Skin: Denies rashes, acute skin changes  Neurologic: Denies headache, loss of consciousness        Vital Signs:   /79   Pulse 81   Ht 160 cm (63\")   Wt 77.2 kg (170 lb 3.2 oz)   SpO2 96%   BMI 30.15 kg/m²          Physical Exam  General: Alert. No acute distress    Ortho Exam        RIGHT KNEE Flexion 110 with pain  Extension 0. Stable to varus/valgus stress. Stable to anterior/posterior drawer. Neurovascularly intact. Pain with Poncho. Negative Lachman. Positive EHL, FHL, HS and TA. Sensation intact to light touch all 5 nerves of the foot. Ambulates with Antalgic gait. Patella is well tracking. Calf supple, non-tender. Positive " tenderness to the medial joint line. Negative tenderness to the lateral joint line. Positive Crepitus. Good strength to hamstrings, quadriceps, dorsiflexors, and plantar flexors.  Knee Extensor Mechanism intact Mild swelling.         Procedures      Imaging Results (Most Recent)       None             Result Review :         XR Knee 3 View Right  Result Date: 3/5/2025  Narrative: XR KNEE 3 VW RIGHT Date of Exam: 3/5/2025 2:01 PM EST Indication: right knee pain, worse on medial side; no fall or injury. Started a couple of weeks ago after getting up out of chair. Pain is worse with weight bearing and ambulation. Patient states will give out sometimes with ambulation Comparison: None available. Findings: No acute fracture or dislocation is noted. Mild degenerative changes noted in the medial compartment. Mild degenerative changes noted in the anterior compartment. There is a small joint effusion. No focal soft tissue swelling noted. Metallic density projects in the posterior soft tissues of the lower thigh     Impression: Impression: Degenerative changes and small joint effusion. Electronically Signed: Tee Fulton MD  3/5/2025 2:08 PM EST  Workstation ID: [id number]             Assessment and Plan     Diagnoses and all orders for this visit:    1. Right knee pain, unspecified chronicity (Primary)    2. Osteoarthritis of right knee, unspecified osteoarthritis type        Discussed the treatment plan with the patient. I reviewed the X-rays that were obtained 3/5/25 with the patient.     HEP exercises given.  Prescribed anti inflammatory.       Discussed injection.  He denied injection this date.      Call or return if worsening symptoms.    Follow Up     3 weeks will discuss injection vs MRI of the right knee.        Patient was given instructions and counseling regarding his condition or for health maintenance advice. Please see specific information pulled into the AVS if appropriate.     Scribed for Erickson Hernandez,  MD by Arelis Viramontes MA.  03/14/25   08:53 EDT    I have personally performed the services described in this document as scribed by the above individual and it is both accurate and complete. Erickson Hernandez MD 03/14/25

## 2025-04-04 ENCOUNTER — OFFICE VISIT (OUTPATIENT)
Dept: ORTHOPEDIC SURGERY | Facility: CLINIC | Age: 65
End: 2025-04-04
Payer: MEDICARE

## 2025-04-04 VITALS — HEART RATE: 89 BPM | SYSTOLIC BLOOD PRESSURE: 108 MMHG | OXYGEN SATURATION: 98 % | DIASTOLIC BLOOD PRESSURE: 70 MMHG

## 2025-04-04 DIAGNOSIS — M17.11 OSTEOARTHRITIS OF RIGHT KNEE, UNSPECIFIED OSTEOARTHRITIS TYPE: ICD-10-CM

## 2025-04-04 DIAGNOSIS — M25.361 KNEE INSTABILITY, RIGHT: ICD-10-CM

## 2025-04-04 DIAGNOSIS — M25.561 RIGHT KNEE PAIN, UNSPECIFIED CHRONICITY: Primary | ICD-10-CM

## 2025-04-04 PROCEDURE — 1160F RVW MEDS BY RX/DR IN RCRD: CPT

## 2025-04-04 PROCEDURE — 1159F MED LIST DOCD IN RCRD: CPT

## 2025-04-04 PROCEDURE — 99213 OFFICE O/P EST LOW 20 MIN: CPT

## 2025-04-04 NOTE — PROGRESS NOTES
Chief Complaint  Follow-up of the Right Knee    Subjective      Devyn Steinberg presents to [facility]     History of Present Illness  He is here today following up on his right knee patient had a pop when he went from sit to stand that happened on 3/5/2025.  He was seen by Dr. Hernandez on 3/14/2025 and given an a prescription for an oral anti-inflammatory.  He was given home exercises as well.    He experienced a sudden buckling of his knee and a popping sensation on the medial aspect of his right knee approximately 2 months ago, which has progressively worsened. Despite the discomfort, he continues to mobilize on the affected knee. By the end of the day, he experiences significant soreness and swelling. He reports instability in the knee, particularly when moving in certain directions. The pain is severe enough to disrupt his sleep. He has been managing the pain with Tylenol, taking additional doses as needed when the effects of the previous dose subside. He also takes diclofenac but occasionally forgets to take it during the night. He has been utilizing a brace for support, which he removes only during sleep. He is able to fully extend his knee, although with some residual soreness. He has no prior history of knee issues but was previously diagnosed with arthritis, which has not caused him any significant problems.    MEDICATIONS  Current: Tylenol, diclofenac      Allergies   Allergen Reactions    Dulaglutide Other (See Comments)     Pancreatitis    Tyloxapol Swelling       Objective     Vital Signs:   Vitals:    04/04/25 0812   BP: 108/70   Pulse: 89   SpO2: 98%   PainSc: 5      There is no height or weight on file to calculate BMI.    I reviewed the patient's chief complaint, history of present illness, review of systems, past medical history, surgical history, family history, social history, medications, and allergy list.     Ortho Exam  Knee   General: Alert, no acute distress.    Right knee: No pain with passive hip ROM. Knee stable to varus/valgus stress.  Knee extensor mechanism intact.  0 degrees knee extension. Flexion to 115 degrees.  Tender to palpation over medial joint line.  Calf soft, non-tender.  Sensation and neurovascularly intact.  Demonstrates active ankle dorsiflexion and plantarflexion.  Palpable pedal pulses.         Imaging Results (Most Recent)       None                Assessment and Plan   Diagnoses and all orders for this visit:    1. Right knee pain, unspecified chronicity (Primary)  -     MRI Knee Right Without Contrast; Future    2. Osteoarthritis of right knee, unspecified osteoarthritis type  -     MRI Knee Right Without Contrast; Future    3. Knee instability, right  -     MRI Knee Right Without Contrast; Future       Assessment & Plan  1. Knee pain.  The patient's knee pain has persisted for nearly 2 months without returning to baseline, necessitating further investigation. The x-ray results do not indicate severe arthritis. An MRI of the knee will be ordered to determine the cause of the pain, such as a meniscus tear or cartilage disruption. He is advised to continue wearing the brace and taking diclofenac as needed. If the MRI reveals a tear or other issues, appropriate treatment options, including potential surgery or injections, will be considered.      Follow up after the MRI        Tobacco Use: Low Risk  (4/4/2025)    Patient History     Smoking Tobacco Use: Never     Smokeless Tobacco Use: Never     Passive Exposure: Past     Patient reports that they are a nonsmoker; cessation education not applicable.            Follow Up   No follow-ups on file.  There are no Patient Instructions on file for this visit.    Patient was given instructions and counseling regarding his condition or for health maintenance advice. Please see specific information pulled into the AVS if appropriate.     Patient or patient representative verbalized consent for the use of  Ambient Listening during the visit with  KINJAL Grimaldo for chart documentation. 4/4/2025  09:29 EDT    Dictated Utilizing Dragon Dictation. Please note that portions of this note were completed with a voice recognition program. Part of this note may be an electronic transcription/translation of spoken language to printed text using the Dragon Dictation System.

## 2025-04-15 ENCOUNTER — HOSPITAL ENCOUNTER (OUTPATIENT)
Dept: MRI IMAGING | Facility: HOSPITAL | Age: 65
Discharge: HOME | End: 2025-04-15
Payer: MEDICARE

## 2025-04-15 DIAGNOSIS — M25.561 RIGHT KNEE PAIN, UNSPECIFIED CHRONICITY: ICD-10-CM

## 2025-04-15 DIAGNOSIS — M25.361 KNEE INSTABILITY, RIGHT: ICD-10-CM

## 2025-04-15 DIAGNOSIS — M17.11 OSTEOARTHRITIS OF RIGHT KNEE, UNSPECIFIED OSTEOARTHRITIS TYPE: ICD-10-CM

## 2025-04-15 PROCEDURE — 73721 MRI JNT OF LWR EXTRE W/O DYE: CPT

## 2025-04-24 ENCOUNTER — OFFICE VISIT (OUTPATIENT)
Dept: ORTHOPEDIC SURGERY | Facility: CLINIC | Age: 65
End: 2025-04-24
Payer: MEDICARE

## 2025-04-24 ENCOUNTER — PREP FOR SURGERY (OUTPATIENT)
Dept: OTHER | Facility: HOSPITAL | Age: 65
End: 2025-04-24
Payer: MEDICARE

## 2025-04-24 VITALS
BODY MASS INDEX: 30.12 KG/M2 | WEIGHT: 170 LBS | HEART RATE: 78 BPM | SYSTOLIC BLOOD PRESSURE: 133 MMHG | DIASTOLIC BLOOD PRESSURE: 73 MMHG | HEIGHT: 63 IN | OXYGEN SATURATION: 96 %

## 2025-04-24 DIAGNOSIS — S83.231D COMPLEX TEAR OF MEDIAL MENISCUS OF RIGHT KNEE, UNSPECIFIED WHETHER OLD OR CURRENT TEAR, SUBSEQUENT ENCOUNTER: ICD-10-CM

## 2025-04-24 DIAGNOSIS — M25.561 RIGHT KNEE PAIN, UNSPECIFIED CHRONICITY: Primary | ICD-10-CM

## 2025-04-24 DIAGNOSIS — M17.11 OSTEOARTHRITIS OF RIGHT KNEE, UNSPECIFIED OSTEOARTHRITIS TYPE: ICD-10-CM

## 2025-04-24 DIAGNOSIS — S83.231D COMPLEX TEAR OF MEDIAL MENISCUS OF RIGHT KNEE, UNSPECIFIED WHETHER OLD OR CURRENT TEAR, SUBSEQUENT ENCOUNTER: Primary | ICD-10-CM

## 2025-04-24 PROBLEM — S83.249A ACUTE MEDIAL MENISCAL TEAR: Status: ACTIVE | Noted: 2025-04-24

## 2025-04-24 PROBLEM — S83.231A COMPLEX TEAR OF MEDIAL MENISCUS OF RIGHT KNEE: Status: ACTIVE | Noted: 2025-04-24

## 2025-04-24 NOTE — H&P (VIEW-ONLY)
"Chief Complaint  Follow-up of the Right Knee     Subjective      Devyn Steinberg presents to [facility] for follow up of the right knee. He had a MRI of the right knee.  He noted prior to this he never had problems with the knee.  He is here today following up on his right knee patient had a pop when he went from sit to stand that happened on 3/5/2025. He was seen  on 3/14/2025 and given an a prescription for an oral anti-inflammatory. He was given home exercises as well. The pain is about the same as it was 6-8 weeks ago.      Allergies   Allergen Reactions    Dulaglutide Other (See Comments)     Pancreatitis    Tyloxapol Swelling        Social History     Socioeconomic History    Marital status:    Tobacco Use    Smoking status: Never     Passive exposure: Past    Smokeless tobacco: Never   Vaping Use    Vaping status: Never Used   Substance and Sexual Activity    Alcohol use: Never    Drug use: Never    Sexual activity: Defer        I reviewed the patient's chief complaint, history of present illness, review of systems, past medical history, surgical history, family history, social history, medications, and allergy list.     Review of Systems     Constitutional: Denies fevers, chills, weight loss  Cardiovascular: Denies chest pain, shortness of breath  Skin: Denies rashes, acute skin changes  Neurologic: Denies headache, loss of consciousness        Vital Signs:   /73   Pulse 78   Ht 160 cm (63\")   Wt 77.1 kg (170 lb)   SpO2 96%   BMI 30.11 kg/m²          Physical Exam  General: Alert. No acute distress    Ortho Exam        Right knee: No pain with passive hip ROM. Knee stable to varus/valgus stress.  Knee extensor mechanism intact.  0 degrees knee extension. Flexion to 115 degrees.  Tender to palpation over medial joint line.  Calf soft, non-tender.  Sensation and neurovascularly intact.  Demonstrates active ankle dorsiflexion and plantarflexion.  Palpable pedal " pulses.       Procedures      Imaging Results (Most Recent)       None             Result Review :         MRI Knee Right Without Contrast  Result Date: 4/15/2025  Narrative: MRI KNEE RIGHT  WO CONTRAST Date of Exam: 4/15/2025 6:52 AM EDT Indication: right knee instability, heard a pop on medial side, right knee pain.  Comparison: Right knee x-rays 3/5/2025 Technique:  Routine multiplanar/multisequence images of the right knee were obtained without contrast administration. Findings: There is complex macerated tearing of the body and posterior horn of the medial meniscus (series 501 image 5-10, series 401 image 16-19). There is mild peripheral extrusion of the medial meniscal body with some bowing of the medial supporting structures;  the medial supporting structures appear intact. There is high-grade chondral loss throughout the central weightbearing portion of the medial compartment with prominent subchondral cystic change and subchondral marrow edema in both the medial femoral condyle and medial tibial plateau; no definite subchondral fracture line. The lateral meniscus is normal. The lateral supporting structures are unremarkable. The articular cartilage of the lateral compartment is grossly preserved. The anterior cruciate ligament and posterior cruciate ligament are normal. The quadriceps tendon and patellar tendon are normal. There is moderate and high-grade chondral loss and chondral irregularity along the medial patellar facet and median patellar ridge; the trochlear cartilage appears relatively well preserved. There is a small knee joint effusion with synovitis. No popliteal cyst. The posterior knee neurovasculature is unremarkable. Normal appearance of the muscles and subcutaneous tissues. No focal bony lesion. No fracture.     Impression: Impression: Complex macerated tearing of the body and posterior horn of the medial meniscus. High-grade chondral loss throughout the central weightbearing portion of the  medial compartment. Small knee joint effusion with synovitis. Moderate and high-grade chondral loss along the medial patellar facet and median patellar ridge. Electronically Signed: Radames Kemp MD  4/15/2025 9:16 AM EDT  Workstation ID: [id number]             Assessment and Plan     Diagnoses and all orders for this visit:    1. Right knee pain, unspecified chronicity (Primary)    2. Complex tear of medial meniscus of right knee, unspecified whether old or current tear, subsequent encounter    3. Osteoarthritis of right knee, unspecified osteoarthritis type        Discussed the treatment plan with the patient. I reviewed the MRI results with the patient.     Discussed the treatment options with the patient, operative vs non-operative. Discussed surgical intervention as a right knee arthroscopy at this time.      He will call back if the knee is not better in a month.      Discussed surgery., Risks/benefits discussed with patient including, but not limited to: infection, bleeding, neurovascular damage, re-rupture, aesthetic deformity, need for further surgery, and death., and Call or return if worsening symptoms.    Follow Up     2 weeks postoperatively       Patient was given instructions and counseling regarding his condition or for health maintenance advice. Please see specific information pulled into the AVS if appropriate.     Scribed for Erickson Hernandez MD by Arelis Viramontes MA.  04/24/25   08:20 EDT    I have personally performed the services described in this document as scribed by the above individual and it is both accurate and complete. Erickson Hernandez MD 04/24/25

## 2025-04-24 NOTE — PROGRESS NOTES
"Chief Complaint  Follow-up of the Right Knee     Subjective      Devyn Steinberg presents to [facility] for follow up of the right knee. He had a MRI of the right knee.  He noted prior to this he never had problems with the knee.  He is here today following up on his right knee patient had a pop when he went from sit to stand that happened on 3/5/2025. He was seen  on 3/14/2025 and given an a prescription for an oral anti-inflammatory. He was given home exercises as well. The pain is about the same as it was 6-8 weeks ago.      Allergies   Allergen Reactions    Dulaglutide Other (See Comments)     Pancreatitis    Tyloxapol Swelling        Social History     Socioeconomic History    Marital status:    Tobacco Use    Smoking status: Never     Passive exposure: Past    Smokeless tobacco: Never   Vaping Use    Vaping status: Never Used   Substance and Sexual Activity    Alcohol use: Never    Drug use: Never    Sexual activity: Defer        I reviewed the patient's chief complaint, history of present illness, review of systems, past medical history, surgical history, family history, social history, medications, and allergy list.     Review of Systems     Constitutional: Denies fevers, chills, weight loss  Cardiovascular: Denies chest pain, shortness of breath  Skin: Denies rashes, acute skin changes  Neurologic: Denies headache, loss of consciousness        Vital Signs:   /73   Pulse 78   Ht 160 cm (63\")   Wt 77.1 kg (170 lb)   SpO2 96%   BMI 30.11 kg/m²          Physical Exam  General: Alert. No acute distress    Ortho Exam        Right knee: No pain with passive hip ROM. Knee stable to varus/valgus stress.  Knee extensor mechanism intact.  0 degrees knee extension. Flexion to 115 degrees.  Tender to palpation over medial joint line.  Calf soft, non-tender.  Sensation and neurovascularly intact.  Demonstrates active ankle dorsiflexion and plantarflexion.  Palpable pedal " pulses.       Procedures      Imaging Results (Most Recent)       None             Result Review :         MRI Knee Right Without Contrast  Result Date: 4/15/2025  Narrative: MRI KNEE RIGHT  WO CONTRAST Date of Exam: 4/15/2025 6:52 AM EDT Indication: right knee instability, heard a pop on medial side, right knee pain.  Comparison: Right knee x-rays 3/5/2025 Technique:  Routine multiplanar/multisequence images of the right knee were obtained without contrast administration. Findings: There is complex macerated tearing of the body and posterior horn of the medial meniscus (series 501 image 5-10, series 401 image 16-19). There is mild peripheral extrusion of the medial meniscal body with some bowing of the medial supporting structures;  the medial supporting structures appear intact. There is high-grade chondral loss throughout the central weightbearing portion of the medial compartment with prominent subchondral cystic change and subchondral marrow edema in both the medial femoral condyle and medial tibial plateau; no definite subchondral fracture line. The lateral meniscus is normal. The lateral supporting structures are unremarkable. The articular cartilage of the lateral compartment is grossly preserved. The anterior cruciate ligament and posterior cruciate ligament are normal. The quadriceps tendon and patellar tendon are normal. There is moderate and high-grade chondral loss and chondral irregularity along the medial patellar facet and median patellar ridge; the trochlear cartilage appears relatively well preserved. There is a small knee joint effusion with synovitis. No popliteal cyst. The posterior knee neurovasculature is unremarkable. Normal appearance of the muscles and subcutaneous tissues. No focal bony lesion. No fracture.     Impression: Impression: Complex macerated tearing of the body and posterior horn of the medial meniscus. High-grade chondral loss throughout the central weightbearing portion of the  medial compartment. Small knee joint effusion with synovitis. Moderate and high-grade chondral loss along the medial patellar facet and median patellar ridge. Electronically Signed: Radames Kemp MD  4/15/2025 9:16 AM EDT  Workstation ID: [id number]             Assessment and Plan     Diagnoses and all orders for this visit:    1. Right knee pain, unspecified chronicity (Primary)    2. Complex tear of medial meniscus of right knee, unspecified whether old or current tear, subsequent encounter    3. Osteoarthritis of right knee, unspecified osteoarthritis type        Discussed the treatment plan with the patient. I reviewed the MRI results with the patient.     Discussed the treatment options with the patient, operative vs non-operative. Discussed surgical intervention as a right knee arthroscopy at this time.      He will call back if the knee is not better in a month.      Discussed surgery., Risks/benefits discussed with patient including, but not limited to: infection, bleeding, neurovascular damage, re-rupture, aesthetic deformity, need for further surgery, and death., and Call or return if worsening symptoms.    Follow Up     2 weeks postoperatively       Patient was given instructions and counseling regarding his condition or for health maintenance advice. Please see specific information pulled into the AVS if appropriate.     Scribed for Erickson Hernandez MD by Arelis Viramontes MA.  04/24/25   08:20 EDT    I have personally performed the services described in this document as scribed by the above individual and it is both accurate and complete. Erickson Hernandez MD 04/24/25

## 2025-04-29 ENCOUNTER — TELEPHONE (OUTPATIENT)
Dept: ORTHOPEDIC SURGERY | Facility: CLINIC | Age: 65
End: 2025-04-29
Payer: MEDICARE

## 2025-04-29 NOTE — TELEPHONE ENCOUNTER
SPOKE WITH PATIENT, SX WAS RS'D FROM 5/21 TO 5/14/25 PER PATIENT'S REQUEST. PATIENT IS AWARE THE HOSPITAL WILL CALL THE DAY PRIOR TO SX W/ARRIVAL TIME.

## 2025-04-29 NOTE — TELEPHONE ENCOUNTER
Hub staff attempted to follow warm transfer process and was unsuccessful     Caller: Devyn Steinberg    Relationship to patient: Self    Best call back number: [phone]    Patient is needing: PATIENT WOULD LIKE TO KNOW IF THERE ARE ANY EARLIER SURGERY DATES - PLEASE REACH OUT AND ADVISE

## 2025-05-12 ENCOUNTER — ANESTHESIA EVENT (OUTPATIENT)
Dept: PERIOP | Facility: HOSPITAL | Age: 65
End: 2025-05-12
Payer: MEDICARE

## 2025-05-13 NOTE — PRE-PROCEDURE INSTRUCTIONS
PATIENT INSTRUCTED TO BE:    - NOTHING TO EAT AFTER MIDNIGHT OR CHEW, EXCEPT CAN HAVE CLEAR LIQUIDS 2 HOURS PRIOR TO SURGERY ARRIVAL TIME , NO MORE THAN 8 OZ. (NOTHING RED)     - TO HOLD ALL VITAMINS, SUPPLEMENTS, NSAIDS FOR ONE WEEK PRIOR TO THEIR SURGICAL PROCEDURE         - BATHING INSTRUCTIONS GIVEN    INSTRUCTED NO LOTIONS, JEWELRY, PIERCINGS,  NAIL POLISH, OR DEODORANT DAY OF SURGERY    - IF DIABETIC, CHECK BLOOD GLUCOSE IF LESS THAN 70 OR HAVING SYMPTOMS CALL THE PREOP AREA FOR INSTRUCTIONS ON AM OF SURGERY ([phone])    -INSTRUCTED TO TAKE THE FOLLOWING MEDICATIONS THE DAY OF SURGERY WITH SIPS OF WATER:   Tramadol, Tresiba 7.5units, Nexium        - DO NOT BRING ANY MEDICATIONS WITH YOU TO THE HOSPITAL THE DAY OF SURGERY, EXCEPT IF USE INHALERS. BRING INHALERS DAY OF SURGERY       - BRING CPAP OR BIPAP TO THE HOSPITAL ONLY IF YOU ARE SPENDING THE NIGHT    - DO NOT SMOKE OR VAPE 24 HOURS PRIOR TO PROCEDURE PER ANESTHESIA REQUEST     -MAKE SURE YOU HAVE A RIDE HOME OR SOMEONE TO STAY WITH YOU THE DAY OF THE PROCEDURE AFTER YOU GO HOME     - FOLLOW ANY OTHER INSTRUCTIONS GIVEN TO YOU BY YOUR SURGEON'S OFFICE.      COME TO [facility]/ [facility], FIRST FLOOR. CHECK IN AT THE DESK FOR REGISTRATION/SURGERY    - YOU WILL RECEIVE A PHONE CALL THE DAY PRIOR TO SURGERY BETWEEN 1PM AND 4 PM WITH ARRIVAL TIME, IF YOUR SURGERY IS ON A MONDAY YOU WILL RECEIVE A CALL THE FRIDAY PRIOR TO SURGERY DATE    - BRING CASH OR CREDIT CARD FOR COPAYMENT OF MEDICATIONS AFTER SURGERY IF YOU USE THE HOSPITAL PHARMACY (MED[facility] TO BED)    - PREADMISSION TESTING NURSE KWABENA JOSÉ [phone] IF HAVE ANY QUESTIONS     -PATIENT PROVIDED THE NUMBER FOR PREOP SURGICAL DEPT IF HAD QUESTIONS AFTER HOURS PRIOR TO SURGERY ( [phone]).  INFORMED PT IF NO ANSWER, LEAVE A MESSAGE AND SOMEONE WILL RETURN THEIR CALL       PATIENT VERBALIZED UNDERSTANDING

## 2025-05-14 ENCOUNTER — ANESTHESIA (OUTPATIENT)
Dept: PERIOP | Facility: HOSPITAL | Age: 65
End: 2025-05-14
Payer: MEDICARE

## 2025-05-14 ENCOUNTER — HOSPITAL ENCOUNTER (OUTPATIENT)
Facility: HOSPITAL | Age: 65
Discharge: HOME | End: 2025-05-14
Attending: ORTHOPAEDIC SURGERY | Admitting: ORTHOPAEDIC SURGERY
Payer: MEDICARE

## 2025-05-14 VITALS
RESPIRATION RATE: 18 BRPM | DIASTOLIC BLOOD PRESSURE: 81 MMHG | OXYGEN SATURATION: 96 % | TEMPERATURE: 97.8 F | WEIGHT: 162.48 LBS | HEART RATE: 76 BPM | BODY MASS INDEX: 28.79 KG/M2 | SYSTOLIC BLOOD PRESSURE: 123 MMHG | HEIGHT: 63 IN

## 2025-05-14 DIAGNOSIS — S83.231D COMPLEX TEAR OF MEDIAL MENISCUS OF RIGHT KNEE, UNSPECIFIED WHETHER OLD OR CURRENT TEAR, SUBSEQUENT ENCOUNTER: ICD-10-CM

## 2025-05-14 LAB
ANION GAP SERPL CALCULATED.3IONS-SCNC: 12.8 MMOL/L (ref 5–15)
BUN SERPL-MCNC: 37 MG/DL (ref 8–23)
BUN/CREAT SERPL: 26.6 (ref 7–25)
CALCIUM SPEC-SCNC: 9.7 MG/DL (ref 8.6–10.5)
CHLORIDE SERPL-SCNC: 99 MMOL/L (ref 98–107)
CO2 SERPL-SCNC: 23.2 MMOL/L (ref 22–29)
CREAT SERPL-MCNC: 1.39 MG/DL (ref 0.76–1.27)
EGFRCR SERPLBLD CKD-EPI 2021: 56.3 ML/MIN/1.73
GLUCOSE BLDC GLUCOMTR-MCNC: 177 MG/DL (ref 70–99)
GLUCOSE BLDC GLUCOMTR-MCNC: 192 MG/DL (ref 70–99)
GLUCOSE SERPL-MCNC: 178 MG/DL (ref 65–99)
POTASSIUM SERPL-SCNC: 4.1 MMOL/L (ref 3.5–5.2)
QT INTERVAL: 341 MS
QTC INTERVAL: 437 MS
SODIUM SERPL-SCNC: 135 MMOL/L (ref 136–145)

## 2025-05-14 PROCEDURE — 25010000002 LIDOCAINE PF 2% 2 % SOLUTION: Performed by: NURSE ANESTHETIST, CERTIFIED REGISTERED

## 2025-05-14 PROCEDURE — 25010000002 ONDANSETRON PER 1 MG: Performed by: NURSE ANESTHETIST, CERTIFIED REGISTERED

## 2025-05-14 PROCEDURE — 25810000003 LACTATED RINGERS PER 1000 ML: Performed by: ANESTHESIOLOGY

## 2025-05-14 PROCEDURE — 25010000002 MIDAZOLAM PER 1MG: Performed by: ANESTHESIOLOGY

## 2025-05-14 PROCEDURE — 25010000002 CEFAZOLIN PER 500 MG: Performed by: ORTHOPAEDIC SURGERY

## 2025-05-14 PROCEDURE — 80048 BASIC METABOLIC PNL TOTAL CA: CPT | Performed by: ANESTHESIOLOGY

## 2025-05-14 PROCEDURE — 93005 ELECTROCARDIOGRAM TRACING: CPT | Performed by: ANESTHESIOLOGY

## 2025-05-14 PROCEDURE — 93010 ELECTROCARDIOGRAM REPORT: CPT | Performed by: SPECIALIST

## 2025-05-14 PROCEDURE — 25010000002 BUPIVACAINE (PF) 0.5 % SOLUTION: Performed by: ORTHOPAEDIC SURGERY

## 2025-05-14 PROCEDURE — 25010000002 DEXAMETHASONE PER 1 MG: Performed by: NURSE ANESTHETIST, CERTIFIED REGISTERED

## 2025-05-14 PROCEDURE — 25010000002 HYDROMORPHONE 1 MG/ML SOLUTION: Performed by: NURSE ANESTHETIST, CERTIFIED REGISTERED

## 2025-05-14 PROCEDURE — 82948 REAGENT STRIP/BLOOD GLUCOSE: CPT | Performed by: ORTHOPAEDIC SURGERY

## 2025-05-14 PROCEDURE — 25010000002 FENTANYL CITRATE (PF) 50 MCG/ML SOLUTION: Performed by: NURSE ANESTHETIST, CERTIFIED REGISTERED

## 2025-05-14 PROCEDURE — 29881 ARTHRS KNE SRG MNISECTMY M/L: CPT | Performed by: ORTHOPAEDIC SURGERY

## 2025-05-14 PROCEDURE — 25010000002 PROPOFOL 10 MG/ML EMULSION: Performed by: NURSE ANESTHETIST, CERTIFIED REGISTERED

## 2025-05-14 PROCEDURE — 82948 REAGENT STRIP/BLOOD GLUCOSE: CPT | Performed by: NURSE ANESTHETIST, CERTIFIED REGISTERED

## 2025-05-14 RX ORDER — PROMETHAZINE HYDROCHLORIDE 12.5 MG/1
25 TABLET ORAL ONCE AS NEEDED
Status: DISCONTINUED | OUTPATIENT
Start: 2025-05-14 | End: 2025-05-14 | Stop reason: HOSPADM

## 2025-05-14 RX ORDER — DEXAMETHASONE SODIUM PHOSPHATE 4 MG/ML
INJECTION, SOLUTION INTRA-ARTICULAR; INTRALESIONAL; INTRAMUSCULAR; INTRAVENOUS; SOFT TISSUE AS NEEDED
Status: DISCONTINUED | OUTPATIENT
Start: 2025-05-14 | End: 2025-05-14 | Stop reason: SURG

## 2025-05-14 RX ORDER — ALBUTEROL SULFATE 90 UG/1
INHALANT RESPIRATORY (INHALATION) AS NEEDED
Status: DISCONTINUED | OUTPATIENT
Start: 2025-05-14 | End: 2025-05-14 | Stop reason: SURG

## 2025-05-14 RX ORDER — ONDANSETRON 2 MG/ML
4 INJECTION INTRAMUSCULAR; INTRAVENOUS ONCE AS NEEDED
Status: DISCONTINUED | OUTPATIENT
Start: 2025-05-14 | End: 2025-05-14 | Stop reason: HOSPADM

## 2025-05-14 RX ORDER — ACETAMINOPHEN 500 MG
1000 TABLET ORAL ONCE
Status: COMPLETED | OUTPATIENT
Start: 2025-05-14 | End: 2025-05-14

## 2025-05-14 RX ORDER — PROPOFOL 10 MG/ML
VIAL (ML) INTRAVENOUS AS NEEDED
Status: DISCONTINUED | OUTPATIENT
Start: 2025-05-14 | End: 2025-05-14 | Stop reason: SURG

## 2025-05-14 RX ORDER — LIDOCAINE HYDROCHLORIDE 20 MG/ML
INJECTION, SOLUTION EPIDURAL; INFILTRATION; INTRACAUDAL; PERINEURAL AS NEEDED
Status: DISCONTINUED | OUTPATIENT
Start: 2025-05-14 | End: 2025-05-14 | Stop reason: SURG

## 2025-05-14 RX ORDER — MAGNESIUM HYDROXIDE 1200 MG/15ML
LIQUID ORAL AS NEEDED
Status: DISCONTINUED | OUTPATIENT
Start: 2025-05-14 | End: 2025-05-14 | Stop reason: HOSPADM

## 2025-05-14 RX ORDER — OXYCODONE HYDROCHLORIDE 5 MG/1
5 TABLET ORAL
Status: COMPLETED | OUTPATIENT
Start: 2025-05-14 | End: 2025-05-14

## 2025-05-14 RX ORDER — HYDROCODONE BITARTRATE AND ACETAMINOPHEN 7.5; 325 MG/1; MG/1
1 TABLET ORAL ONCE AS NEEDED
Status: DISCONTINUED | OUTPATIENT
Start: 2025-05-14 | End: 2025-05-14 | Stop reason: HOSPADM

## 2025-05-14 RX ORDER — MIDAZOLAM HYDROCHLORIDE 2 MG/2ML
2 INJECTION, SOLUTION INTRAMUSCULAR; INTRAVENOUS ONCE
Status: COMPLETED | OUTPATIENT
Start: 2025-05-14 | End: 2025-05-14

## 2025-05-14 RX ORDER — ONDANSETRON 2 MG/ML
INJECTION INTRAMUSCULAR; INTRAVENOUS AS NEEDED
Status: DISCONTINUED | OUTPATIENT
Start: 2025-05-14 | End: 2025-05-14 | Stop reason: SURG

## 2025-05-14 RX ORDER — BUPIVACAINE HYDROCHLORIDE 5 MG/ML
INJECTION, SOLUTION EPIDURAL; INTRACAUDAL; PERINEURAL AS NEEDED
Status: DISCONTINUED | OUTPATIENT
Start: 2025-05-14 | End: 2025-05-14 | Stop reason: HOSPADM

## 2025-05-14 RX ORDER — PROMETHAZINE HYDROCHLORIDE 25 MG/1
25 SUPPOSITORY RECTAL ONCE AS NEEDED
Status: DISCONTINUED | OUTPATIENT
Start: 2025-05-14 | End: 2025-05-14 | Stop reason: HOSPADM

## 2025-05-14 RX ORDER — SCOPOLAMINE 1 MG/3D
1 PATCH, EXTENDED RELEASE TRANSDERMAL ONCE
Status: DISCONTINUED | OUTPATIENT
Start: 2025-05-14 | End: 2025-05-14 | Stop reason: HOSPADM

## 2025-05-14 RX ORDER — FENTANYL CITRATE 50 UG/ML
INJECTION, SOLUTION INTRAMUSCULAR; INTRAVENOUS AS NEEDED
Status: DISCONTINUED | OUTPATIENT
Start: 2025-05-14 | End: 2025-05-14 | Stop reason: SURG

## 2025-05-14 RX ORDER — SODIUM CHLORIDE, SODIUM LACTATE, POTASSIUM CHLORIDE, CALCIUM CHLORIDE 600; 310; 30; 20 MG/100ML; MG/100ML; MG/100ML; MG/100ML
9 INJECTION, SOLUTION INTRAVENOUS CONTINUOUS PRN
Status: DISCONTINUED | OUTPATIENT
Start: 2025-05-14 | End: 2025-05-14 | Stop reason: HOSPADM

## 2025-05-14 RX ORDER — HYDROCODONE BITARTRATE AND ACETAMINOPHEN 7.5; 325 MG/1; MG/1
1 TABLET ORAL EVERY 4 HOURS PRN
Qty: 21 TABLET | Refills: 0 | Status: SHIPPED | OUTPATIENT
Start: 2025-05-14

## 2025-05-14 RX ADMIN — LIDOCAINE HYDROCHLORIDE 80 MG: 20 INJECTION, SOLUTION INTRAVENOUS at 07:47

## 2025-05-14 RX ADMIN — FENTANYL CITRATE 25 MCG: 50 INJECTION, SOLUTION INTRAMUSCULAR; INTRAVENOUS at 07:59

## 2025-05-14 RX ADMIN — HYDROMORPHONE HYDROCHLORIDE 0.5 MG: 1 INJECTION, SOLUTION INTRAMUSCULAR; INTRAVENOUS; SUBCUTANEOUS at 09:28

## 2025-05-14 RX ADMIN — DEXAMETHASONE SODIUM PHOSPHATE 4 MG: 4 INJECTION, SOLUTION INTRAMUSCULAR; INTRAVENOUS at 07:47

## 2025-05-14 RX ADMIN — MIDAZOLAM HYDROCHLORIDE 2 MG: 1 INJECTION, SOLUTION INTRAMUSCULAR; INTRAVENOUS at 07:35

## 2025-05-14 RX ADMIN — FENTANYL CITRATE 25 MCG: 50 INJECTION, SOLUTION INTRAMUSCULAR; INTRAVENOUS at 07:47

## 2025-05-14 RX ADMIN — HYDROMORPHONE HYDROCHLORIDE 0.5 MG: 1 INJECTION, SOLUTION INTRAMUSCULAR; INTRAVENOUS; SUBCUTANEOUS at 09:10

## 2025-05-14 RX ADMIN — OXYCODONE 5 MG: 5 TABLET ORAL at 09:10

## 2025-05-14 RX ADMIN — ALBUTEROL SULFATE 3 PUFF: 90 AEROSOL, METERED RESPIRATORY (INHALATION) at 08:22

## 2025-05-14 RX ADMIN — ONDANSETRON 4 MG: 2 INJECTION INTRAMUSCULAR; INTRAVENOUS at 07:47

## 2025-05-14 RX ADMIN — ACETAMINOPHEN 1000 MG: 500 TABLET ORAL at 07:01

## 2025-05-14 RX ADMIN — OXYCODONE 5 MG: 5 TABLET ORAL at 09:28

## 2025-05-14 RX ADMIN — SCOLOPAMINE TRANSDERMAL SYSTEM 1 PATCH: 1 PATCH, EXTENDED RELEASE TRANSDERMAL at 07:01

## 2025-05-14 RX ADMIN — PROPOFOL 160 MG: 10 INJECTION, EMULSION INTRAVENOUS at 07:47

## 2025-05-14 RX ADMIN — FENTANYL CITRATE 50 MCG: 50 INJECTION, SOLUTION INTRAMUSCULAR; INTRAVENOUS at 07:54

## 2025-05-14 RX ADMIN — SODIUM CHLORIDE 2 G: 9 INJECTION, SOLUTION INTRAVENOUS at 07:42

## 2025-05-14 RX ADMIN — SODIUM CHLORIDE, POTASSIUM CHLORIDE, SODIUM LACTATE AND CALCIUM CHLORIDE 9 ML/HR: 600; 310; 30; 20 INJECTION, SOLUTION INTRAVENOUS at 07:01

## 2025-05-14 NOTE — ANESTHESIA PREPROCEDURE EVALUATION
Anesthesia Evaluation     Patient summary reviewed and Nursing notes reviewed   history of anesthetic complications:  PONV  NPO Solid Status: > 8 hours  NPO Liquid Status: > 2 hours           Airway   Mallampati: II  TM distance: <3 FB  Neck ROM: full  No difficulty expected and Small opening  Dental    (+) poor dentition        Pulmonary - negative pulmonary ROS and normal exam    breath sounds clear to auscultation  Cardiovascular - normal exam  Exercise tolerance: good (4-7 METS)    Rhythm: regular  Rate: normal    (+) hypertension, hyperlipidemia      Neuro/Psych- negative ROS  GI/Hepatic/Renal/Endo    (+) GERD well controlled, diabetes mellitus    Musculoskeletal (-) negative ROS    Abdominal    Substance History - negative use     OB/GYN negative ob/gyn ROS         Other - negative ROS       ROS/Med Hx Other: PAT Nursing Notes unavailable.     EKG SR, old inferior infarct                Anesthesia Plan    ASA 2     general   total IV anesthesia  (Patient understands anesthesia not responsible for dental damage.    Pt requests TIVA)  intravenous induction     Anesthetic plan, risks, benefits, and alternatives have been provided, discussed and informed consent has been obtained with: patient.    Use of blood products discussed with patient .    Plan discussed with CRNA.    CODE STATUS:

## 2025-05-14 NOTE — OP NOTE
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    Procedure(s):  RIGHT KNEE ARTHROSCOPY WITH partial medial MENISCECTOMY, medial femoral and patellofemoral CHONDROPLASTY    Staff:  Surgeon(s):  Erickson Hernandez MD    Assistant: Mikayla Enriquez    Anesthesia: General    Estimated Blood Loss: minimal    Implants:    Nothing was implanted during the procedure    Specimen:          None        Findings: Complex medial meniscal tear with arthritis throughout the knee    Complications: None    Description of Procedure: The patient was brought to the operating room and underwent general anesthesia, had preoperative antibiotic, and was prepped and draped in sterile fashion. Standard superolateral outflow was inserted followed by inferolateral scope placement. The scope was inserted. The medial meniscus was torn.  The lateral meniscus was not torn. This medial meniscal tear was débrided back to a stable rim using a series of biters and opal, checked with a probe. The ACL was not torn. There was moderate chondromalacia seen in the medial and patellofemoral compartments.  Chondroplasty was formed in both compartments using a shaver.. Portals were then closed using 3-0 nylon. Half percent Marcaine was injected into the joint. Sterile dressing was applied. The patient was then extubated and taken to recovery in stable condition. No complications.    Assistant: Mikayla Enriquez  was responsible for performing the following activities:  Closing and Placing Dressing and their skilled assistance was necessary for the success of this case.    Erickson Hernandez MD     Date: 5/14/2025  Time: 08:29 EDT

## 2025-05-14 NOTE — DISCHARGE INSTRUCTIONS
DISCHARGE INSTRUCTIONS  POST-OPERATIVE  Knee Arthroscopic Surgery      For your surgery you had:  General anesthesia (you may have a sore throat for the first 24 hours)  IV sedation.  Local anesthesia  Monitored anesthesia care  You received a medicated patch for nausea prevention today (behind your ear). It is recommended that you remove it 24-48 hours post-operatively. It must be removed within 72 hours.   You may experience dizziness, drowsiness, or light-headedness for several hours following surgery/procedure.  Do not stay alone today or tonight.  Limit your activity for 24 hours.  Resume your diet slowly.  Follow whatever special dietary instructions you may have been given by your doctor.  You should not drive or operate machinery or drink alcohol for 24 hours or while you are taking pain medication.  You should not sign legally binding documents.  [] If you had a regional block, you will not have control of the leg for up to 12 hours.  Protect the leg and follow your physician's specific instructions.  Post-Operative Day #1 Thursday  Post-Operative Day #1 is counted as the 1st day after surgery.  Leave ace wrap on day one to aid in the reduction of swelling.  If dressing is too tight it can be rewrapped.  Post-Operative Day #3 Saturday  Ace wrap and dressing may be removed.  Put a 4x4 dressing to suture or staple site.  Change dressing once or twice daily until suture or staples are removed.  It is normal to have some redness or irritation around skin sutures or stapes, however, if you have any expanding areas of redness with a persistent fever and increasing pain notify the physician as soon as possible.  On Post-Operative Day #2, you may want to reapply the ace wrap.  Put ace wrap directly over the knee to add compression and aid in swelling reduction.  It is normal to have some swelling down into the calf and ankle after knee arthroscopy or Anterior Cruciate Ligament (ACL) reconstruction.  If you notice  a significant amount of swelling or increasing pain in calf area, notify the physician immediately.   Post-Operative Day #3 Saturday  You may shower.  During shower, avoid too much water to incision site.  Let water run down leg and then pat dry.  Do not put any ointments on the incision unless directed by surgeon.  Reapply dry dressing to sutures or staple sites.    General Information  Full weight bearing with crutches is allowed after a standard knee arthroscopy or ACL reconstruction unless instructed otherwise by surgeon.  You may put as much weight on knee as tolerable.  If given a knee immobilizer postoperatively, usually with an ACL reconstruction, this is for protection with early ambulation until you are steadier on your feet.  It is very important to take off immobilizer to do range of motion and flexion and extension exercises.  You do not have to sleep in the knee immobilizer.  Knee range of motion exercises should be started as early as the day of surgery.  Try to achieve full extension and start working on range of motion and flexion of the knee.  Move the ankle up and down periodically to help reduce swelling as well as reduce blood pooling.  To allow full extension of the knee and prevent blood clots it is very important to put pillows at the level of the mid-calf to the foot.  DO NOT put pillows directly behind knee.  SPECIAL INSTRUCTIONS:                                               DISCHARGE INSTRUCTIONS  POST-OPERATIVE   Knee Arthroscopic Surgery      General Instructions  You will receive a prescription for physical therapy, unless otherwise instructed by physician.  Physical therapy is imperative especially after ACL reconstruction and some knee arthroscopies for swelling reduction, knee range of motion and strengthening.  The Cold Therapy System can help reduce swelling and decrease pain.  Utilize device for 30-60 minutes per session, with 30-60 minute breaks in between sessions.  It is  recommended to use, as directed, for the first 72 hours after surgery until bedtime.  After 72 hours, continue using the device as needed until your follow-up appointment with your physician.  Never place directly on skin.  Please refer to the instruction sheet given. (IF PROVIDED BY INSURANCE COMPANY)   [] Use ice pack on the knee for 20 minutes on and 20 minutes off.  Never place ice directly on the skin.  By following this, you will cool the knee sufficiently.  Avoid getting dressing wet.  To help reduce swelling and minimize throbbing pain, use pillows to keep the knee elevated above the level of the heart, even while sleeping.  Sit with the leg propped up on a footstool or chair with pillows.  Exercises toes for 10 minutes every hour while awake.  If you are given a prescription for pain medication, take it as prescribed.  If you are able to take over-the-counter anti-inflammatory medications such as Motrin or Aleve, you may take as per package instructions in between the pain medication to help enhance pain control and reduce swelling.  If you are taking the pain medication prescribed, do not take Tylenol too unless you consult with the pharmacist. Generally, the pain medications prescribed have Tylenol in them and too much Tylenol can be harmful.  You should stop the anti-inflammatory medication if you experience any stomach/GI disturbances.  Consult with your physician or your pharmacist before taking over-the-counter pain medications/anti-inflammatories. It is not uncommon to have a fever post-operatively especially in the first 24-48 hours.  Deep breathing and coughing and early ambulation will help.  Anti-inflammatories can be used for fever reduction also.  If unable to urinate 6 to 8 hours after surgery or urinating frequently in small amounts, notify the physician or go to the nearest Emergency Room.  NOTIFY YOUR PHYSICIAN IF YOU EXPERIENCE:  Numbness of toes.  Inability to move toes.  Extreme  coldness, paleness or blue dis-coloration of toes.  Any pain other than from the incision, such as swelling of the leg that blocks circulation of the toes.  Follow verbal instructions from your doctor.  Medications per physician instructions as indicated on Discharge Medication Information Sheet.  You should see  ______________________for follow-up care  on   .  Phone number: _________________________  Missing your appointment/follow-up could lead to serious complications.  If you have an emergency and cannot reach your doctor, go to an Emergency Room nearest your home.  Access Lab and other Diagnostic Test results and manage your personal health records by going to: [email or web address]

## 2025-05-14 NOTE — ANESTHESIA POSTPROCEDURE EVALUATION
Patient: Devyn Steinberg    Procedure Summary       Date: 05/14/25 Room / Location: [facility]  / [facility]    Anesthesia Start: 0742 Anesthesia Stop: 0838    Procedure: RIGHT KNEE ARTHROSCOPY WITH partial medial MENISCECTOMY,CHONDROPLASTY (Right: Knee) Diagnosis:       Complex tear of medial meniscus of right knee, unspecified whether old or current tear, subsequent encounter      (Complex tear of medial meniscus of right knee, unspecified whether old or current tear, subsequent encounter [S83.916L])    Surgeons: Erickson Hernandez MD Provider: Oliverio Sneed MD    Anesthesia Type: general ASA Status: 2            Anesthesia Type: general    Vitals  Vitals Value Taken Time   /82 05/14/25 09:36   Temp 36.9 °C (98.4 °F) 05/14/25 08:34   Pulse 89 05/14/25 09:44   Resp 18 05/14/25 09:35   SpO2 95 % 05/14/25 09:44   Vitals shown include unfiled device data.        Post Anesthesia Care and Evaluation    Patient location during evaluation: bedside  Patient participation: complete - patient participated  Level of consciousness: awake  Pain management: adequate    Airway patency: patent  PONV Status: none  Cardiovascular status: acceptable and stable  Respiratory status: acceptable  Hydration status: acceptable

## 2025-05-19 ENCOUNTER — TREATMENT (OUTPATIENT)
Dept: PHYSICAL THERAPY | Facility: CLINIC | Age: 65
End: 2025-05-19
Payer: MEDICARE

## 2025-05-19 DIAGNOSIS — Z98.890 S/P ARTHROSCOPIC PARTIAL MEDIAL MENISCECTOMY OF RIGHT KNEE: Primary | ICD-10-CM

## 2025-05-19 DIAGNOSIS — Z87.828 S/P ARTHROSCOPIC PARTIAL MEDIAL MENISCECTOMY OF RIGHT KNEE: Primary | ICD-10-CM

## 2025-05-19 DIAGNOSIS — R26.2 DIFFICULTY WALKING: ICD-10-CM

## 2025-05-19 DIAGNOSIS — M25.561 RIGHT KNEE PAIN, UNSPECIFIED CHRONICITY: ICD-10-CM

## 2025-05-19 PROCEDURE — 97161 PT EVAL LOW COMPLEX 20 MIN: CPT | Performed by: PHYSICAL THERAPIST

## 2025-05-19 PROCEDURE — 97110 THERAPEUTIC EXERCISES: CPT | Performed by: PHYSICAL THERAPIST

## 2025-05-19 NOTE — PROGRESS NOTES
Physical Therapy Initial Evaluation and Plan of Care                    [facility] [address] [place] [address]    Patient: Devyn Steinberg   : 1960  Diagnosis/ICD-10 Code:  S/P arthroscopic partial medial meniscectomy of right knee [Z98.890, Z87.828]  Referring practitioner: Erickson Hernandez MD  Date of Initial Visit: 2025  Today's Date: 2025  Patient seen for 1 sessions           Subjective Questionnaire: LEFS: 36/80 = 45% Function    Subjective Evaluation    History of Present Illness  Mechanism of injury: The patient presents to physical therapy s/p right knee partial medial meniscectomy on 25. His knee already feel better than it did before surgery. His pain today is located mainly on the lateral side of his right knee. His pain is increased some if he does too much. He is taking his time with walking and stairs. He takes pain medication PRN at this time. He has some n/t in the lateral knee pain.       Patient Occupation: Retired -  Pain  Current pain rating: 3  At worst pain ratin    Patient Goals  Patient goal: The patient would like to have less pain and improved ability to walk.         Past Medical History:   Diagnosis Date    Acid reflux     Diabetes mellitus     BG RUNS AROUND 114-160 IN AM    Hyperlipidemia     Hypertension     Nontraumatic incomplete tear of rotator cuff, left     PONV (postoperative nausea and vomiting)     S/P laparoscopic cholecystectomy 2023    Seasonal allergies       Past Surgical History:   Procedure Laterality Date    CHOLECYSTECTOMY N/A 2023    Procedure: CHOLECYSTECTOMY LAPAROSCOPIC;  Surgeon: Paul Hodge MD;  Location: [facility] MAIN OR;  Service: General;  Laterality: N/A;    ERCP N/A 2023    Procedure: ENDOSCOPIC RETROGRADE CHOLANGIOPANCREATOGRAPHY WITH SPHINCTEROTOMY, BALLOON SWEEP AND STENT PLACEMENT;  Surgeon: Rory Blas MD;  Location: [facility] ENDOSCOPY;  Service:  Gastroenterology;  Laterality: N/A;  BILE DUCT SLUDGE, Ascending cholangitis    ERCP N/A 2023    Procedure: ENDOSCOPIC RETROGRADE CHOLANGIOPANCREATOGRAPHY WITH STENT REMOVAL AND BALLOON SWEEP;  Surgeon: Rory Blas MD;  Location: [facility] ENDOSCOPY;  Service: Gastroenterology;  Laterality: N/A;  SAME AS PREOP    HERNIA REPAIR      x2    KNEE ARTHROSCOPY W/ MENISCECTOMY Right 2025    Procedure: RIGHT KNEE ARTHROSCOPY WITH partial medial MENISCECTOMY,CHONDROPLASTY;  Surgeon: Erickson Hernandez MD;  Location: [facility] OR [facility] [place];  Service: Orthopedics;  Laterality: Right;    SHOULDER ARTHROSCOPY W/ ROTATOR CUFF REPAIR Left 2023    Procedure: LEFT SHOULDER ARTHROSCOPY WITH OPEN DEBRIDEMENT OF MASSIVE ROTATOR CUFF TEAR;  Surgeon: Erickson Hernandez MD;  Location: [facility] OR [facility] [place];  Service: Orthopedics;  Laterality: Left; PT STATED DIDN'T HAVE RCR    TOTAL SHOULDER ARTHROPLASTY W/ DISTAL CLAVICLE EXCISION Left 01/15/2024    Procedure: LEFT TOTAL SHOULDER REVERSE ARTHROPLASTY;  Surgeon: Erickson Hernandez MD;  Location: [facility] MAIN OR;  Service: Orthopedics;  Laterality: Left;    UPPER GASTROINTESTINAL ENDOSCOPY         Objective          Observations     Additional Knee Observation Details  All portal sites healing well with no signs of infection.    Tenderness     Additional Tenderness Details  Tenderness to palpation in the right knee over medial/lateral joint lines and around the periphery of portal sites.    Neurological Testing     Additional Neurological Details  Sensation to light touch intact and equal bilaterally from L2-S1 dermatomal pattern except for hyposensation in lateral right knee.    Active Range of Motion   Left Knee   Flexion: 140 degrees   Extension: 0 degrees   Extensor la degrees     Right Knee   Flexion: 108 degrees   Extension: 2 degrees   Extensor la degrees     Passive Range of Motion     Right Knee   Flexion: 111 degrees   Extension: 1 degrees     Patellar Mobility   Left Knee  Patellar tendons within functional limits include the medial, lateral, superior and inferior.     Right Knee Hypomobile in the medial, lateral, superior and inferior patellar tendon(s).     Strength/Myotome Testing     Left Hip   Planes of Motion   Flexion: 4+    Right Hip   Planes of Motion   Flexion: 4    Left Knee   Flexion: 5  Extension: 5  Quadriceps contraction: good    Right Knee   Flexion: 4+  Extension: 4-  Quadriceps contraction: fair    Ambulation     Ambulation: Level Surfaces     Additional Level Surfaces Ambulation Details  The patient ambulates with slightly decreased stance time on the right lower extremity, but good step length bilaterally.      See Exercise, Manual, and Modality Logs for complete treatment.     Assessment & Plan       Assessment  Impairments: abnormal gait, abnormal muscle firing, abnormal or restricted ROM, activity intolerance, impaired balance, impaired physical strength, lacks appropriate home exercise program, pain with function, safety issue and weight-bearing intolerance   Functional limitations: walking, uncomfortable because of pain, moving in bed, standing and stooping   Assessment details: The patient presents to physical therapy s/p right knee partial medial meniscectomy on 5/14/25. He presents with right knee pain and associated right knee weakness, stiffness, tenderness to palpation, antalgic gait, and functional deficits (LEFS). The patient would benefit from skilled physical therapy as described below to address these limitations and allow him to return to prior level of function.   Prognosis: good    Goals  Plan Goals: KNEE PROBLEMS:     1. The patient has limited ROM of the right knee.   LTG 1: 12 weeks:  The patient will demonstrate 0 to 140 degrees of ROM for the right knee in order to allow patient to walk, ascend/descend stairs, and return to sports without limitation.    STATUS:  New   STG 1a: 6 weeks:  The patient will demonstrate 0 to 120 degrees of ROM for  the right knee.    STATUS:  New    2. The patient has limited strength of the right knee.   LTG 2: 12 weeks: The patient will demonstrate 5 /5 strength for right knee flexion and extension in order to allow patient improved joint stability    STATUS:  New   STG 2a: 6 weeks: The patient will demonstrate 4+ /5 strength for right knee flexion and extension    STATUS:  New      3. The patient has gait dysfunction.   LTG 3: 12 weeks:  The patient will ambulate without assistive device, independently, for community distances with normal biomechanics of the right lower extremity in order to improve mobility and allow patient to perform activities such as grocery shopping with greater ease.    STATUS:  New    4. The patient has limited strength of the right hip.   LTG 4: 12 weeks: The patient will demonstrate 4+ /5 strength for right hip flexion, abduction, and extension in order to allow patient improved joint stability    STATUS:  New   STG 4a: 6 weeks: The patient will demonstrate 4 /5 strength for right hip flexion, abduction, and extension    STATUS:  New     5. Mobility: Walking/Moving Around Functional Limitation     LTG 5: 12 weeks:  The patient will demonstrate 25 % limitation by achieving a score of 60/80 on the LEFS.    STATUS:  New   STG 5 a: 6 weeks:  The patient will demonstrate 50 % limitation by achieving a score of 40/80 on the LEFS.       STATUS:  New    Plan  Therapy options: will be seen for skilled therapy services  Planned modality interventions: cryotherapy, electrical stimulation/Russian stimulation and TENS  Planned therapy interventions: balance/weight-bearing training, ADL retraining, soft tissue mobilization, strengthening, stretching, therapeutic activities, joint mobilization, home exercise program, gait training, functional ROM exercises, flexibility, body mechanics training, postural training, neuromuscular re-education and manual therapy  Frequency: 2x week  Duration in weeks: 12  Treatment  plan discussed with: patient        Visit Diagnoses:    ICD-10-CM ICD-9-CM   1. S/P arthroscopic partial medial meniscectomy of right knee  Z98.890 V45.89    Z87.828 V15.59   2. Right knee pain, unspecified chronicity  M25.561 719.46   3. Difficulty walking  R26.2 719.7       History # of Personal Factors and/or Comorbidities: MODERATE (1-2)  Examination of Body System(s): # of elements: LOW (1-2)  Clinical Presentation: STABLE   Clinical Decision Making: LOW       Timed:         Manual Therapy:    0     mins  44059;     Therapeutic Exercise:    10     mins  90759;     Neuromuscular Adrian:    0    mins  72071;    Therapeutic Activity:     0     mins  49356;     Gait Trainin     mins  38810;     Ultrasound:     0     mins  81449;    Ionto                               0    mins   18173  Self Care                       0     mins   42808  Canalith Repos    0     mins 06383      Un-Timed:  Electrical Stimulation:    0     mins  94441 (MC );  Dry Needling     0     mins self-pay  Traction     0     mins 92742  Low Eval     25     Mins  38438  Mod Eval     0     Mins  93117  High Eval                       0     Mins  87904  Re-Eval                           0    mins  57474    Timed Treatment:   10   mins   Total Treatment:     35   mins    PT SIGNATURE: Artemio Ortiz PT    Electronically signed 2025    [place] License: PT - [id number]      Initial Certification  Certification Period: 2025 thru 2025  I certify that the therapy services are furnished while this patient is under my care.  The services outlined above are required by this patient, and will be reviewed every 90 days.     PHYSICIAN: Erickson eHrnandez MD  NPI: [id number]      DATE:     Please sign and return via fax to [phone]. Thank you, [facility].

## 2025-05-22 ENCOUNTER — TREATMENT (OUTPATIENT)
Dept: PHYSICAL THERAPY | Facility: CLINIC | Age: 65
End: 2025-05-22
Payer: MEDICARE

## 2025-05-22 DIAGNOSIS — Z87.828 S/P ARTHROSCOPIC PARTIAL MEDIAL MENISCECTOMY OF RIGHT KNEE: ICD-10-CM

## 2025-05-22 DIAGNOSIS — Z98.890 S/P ARTHROSCOPIC PARTIAL MEDIAL MENISCECTOMY OF RIGHT KNEE: ICD-10-CM

## 2025-05-22 DIAGNOSIS — R26.2 DIFFICULTY WALKING: ICD-10-CM

## 2025-05-22 DIAGNOSIS — M25.561 RIGHT KNEE PAIN, UNSPECIFIED CHRONICITY: Primary | ICD-10-CM

## 2025-05-22 NOTE — PROGRESS NOTES
Outpatient Physical Therapy                   Physical Therapy Daily Treatment Note    Patient: Devyn Steinberg   : 1960  Diagnosis/ICD-10 Code:  Right knee pain, unspecified chronicity [M25.561]  Referring practitioner: Erickson Hernandez MD  Date of Initial Visit: Type: THERAPY  Noted: 2025  Today's Date: 2025  Patient seen for 2 sessions             Subjective   Devyn Steinberg reports: compliance with his HEP.     Pain: 0/10 pain, at time of arrival but when he walks sometimes it feels like one part of his knee is going to go one way and another part is going a different way. Patient notes it feels unstable.     Objective     See Exercise, Manual, and Modality Logs for complete treatment.     Assessment/Plan  Devyn is just beginning care to attend to deficits outlined in evaluation, requiring further therapist directed strengthening. Patient states he began to have some increased discomfort with exercises; his pain was 3/10 at the end of the session. Assess how patient tolerated treatment next session.    Progress per Plan of Care      Timed:  Manual Therapy:    0     mins  36216;  Therapeutic Exercise:    16     mins  15772;     Neuromuscular Adrian:    0    mins  91641;    Therapeutic Activity:     10     mins  26077;     Self care:                       0     mins  33780;  Gait Trainin     mins  27143;       Ultrasound:                    0     mins  70140;      Untimed:  Mechanical Traction:    0     mins  22805;   Electrical Stimulation:    0     mins  65197 ( );      Timed Treatment:   26   mins   Total Treatment:     26   mins      Electronically signed:   Taylor Preston PTA  Physical Therapist Assistant  [facility] License #: [id number]

## 2025-05-28 ENCOUNTER — TREATMENT (OUTPATIENT)
Dept: PHYSICAL THERAPY | Facility: CLINIC | Age: 65
End: 2025-05-28
Payer: MEDICARE

## 2025-05-28 DIAGNOSIS — Z87.828 S/P ARTHROSCOPIC PARTIAL MEDIAL MENISCECTOMY OF RIGHT KNEE: ICD-10-CM

## 2025-05-28 DIAGNOSIS — Z98.890 S/P ARTHROSCOPIC PARTIAL MEDIAL MENISCECTOMY OF RIGHT KNEE: ICD-10-CM

## 2025-05-28 DIAGNOSIS — M25.561 RIGHT KNEE PAIN, UNSPECIFIED CHRONICITY: Primary | ICD-10-CM

## 2025-05-28 DIAGNOSIS — R26.2 DIFFICULTY WALKING: ICD-10-CM

## 2025-05-28 NOTE — PROGRESS NOTES
Physical Therapy Daily Treatment Note      Patient: Devyn Steinberg   : 1960  Referring practitioner: Erickson Hernandez MD  Date of Initial Visit: Type: THERAPY  Noted: 2025  Today's Date: 2025  Patient seen for 3 sessions           Subjective Questionnaire:       Subjective Evaluation    History of Present Illness    Subjective comment: Pt reports his R knee is feeling better than it has been.       Objective   See Exercise, Manual, and Modality Logs for complete treatment.       Assessment & Plan       Assessment  Assessment details: Pt reported his knee was popping.  Pt tolerated strengthening exercise well.  Pt is progressing and will benefit from continued PT.        Visit Diagnoses:    ICD-10-CM ICD-9-CM   1. Right knee pain, unspecified chronicity  M25.561 719.46   2. S/P arthroscopic partial medial meniscectomy of right knee  Z98.890 V45.89    Z87.828 V15.59   3. Difficulty walking  R26.2 719.7       Progress per Plan of Care and Progress strengthening /stabilization /functional activity           Timed:  Manual Therapy:         mins  99752;  Therapeutic Exercise:    20     mins  00523;     Neuromuscular Adrian:        mins  79368;    Therapeutic Activity:     9     mins  44066;     Gait Training:           mins  39688;     Ultrasound:          mins  82290;    Electrical Stimulation:         mins  86529 ( );  Aquatic Therapy          mins  67163    Untimed:  Electrical Stimulation:         mins  42080 ( );  Mechanical Traction:         mins  73653;     Timed Treatment:   29   mins   Total Treatment:     29   mins    Electronically signed    Odessa Ratliff PTA  Physical Therapist Assistant    [facility] license: [id number]

## 2025-05-29 ENCOUNTER — OFFICE VISIT (OUTPATIENT)
Dept: ORTHOPEDIC SURGERY | Facility: CLINIC | Age: 65
End: 2025-05-29
Payer: MEDICARE

## 2025-05-29 VITALS — OXYGEN SATURATION: 96 % | SYSTOLIC BLOOD PRESSURE: 135 MMHG | HEART RATE: 89 BPM | DIASTOLIC BLOOD PRESSURE: 80 MMHG

## 2025-05-29 DIAGNOSIS — Z98.890 S/P RIGHT KNEE ARTHROSCOPY: Primary | ICD-10-CM

## 2025-05-29 NOTE — PROGRESS NOTES
Chief Complaint  Follow-up of the Right Knee    Subjective      Devyn Steinberg presents to [facility]     History of Present Illness  The patient is here today following up on his right knee. He is 2 weeks status post right knee arthroscopy with partial medial meniscectomy and medial femoral and patellofemoral chondroplasty performed by Dr. Hernandez on 05/14/2025.    He reports an improvement in his knee condition, although he notes that the affected knee remains larger than the contralateral one due to persistent swelling. He has initiated physical therapy, working with [facility].  Despite the swelling, he has been able to manage his symptoms with ice and rest.  He states he is doing much better than prior to surgery.      Allergies   Allergen Reactions    Dulaglutide Other (See Comments)     Pancreatitis    Tyloxapol Swelling       Objective     Vital Signs:   Vitals:    05/29/25 0906   BP: 135/80   Pulse: 89   SpO2: 96%   PainSc: 0-No pain     There is no height or weight on file to calculate BMI.    I reviewed the patient's chief complaint, history of present illness, review of systems, past medical history, surgical history, family history, social history, medications, and allergy list.     Ortho Exam  Knee Scope   General: Alert, no acute distress  Right lower extremity: Sutures removed today without complications.  Well-healing arthroscopy portals about right knee. No redness or active drainage noted.  No knee effusion.  Mild tenderness to the medial joint line.  Mild tenderness to the lateral joint line.  120 degrees of flexion.  0 degrees extension. Knee extensor mechanism intact. Knee stable to varus and valgus stress. Calf soft, nontender. Demonstrates active ankle plantarflexion and dorsiflexion. Sensation intact over the dorsal and plantar foot.  Palpable pedal pulses.                       Imaging Results (Most Recent)       None              Results           Assessment and Plan   Diagnoses and all orders for this visit:    1. S/P right knee arthroscopy with partial medial meniscectomy and chondroplasty (Primary)            Devyn Steinberg presents today 2 weeks postop right knee arthroscopy with partial medial meniscectomy and chondroplasty performed by Dr Hernandez on 5/14/2025.  Sutures removed today without complications.  Arthroscopy portals are well-healing.  No active drainage or redness noted.  No concerning signs of infection. Patient is doing well and denies fever or chills.     Incision site care was reviewed today. Patient instructed not to soak or submerge incision. Do not apply any lotions, creams, or ointments to the incision at this time.     Continue icing as needed to help with pain and swelling.  Ice knee up to 3 or 4 times daily for no longer than 15 to 20 minutes at a time.      We discussed ordering formal physical therapy versus home exercises.  Patient understood and elected to proceed with formal physical therapy continue until end of June.  We discussed the importance of these exercises to improve range of motion and strength.  Patient understood and agreed.      Patient will follow up in 4 weeks for reevaluation. No new x-rays needed at next visit.     Call or return if symptoms worsen or patient has any concerns.           Tobacco Use: Low Risk  (5/29/2025)    Patient History     Smoking Tobacco Use: Never     Smokeless Tobacco Use: Never     Passive Exposure: Past     Patient reports that they are a nonsmoker; cessation education not applicable.            Follow Up   Return in about 1 month (around 6/29/2025).  There are no Patient Instructions on file for this visit.    Patient was given instructions and counseling regarding his condition or for health maintenance advice. Please see specific information pulled into the AVS if appropriate.     Patient or patient representative verbalized consent for the use of Ambient  Listening during the visit with  KINJAL Grimaldo for chart documentation. 5/29/2025  15:17 EDT    Dictated Utilizing Dragon Dictation. Please note that portions of this note were completed with a voice recognition program. Part of this note may be an electronic transcription/translation of spoken language to printed text using the Dragon Dictation System.

## 2025-05-30 ENCOUNTER — TREATMENT (OUTPATIENT)
Dept: PHYSICAL THERAPY | Facility: CLINIC | Age: 65
End: 2025-05-30
Payer: MEDICARE

## 2025-05-30 DIAGNOSIS — Z98.890 S/P ARTHROSCOPIC PARTIAL MEDIAL MENISCECTOMY OF RIGHT KNEE: ICD-10-CM

## 2025-05-30 DIAGNOSIS — Z87.828 S/P ARTHROSCOPIC PARTIAL MEDIAL MENISCECTOMY OF RIGHT KNEE: ICD-10-CM

## 2025-05-30 DIAGNOSIS — M25.561 RIGHT KNEE PAIN, UNSPECIFIED CHRONICITY: Primary | ICD-10-CM

## 2025-05-30 NOTE — PROGRESS NOTES
Physical Therapy Daily Treatment Note      Patient: Devyn Steinberg   : 1960  Referring practitioner: Erickson Hernandez MD  Date of Initial Visit: Type: THERAPY  Noted: 2025  Today's Date: 2025  Patient seen for 4 sessions           Subjective Questionnaire:       Subjective Evaluation    History of Present Illness    Subjective comment: Pt reports R knee pain 3-4/10.  Pt reports his knee feels like it will give on him but it hasn't yet.       Objective   See Exercise, Manual, and Modality Logs for complete treatment.       Assessment & Plan       Assessment  Assessment details: Pt tolerated strengthening well without c/o pain or discomfort.  Pt's R knee is progressing well.  Continue with POC.         Visit Diagnoses:    ICD-10-CM ICD-9-CM   1. Right knee pain, unspecified chronicity  M25.561 719.46   2. S/P arthroscopic partial medial meniscectomy of right knee  Z98.890 V45.89    Z87.828 V15.59       Progress per Plan of Care and Progress strengthening /stabilization /functional activity           Timed:  Manual Therapy:         mins  43948;  Therapeutic Exercise:    20     mins  72350;     Neuromuscular Adrian:        mins  20624;    Therapeutic Activity:     8     mins  02015;     Gait Training:           mins  15563;     Ultrasound:          mins  38919;    Electrical Stimulation:         mins  96589 ( );  Aquatic Therapy          mins  50766    Untimed:  Electrical Stimulation:         mins  93329 ( );  Mechanical Traction:         mins  45230;     Timed Treatment:   28   mins   Total Treatment:     28   mins    Electronically signed    Odessa Ratliff PTA  Physical Therapist Assistant    [facility] license: [id number]

## 2025-06-03 ENCOUNTER — TREATMENT (OUTPATIENT)
Dept: PHYSICAL THERAPY | Facility: CLINIC | Age: 65
End: 2025-06-03
Payer: MEDICARE

## 2025-06-03 DIAGNOSIS — Z87.828 S/P ARTHROSCOPIC PARTIAL MEDIAL MENISCECTOMY OF RIGHT KNEE: Primary | ICD-10-CM

## 2025-06-03 DIAGNOSIS — R26.2 DIFFICULTY WALKING: ICD-10-CM

## 2025-06-03 DIAGNOSIS — Z98.890 S/P ARTHROSCOPIC PARTIAL MEDIAL MENISCECTOMY OF RIGHT KNEE: Primary | ICD-10-CM

## 2025-06-03 DIAGNOSIS — M25.561 RIGHT KNEE PAIN, UNSPECIFIED CHRONICITY: ICD-10-CM

## 2025-06-03 PROCEDURE — 97110 THERAPEUTIC EXERCISES: CPT | Performed by: PHYSICAL THERAPIST

## 2025-06-03 PROCEDURE — 97530 THERAPEUTIC ACTIVITIES: CPT | Performed by: PHYSICAL THERAPIST

## 2025-06-03 PROCEDURE — 97112 NEUROMUSCULAR REEDUCATION: CPT | Performed by: PHYSICAL THERAPIST

## 2025-06-03 NOTE — PROGRESS NOTES
Physical Therapy Daily Treatment Note                      [facility] [address] [place] [address]    Patient: Devyn Steinberg   : 1960  Diagnosis/ICD-10 Code:  S/P arthroscopic partial medial meniscectomy of right knee [Z98.890, Z87.828]  Referring practitioner: Erickson Hernandez MD  Date of Initial Visit: Type: THERAPY  Noted: 2025  Today's Date: 6/3/2025  Patient seen for 5 sessions           Subjective   The patient reported that his knee feels sore today, but no worse than normal.     Objective   See Exercise, Manual, and Modality Logs for complete treatment.     Assessment/Plan    The patient demonstrated good tolerance to all functional lower extremity strengthening exercise. Continue to progress with current PT plan of care per patient tolerance.         Timed:  Manual Therapy:    0     mins  49589;  Therapeutic Exercise:    15     mins  74918;     Neuromuscular Adrian:   8    mins  19499;    Therapeutic Activity:     15     mins  35545;     Gait Trainin     mins  06767;     Aquatics                         0      mins  73194    Un-timed:  Mechanical Traction      0     mins  44362  Dry Needling     0     mins self-pay  Electrical Stimulation:    0     mins  83488 ( );      Timed Treatment:   38   mins   Total Treatment:     38   mins    Artemio Ortiz PT  Physical Therapist    Electronically signed 6/3/2025    [place] License: PT - [id number]

## 2025-06-05 ENCOUNTER — TREATMENT (OUTPATIENT)
Dept: PHYSICAL THERAPY | Facility: CLINIC | Age: 65
End: 2025-06-05
Payer: MEDICARE

## 2025-06-05 DIAGNOSIS — Z98.890 S/P ARTHROSCOPIC PARTIAL MEDIAL MENISCECTOMY OF RIGHT KNEE: Primary | ICD-10-CM

## 2025-06-05 DIAGNOSIS — M25.561 RIGHT KNEE PAIN, UNSPECIFIED CHRONICITY: ICD-10-CM

## 2025-06-05 DIAGNOSIS — Z87.828 S/P ARTHROSCOPIC PARTIAL MEDIAL MENISCECTOMY OF RIGHT KNEE: Primary | ICD-10-CM

## 2025-06-05 DIAGNOSIS — R26.2 DIFFICULTY WALKING: ICD-10-CM

## 2025-06-05 NOTE — PROGRESS NOTES
Physical Therapy Daily Treatment Note                      [facility] [address] [place] [address]    Patient: Devyn Steinberg   : 1960  Diagnosis/ICD-10 Code:  S/P arthroscopic partial medial meniscectomy of right knee [Z98.890, Z87.828]  Referring practitioner: Erickson Hernandez MD  Date of Initial Visit: Type: THERAPY  Noted: 2025  Today's Date: 2025  Patient seen for 6 sessions           Subjective   The patient reported that his knee pain is a 4-5/10 today. The pain is located mainly on the medial side of his left knee.    Objective   See Exercise, Manual, and Modality Logs for complete treatment.     Assessment/Plan    The patient demonstrated good tolerance to all functional lower extremity strengthening exercise. We were able to progress exercise today. Continue to progress with current PT plan of care per patient tolerance.         Timed:  Manual Therapy:    0     mins  82550;  Therapeutic Exercise:    15     mins  36763;     Neuromuscular Adrian:   8    mins  45255;    Therapeutic Activity:     15     mins  56723;     Gait Trainin     mins  15646;     Aquatics                         0      mins  08200    Un-timed:  Mechanical Traction      0     mins  53246  Dry Needling     0     mins self-pay  Electrical Stimulation:    0     mins  82112 ( );      Timed Treatment:   38   mins   Total Treatment:     38   mins    Artemio Ortiz PT  Physical Therapist    Electronically signed 2025    [place] License: PT - [id number]

## 2025-06-11 ENCOUNTER — TREATMENT (OUTPATIENT)
Dept: PHYSICAL THERAPY | Facility: CLINIC | Age: 65
End: 2025-06-11
Payer: MEDICARE

## 2025-06-11 DIAGNOSIS — Z87.828 S/P ARTHROSCOPIC PARTIAL MEDIAL MENISCECTOMY OF RIGHT KNEE: ICD-10-CM

## 2025-06-11 DIAGNOSIS — Z98.890 S/P ARTHROSCOPIC PARTIAL MEDIAL MENISCECTOMY OF RIGHT KNEE: ICD-10-CM

## 2025-06-11 DIAGNOSIS — M25.561 RIGHT KNEE PAIN, UNSPECIFIED CHRONICITY: Primary | ICD-10-CM

## 2025-06-11 DIAGNOSIS — R26.2 DIFFICULTY WALKING: ICD-10-CM

## 2025-06-11 PROCEDURE — 97530 THERAPEUTIC ACTIVITIES: CPT | Performed by: PHYSICAL THERAPIST

## 2025-06-11 PROCEDURE — 97110 THERAPEUTIC EXERCISES: CPT | Performed by: PHYSICAL THERAPIST

## 2025-06-11 NOTE — PROGRESS NOTES
Physical Therapy Daily Treatment Note      Patient: Devyn Steinberg   : 1960  Referring practitioner: Erickson Hernandez MD  Date of Initial Visit: Type: THERAPY  Noted: 2025  Today's Date: 2025  Patient seen for 7 sessions           Subjective Questionnaire:       Subjective Evaluation    History of Present Illness    Subjective comment: Pt reports his R knee is sore 2/10.       Objective   See Exercise, Manual, and Modality Logs for complete treatment.       Assessment & Plan       Assessment  Assessment details: Pt reports his R knee pain increases with standing or standing more than 10 minutes. Pt tolerated strengthening exercise well.  Pt's R knee was able to achieve 0 degrees with max overpressure.  Pt was encouraged to perform hamstring stretches at home.  Continue with POC.        Visit Diagnoses:    ICD-10-CM ICD-9-CM   1. Right knee pain, unspecified chronicity  M25.561 719.46   2. S/P arthroscopic partial medial meniscectomy of right knee  Z98.890 V45.89    Z87.828 V15.59   3. Difficulty walking  R26.2 719.7       Progress per Plan of Care and Progress strengthening /stabilization /functional activity           Timed:  Manual Therapy:    2     mins  99671;  Therapeutic Exercise:    23     mins  09972;     Neuromuscular Adrian:        mins  50271;    Therapeutic Activity:     8     mins  10727;     Gait Training:           mins  38394;     Ultrasound:          mins  49436;    Electrical Stimulation:         mins  13050 ( );  Aquatic Therapy          mins  58348    Untimed:  Electrical Stimulation:         mins  66468 ( );  Mechanical Traction:         mins  64867;     Timed Treatment:   33   mins   Total Treatment:     33   mins    Electronically signed    Odessa Ratliff PTA  Physical Therapist Assistant    [facility] license: [id number]

## 2025-06-20 ENCOUNTER — TREATMENT (OUTPATIENT)
Dept: PHYSICAL THERAPY | Facility: CLINIC | Age: 65
End: 2025-06-20
Payer: MEDICARE

## 2025-06-20 DIAGNOSIS — M25.561 RIGHT KNEE PAIN, UNSPECIFIED CHRONICITY: ICD-10-CM

## 2025-06-20 DIAGNOSIS — Z87.828 S/P ARTHROSCOPIC PARTIAL MEDIAL MENISCECTOMY OF RIGHT KNEE: Primary | ICD-10-CM

## 2025-06-20 DIAGNOSIS — Z98.890 S/P ARTHROSCOPIC PARTIAL MEDIAL MENISCECTOMY OF RIGHT KNEE: Primary | ICD-10-CM

## 2025-06-20 DIAGNOSIS — R26.2 DIFFICULTY WALKING: ICD-10-CM

## 2025-06-20 NOTE — PROGRESS NOTES
Progress Note  [facility] [address] [place] [address]      Patient: Devyn Steinberg   : 1960  Diagnosis/ICD-10 Code:  S/P arthroscopic partial medial meniscectomy of right knee [Z98.890, Z87.828]  Referring practitioner: Erickson Hernandez MD  Date of Initial Visit: Type: THERAPY  Noted: 2025  Today's Date: 2025  Patient seen for 8 sessions      Subjective:   Subjective Questionnaire:  LEFS: 38/80 = 47.5% Function   Clinical Progress: improved  Home Program Compliance: Yes  Treatment has included: therapeutic exercise, neuromuscular re-education, manual therapy, therapeutic activity, and gait training    Subjective   The patient reported that his knee pain is a 4/10 today. He had a rough few days earlier in the week, but is feeling a little better now. Over the past month he has noticed improvements in knee strength and flexibility. However, he still has limitations with mobility. As long as he doesn't have to walk, he does pretty good. He would buck to continue with PT at this time.    Objective      Tenderness      Additional Tenderness Details  Tenderness to palpation in the right knee over medial/lateral joint lines and around the periphery of portal sites.     Neurological Testing      Additional Neurological Details  Sensation to light touch intact and equal bilaterally from L2-S1 dermatomal pattern except for hyposensation in lateral right knee.     Active Range of Motion   Left Knee   Flexion: 140 degrees   Extension: 0 degrees   Extensor la degrees      Right Knee   Flexion: 110 degrees   Extension: 2 degrees   Extensor la degrees      Passive Range of Motion      Right Knee   Flexion: 122 degrees   Extension: 1 degrees      Patellar Mobility   Left Knee Patellar tendons within functional limits include the medial, lateral, superior and inferior.      Right Knee Hypomobile in the medial, lateral, superior and inferior patellar tendon(s).      Strength/Myotome Testing       Left Hip   Planes of Motion   Flexion: 4+     Right Hip   Planes of Motion   Flexion: 4     Left Knee   Flexion: 5  Extension: 5  Quadriceps contraction: good     Right Knee   Flexion: 4+  Extension: 4+  Quadriceps contraction: good     Ambulation      Ambulation: Level Surfaces      Additional Level Surfaces Ambulation Details  The patient ambulates with slightly decreased stance time on the right lower extremity, but good step length bilaterally.     See Exercise, Manual, and Modality Logs for complete treatment.     Assessment/Plan  The patient was re-evaluated today and presents with improvements in right knee strength, ROM, and function (LEFS) compared to his initial evaluation. Although improved, he continues to present with moderate right knee pain, mild edema, and ongoing functional limitations. He would benefit from continued skilled physical therapy to address remaining functional deficits and to allow the patient to return to his prior level of function.     Goals  Plan Goals: KNEE PROBLEMS:      1. The patient has limited ROM of the right knee.              LTG 1: 12 weeks:  The patient will demonstrate 0 to 140 degrees of ROM for the right knee in order to allow patient to walk, ascend/descend stairs, and return to sports without limitation.                          STATUS:  progressing              STG 1a: 6 weeks:  The patient will demonstrate 0 to 120 degrees of ROM for the right knee.                          STATUS:  progressing     2. The patient has limited strength of the right knee.              LTG 2: 12 weeks: The patient will demonstrate 5 /5 strength for right knee flexion and extension in order to allow patient improved joint stability                          STATUS:  progressing              STG 2a: 6 weeks: The patient will demonstrate 4+ /5 strength for right knee flexion and extension                          STATUS:  met                 3. The patient has gait dysfunction.               LTG 3: 12 weeks:  The patient will ambulate without assistive device, independently, for community distances with normal biomechanics of the right lower extremity in order to improve mobility and allow patient to perform activities such as grocery shopping with greater ease.                          STATUS:  progressing     4. The patient has limited strength of the right hip.              LTG 4: 12 weeks: The patient will demonstrate 4+ /5 strength for right hip flexion, abduction, and extension in order to allow patient improved joint stability                          STATUS:  progressing              STG 4a: 6 weeks: The patient will demonstrate 4 /5 strength for right hip flexion, abduction, and extension                          STATUS:  progressing     5. Mobility: Walking/Moving Around Functional Limitation                               LTG 5: 12 weeks:  The patient will demonstrate 25 % limitation by achieving a score of 60/80 on the LEFS.                          STATUS:  progressing              STG 5 a: 6 weeks:  The patient will demonstrate 50 % limitation by achieving a score of 40/80 on the LEFS.                             STATUS:  progressing    Progress toward previous goals: Partially Met      Recommendations: Continue as planned  Timeframe: 1 month  Prognosis to achieve goals: good    PT Signature: Artemio Ortiz PT    Electronically signed 2025    [place] License: PT - [id number]       Timed:  Manual Therapy:    0     mins  86495;  Therapeutic Exercise:    24     mins  96694;     Neuromuscular Adrian:    0    mins  46133;    Therapeutic Activity:     14     mins  65749;     Gait Trainin     mins  88256;     Aquatics                         0      mins  15989    Un-timed:  Mechanical Traction      0     mins  46313  Dry Needling     0     mins self-pay  Electrical Stimulation:    0     mins  43360 ( )  Re-evaluation:               0     mins 15687;    Timed Treatment:   38   mins    Total Treatment:     38   mins

## 2025-06-25 ENCOUNTER — TREATMENT (OUTPATIENT)
Dept: PHYSICAL THERAPY | Facility: CLINIC | Age: 65
End: 2025-06-25
Payer: MEDICARE

## 2025-06-25 DIAGNOSIS — Z98.890 S/P ARTHROSCOPIC PARTIAL MEDIAL MENISCECTOMY OF RIGHT KNEE: ICD-10-CM

## 2025-06-25 DIAGNOSIS — Z87.828 S/P ARTHROSCOPIC PARTIAL MEDIAL MENISCECTOMY OF RIGHT KNEE: ICD-10-CM

## 2025-06-25 DIAGNOSIS — M25.561 RIGHT KNEE PAIN, UNSPECIFIED CHRONICITY: ICD-10-CM

## 2025-06-25 DIAGNOSIS — R26.2 DIFFICULTY WALKING: Primary | ICD-10-CM

## 2025-06-25 NOTE — PROGRESS NOTES
Physical Therapy Daily Treatment Note      Patient: Devyn Steinberg   : 1960  Referring practitioner: Erickson Hernandez MD  Date of Initial Visit: Type: THERAPY  Noted: 2025  Today's Date: 2025  Patient seen for 9 sessions           Subjective Questionnaire:       Subjective Evaluation    History of Present Illness    Subjective comment: Pt reports his R knee is getting better all the time.       Objective   See Exercise, Manual, and Modality Logs for complete treatment.       Assessment & Plan       Assessment  Assessment details: Pt tolerated strengthening well without c/o pain or discomfort.  Pt reported his R knee was pretty sore yesterday.  Pt is progressing and will benefit from continued PT.        Visit Diagnoses:    ICD-10-CM ICD-9-CM   1. Difficulty walking  R26.2 719.7   2. S/P arthroscopic partial medial meniscectomy of right knee  Z98.890 V45.89    Z87.828 V15.59   3. Right knee pain, unspecified chronicity  M25.561 719.46       Progress per Plan of Care and Progress strengthening /stabilization /functional activity           Timed:  Manual Therapy:         mins  44575;  Therapeutic Exercise:    18     mins  03791;     Neuromuscular Adrian:        mins  96915;    Therapeutic Activity:     9     mins  89757;     Gait Training:           mins  51270;     Ultrasound:          mins  22263;    Electrical Stimulation:         mins  69628 ( );  Aquatic Therapy          mins  52364    Untimed:  Electrical Stimulation:         mins  88908 ( );  Mechanical Traction:         mins  74788;     Timed Treatment:   27   mins   Total Treatment:     27   mins    Electronically signed    Odessa Ratliff PTA  Physical Therapist Assistant    [employer] license: [id number]

## 2025-06-26 ENCOUNTER — OFFICE VISIT (OUTPATIENT)
Dept: ORTHOPEDIC SURGERY | Facility: CLINIC | Age: 65
End: 2025-06-26
Payer: MEDICARE

## 2025-06-26 VITALS
DIASTOLIC BLOOD PRESSURE: 81 MMHG | OXYGEN SATURATION: 96 % | BODY MASS INDEX: 28.7 KG/M2 | WEIGHT: 162 LBS | HEART RATE: 77 BPM | HEIGHT: 63 IN | SYSTOLIC BLOOD PRESSURE: 138 MMHG

## 2025-06-26 DIAGNOSIS — Z98.890 S/P RIGHT KNEE ARTHROSCOPY: Primary | ICD-10-CM

## 2025-06-26 DIAGNOSIS — M17.5 OTHER SECONDARY OSTEOARTHRITIS OF RIGHT KNEE: ICD-10-CM

## 2025-06-26 RX ORDER — DICLOFENAC SODIUM 75 MG/1
75 TABLET, DELAYED RELEASE ORAL 2 TIMES DAILY
Qty: 180 TABLET | Refills: 0 | Status: SHIPPED | OUTPATIENT
Start: 2025-06-26

## 2025-06-26 NOTE — PROGRESS NOTES
"Chief Complaint  Follow-up of the Right Knee    Subjective      Devyn Steinberg presents to [facility]     History of Present Illness  The patient is here today for a follow-up on his right knee. He is 6 weeks post right knee arthroscopy with partial medial meniscectomy performed by Dr. Hernandez on 05/14/2025.    He has been attending physical therapy at [facility], with his last session being yesterday, marking his ninth visit. He reports that his knee was improving and he tolerated strengthening exercises without complaints of pain or discomfort. However, he experienced significant soreness in his right knee the day before after physical therapy. He has approximately two more physical therapy visits scheduled. He continues to experience soreness in his knee, which he attributes to [place]. The swelling in his knee persists, but he finds relief through physical therapy. He has not yet discussed the possibility of additional insurance coverage for further physical therapy sessions. He has been managing the pain with diclofenac and applying ice to the affected area. He also uses a brace for support.      Allergies   Allergen Reactions    Dulaglutide Other (See Comments)     Pancreatitis    Tyloxapol Swelling       Objective     Vital Signs:   Vitals:    06/26/25 0821   BP: 138/81   Pulse: 77   SpO2: 96%   Weight: 73.5 kg (162 lb)   Height: 160 cm (63\")     Body mass index is 28.7 kg/m².    I reviewed the patient's chief complaint, history of present illness, review of systems, past medical history, surgical history, family history, social history, medications, and allergy list.     Ortho Exam  Physical Exam  Musculoskeletal:  Right knee: 0 degrees of extension, 110 degrees of flexion, incisions well healed, no effusion, tenderness over medial joint line, mild crepitus with motion            Imaging Results (Most Recent)       None "             Results           Assessment and Plan   Diagnoses and all orders for this visit:    1. S/P right knee arthroscopy w/PMM (Primary)  -     diclofenac (VOLTAREN) 75 MG EC tablet; Take 1 tablet by mouth 2 (Two) Times a Day.  Dispense: 180 tablet; Refill: 0  -     Ambulatory Referral to Physical Therapy for Evaluation & Treatment    2. Other secondary osteoarthritis of right knee  -     Ambulatory Referral to Physical Therapy for Evaluation & Treatment       Assessment & Plan  Post-operative status following right knee arthroscopy with partial medial meniscectomy:  Currently 6 weeks post-surgery, attending physical therapy with nine sessions completed. Reports knee soreness, especially after activities such as [place] the lake, indicating insufficient strength in the leg to support the knee. Occasional achiness is expected.    An additional month of physical therapy focusing on strengthening exercises is recommended, with sessions twice a week. Diclofenac 75 mg has been prescribed with a 90-day supply. Ice application and elevation are advised to manage swelling. Use of a knee brace for support during prolonged activities is recommended, but caution against dependency is emphasized. If pain and discomfort persist after completing physical therapy, a steroid injection may be considered.    Follow-up: Follow-up appointment scheduled in 6 weeks.        Tobacco Use: Low Risk  (5/29/2025)    Patient History     Smoking Tobacco Use: Never     Smokeless Tobacco Use: Never     Passive Exposure: Past     Patient reports that they are a nonsmoker; cessation education not applicable.            Follow Up   Return in about 6 weeks (around 8/7/2025).  There are no Patient Instructions on file for this visit.    Patient was given instructions and counseling regarding his condition or for health maintenance advice. Please see specific information pulled into the AVS if appropriate.     Patient or patient representative  verbalized consent for the use of Ambient Listening during the visit with  KINJAL Grimaldo for chart documentation. 6/26/2025  13:02 EDT    Dictated Utilizing Dragon Dictation. Please note that portions of this note were completed with a voice recognition program. Part of this note may be an electronic transcription/translation of spoken language to printed text using the Dragon Dictation System.

## 2025-07-02 ENCOUNTER — TREATMENT (OUTPATIENT)
Dept: PHYSICAL THERAPY | Facility: CLINIC | Age: 65
End: 2025-07-02
Payer: MEDICARE

## 2025-07-02 DIAGNOSIS — Z87.828 S/P ARTHROSCOPIC PARTIAL MEDIAL MENISCECTOMY OF RIGHT KNEE: Primary | ICD-10-CM

## 2025-07-02 DIAGNOSIS — Z98.890 S/P ARTHROSCOPIC PARTIAL MEDIAL MENISCECTOMY OF RIGHT KNEE: Primary | ICD-10-CM

## 2025-07-02 DIAGNOSIS — R26.2 DIFFICULTY WALKING: ICD-10-CM

## 2025-07-02 DIAGNOSIS — M25.561 RIGHT KNEE PAIN, UNSPECIFIED CHRONICITY: ICD-10-CM

## 2025-07-02 NOTE — PROGRESS NOTES
Physical Therapy Daily Treatment Note                      [facility] [address] [place] [address]    Patient: Devyn Steinberg   : 1960  Diagnosis/ICD-10 Code:  S/P arthroscopic partial medial meniscectomy of right knee [Z98.890, Z87.828]  Referring practitioner: Erickson Hernandez MD  Date of Initial Visit: Type: THERAPY  Noted: 2025  Today's Date: 2025  Patient seen for 10 sessions           Subjective   The patient reported that his knee pain is a 2/10 today. It hurts more the longer he is up and walking. The pain is improved with rest.    Objective   See Exercise, Manual, and Modality Logs for complete treatment.     Assessment/Plan    The patient demonstrated good tolerance to all functional lower extremity strengthening exercise. Continue to progress with current PT plan of care per patient tolerance.         Timed:  Manual Therapy:    0     mins  30774;  Therapeutic Exercise:    18     mins  85376;     Neuromuscular Adrian:   10    mins  48289;    Therapeutic Activity:     10     mins  93023;     Gait Trainin     mins  53136;     Aquatics                         0      mins  56891    Un-timed:  Mechanical Traction      0     mins  45544  Dry Needling     0     mins self-pay  Electrical Stimulation:    0     mins  59342 ( );      Timed Treatment:   38   mins   Total Treatment:     38   mins    Artemio Ortiz PT  Physical Therapist    Electronically signed 2025    [place] License: PT - [id number]

## 2025-07-09 ENCOUNTER — TREATMENT (OUTPATIENT)
Dept: PHYSICAL THERAPY | Facility: CLINIC | Age: 65
End: 2025-07-09
Payer: MEDICARE

## 2025-07-09 DIAGNOSIS — Z98.890 S/P ARTHROSCOPIC PARTIAL MEDIAL MENISCECTOMY OF RIGHT KNEE: ICD-10-CM

## 2025-07-09 DIAGNOSIS — Z87.828 S/P ARTHROSCOPIC PARTIAL MEDIAL MENISCECTOMY OF RIGHT KNEE: ICD-10-CM

## 2025-07-09 DIAGNOSIS — M25.561 RIGHT KNEE PAIN, UNSPECIFIED CHRONICITY: Primary | ICD-10-CM

## 2025-07-09 DIAGNOSIS — R26.2 DIFFICULTY WALKING: ICD-10-CM

## 2025-07-09 NOTE — PROGRESS NOTES
Physical Therapy Daily Treatment Note                      [facility] [address] [place] [address]    Patient: Devyn Steinberg   : 1960  Diagnosis/ICD-10 Code:  Right knee pain, unspecified chronicity [M25.561]  Referring practitioner: Erickson Hernandez MD  Date of Initial Visit: Type: THERAPY  Noted: 2025  Today's Date: 2025  Patient seen for 11 sessions           Subjective   Pt states his knee is feeling pretty good today, states he does still have quite a bit of swelling.      Objective   See Exercise, Manual, and Modality Logs for complete treatment.     Assessment/Plan  Presents without AD, ambulates with slight decreased R heel strike due to slight extension lag.  R hamstring has tightness during passive stretching, slight edema palpated also.  Good overall tolerance of therex.  Pt will continue to benefit from treatments to further decrease pain and increase strength           Continue to progress POC per pt tolerance.    Timed:  Manual Therapy:    8     mins  98111;  Therapeutic Exercise:    15     mins  94484;     Neuromuscular Adrian:   15    mins  21147;    Therapeutic Activity:     0     mins  26968;     Gait Trainin     mins  30063;         Un-timed:  Mechanical Traction      0     mins  03107  Dry Needling     0     mins self-pay  Electrical Stimulation:    0     mins  20037 ( );      Timed Treatment:   38   mins   Total Treatment:     38   mins    Trisha Aparicio PTA  Physical Therapist Assistant    Electronically signed 2025    [place] License: [id number]

## 2025-07-11 ENCOUNTER — TREATMENT (OUTPATIENT)
Dept: PHYSICAL THERAPY | Facility: CLINIC | Age: 65
End: 2025-07-11
Payer: MEDICARE

## 2025-07-11 DIAGNOSIS — M25.561 RIGHT KNEE PAIN, UNSPECIFIED CHRONICITY: Primary | ICD-10-CM

## 2025-07-11 DIAGNOSIS — Z98.890 S/P ARTHROSCOPIC PARTIAL MEDIAL MENISCECTOMY OF RIGHT KNEE: ICD-10-CM

## 2025-07-11 DIAGNOSIS — R26.2 DIFFICULTY WALKING: ICD-10-CM

## 2025-07-11 DIAGNOSIS — Z87.828 S/P ARTHROSCOPIC PARTIAL MEDIAL MENISCECTOMY OF RIGHT KNEE: ICD-10-CM

## 2025-07-11 PROCEDURE — 97110 THERAPEUTIC EXERCISES: CPT | Performed by: PHYSICAL THERAPIST

## 2025-07-11 PROCEDURE — 97140 MANUAL THERAPY 1/> REGIONS: CPT | Performed by: PHYSICAL THERAPIST

## 2025-07-11 NOTE — PROGRESS NOTES
Physical Therapy Daily Treatment Note                      [facility] [address] [place] [address]    Patient: Devyn Steinberg   : 1960  Diagnosis/ICD-10 Code:  Right knee pain, unspecified chronicity [M25.561]  Referring practitioner: Erickson Hernandez MD  Date of Initial Visit: Type: THERAPY  Noted: 2025  Today's Date: 2025  Patient seen for 12 sessions           Subjective   Pt reports he did have some soreness after his last visit.  Pt reports he feels like his knee shouldn't yan bothering him at all anymore.    Objective   See Exercise, Manual, and Modality Logs for complete treatment.     Assessment/Plan    Continues to ambulate with slight decreased R heel strike/decreased terminal knee extension.  R hamstring and adductors have tightness during manual stretching. VCs for improved terminal quad contraction.  Will continue to benefit from treatments to decrease extension lag and increase strength.           Continue to progress POC per pt tolerance.    Timed:  Manual Therapy:    10     mins  69376;  Therapeutic Exercise:    15     mins  52779;     Neuromuscular Adrian:   0    mins  24775;    Therapeutic Activity:     0     mins  86493;     Gait Trainin     mins  99808;         Un-timed:  Mechanical Traction      0     mins  52701  Dry Needling     0     mins self-pay  Electrical Stimulation:    0     mins  34990 ( );      Timed Treatment:   25   mins   Total Treatment:     25   mins    Trisha Aparicio PTA  Physical Therapist Assistant    Electronically signed 2025    [place] License: [id number]

## 2025-07-16 ENCOUNTER — TREATMENT (OUTPATIENT)
Dept: PHYSICAL THERAPY | Facility: CLINIC | Age: 65
End: 2025-07-16
Payer: MEDICARE

## 2025-07-16 DIAGNOSIS — Z98.890 S/P ARTHROSCOPIC PARTIAL MEDIAL MENISCECTOMY OF RIGHT KNEE: ICD-10-CM

## 2025-07-16 DIAGNOSIS — R26.2 DIFFICULTY WALKING: ICD-10-CM

## 2025-07-16 DIAGNOSIS — Z87.828 S/P ARTHROSCOPIC PARTIAL MEDIAL MENISCECTOMY OF RIGHT KNEE: ICD-10-CM

## 2025-07-16 DIAGNOSIS — M25.561 RIGHT KNEE PAIN, UNSPECIFIED CHRONICITY: Primary | ICD-10-CM

## 2025-07-16 NOTE — PROGRESS NOTES
Physical Therapy Daily Treatment Note      Patient: Devyn Steinberg   : 1960  Referring practitioner: Erickson Hernandez MD  Date of Initial Visit: Type: THERAPY  Noted: 2025  Today's Date: 2025  Patient seen for 13 sessions           Subjective Questionnaire:       Subjective Evaluation    History of Present Illness    Subjective comment: Pt reports that the longer he walks the more it hurts.  Pt reports that currently (9:07 am) his R knee feels ok 1/10 but by the end of the day he is ready to cut it off.       Objective   See Exercise, Manual, and Modality Logs for complete treatment.       Assessment & Plan       Assessment  Assessment details: Pt reports that he twisted his knee, it popped and hasn't hurt a lot since then except with walking. Pt reported burning along lateral patella  edge with hamstring curls.  Pt tolerated all other exercise well.  Pt is progressing.  Continue with POC.        Visit Diagnoses:    ICD-10-CM ICD-9-CM   1. Right knee pain, unspecified chronicity  M25.561 719.46   2. S/P arthroscopic partial medial meniscectomy of right knee  Z98.890 V45.89    Z87.828 V15.59   3. Difficulty walking  R26.2 719.7       Progress per Plan of Care and Progress strengthening /stabilization /functional activity           Timed:  Manual Therapy:         mins  31651;  Therapeutic Exercise:    22     mins  24261;     Neuromuscular Adrian:        mins  41761;    Therapeutic Activity:     8     mins  28599;     Gait Training:           mins  21602;     Ultrasound:          mins  77905;    Electrical Stimulation:         mins  67062 ( );  Aquatic Therapy          mins  06297    Untimed:  Electrical Stimulation:         mins  63047 ( );  Mechanical Traction:         mins  60498;     Timed Treatment:   30   mins   Total Treatment:     30   mins    Electronically signed    Odessa Ratliff PTA  Physical Therapist Assistant    [place] license: [id number]

## 2025-07-18 ENCOUNTER — TREATMENT (OUTPATIENT)
Dept: PHYSICAL THERAPY | Facility: CLINIC | Age: 65
End: 2025-07-18
Payer: MEDICARE

## 2025-07-18 DIAGNOSIS — M25.561 RIGHT KNEE PAIN, UNSPECIFIED CHRONICITY: Primary | ICD-10-CM

## 2025-07-18 DIAGNOSIS — Z98.890 S/P ARTHROSCOPIC PARTIAL MEDIAL MENISCECTOMY OF RIGHT KNEE: ICD-10-CM

## 2025-07-18 DIAGNOSIS — Z87.828 S/P ARTHROSCOPIC PARTIAL MEDIAL MENISCECTOMY OF RIGHT KNEE: ICD-10-CM

## 2025-07-18 DIAGNOSIS — R26.2 DIFFICULTY WALKING: ICD-10-CM

## 2025-07-18 NOTE — PROGRESS NOTES
Physical Therapy Daily Treatment Note      Patient: Devyn Steinberg   : 1960  Referring practitioner: Erickson Hernandez MD  Date of Initial Visit: Type: THERAPY  Noted: 2025  Today's Date: 2025  Patient seen for 14 sessions           Subjective Questionnaire:       Subjective Evaluation    History of Present Illness    Subjective comment: Pt reports his R knee feels good currently and states it will hurt after a while.  Pt reports his R knee hurts every day starting to hurt with activity and gets to a point that he has to rest.       Objective   See Exercise, Manual, and Modality Logs for complete treatment.       Assessment & Plan       Assessment  Assessment details: Pt tolerated strengthening well.  Pt ambulates without total knee extension.  Pt was able to achieve 0 degrees R knee extension after Knee MOBs and passive over pressure.  Pt's R knee flexion is progressing well.  Continue with POC.        Visit Diagnoses:    ICD-10-CM ICD-9-CM   1. Right knee pain, unspecified chronicity  M25.561 719.46   2. S/P arthroscopic partial medial meniscectomy of right knee  Z98.890 V45.89    Z87.828 V15.59   3. Difficulty walking  R26.2 719.7       Progress per Plan of Care and Progress strengthening /stabilization /functional activity           Timed:  Manual Therapy:    8     mins  96076;  Therapeutic Exercise:    12     mins  62299;     Neuromuscular Adrian:        mins  39815;    Therapeutic Activity:     10     mins  55204;     Gait Training:           mins  52000;     Ultrasound:          mins  61056;    Electrical Stimulation:         mins  14512 ( );  Aquatic Therapy          mins  95713    Untimed:  Electrical Stimulation:         mins  98980 ( );  Mechanical Traction:         mins  82584;     Timed Treatment:   30   mins   Total Treatment:     30   mins    Electronically signed    Odessa Ratliff PTA  Physical Therapist Assistant    [place] license: [id number]

## 2025-07-31 ENCOUNTER — OFFICE VISIT (OUTPATIENT)
Dept: ORTHOPEDIC SURGERY | Facility: CLINIC | Age: 65
End: 2025-07-31
Payer: OTHER MISCELLANEOUS

## 2025-07-31 ENCOUNTER — TREATMENT (OUTPATIENT)
Dept: PHYSICAL THERAPY | Facility: CLINIC | Age: 65
End: 2025-07-31
Payer: MEDICARE

## 2025-07-31 VITALS
WEIGHT: 162 LBS | DIASTOLIC BLOOD PRESSURE: 78 MMHG | OXYGEN SATURATION: 97 % | SYSTOLIC BLOOD PRESSURE: 119 MMHG | HEART RATE: 72 BPM | HEIGHT: 63 IN | BODY MASS INDEX: 28.7 KG/M2

## 2025-07-31 DIAGNOSIS — R26.2 DIFFICULTY WALKING: ICD-10-CM

## 2025-07-31 DIAGNOSIS — Z87.828 S/P ARTHROSCOPIC PARTIAL MEDIAL MENISCECTOMY OF RIGHT KNEE: ICD-10-CM

## 2025-07-31 DIAGNOSIS — Z98.890 S/P ARTHROSCOPIC PARTIAL MEDIAL MENISCECTOMY OF RIGHT KNEE: ICD-10-CM

## 2025-07-31 DIAGNOSIS — M25.561 RIGHT KNEE PAIN, UNSPECIFIED CHRONICITY: Primary | ICD-10-CM

## 2025-07-31 DIAGNOSIS — Z96.612 HISTORY OF ARTHROPLASTY OF LEFT SHOULDER: Primary | ICD-10-CM

## 2025-07-31 NOTE — PROGRESS NOTES
Re-Assessment / Discharge  [facility] [address] [place] [address]      Patient: Devyn Steinberg   : 1960  Diagnosis/ICD-10 Code:  Right knee pain, unspecified chronicity [M25.561]  Referring practitioner: Erickson Hernandez MD  Date of Initial Visit: Type: THERAPY  Noted: 2025  Today's Date: 2025  Patient seen for 15 sessions      Subjective:   Subjective Questionnaire: LEFS: 41/80 = 51.25% Function   Clinical Progress: improved  Home Program Compliance: Yes  Treatment has included: therapeutic exercise, neuromuscular re-education, manual therapy, therapeutic activity, and gait training    Subjective   The patient reported that his right knee pain is a 1-2/10 today. It is located mainly on the medial side of his knee. His pain is increased with prolonged standing, with stairs, and sometimes with long car rides. He is a diabetic and acknowledges that he takes longer to heal. He feels like he is capable of discharging to a home program at this time.       Objective   Tenderness      Additional Tenderness Details  Tenderness to palpation in the right knee over medial/lateral joint lines and around the periphery of portal sites.     Neurological Testing      Additional Neurological Details  Sensation to light touch intact and equal bilaterally from L2-S1 dermatomal pattern except for hyposensation in lateral right knee.     Active Range of Motion   Left Knee   Flexion: 140 degrees   Extension: 0 degrees   Extensor la degrees      Right Knee   Flexion: 120 degrees   Extension: 0 degrees   Extensor la degrees      Passive Range of Motion      Right Knee   Flexion: 130 degrees   Extension: 0 degrees      Patellar Mobility   Left Knee Patellar tendons within functional limits include the medial, lateral, superior and inferior.      Right Knee Hypomobile in the medial, lateral, superior and inferior patellar tendon(s).      Strength/Myotome Testing      Left Hip   Planes of Motion    Flexion: 4+     Right Hip   Planes of Motion   Flexion: 4+     Left Knee   Flexion: 5  Extension: 5  Quadriceps contraction: good     Right Knee   Flexion: 4+  Extension: 4+  Quadriceps contraction: good     Ambulation      Ambulation: Level Surfaces      Additional Level Surfaces Ambulation Details  The patient ambulates with normal biomechanics    See Exercise, Manual, and Modality Logs for complete treatment.       Assessment/Plan  The patient was re-evaluated today and presents with improvements in right knee strength, ROM, and function (LEFS) compared to his initial evaluation. Although improved, he continues to present with moderate right knee pain, mild edema, and ongoing functional limitations. He would benefit from continued skilled physical therapy to address remaining functional deficits and to allow the patient to return to his prior level of function.      Goals  Plan Goals: KNEE PROBLEMS:      1. The patient has limited ROM of the right knee.              LTG 1: 12 weeks:  The patient will demonstrate 0 to 140 degrees of ROM for the right knee in order to allow patient to walk, ascend/descend stairs, and return to sports without limitation.                          STATUS:  progressing              STG 1a: 6 weeks:  The patient will demonstrate 0 to 120 degrees of ROM for the right knee.                          STATUS:  Met     2. The patient has limited strength of the right knee.              LTG 2: 12 weeks: The patient will demonstrate 5 /5 strength for right knee flexion and extension in order to allow patient improved joint stability                          STATUS:  progressing              STG 2a: 6 weeks: The patient will demonstrate 4+ /5 strength for right knee flexion and extension                          STATUS:  met                 3. The patient has gait dysfunction.              LTG 3: 12 weeks:  The patient will ambulate without assistive device, independently, for community  distances with normal biomechanics of the right lower extremity in order to improve mobility and allow patient to perform activities such as grocery shopping with greater ease.                          STATUS:  met     4. The patient has limited strength of the right hip.              LTG 4: 12 weeks: The patient will demonstrate 4+ /5 strength for right hip flexion, abduction, and extension in order to allow patient improved joint stability                          STATUS:  progressing              STG 4a: 6 weeks: The patient will demonstrate 4 /5 strength for right hip flexion, abduction, and extension                          STATUS:  progressing     5. Mobility: Walking/Moving Around Functional Limitation                               LTG 5: 12 weeks:  The patient will demonstrate 25 % limitation by achieving a score of 60/80 on the LEFS.                          STATUS:  progressing              STG 5 a: 6 weeks:  The patient will demonstrate 50 % limitation by achieving a score of 40/80 on the LEFS.                             STATUS:  met    Progress toward previous goals: Partially Met        Recommendations: Discharge    Prognosis to achieve goals: fair    PT Signature: Artemio Ortiz PT    Electronically signed 2025    [place] License: PT - [id number]         Timed:  Manual Therapy:    0     mins  38635;  Therapeutic Exercise:    14     mins  44707;     Neuromuscular Adrian:    0    mins  03905;    Therapeutic Activity:     10     mins  56691;     Gait Trainin     mins  70598;     Aquatics                         0      mins  75827    Un-timed:  Mechanical Traction      0     mins  16287  Dry Needling     0     mins self-pay  Electrical Stimulation:    0     mins  10355 ( )  Re-evaluation:               8     mins 72639;    Timed Treatment:   24   mins   Total Treatment:     32   mins

## 2025-07-31 NOTE — PROGRESS NOTES
"Chief Complaint  Follow-up of the Left Shoulder    Subjective      Devyn Steinberg presents to [facility]     History of Present Illness  The patient is here today for a follow-up on his left shoulder. He is status post left total shoulder reverse arthroplasty performed by  on 01/15/2024.    During his last office visit, he was doing well but still experiencing some discomfort in the shoulder. He was advised to obtain an impairment rating by worker's compensation for disability. He reports that his shoulder condition has improved significantly. However, he continues to experience difficulty in certain movements, such as lifting his arm. Lying on his side results in soreness upon waking, which lasts for about 2 hours before subsiding for the rest of the day. He has been assigned a disability rating of 21 percent by the company doctor. He is currently retired and has been offered a return to work at a reduced hourly rate.    He is still doing physical therapy for his knees.      Allergies   Allergen Reactions    Dulaglutide Other (See Comments)     Pancreatitis    Tyloxapol Swelling       Objective     Vital Signs:   Vitals:    07/31/25 0848   BP: 119/78   Pulse: 72   SpO2: 97%   Weight: 73.5 kg (162 lb)   Height: 160 cm (63\")     Body mass index is 28.7 kg/m².    I reviewed the patient's chief complaint, history of present illness, review of systems, past medical history, surgical history, family history, social history, medications, and allergy list.     Ortho Exam    General: Alert. No acute distress.  Left Upper Extremity: tender to palpation. No skin discoloration, atrophy, or swelling. 120 degrees active elevation. 140 passive forward shoulder elevation. External rotation to 45 degrees. Internal rotation to side pocket. Demonstrates intact active elbow ROM. Demonstrates intact active wrist ROM. Sensation intact. Palpable radial pulse. Neurovascularly intact.            Imaging " Results (Most Recent)       None             Results           Assessment and Plan   Diagnoses and all orders for this visit:    1. History of arthroplasty of left shoulder (Primary)         Patient is doing well. Continue home exercise program to progress strength and ROM.     Discussed the importance of lifelong antibiotic prophylaxis with dental procedures following total joint replacement. In the event of a dental procedure, patient is welcome to contact our office to obtain antibiotics prior to the procedure if their dentist does not provide the prescription.     We also discussed that if a fall/injury were to occur to the affected extremity was advised for patient to obtain x-rays for clarification that no hardware has been compromised or if showing signs of loosening.    Patient verbalized understanding.     Follow-up in 6 months. We will repeat xray's of the prosthetic joint at that time.   Call sooner or return to care, if needed with any changes or concerns.      Tobacco Use: Low Risk  (7/31/2025)    Patient History     Smoking Tobacco Use: Never     Smokeless Tobacco Use: Never     Passive Exposure: Past     Patient reports that they are a nonsmoker; cessation education not applicable.                 Follow Up   Return in about 6 months (around 1/31/2026).  There are no Patient Instructions on file for this visit.    Patient was given instructions and counseling regarding his condition or for health maintenance advice. Please see specific information pulled into the AVS if appropriate.     Patient or patient representative verbalized consent for the use of Ambient Listening during the visit with  KINJAL Grimaldo for chart documentation. 7/31/2025  09:17 EDT    Dictated Utilizing Dragon Dictation. Please note that portions of this note were completed with a voice recognition program. Part of this note may be an electronic transcription/translation of spoken language to printed text using the Dragon  Dictation System.

## 2025-08-12 ENCOUNTER — OFFICE VISIT (OUTPATIENT)
Dept: ORTHOPEDIC SURGERY | Facility: CLINIC | Age: 65
End: 2025-08-12
Payer: MEDICARE

## 2025-08-12 VITALS
OXYGEN SATURATION: 95 % | HEART RATE: 80 BPM | BODY MASS INDEX: 28.7 KG/M2 | WEIGHT: 162 LBS | SYSTOLIC BLOOD PRESSURE: 132 MMHG | HEIGHT: 63 IN | DIASTOLIC BLOOD PRESSURE: 78 MMHG

## 2025-08-12 DIAGNOSIS — Z98.890 S/P RIGHT KNEE ARTHROSCOPY: Primary | ICD-10-CM

## (undated) DEVICE — CONN JET HYDRA H20 AUXILIARY DISP

## (undated) DEVICE — LAPAROVUE VISIBILITY SYSTEM LAPAROSCOPIC SOLUTIONS: Brand: LAPAROVUE

## (undated) DEVICE — DRSNG SURG AQUACEL AG/ADVNTGE 9X25CM 3.5X10IN

## (undated) DEVICE — SOL IRR NACL 0.9PCT BT 1000ML

## (undated) DEVICE — OSC BEACH CHAIR SHOULDER-LF: Brand: MEDLINE INDUSTRIES, INC.

## (undated) DEVICE — TBG INFLOW/OUTFLOW DUALWAVE 1P/U

## (undated) DEVICE — LAPAROSCOPIC TROCAR SLEEVE/SINGLE USE: Brand: KII® OPTICAL ACCESS SYSTEM

## (undated) DEVICE — BIPOLAR SEALER 23-112-1 AQM 6.0: Brand: AQUAMANTYS™

## (undated) DEVICE — TISSUE RETRIEVAL SYSTEM: Brand: INZII RETRIEVAL SYSTEM

## (undated) DEVICE — PULLOVER TOGA, 2X LARGE: Brand: FLYTE, SURGICOOL

## (undated) DEVICE — INTENDED FOR TISSUE SEPARATION, AND OTHER PROCEDURES THAT REQUIRE A SHARP SURGICAL BLADE TO PUNCTURE OR CUT.: Brand: BARD-PARKER ® CARBON RIB-BACK BLADES

## (undated) DEVICE — PAD GRND REM POLYHESIVE A/ DISP

## (undated) DEVICE — SOL IRR NACL 0.9PCT 3000ML

## (undated) DEVICE — DEV LK WIREGUIDE FUSN OLYMP SCP

## (undated) DEVICE — 2, DISPOSABLE SUCTION/IRRIGATOR WITHOUT DISPOSABLE TIP: Brand: STRYKEFLOW

## (undated) DEVICE — GOWN,SIRUS,POLYRNF,BRTHSLV,XLN/XXL,18/CS: Brand: MEDLINE

## (undated) DEVICE — SPHINCTEROTOME: Brand: DREAMTOME™ RX 44

## (undated) DEVICE — Device: Brand: DEFENDO AIR/WATER/SUCTION AND BIOPSY VALVE

## (undated) DEVICE — THE STERILE LIGHT HANDLE COVER IS USED WITH STERIS SURGICAL LIGHTING AND VISUALIZATION SYSTEMS.

## (undated) DEVICE — BLD CUT FORMLA AGGR PLS 4.0MM

## (undated) DEVICE — POSTN HD UNIV

## (undated) DEVICE — PENCL E/S SMOKEEVAC W/TELESCP CANN

## (undated) DEVICE — 3 RING SUTURE PASSER - 16 CM: Brand: 3 RING SUTURE PASSER - 16 CM

## (undated) DEVICE — SYR LUERLOK 30CC

## (undated) DEVICE — Device

## (undated) DEVICE — SHOULDER P.A.D. III: Brand: DEROYAL

## (undated) DEVICE — DRSNG WND GZ CURAD OIL EMULSION 3X3IN STRL

## (undated) DEVICE — SLV SCD KN/LEN ADJ EXPRSS BLENDED MD 1P/U

## (undated) DEVICE — ANTIBACTERIAL UNDYED BRAIDED (POLYGLACTIN 910), SYNTHETIC ABSORBABLE SUTURE: Brand: COATED VICRYL

## (undated) DEVICE — SOLIDIFIER LIQLOC PLS 1500CC BT

## (undated) DEVICE — ELECTRD BLD EDGE COAT 3IN

## (undated) DEVICE — DRSNG PAD ABD 8X10IN STRL

## (undated) DEVICE — GLOVE,SURG,SENSICARE SLT,LF,PF,8.5: Brand: MEDLINE

## (undated) DEVICE — GLV SURG BIOGEL LTX PF 8 1/2

## (undated) DEVICE — SUT MNCRYL PLS ANTIB UD 3/0 PS2 27IN

## (undated) DEVICE — RETRIEVAL BALLOON CATHETER: Brand: EXTRACTOR™ PRO RX

## (undated) DEVICE — GLOVE,SURG,SENSICARE SLT,LF,PF,7: Brand: MEDLINE

## (undated) DEVICE — ENDOPATH PNEUMONEEDLE INSUFFLATION NEEDLES WITH LUER LOCK CONNECTORS 120MM: Brand: ENDOPATH

## (undated) DEVICE — Device: Brand: PULSAVAC®

## (undated) DEVICE — GLV SURG SENSICARE SLT PF LF 6.5 STRL

## (undated) DEVICE — SUT ETHLN 3-0 FS118IN 663H

## (undated) DEVICE — SOL IRR NACL 0.9PCT BT 250ML

## (undated) DEVICE — SINGLE USE DISTAL COVER MAJ-2315: Brand: SINGLE USE DISTAL COVER

## (undated) DEVICE — SUT VIC 2/0 CT1 36IN

## (undated) DEVICE — GLV SURG SENSICARE PI PF LF 7 GRN STRL

## (undated) DEVICE — SNAR POLYP CAPTIVATOR CRSNT 27MM 240CM

## (undated) DEVICE — STERILE POLYISOPRENE POWDER-FREE SURGICAL GLOVES WITH EMOLLIENT COATING: Brand: PROTEXIS

## (undated) DEVICE — ENDOPATH XCEL WITH OPTIVIEW TECHNOLOGY BLADELESS TROCARS WITH STABILITY SLEEVES: Brand: ENDOPATH XCEL OPTIVIEW

## (undated) DEVICE — GLV SURG SENSICARE SLT PF LF 7.5 STRL

## (undated) DEVICE — KNEE ARTHROSCOPY-LF: Brand: MEDLINE INDUSTRIES, INC.

## (undated) DEVICE — GW JAG STR .035IN 450CM

## (undated) DEVICE — CVR LEG BOOTLEG F/R NOSKID 33IN

## (undated) DEVICE — GAUZE,SPONGE,4"X4",16PLY,STRL,LF,10/TRAY: Brand: MEDLINE

## (undated) DEVICE — FMS FLUID MANAGEMENT SYSTEM INFLOW TUBING (FMS VUE): Brand: FMS

## (undated) DEVICE — ANTIBACTERIAL VIOLET BRAIDED (POLYGLACTIN 910), SYNTHETIC ABSORBABLE SURGICAL SUTURE: Brand: COATED VICRYL

## (undated) DEVICE — GOWN,REINFRCE,POLY,SIRUS,BREATH SLV,XXLG: Brand: MEDLINE

## (undated) DEVICE — NO-SCRATCH ™ SMALL WHITNEY CURETTE ™ IS A SINGLE-USE, PLASTIC CURETTE FOR QUICKLY APPLYING, MANIPULATING AND REMOVING BONE CEMENT DURING HIP AND KNEE REPLACEMENT SURGERY. THE PLASTIC IS SOFTER THAN STEEL INSTRUMENTS, REDUCING THE RISK OF DAMAGING THE PROSTHESIS WITH METAL INSTRUMENTS.  THE CURETTE’S 6MM TIP REMOVES EXCESS CEMENT FROM REPLACEMENT HIPS AND KNEES. EASY-TO-MANEUVER, THE SMALL BLUE CURETTE LETS YOU REMOVE CEMENT FROM ALL EDGES OF THE PROSTHESIS.NO-SCRATCH WHITNEY SMALL CURETTE FEATURES:SAFER THAN STEEL- MADE OF PLASTIC - STURDY YET SOFTER THAN SURGICAL STEEL.HANDIER- EACH TOOL HAS A MOLDED-IN THUMB INDENTATION INSTANTLY ORIENTING THE TOOL.- EASIER TO MANEUVER IN HARD TO SEE PLACES.- COLOR-CODED FOR EASY IDENTIFICATION.FASTER- COMES INDIVIDUALLY PACKAGED IN STERILE, PEEL OPEN POUCH, READY TO GO.- APPLIES, MANIPULATES, OR REMOVES CEMENT WITH FINGERTIP PRECISION.ECONOMICAL- THE COST OF A SINGLE REVISION DWARFS THE COST OF A SINGLE-USE CURETTE. - DISPOSABLE – THERE’S NO NEED TO WASTE TIME REMOVING HARDENED CEMENT OR RE-STERILIZING TOOLS.- LESS EXPENSIVE TO BUY AND INVENTORY - ORDER ONLY THE TOOL YOU USE.- PACKAGED 25 INDIVIDUALLY WRAPPED TOOLS TO A CARTON FOR CONVENIENT SHELF STORAGE.: Brand: WHITNEY NO-SCRATCH CURETTE (SMALL)

## (undated) DEVICE — ENDOPATH ETS-FLEX45 ARTICULATING ENDOSCOPIC LINEAR CUTTER, NO RELOAD: Brand: ENDOPATH

## (undated) DEVICE — TOTAL KNEE-LF: Brand: MEDLINE INDUSTRIES, INC.

## (undated) DEVICE — GENERAL LAPAROSCOPY-LF: Brand: MEDLINE INDUSTRIES, INC.

## (undated) DEVICE — APPL CHLORAPREP HI/LITE 26ML ORNG

## (undated) DEVICE — BLCK/BITE BLOX WO/DENTL/RIM W/STRAP 54F

## (undated) DEVICE — TROCAR: Brand: KII® SLEEVE

## (undated) DEVICE — FMS FLUID MANAGEMENT SYSTEM OUTFLOW TUBING WITHOUT ONE-WAY VALVE (FMS VUE OR FMS DUO PLUS): Brand: FMS

## (undated) DEVICE — ADHS SKIN SURG TISS VISC PREMIERPRO EXOFIN HI/VISC FAST/DRY

## (undated) DEVICE — LAPAROSCOPIC SCISSORS: Brand: EPIX LAPAROSCOPIC SCISSORS

## (undated) DEVICE — DELUXE LF ST 6X5.5 20/CS: Brand: DELUXE LF ST 6X5.5 20/CS

## (undated) DEVICE — LINER SURG CANSTR SXN S/RIGD 1500CC

## (undated) DEVICE — TOWEL,OR,DSP,ST,BLUE,STD,4/PK,20PK/CS: Brand: MEDLINE

## (undated) DEVICE — RX DELIVERY SYSTEM: Brand: NAVIFLEX™ RX DELIVERY SYSTEM

## (undated) DEVICE — SUT VIC PLS CTD BR 0 TIE 18IN VIL

## (undated) DEVICE — DRP SURG U/DRP INVISISHIELD PA 48X52IN

## (undated) DEVICE — 90-S CRUISE, SUCTION PROBE, NON-BENDABLE, MAX CUT LEVEL 1: Brand: SERFAS ENERGY

## (undated) DEVICE — GLV SURG SENSICARE SLT PF LF 7 STRL

## (undated) DEVICE — HYPODERMIC SAFETY NEEDLE: Brand: MONOJECT

## (undated) DEVICE — STRYKER PERFORMANCE SERIES SAGITTAL BLADE: Brand: STRYKER PERFORMANCE SERIES

## (undated) DEVICE — 40585 XL ADVANCED TRENDELENBURG POSITIONING KIT: Brand: 40585 XL ADVANCED TRENDELENBURG POSITIONING KIT

## (undated) DEVICE — SPHINCT CANNULATOME2 2L DOME/TP .035IN 6F 2.8MM 200CM

## (undated) DEVICE — MAT FLR ABS W/BLU/LINER 56X72IN WHT